# Patient Record
Sex: FEMALE | Race: WHITE | Employment: UNEMPLOYED | URBAN - METROPOLITAN AREA
[De-identification: names, ages, dates, MRNs, and addresses within clinical notes are randomized per-mention and may not be internally consistent; named-entity substitution may affect disease eponyms.]

---

## 2017-01-26 ENCOUNTER — OFFICE VISIT (OUTPATIENT)
Dept: INTERNAL MEDICINE CLINIC | Age: 52
End: 2017-01-26

## 2017-01-26 VITALS
HEART RATE: 64 BPM | SYSTOLIC BLOOD PRESSURE: 126 MMHG | BODY MASS INDEX: 40.34 KG/M2 | OXYGEN SATURATION: 96 % | HEIGHT: 66 IN | DIASTOLIC BLOOD PRESSURE: 91 MMHG | WEIGHT: 251 LBS | TEMPERATURE: 97.9 F | RESPIRATION RATE: 12 BRPM

## 2017-01-26 DIAGNOSIS — R06.83 SNORING: ICD-10-CM

## 2017-01-26 DIAGNOSIS — E78.00 HIGH CHOLESTEROL: ICD-10-CM

## 2017-01-26 DIAGNOSIS — K63.5 COLON POLYPS: ICD-10-CM

## 2017-01-26 DIAGNOSIS — E03.9 ACQUIRED HYPOTHYROIDISM: ICD-10-CM

## 2017-01-26 DIAGNOSIS — R07.89 OTHER CHEST PAIN: ICD-10-CM

## 2017-01-26 DIAGNOSIS — F32.89 OTHER DEPRESSION: ICD-10-CM

## 2017-01-26 DIAGNOSIS — I10 ESSENTIAL HYPERTENSION: ICD-10-CM

## 2017-01-26 DIAGNOSIS — Z23 ENCOUNTER FOR IMMUNIZATION: Primary | ICD-10-CM

## 2017-01-26 PROBLEM — G47.00 INSOMNIA: Status: ACTIVE | Noted: 2017-01-26

## 2017-01-26 PROBLEM — J45.909 ASTHMA: Status: ACTIVE | Noted: 2017-01-26

## 2017-01-26 RX ORDER — LOVASTATIN 10 MG/1
TABLET ORAL
Refills: 1 | COMMUNITY
Start: 2016-12-20 | End: 2017-04-21 | Stop reason: SDUPTHER

## 2017-01-26 RX ORDER — BUPROPION HYDROCHLORIDE 100 MG/1
TABLET, EXTENDED RELEASE ORAL
Refills: 3 | COMMUNITY
Start: 2016-12-17 | End: 2017-01-26 | Stop reason: ALTCHOICE

## 2017-01-26 NOTE — MR AVS SNAPSHOT
Visit Information Date & Time Provider Department Dept. Phone Encounter #  
 1/26/2017  1:15 PM José Miguel Mcnally [facility] [address] [phone] [id number] Follow-up Instructions Return in about 3 months (around 4/26/2017) for BP and labs . Your Appointments 3/17/2017  1:00 PM  
ACUTE CARE with MD [facility]) Appt Note: well woman cp 0 sb 3/16/16  
 [address] [id number] [address] [place] [address] [address] [address] Upcoming Health Maintenance Date Due Hepatitis C Screening 1965 INFLUENZA AGE 9 TO ADULT 8/1/2016 PAP AKA CERVICAL CYTOLOGY 11/25/2017 COLONOSCOPY 2/1/2018 BREAST CANCER SCRN MAMMOGRAM 4/5/2018 DTaP/Tdap/Td series (2 - Td) 10/5/2022 Allergies as of 1/26/2017  Review Complete On: 1/26/2017 By: Karena Call LPN Severity Noted Reaction Type Reactions Codeine  10/13/2010    Other (comments) Pcn [Penicillins]  10/13/2010    Other (comments) Current Immunizations  Reviewed on 10/29/2013 Name Date Influenza Vaccine 10/17/2013 Influenza Vaccine (Quad) PF  Incomplete Pneumococcal Vaccine (Unspecified Type) 10/17/2013 Not reviewed this visit You Were Diagnosed With   
  
 Codes Comments Encounter for immunization    -  Primary ICD-10-CM: B19 ICD-9-CM: V03.89 Snoring     ICD-10-CM: R06.83 
ICD-9-CM: 786.09 Colon polyps     ICD-10-CM: K63.5 ICD-9-CM: 211.3 Essential hypertension     ICD-10-CM: I10 
ICD-9-CM: 401.9 Other chest pain     ICD-10-CM: R07.89 ICD-9-CM: 786.59 Elevated BP     ICD-10-CM: I10 
ICD-9-CM: 401.9 Other depression     ICD-10-CM: F32.89 Vitals BP Pulse Temp Resp Height(growth percentile) Weight(growth percentile)  (!) 126/91 (BP 1 Location: Left arm, BP Patient Position: Sitting) 64 97.9 °F (36.6 °C) (Oral) 12 5' 6\" (1.676 m) 251 lb (113.9 kg) LMP SpO2 BMI OB Status Smoking Status 06/22/2009 96% 40.51 kg/m2 Hysterectomy Former Smoker BMI and BSA Data Body Mass Index Body Surface Area 40.51 kg/m 2 2.3 m 2 Preferred Pharmacy Pharmacy Name Phone [id number] [address] [place] [address] [phone] Your Updated Medication List  
  
   
This list is accurate as of: 1/26/17  2:16 PM.  Always use your most recent med list.  
  
  
  
  
 albuterol 2.5 mg /3 mL (0.083 %) nebulizer solution Commonly known as:  PROVENTIL VENTOLIN  
  
 albuterol 90 mcg/actuation inhaler Commonly known as:  Avis Portal Take 2 Puffs by inhalation every six (6) hours as needed. escitalopram oxalate 20 mg tablet Commonly known as:  Arther Putty TAKE 1 TABLET BY MOUTH EVERY DAY  
  
 lovastatin 10 mg tablet Commonly known as:  MEVACOR  
TAKE 1 TABLET BY MOUTH EVERY DAY  
  
 QVAR 40 mcg/actuation inhaler Generic drug:  beclomethasone Take 1 Puff by inhalation two (2) times daily as needed. SINGULAIR 10 mg tablet Generic drug:  montelukast  
Take 10 mg by mouth daily. SYNTHROID 100 mcg tablet Generic drug:  levothyroxine  
  
 traZODone 150 mg tablet Commonly known as:  DESYREL  
300 mg. Takes prn patient states she \"hardly takes it\" We Performed the Following AMB SUPPLY ORDER [[id number] Custom] Comments:  
 Check your blood pressure 5 times per week and record readings. INFLUENZA VIRUS VAC QUAD,SPLIT,PRESV FREE SYRINGE 3/> YRS IM [id number] CPT(R)] TN IMMUNIZ ADMIN,1 SINGLE/COMB VAC/TOXOID [id number] CPT(R)] REFERRAL FOR COLONOSCOPY [YDT787 Custom] Comments:  
 Needs coonoscopy followed for polyps every 3 years REFERRAL TO CARDIOLOGY [[id number] Custom] Comments:  
 Chest pain SLEEP MEDICINE REFERRAL [[id number] Custom] Comments:  
 Suspect SUN Follow-up Instructions Return in about 3 months (around 4/26/2017) for BP and labs . Referral Information Referral ID Referred By Referred To  
  
 [id number] APPA Derek Vazquez, [address] [facility] [address] Phone: [phone] Fax: [phone] Visits Status Start Date End Date 1 New Request 1/26/17 1/26/18 If your referral has a status of pending review or denied, additional information will be sent to support the outcome of this decision. Referral ID Referred By Referred To  
 [id number] [address] [facility], [address] [facility] [address] [place] [address],[address], [facility] 23 Visits Status Start Date End Date 1 New Request 1/26/17 1/26/18 If your referral has a status of pending review or denied, additional information will be sent to support the outcome of this decision. Referral ID Referred By Referred To  
 [id number] APPA Ewing Frankel, [address] [facility] [address] Phone: [phone] Fax: [phone] Visits Status Start Date End Date 1 New Request 1/26/17 1/26/18 If your referral has a status of pending review or denied, additional information will be sent to support the outcome of this decision. Patient Instructions Insomnia: Care Instructions Your Care Instructions Insomnia is the inability to sleep well. It is a common problem for most people at some time. Insomnia may make it hard for you to get to sleep, stay asleep, or sleep as long as you need to. This can make you tired and grouchy during the day. It can also make you forgetful, less effective at work, and unhappy. Insomnia can be caused by conditions such as depression or anxiety. Pain can also affect your ability to sleep.  When these problems are solved, the insomnia usually clears up. But sometimes bad sleep habits can cause insomnia. If insomnia is affecting your work or your enjoyment of life, you can take steps to improve your sleep. Follow-up care is a key part of your treatment and safety. Be sure to make and go to all appointments, and call your doctor if you are having problems. It's also a good idea to know your test results and keep a list of the medicines you take. How can you care for yourself at home? What to avoid · Do not have drinks with caffeine, such as coffee or black tea, for 8 hours before bed. · Do not smoke or use other types of tobacco near bedtime. Nicotine is a stimulant and can keep you awake. · Avoid drinking alcohol late in the evening, because it can cause you to wake in the middle of the night. · Do not eat a big meal close to bedtime. If you are hungry, eat a light snack. · Do not drink a lot of water close to bedtime, because the need to urinate may wake you up during the night. · Do not read or watch TV in bed. Use the bed only for sleeping and sexual activity. What to try · Go to bed at the same time every night, and wake up at the same time every morning. Do not take naps during the day. · Keep your bedroom quiet, dark, and cool. · Sleep on a comfortable pillow and mattress. · If watching the clock makes you anxious, turn it facing away from you so you cannot see the time. · If you worry when you lie down, start a worry book. Well before bedtime, write down your worries, and then set the book and your concerns aside. · Try meditation or other relaxation techniques before you go to bed. · If you cannot fall asleep, get up and go to another room until you feel sleepy. Do something relaxing. Repeat your bedtime routine before you go to bed again. · Make your house quiet and calm about an hour before bedtime.  Turn down the lights, turn off the TV, log off the computer, and turn down the volume on music. This can help you relax after a busy day. When should you call for help? Watch closely for changes in your health, and be sure to contact your doctor if: 
· Your efforts to improve your sleep do not work. · Your insomnia gets worse. · You have been feeling down, depressed, or hopeless or have lost interest in things that you usually enjoy. Where can you learn more? Go to http://olivier-akil.info/. Enter P513 in the search box to learn more about \"Insomnia: Care Instructions. \" Current as of: July 26, 2016 Content Version: 11.1 © 9418-2675 Exclusively.in. Care instructions adapted under license by [employer] (which disclaims liability or warranty for this information). If you have questions about a medical condition or this instruction, always ask your healthcare professional. [address] any warranty or liability for your use of this information. 
 
--------------------------------------------- Trial of melatonin OTC - take the disintegrating type at bedtime.  
------------------------------------- Normal blood pressure is 125/80 or less. Record BP readings. Introducing [facility]! Dear Richi Esteban: Thank you for requesting a ONTRAPORT account. Our records indicate that you already have an active ONTRAPORT account. You can access your account anytime at [email or web address] Did you know that you can access your hospital and ER discharge instructions at any time in ONTRAPORT? You can also review all of your test results from your hospital stay or ER visit. Additional Information If you have questions, please visit the Frequently Asked Questions section of the ONTRAPORT website at [email or web address] Remember, ONTRAPORT is NOT to be used for urgent needs. For medical emergencies, dial 911. Now available from your iPhone and Android! Please provide this summary of care documentation to your next provider. Your primary care clinician is listed as GUNNER Chang. If you have any questions after today's visit, please call [phone].

## 2017-01-26 NOTE — PROGRESS NOTES
Ms. Caitlyn Reyes is a new patient who is here to establish care. Had to change PCP due to insurance. Establish Care (new patient )     depression: patient was started [facility] after hysterectomy. Mood is stable. But has significant  Insomnia. Has energy during the day and enjoys activities and family. Patient stopped taking Wellbutrin on her own and requesting to have it taken off from her med list.     Insomnia: having trouble sleeping for years. Takes trazodone 300mg to sleeps but that makes her too sleepy during the day also. She has tried taking lower dose and it doesn't work. Has tried benadryl. Asthma: takes QVAR daily. Albuterol as needed. Also on singulair. Never intubated for asthma. Ashtma is well controlled     Up to date on Omnidrone - has appointment with  [address] [facility] [address] [facility] pap smears and orders mammogram      Chest pain occasionally: substernal. Lasts seconds. Not exertional can happen at rest. \" Shapr pain\". No dyspnea, no orthopnea and no PND. Patient is concerned due to strong family. Hx. Takes lovastatin daily and hap lipid panel checked in November. Requesting to see a cardiologist.   Strong family hx of CAD - sister had CAD and had MI last. Father had heart attack in his early 46s, brother had heart attack at 50. Chronic knee pain and back pain : 2 surgeries in Knee and back and followed by ortho. Not on medication for pain.          Review of systems:  Constitutional: negative for fever, chills, weight loss, night sweats   Eyes : negative for vision changes, eye pain and discharge  Nose and Throat: negative for tinnitus, sore throat   Cardiovascular: positive for occasional chest pain, no palpitations and shortness of breath  Respiratory: negative for shortness of breath, cough and wheezing   Gastroinstestinal: negative for abdominal pain, nausea, vomiting, diarrhea, constipation, and blood in the stool  Musculoskeletal: negative for back ache and joint ache Genitourinary: negative for dysuria, nocturia, polyuria and hematuria   Neurologic: Negative for focal weakness, numbness or incoordination  Skin: negative for rash, pruritus  Hematologic: negative for easy bruising      Past Medical History   Diagnosis Date    Asthma     Chronic pain      LOWER BACK    Hypercholesteremia     Psychiatric disorder         Past Surgical History   Procedure Laterality Date    Hx breast biopsy       Left    Renal stent      Hx orthopaedic       thumb surgery    Hx orthopaedic       left knee surgery    Hx back surgery       L5 & S 1    Pr removal of kidney stone      Hx hysterectomy  6/22/09     LSH LSO    Hx tubal ligation         Allergies   Allergen Reactions    Codeine Other (comments)    Pcn [Penicillins] Other (comments)       Current Outpatient Prescriptions on File Prior to Visit   Medication Sig Dispense Refill    SYNTHROID 100 mcg tablet   7    traZODone (DESYREL) 150 mg tablet 300 mg. Takes prn patient states she \"hardly takes it\"  5    albuterol (PROVENTIL VENTOLIN) 2.5 mg /3 mL (0.083 %) nebulizer solution   1    escitalopram oxalate (LEXAPRO) 20 mg tablet TAKE 1 TABLET BY MOUTH EVERY DAY 30 Tab 4    beclomethasone (QVAR) 40 mcg/actuation inhaler Take 1 Puff by inhalation two (2) times daily as needed.  albuterol (PROVENTIL, VENTOLIN) 90 mcg/Actuation inhaler Take 2 Puffs by inhalation every six (6) hours as needed.  montelukast (SINGULAIR) 10 mg tablet Take 10 mg by mouth daily. No current facility-administered medications on file prior to visit. family history includes Cancer in her sister; Cancer (age of onset: 54) in her mother; Elevated Lipids in her brother, father, sister, sister, and sister; Heart Disease in her brother, father, sister, sister, and sister; Hypertension in her brother and father. There is no history of Anesth Problems.     Social History     Social History    Marital status: SINGLE     Spouse name: N/A   Alin Villaseñor Number of children: N/A    Years of education: N/A     Occupational History    Not on file. Social History Main Topics    Smoking status: Former Smoker     Packs/day: 1.00     Years: 25.00    Smokeless tobacco: Never Used    Alcohol use Yes      Comment: occasionally    Drug use: No    Sexual activity: Yes     Partners: Male     Birth control/ protection: Surgical     Other Topics Concern    Not on file     Social History Narrative       Visit Vitals    BP (!) 126/91 (BP 1 Location: Left arm, BP Patient Position: Sitting)    Pulse 64    Temp 97.9 °F (36.6 °C) (Oral)    Resp 12    Ht 5' 6\" (1.676 m)    Wt 251 lb (113.9 kg)    LMP 06/22/2009    SpO2 96%    BMI 40.51 kg/m2     General:  Well appearing female no acute distress  HEENT:   PERRL,normal conjunctiva. External ear and canals normal, TMs normal.  Hearing normal to voice. Nose without edema or discharge, normal septum. Lips, teeth, gums normal.  Oropharynx: no erythema, no exudates, no lesions, normal tongue. Neck:  Supple. Large neck circum fence. Thyroid normal size, nontender, without nodules. No carotid bruit. No masses or lymphadenopathy  Respiratory: no respiratory distress,  no wheezing, no rhonchi, no rales. No chest wall tenderness. Cardiovascular:  RRR, normal S1S2, no murmur. Gastrointestinal: normal bowel sounds, soft, nontender, without masses. No hepatosplenomegaly. Extremities +2 pulses, no edema, normal sensation   Musculoskeletal:  Normal gait. Normal digits and nails. Normal strength and tone, no atrophy, and no abnormal movement. Skin:  No rash, no lesions, no ulcers. Skin warm, normal turgor, without induration or nodules. Neuro:  A and OX4, fluent speech, cranial nerves normal 2-12. Sensation normal to light touch.   DTR symmetrical  Psych:  Normal affect      Lab Results   Component Value Date/Time    WBC 4.5 11/02/2014 01:00 PM    Hemoglobin (POC) 12.9 10/28/2013 03:40 PM    HGB 12.8 11/02/2014 01:00 PM Hematocrit (POC) 38 10/28/2013 03:40 PM    HCT 40.5 11/02/2014 01:00 PM    PLATELET 413 49/45/3580 01:00 PM    MCV 97.8 11/02/2014 01:00 PM     Lab Results   Component Value Date/Time    Sodium 140 11/02/2014 01:00 PM    Potassium 4.1 11/02/2014 01:00 PM    Chloride 104 11/02/2014 01:00 PM    CO2 29 11/02/2014 01:00 PM    Anion gap 7 11/02/2014 01:00 PM    Glucose 88 11/02/2014 01:00 PM    BUN 9 11/02/2014 01:00 PM    Creatinine 1.03 11/02/2014 01:00 PM    BUN/Creatinine ratio 9 11/02/2014 01:00 PM    GFR est AA >60 11/02/2014 01:00 PM    GFR est non-AA 57 11/02/2014 01:00 PM    Calcium 9.0 11/02/2014 01:00 PM     Patient to bring in labs done in November 2016. EKG showed NSR with normal ST segments. Assessment and Plan:    44 yo woman with a hx of obesity, depression, hypothyroidism and asthma presenting to establish care    Other chest pain: description is atypical, non exertional lasts seconds. However patient is obese and has strong family hx of CAD.   - REFERRAL TO CARDIOLOGY - patient will likely need stress test   - AMB POC EKG ROUTINE W/ 12 LEADS, INTER & REP       Snoring/ insomnia: large neck circumference suspect SUN  - SLEEP MEDICINE REFERRAL  - trial of melatonin at night time  - continue trazodone as needed     Colon polyps hx. Patient is due for colonoscopy this year. No Bleeding or change in bowel habits   On 03/14 - 2017 - update  I received records from previous colonoscopy done in 2014 which showed tubular adenoma with hyperplastic polyp - no mention of time to follow up -  Already given referral for colonoscopy   - REFERRAL FOR COLONOSCOPY       Elevated BP: BP is high today.  On previous visits in system BP is normal - possibly due to office visit   - AMB SUPPLY ORDER for BP cuff at home - advised to take BP and send us measurements via my chart or next apt     depression  - stable continue lexapro  - pt discontinued wellbutrin      Obesity: counseled on weight loss      High cholesterol : on lovastatin had recent cholesterol check pending results sent over     MIld intermittent Asthma: stable continue QVAR and singulair and PRN albuterol     In Addition to above patient was seen for immunizations    . Encounter for immunization  - Influenza virus vaccine (QUADRIVALENT PRES FREE SYRINGE) IM 3 years and older  - ND IMMUNIZ ADMIN,1 SINGLE/COMB VAC/TOXOID    Patient to send copy of recent  labs     Follow-up Disposition:  Return in about 3 months (around 4/26/2017) for BP and labs .    Received records   Labs done om Nove 2016   normal BMP and LFTs, normal CBC   Cholesterol last chedked in 08/2016 which showed  and HDL is 86   Triglycerides 100     TSH normal in 08/2016    Sher Zapata MD

## 2017-01-26 NOTE — PROGRESS NOTES
Reviewed record in preparation for visit and have obtained necessary documentation. Identified pt with two pt identifiers(name and ). Chief Complaint   Patient presents with   [employer] Establish Care     new patient        Health Maintenance Due   Topic Date Due    Hepatitis C Screening  1965    DTaP/Tdap/Td series (1 - Tdap) 1985    INFLUENZA AGE 9 TO ADULT  2016       Ms. Pam Houston has a reminder for a \"due or due soon\" health maintenance. I have asked that she discuss health maintenance topic(s) due with Her  primary care provider. Coordination of Care Questionnaire:  :     1) Have you been to an emergency room, urgent care clinic since your last visit? no   Hospitalized since your last visit? no             2) Have you seen or consulted any other health care providers outside of [address] [place] [address] since your last visit? no  (Include any pap smears or colon screenings in this section.)      Patient is accompanied by self I have received verbal consent from Misti Reeves to discuss any/all medical information while they are present in the room.

## 2017-01-26 NOTE — PATIENT INSTRUCTIONS

## 2017-02-27 ENCOUNTER — TELEPHONE (OUTPATIENT)
Dept: INTERNAL MEDICINE CLINIC | Age: 52
End: 2017-02-27

## 2017-02-27 NOTE — TELEPHONE ENCOUNTER
Pt requesting that you make her a colonoscopy appt at the imaging center on [address] [facility]. Pt states she doesn't need to go to a consult as she has had one before. Please call pt.

## 2017-02-27 NOTE — TELEPHONE ENCOUNTER
Spoke with patient after verifying name and  regarding colonoscopy appointment. Patient stated that would like Dr. Antonina Hamlin to schedule her an appointment to complete her colonoscopy. Writer explained to patient that Dr. Antonina Hamlin doesn't call and schedule the appointment but the patient does. Patient given an opportunity to ask questions, repeated information, and verbalized understanding.

## 2017-03-03 ENCOUNTER — TELEPHONE (OUTPATIENT)
Dept: INTERNAL MEDICINE CLINIC | Age: 52
End: 2017-03-03

## 2017-03-03 NOTE — TELEPHONE ENCOUNTER
Spoke with patient after verifying name and  regarding diverticulitis. Patient stated she had diagnosis of diverticulitis on 2014 by Dr. Duane Deluca. Per chart review, no hx of diverticulitis. Patient stated that she will drop off  the paperwork from Dr. Duane Deluca office today. Patient stated she is having a flare-up and would like a antibiotic called into her local pharmacy. Will Discuss with On-Call provider. Patient given an opportunity to ask questions, repeated information, and verbalized understanding.

## 2017-03-03 NOTE — TELEPHONE ENCOUNTER
Spoke with patient after verifying name and  regarding Dr. Lizbeth Deleon recommendations. Writer informed patient of Dr. Lizbeth Deleon recommendations. Patient given an opportunity to ask questions, repeated information, and verbalized understanding.

## 2017-03-03 NOTE — TELEPHONE ENCOUNTER
Pt called and states that they are needing a call back today in regards to getting a medication called in for her diverticulitis being inflamed. Please call pt to advise.

## 2017-03-03 NOTE — TELEPHONE ENCOUNTER
Discussed with Dr. Elise Pritchard. Dr. Elise Pritchard reviewed the paperwork and stated the patient needs to see UC to be evaluated with an abdominal exam.

## 2017-03-03 NOTE — TELEPHONE ENCOUNTER
Patient states she's calling to advise she just dropped off her medical records to Dr. Benoit Rodriguez at the  that was needed. Please call if any questions.  Thank you

## 2017-03-17 ENCOUNTER — HOSPITAL ENCOUNTER (OUTPATIENT)
Dept: LAB | Age: 52
Discharge: HOME | End: 2017-03-17
Payer: MEDICARE

## 2017-03-17 ENCOUNTER — OFFICE VISIT (OUTPATIENT)
Dept: INTERNAL MEDICINE CLINIC | Age: 52
End: 2017-03-17

## 2017-03-17 VITALS
TEMPERATURE: 98 F | DIASTOLIC BLOOD PRESSURE: 89 MMHG | WEIGHT: 254 LBS | BODY MASS INDEX: 40.82 KG/M2 | HEIGHT: 66 IN | HEART RATE: 69 BPM | SYSTOLIC BLOOD PRESSURE: 149 MMHG | RESPIRATION RATE: 18 BRPM | OXYGEN SATURATION: 99 %

## 2017-03-17 DIAGNOSIS — K57.32 DIVERTICULITIS OF LARGE INTESTINE WITHOUT PERFORATION OR ABSCESS WITHOUT BLEEDING: ICD-10-CM

## 2017-03-17 DIAGNOSIS — J01.00 ACUTE NON-RECURRENT MAXILLARY SINUSITIS: Primary | ICD-10-CM

## 2017-03-17 DIAGNOSIS — I10 ESSENTIAL HYPERTENSION: ICD-10-CM

## 2017-03-17 DIAGNOSIS — Z11.59 NEED FOR HEPATITIS C SCREENING TEST: ICD-10-CM

## 2017-03-17 PROCEDURE — 80053 COMPREHEN METABOLIC PANEL: CPT

## 2017-03-17 PROCEDURE — 86803 HEPATITIS C AB TEST: CPT

## 2017-03-17 PROCEDURE — 85025 COMPLETE CBC W/AUTO DIFF WBC: CPT

## 2017-03-17 PROCEDURE — 36415 COLL VENOUS BLD VENIPUNCTURE: CPT

## 2017-03-17 RX ORDER — LEVOFLOXACIN 500 MG/1
500 TABLET, FILM COATED ORAL DAILY
Qty: 14 TAB | Refills: 0 | Status: SHIPPED | OUTPATIENT
Start: 2017-03-17 | End: 2017-03-24

## 2017-03-17 RX ORDER — BUPROPION HYDROCHLORIDE 100 MG/1
TABLET, EXTENDED RELEASE ORAL
Refills: 3 | COMMUNITY
Start: 2017-03-02 | End: 2017-03-24

## 2017-03-17 RX ORDER — CYCLOBENZAPRINE HCL 5 MG
TABLET ORAL
Refills: 1 | COMMUNITY
Start: 2016-12-20 | End: 2017-03-24

## 2017-03-17 RX ORDER — CELECOXIB 200 MG/1
CAPSULE ORAL
Refills: 5 | COMMUNITY
Start: 2016-12-31 | End: 2017-03-24

## 2017-03-17 RX ORDER — METRONIDAZOLE 500 MG/1
500 TABLET ORAL 3 TIMES DAILY
Qty: 42 TAB | Refills: 0 | Status: SHIPPED | OUTPATIENT
Start: 2017-03-17 | End: 2017-03-24

## 2017-03-17 NOTE — MR AVS SNAPSHOT
Visit Information Date & Time Provider Department Dept. Phone Encounter #  
 3/17/2017  3:30 PM [place], [place] [address] [place] [address],[address] [phone] [id number] Follow-up Instructions Return in about 4 weeks (around 4/14/2017) for BP pressure and fasting cholesterol . Your Appointments 3/17/2017  3:30 PM  
ACUTE CARE with MD FARZAD Mcnally [facility]) Appt Note: Sinus Infection symptoms//  
 [facility] [address] [address]  
[phone]  
  
   
 [facility] [address] [facility]  [address] [facility]  
  
    
 3/20/2017  2:20 PM  
New Patient with Deshawn Guerrero MD  
[address] [facility] [place] ([facility]) Appt Note: NP_ ref by Dr Estevan Jackson witnessed apneas_ Medicare + Medicaid; r/s due to physician emergency [address] [place] [address], [address] [phone] [address] [place] [address], [address] [address]  
  
    
 3/24/2017  1:15 PM  
ROUTINE CARE with Babar Herrera MD  
[facility]) Appt Note: well woman cp 0 sb 3/16/16; well woman cp 0 sb 3/16/16  
 [address] [facility]. [address] [phone] [address] [place] [address] 4/27/2017  3:30 PM  
ROUTINE CARE with MD FARZAD Mcnally [facility]) Appt Note: 3 mon f/u for bp and labs [facility] [address] [phone]  
[address] [facility]  [address] [facility] Upcoming Health Maintenance Date Due Hepatitis C Screening 1965 PAP AKA CERVICAL CYTOLOGY 11/25/2017 COLONOSCOPY 2/1/2018 BREAST CANCER SCRN MAMMOGRAM 4/5/2018 DTaP/Tdap/Td series (2 - Td) 10/5/2022 Allergies as of 3/17/2017  Review Complete On: 3/17/2017 By: Karie Schwab Severity Noted Reaction Type Reactions Codeine  10/13/2010    Other (comments) Pcn [Penicillins]  10/13/2010    Other (comments) Current Immunizations  Reviewed on 1/26/2017 Name Date Influenza Vaccine 10/17/2013 Influenza Vaccine (Quad) PF 1/26/2017 Pneumococcal Vaccine (Unspecified Type) 10/17/2013 Not reviewed this visit You Were Diagnosed With   
  
 Codes Comments Acute non-recurrent maxillary sinusitis    -  Primary ICD-10-CM: J01.00 ICD-9-CM: 461.0 Diverticulitis of large intestine without perforation or abscess without bleeding     ICD-10-CM: K57.32 
ICD-9-CM: 562.11 Vitals BP Pulse Temp Resp Height(growth percentile) Weight(growth percentile) 149/89 (BP 1 Location: Left arm, BP Patient Position: Sitting) 69 98 °F (36.7 °C) (Oral) 18 5' 6\" (1.676 m) 254 lb (115.2 kg) LMP SpO2 BMI OB Status Smoking Status 06/22/2009 99% 41 kg/m2 Hysterectomy Former Smoker BMI and BSA Data Body Mass Index Body Surface Area 41 kg/m 2 2.32 m 2 Preferred Pharmacy Pharmacy Name Phone [id number] [address] [place] [address] [phone] Your Updated Medication List  
  
   
This list is accurate as of: 3/17/17  3:27 PM.  Always use your most recent med list.  
  
  
  
  
 albuterol 2.5 mg /3 mL (0.083 %) nebulizer solution Commonly known as:  PROVENTIL VENTOLIN  
  
 albuterol 90 mcg/actuation inhaler Commonly known as:  Raúl Has Take 2 Puffs by inhalation every six (6) hours as needed. buPROPion  mg SR tablet Commonly known as:  WELLBUTRIN SR  
TAKE 1 TABLET BY MOUTH 2 TIMES A DAY  
  
 celecoxib 200 mg capsule Commonly known as:  CELEBREX  
TAKE 1 CAPSULE BY MOUTH DAILY  
  
 cyclobenzaprine 5 mg tablet Commonly known as:  FLEXERIL  
TAKE 1 TABLET BY MOUTH AT BEDTIME AS NEEDED  
  
 escitalopram oxalate 20 mg tablet Commonly known as:  Lima Carroll TAKE 1 TABLET BY MOUTH EVERY DAY  
  
 levoFLOXacin 500 mg tablet Commonly known as:  Rue Rafael Take 1 Tab by mouth daily. lovastatin 10 mg tablet Commonly known as:  MEVACOR  
TAKE 1 TABLET BY MOUTH EVERY DAY  
  
 metroNIDAZOLE 500 mg tablet Commonly known as:  FLAGYL Take 1 Tab by mouth three (3) times daily. QVAR 40 mcg/actuation Honey Generic drug:  beclomethasone Take 1 Puff by inhalation two (2) times daily as needed. SINGULAIR 10 mg tablet Generic drug:  montelukast  
Take 10 mg by mouth daily. SYNTHROID 100 mcg tablet Generic drug:  levothyroxine Prescriptions Sent to Pharmacy Refills  
 levoFLOXacin (LEVAQUIN) 500 mg tablet 0 Sig: Take 1 Tab by mouth daily. Class: Normal  
 Pharmacy: [address] [place] [address] [address] [place] [address] Ph #: [phone] Route: Oral  
 metroNIDAZOLE (FLAGYL) 500 mg tablet 0 Sig: Take 1 Tab by mouth three (3) times daily. Class: Normal  
 Pharmacy: [address] [place] [address] [address] [place] [address] Ph #: [phone] Route: Oral  
  
We Performed the Following CBC WITH AUTOMATED DIFF [76570 CPT(R)] METABOLIC PANEL, COMPREHENSIVE [37265 CPT(R)] Follow-up Instructions Return in about 4 weeks (around 4/14/2017) for BP pressure and fasting cholesterol . Patient Instructions Learning About Diverticulosis and Diverticulitis What are diverticulosis and diverticulitis? In diverticulosis and diverticulitis, pouches called diverticula form in the wall of the large intestine, or colon. · In diverticulosis, the pouches do not cause any pain or other symptoms. · In diverticulitis, the pouches get inflamed or infected and cause symptoms. Doctors aren't sure what causes these pouches in the colon. But they think that a low-fiber diet may play a role.  Without fiber to add bulk to the stool, the colon has to work harder than normal to push the stool forward. The pressure from this may cause pouches to form in weak spots along the colon. Some people with diverticulosis get diverticulitis. But experts don't know why this happens. What are the symptoms? · In diverticulosis, most people don't have symptoms. But pouches sometimes bleed. · In diverticulitis, symptoms may last from a few hours to a week or more. They include: ¨ Belly pain. This is usually in the lower left side. It is sometimes worse when you move. This is the most common symptom. ¨ Fever and chills. ¨ Bloating and gas. ¨ Diarrhea or constipation. ¨ Nausea and sometimes vomiting. ¨ Not feeling like eating. How can you prevent these problems? You may be able to lower your chance of getting diverticulitis. You can do this by taking steps to prevent constipation. · Eat fruits, vegetables, beans, and whole grains every day. These foods are high in fiber. · Drink plenty of fluids (enough so that your urine is light yellow or clear like water). If you have kidney, heart, or liver disease and have to limit fluids, talk with your doctor before you increase the amount of fluids you drink. · Get at least 30 minutes of exercise on most days of the week. Walking is a good choice. You also may want to do other activities, such as running, swimming, cycling, or playing tennis or team sports. · Take a fiber supplement, such as Citrucel or Metamucil, every day if needed. Read and follow all instructions on the label. · Schedule time each day for a bowel movement. Having a daily routine may help. Take your time and do not strain when having a bowel movement. Some people avoid nuts, seeds, berries, and popcorn. They believe that these foods might get trapped in the diverticula and cause pain. But there is no proof that these foods cause diverticulitis or make it worse. How are these problems treated? · The best way to treat diverticulosis is to avoid constipation. (See the tips above.) · Treatment for diverticulitis includes antibiotics and often a change in your diet. You may need only liquids at first. Your doctor may suggest pain medicines for pain or belly cramps. In some cases, surgery may be needed. Follow-up care is a key part of your treatment and safety. Be sure to make and go to all appointments, and call your doctor if you are having problems. It's also a good idea to know your test results and keep a list of the medicines you take. Where can you learn more? Go to http://olivier-akil.info/. Enter F752 in the search box to learn more about \"Learning About Diverticulosis and Diverticulitis. \" Current as of: August 9, 2016 Content Version: 11.1 © 4243-2685 [employer]. Care instructions adapted under license by [employer] (which disclaims liability or warranty for this information). If you have questions about a medical condition or this instruction, always ask your healthcare professional. [address] any warranty or liability for your use of this information. Introducing [facility]! Dear Giacomo Gaviria: Thank you for requesting a [employer] account. Our records indicate that you already have an active [employer] account. You can access your account anytime at https://[employer]. [employer] Did you know that you can access your hospital and ER discharge instructions at any time in [employer]? You can also review all of your test results from your hospital stay or ER visit. Additional Information If you have questions, please visit the Frequently Asked Questions section of the [employer] website at https://[employer]. [employer]/. Remember, [employer] is NOT to be used for urgent needs. For medical emergencies, dial 911. Now available from your iPhone and Android! Please provide this summary of care documentation to your next provider. Your primary care clinician is listed as GUNNER Chang. If you have any questions after today's visit, please call [phone].

## 2017-03-17 NOTE — PROGRESS NOTES
Chief Complaint   Patient presents with    Sinus Infection     x5 days      1. Have you been to the ER, urgent care clinic since your last visit? Hospitalized since your last visit? No    2. Have you seen or consulted any other health care providers outside of the [address] [place] [address] since your last visit? Include any pap smears or colon screening.  No

## 2017-03-17 NOTE — PROGRESS NOTES
CC: Sinus Infection (x5 days )  and abdominal pain     HPI:    She is a 45 y.o. female who presents for evaluation of sinus pain/ thinks has infection tara bdominla pain     sinusinits symptoms: complains of pain 5 days in maxillary sinus, sinus pressure and fullness, subjective fever, chills. Throat also with pain. Yellow/green discharge. Took teraflu with some improvement     Left lower abdominal pain: has hx of diverticulitisDiverticulitis X 3 had colonoscopy in the past.  Notes pain in left lower abdomen sharp 5/10 comes and goes for past 4-5 days also tried to make apt this Wednesday and unable to. Denies nausea and vomiting and tolerating PO.         ROS:  12 systems reviewed and negative other than HPI     Past Medical History:   Diagnosis Date    Asthma     Chronic pain     LOWER BACK    Colon polyps     Hypercholesteremia     Psychiatric disorder        Current Outpatient Prescriptions on File Prior to Visit   Medication Sig Dispense Refill    lovastatin (MEVACOR) 10 mg tablet TAKE 1 TABLET BY MOUTH EVERY DAY  1    SYNTHROID 100 mcg tablet   7    albuterol (PROVENTIL VENTOLIN) 2.5 mg /3 mL (0.083 %) nebulizer solution   1    escitalopram oxalate (LEXAPRO) 20 mg tablet TAKE 1 TABLET BY MOUTH EVERY DAY 30 Tab 4    beclomethasone (QVAR) 40 mcg/actuation inhaler Take 1 Puff by inhalation two (2) times daily as needed.  albuterol (PROVENTIL, VENTOLIN) 90 mcg/Actuation inhaler Take 2 Puffs by inhalation every six (6) hours as needed.  montelukast (SINGULAIR) 10 mg tablet Take 10 mg by mouth daily. No current facility-administered medications on file prior to visit.         Past Surgical History:   Procedure Laterality Date    HX BACK SURGERY      L5 & S 1    HX BREAST BIOPSY      Left    HX [facility]CT[facility]  6/22/09    [facility]    HX ORTHOPAEDIC      thumb surgery    HX ORTHOPAEDIC      left knee surgery    HX TUBAL LIGATION      REMOVAL OF KIDNEY STONE      RENAL STENT Family History   Problem Relation Age of Onset    Cancer Mother 54     Lung cancer    Heart Disease Father     Hypertension Father     Elevated Lipids Father     Elevated Lipids Sister     Heart Disease Brother     Hypertension Brother     Elevated Lipids Brother     Elevated Lipids Sister     Elevated Lipids Sister     Heart Disease Sister     Heart Disease Sister     Heart Disease Sister     Cancer Sister      colon cancer with liver mets    Anesth Problems Neg Hx      Reviewed and no changes     Social History     Social History    Marital status: SINGLE     Spouse name: N/A    Number of children: N/A    Years of education: N/A     Occupational History    Not on file.      Social History Main Topics    Smoking status: Former Smoker     Packs/day: 1.00     Years: 25.00    Smokeless tobacco: Never Used    Alcohol use Yes      Comment: occasionally    Drug use: No    Sexual activity: Yes     Partners: Male     Birth control/ protection: Surgical     Other Topics Concern    Not on file     Social History Narrative            Visit Vitals    /89 (BP 1 Location: Left arm, BP Patient Position: Sitting)    Pulse 69    Temp 98 °F (36.7 °C) (Oral)    Resp 18    Ht 5' 6\" (1.676 m)    Wt 254 lb (115.2 kg)    LMP 06/22/2009    SpO2 99%    BMI 41 kg/m2       Physical Examination:   General - Well appearing female  HEENT - PERRL, TM no erythema/opacification, boggy nasal turbinates, oropharynx no erythema or exudate, MMM   Pain to palpation on maxillary sinus   Neck - supple, no bruits, no TMG, no LAD  Pulm - clear to auscultation bilaterally  Cardio - RRR, normal S1 S2, no murmur gallops or rubs  Abd - soft, tenderness on LLQ, no masses, no HSM no rebound  Extrem - no edema, +2 distal pulses  Psych - normal affect, appropriate mood    Lab Results   Component Value Date/Time    WBC 4.5 11/02/2014 01:00 PM    Hemoglobin (POC) 12.9 10/28/2013 03:40 PM    HGB 12.8 11/02/2014 01:00 PM Hematocrit (POC) 38 10/28/2013 03:40 PM    HCT 40.5 11/02/2014 01:00 PM    PLATELET 189 18/60/3866 01:00 PM    MCV 97.8 11/02/2014 01:00 PM     Lab Results   Component Value Date/Time    Sodium 140 11/02/2014 01:00 PM    Potassium 4.1 11/02/2014 01:00 PM    Chloride 104 11/02/2014 01:00 PM    CO2 29 11/02/2014 01:00 PM    Anion gap 7 11/02/2014 01:00 PM    Glucose 88 11/02/2014 01:00 PM    BUN 9 11/02/2014 01:00 PM    Creatinine 1.03 11/02/2014 01:00 PM    BUN/Creatinine ratio 9 11/02/2014 01:00 PM    GFR est AA >60 11/02/2014 01:00 PM    GFR est non-AA 57 11/02/2014 01:00 PM    Calcium 9.0 11/02/2014 01:00 PM     No results found for: CHOL, CHOLPOCT, CHOLX, CHLST, CHOLV, HDL, HDLPOC, LDL, LDLCPOC, NLDLCT, DLDL, LDLC, DLDLP, VLDLC, VLDL, TGL, TGLX, TRIGL, [id number], TRIGP, TGLPOCT, CHHD, CHHDX  No results found for: TSH, TSH2, TSH3, TSHP, TSHEXT  No results found for: PSA, PSA2, PSAR1, PSA1, PSAR2, PSA3, PSAR3, [id number], [id number], PSALT  No results found for: HBA1C, HGBE8, EZF4NGGN, XNJ6ZHBX  No results found for: LILLI Cutler    Lab Results   Component Value Date/Time    ALT (SGPT) 25 11/02/2014 01:00 PM    AST (SGOT) 24 11/02/2014 01:00 PM    Alk. phosphatase 91 11/02/2014 01:00 PM    Bilirubin, total 0.3 11/02/2014 01:00 PM      Received records   Labs done om Nove 2016  normal BMP and LFTs, normal CBC   Cholesterol last chedked in 08/2016 which showed  and HDL is 86   Triglycerides 100      TSH normal in 08/2016     Assessment/Plan:    1. Acute non-recurrent maxillary sinusitis  - symptosm for 5 days with pain on maxillary sinus to touch. Pen allergy. - levoFLOXacin (LEVAQUIN) 500 mg tablet; Take 1 Tab by mouth daily. Dispense: 14 Tab; Refill: 0    2. Diverticulitis of large intestine without perforation or abscess without bleeding  - symptoms of diverticulitis with pain on LLQ on exam. Will treat empirically. - levoFLOXacin (LEVAQUIN) 500 mg tablet;  Take 1 Tab by mouth daily.  Dispense: 14 Tab; Refill: 0  - metroNIDAZOLE (FLAGYL) 500 mg tablet; Take 1 Tab by mouth three (3) times daily. Dispense: 42 Tab; Refill: 0  - CBC WITH AUTOMATED DIFF  - METABOLIC PANEL, COMPREHENSIVE  - patient is due for colonoscopy in September 2017 for previous tubular adenoma. Advised to see her GI       3. Snoring: has sleep study scheduled    4. Elevated BP - elevated in todays visit again  - patient is checking BP at home and forgor to bring in values  - follow up in onem month and if elevated will start patient on BP medication   - BMP today     5.  HM  Has womens exam already scheduled for this month and will have mammogram   Colonoscopy due in Sep 2017  Hep C screening today   Follow-up Disposition: Not on Eulalio MD Emerita

## 2017-03-17 NOTE — PATIENT INSTRUCTIONS
Learning About Diverticulosis and Diverticulitis  What are diverticulosis and diverticulitis? In diverticulosis and diverticulitis, pouches called diverticula form in the wall of the large intestine, or colon. · In diverticulosis, the pouches do not cause any pain or other symptoms. · In diverticulitis, the pouches get inflamed or infected and cause symptoms. Doctors aren't sure what causes these pouches in the colon. But they think that a low-fiber diet may play a role. Without fiber to add bulk to the stool, the colon has to work harder than normal to push the stool forward. The pressure from this may cause pouches to form in weak spots along the colon. Some people with diverticulosis get diverticulitis. But experts don't know why this happens. What are the symptoms? · In diverticulosis, most people don't have symptoms. But pouches sometimes bleed. · In diverticulitis, symptoms may last from a few hours to a week or more. They include:  ¨ Belly pain. This is usually in the lower left side. It is sometimes worse when you move. This is the most common symptom. ¨ Fever and chills. ¨ Bloating and gas. ¨ Diarrhea or constipation. ¨ Nausea and sometimes vomiting. ¨ Not feeling like eating. How can you prevent these problems? You may be able to lower your chance of getting diverticulitis. You can do this by taking steps to prevent constipation. · Eat fruits, vegetables, beans, and whole grains every day. These foods are high in fiber. · Drink plenty of fluids (enough so that your urine is light yellow or clear like water). If you have kidney, heart, or liver disease and have to limit fluids, talk with your doctor before you increase the amount of fluids you drink. · Get at least 30 minutes of exercise on most days of the week. Walking is a good choice. You also may want to do other activities, such as running, swimming, cycling, or playing tennis or team sports.   · Take a fiber supplement, such as Citrucel or Metamucil, every day if needed. Read and follow all instructions on the label. · Schedule time each day for a bowel movement. Having a daily routine may help. Take your time and do not strain when having a bowel movement. Some people avoid nuts, seeds, berries, and popcorn. They believe that these foods might get trapped in the diverticula and cause pain. But there is no proof that these foods cause diverticulitis or make it worse. How are these problems treated? · The best way to treat diverticulosis is to avoid constipation. (See the tips above.)  · Treatment for diverticulitis includes antibiotics and often a change in your diet. You may need only liquids at first. Your doctor may suggest pain medicines for pain or belly cramps. In some cases, surgery may be needed. Follow-up care is a key part of your treatment and safety. Be sure to make and go to all appointments, and call your doctor if you are having problems. It's also a good idea to know your test results and keep a list of the medicines you take. Where can you learn more? Go to http://olivier-akil.info/. Enter R273 in the search box to learn more about \"Learning About Diverticulosis and Diverticulitis. \"  Current as of: August 9, 2016  Content Version: 11.1  © 0546-4905 [employer]. Care instructions adapted under license by [employer] (which disclaims liability or warranty for this information). If you have questions about a medical condition or this instruction, always ask your healthcare professional. [place] [address] any warranty or liability for your use of this information.

## 2017-03-18 LAB
ALBUMIN SERPL-MCNC: 4.2 G/DL (ref 3.5–5.5)
ALBUMIN/GLOB SERPL: 1.6 {RATIO} (ref 1.2–2.2)
ALP SERPL-CCNC: 72 IU/L (ref 39–117)
ALT SERPL-CCNC: 19 IU/L (ref 0–32)
AST SERPL-CCNC: 20 IU/L (ref 0–40)
BASOPHILS # BLD AUTO: 0 X10E3/UL (ref 0–0.2)
BASOPHILS NFR BLD AUTO: 1 %
BILIRUB SERPL-MCNC: 0.3 MG/DL (ref 0–1.2)
BUN SERPL-MCNC: 7 MG/DL (ref 6–24)
BUN/CREAT SERPL: 7 (ref 9–23)
CALCIUM SERPL-MCNC: 9.3 MG/DL (ref 8.7–10.2)
CHLORIDE SERPL-SCNC: 97 MMOL/L (ref 96–106)
CO2 SERPL-SCNC: 25 MMOL/L (ref 18–29)
CREAT SERPL-MCNC: 0.97 MG/DL (ref 0.57–1)
EOSINOPHIL # BLD AUTO: 0.1 X10E3/UL (ref 0–0.4)
EOSINOPHIL NFR BLD AUTO: 4 %
ERYTHROCYTE [DISTWIDTH] IN BLOOD BY AUTOMATED COUNT: 13.3 % (ref 12.3–15.4)
GLOBULIN SER CALC-MCNC: 2.6 G/DL (ref 1.5–4.5)
GLUCOSE SERPL-MCNC: 93 MG/DL (ref 65–99)
HCT VFR BLD AUTO: 42.5 % (ref 34–46.6)
HCV AB S/CO SERPL IA: 0.1 S/CO RATIO (ref 0–0.9)
HGB BLD-MCNC: 13.6 G/DL (ref 11.1–15.9)
IMM GRANULOCYTES # BLD: 0 X10E3/UL (ref 0–0.1)
IMM GRANULOCYTES NFR BLD: 0 %
LYMPHOCYTES # BLD AUTO: 1.2 X10E3/UL (ref 0.7–3.1)
LYMPHOCYTES NFR BLD AUTO: 33 %
MCH RBC QN AUTO: 31.7 PG (ref 26.6–33)
MCHC RBC AUTO-ENTMCNC: 32 G/DL (ref 31.5–35.7)
MCV RBC AUTO: 99 FL (ref 79–97)
MONOCYTES # BLD AUTO: 0.2 X10E3/UL (ref 0.1–0.9)
MONOCYTES NFR BLD AUTO: 7 %
NEUTROPHILS # BLD AUTO: 2 X10E3/UL (ref 1.4–7)
NEUTROPHILS NFR BLD AUTO: 55 %
PLATELET # BLD AUTO: 238 X10E3/UL (ref 150–379)
POTASSIUM SERPL-SCNC: 4.9 MMOL/L (ref 3.5–5.2)
PROT SERPL-MCNC: 6.8 G/DL (ref 6–8.5)
RBC # BLD AUTO: 4.29 X10E6/UL (ref 3.77–5.28)
SODIUM SERPL-SCNC: 140 MMOL/L (ref 134–144)
WBC # BLD AUTO: 3.7 X10E3/UL (ref 3.4–10.8)

## 2017-03-20 ENCOUNTER — OFFICE VISIT (OUTPATIENT)
Dept: SLEEP MEDICINE | Age: 52
End: 2017-03-20

## 2017-03-20 ENCOUNTER — HOSPITAL ENCOUNTER (OUTPATIENT)
Dept: SLEEP MEDICINE | Age: 52
Discharge: HOME | End: 2017-03-20
Payer: MEDICARE

## 2017-03-20 VITALS
HEIGHT: 66 IN | SYSTOLIC BLOOD PRESSURE: 139 MMHG | OXYGEN SATURATION: 92 % | HEART RATE: 70 BPM | BODY MASS INDEX: 40.34 KG/M2 | DIASTOLIC BLOOD PRESSURE: 86 MMHG | TEMPERATURE: 97.9 F | WEIGHT: 251 LBS

## 2017-03-20 DIAGNOSIS — F51.05 HYPOSOMNIA, INSOMNIA, OR SLEEPLESSNESS ASSOCIATED WITH ANXIETY: ICD-10-CM

## 2017-03-20 DIAGNOSIS — F41.9 HYPOSOMNIA, INSOMNIA, OR SLEEPLESSNESS ASSOCIATED WITH ANXIETY: ICD-10-CM

## 2017-03-20 DIAGNOSIS — E66.01 OBESITY, MORBID, BMI 40.0-49.9 (HCC): ICD-10-CM

## 2017-03-20 DIAGNOSIS — G47.33 OSA (OBSTRUCTIVE SLEEP APNEA): Primary | ICD-10-CM

## 2017-03-20 PROCEDURE — 95806 SLEEP STUDY UNATT&RESP EFFT: CPT

## 2017-03-20 RX ORDER — FAMOTIDINE 20 MG/1
20 TABLET, FILM COATED ORAL 2 TIMES DAILY
COMMUNITY
End: 2017-03-24

## 2017-03-20 NOTE — PROGRESS NOTES
Notify patient that her blood counts are normal, kidney function is normal, Hep C screening is negative.

## 2017-03-20 NOTE — PROGRESS NOTES
Spoke with the pt about lab results . Lab results are as follows:  blood counts are normal, kidney function is normal, Hep C screening is negative.

## 2017-03-20 NOTE — PROGRESS NOTES
[address] [facility] [place]. [place], [address]  Tel.  [phone]  Fax. 100 [facility] 60  [place], [address] [facility]  Tel.  [phone]  Fax. [phone] [address]   Tel.  [phone]  Fax. [phone]         Subjective:      Rudean Simmonds is an 45 y.o. female referred for evaluation for a sleep disorder. She complains of awakening in the middle of the night because of SOB associated with difficulty maintaining sleep. Symptoms began a few years ago, gradually worsening since that time. She usually can fall asleep in 30 minutes. Family or house members note snoring, choking, periods of not breathing. She denies completely or partially paralyzed while falling asleep or waking up. Rudean Simmonds does wake up frequently at night. She is bothered by waking up too early and left unable to get back to sleep. She actually sleeps about 3 hours at night and wakes up about 7 times during the night. She   work shifts: Aury De Los Santos indicates she does get too little sleep at night. Her bedtime is  . She awakens at  . She does take naps. She takes 5 naps a week lasting 45 min to an hour. She has the following observed behaviors: Pauses in breathing;  . Other remarks: waking with a gasp or snort    [place] Sleepiness Score: 3   which reflect mild daytime drowsiness. Allergies   Allergen Reactions    Codeine Other (comments)    Pcn [Penicillins] Other (comments)         Current Outpatient Prescriptions:     famotidine (PEPCID) 20 mg tablet, Take 20 mg by mouth two (2) times a day., Disp: , Rfl:     levoFLOXacin (LEVAQUIN) 500 mg tablet, Take 1 Tab by mouth daily. , Disp: 14 Tab, Rfl: 0    metroNIDAZOLE (FLAGYL) 500 mg tablet, Take 1 Tab by mouth three (3) times daily. , Disp: 42 Tab, Rfl: 0    lovastatin (MEVACOR) 10 mg tablet, TAKE 1 TABLET BY MOUTH EVERY DAY, Disp: , Rfl: 1    SYNTHROID 100 mcg tablet, , Disp: , Rfl: 7    albuterol (PROVENTIL VENTOLIN) 2.5 mg /3 mL (0.083 %) nebulizer solution, , Disp: , Rfl: 1    escitalopram oxalate (LEXAPRO) 20 mg tablet, TAKE 1 TABLET BY MOUTH EVERY DAY, Disp: 30 Tab, Rfl: 4    beclomethasone (QVAR) 40 mcg/actuation inhaler, Take 1 Puff by inhalation two (2) times daily as needed. , Disp: , Rfl:     albuterol (PROVENTIL, VENTOLIN) 90 mcg/Actuation inhaler, Take 2 Puffs by inhalation every six (6) hours as needed. , Disp: , Rfl:     montelukast (SINGULAIR) 10 mg tablet, Take 10 mg by mouth daily. , Disp: , Rfl:     buPROPion SR (WELLBUTRIN SR) 100 mg SR tablet, TAKE 1 TABLET BY MOUTH 2 TIMES A DAY, Disp: , Rfl: 3    cyclobenzaprine (FLEXERIL) 5 mg tablet, TAKE 1 TABLET BY MOUTH AT BEDTIME AS NEEDED, Disp: , Rfl: 1    celecoxib (CELEBREX) 200 mg capsule, TAKE 1 CAPSULE BY MOUTH DAILY, Disp: , Rfl: 5     She  has a past medical history of Asthma; Chronic pain; Colon polyps; Hypercholesteremia; and Psychiatric disorder. She  has a past surgical history that includes breast biopsy; renal stent; orthopaedic; orthopaedic; back surgery; removal of kidney stone; hysterectomy (6/22/09); and tubal ligation. She family history includes Cancer in her sister; Cancer (age of onset: 54) in her mother; Elevated Lipids in her brother, father, sister, sister, and sister; Heart Disease in her brother, father, sister, sister, and sister; Hypertension in her brother and father. There is no history of Anesth Problems. She  reports that she has quit smoking. She has a 25.00 pack-year smoking history. She has never used smokeless tobacco. She reports that she drinks alcohol. She reports that she does not use illicit drugs. Review of Systems:  Constitutional:  No significant weight loss or weight gain. Eyes:  No blurred vision.   CVS:  No significant chest pain  Pulm:  No significant shortness of breath  GI:  No significant nausea or vomiting  :  No significant nocturia  Musculoskeletal:  No significant joint pain at night  Skin:  No significant rashes  Neuro:  No significant dizziness   Psych:  anxiety    Sleep Review of Systems: notable for + difficulty falling asleep; frequent awakenings at night;  irregular dreaming noted; no nightmares ; no early morning headaches; no memory problems; no concentration issues; no history of any automobile or occupational accidents due to daytime drowsiness. Objective:     Visit Vitals    /86    Pulse 70    Temp 97.9 °F (36.6 °C)    Ht 5' 6\" (1.676 m)    Wt 251 lb (113.9 kg)    LMP 06/22/2009    SpO2 92%    BMI 40.51 kg/m2         General:   Not in acute distress   Eyes:  Anicteric sclerae, no obvious strabismus   Nose:  No obvious nasal septum deviation    Oropharynx:   Class 3 oropharyngeal outlet, thick tongue base,low-lying soft palate, narrow tonsilo-pharyngeal pilars   Tonsils:   tonsils are unappreciated   Neck:   Neck circ. in \"inches\": 15.5; midline trachea   Chest/Lungs:  Equal lung expansion, clear on auscultation    CVS:  Normal rate, regular rhythm; no JVD   Skin:  Warm to touch; no obvious rashes   Neuro:  No focal deficits ; no obvious tremor    Psych:  Normal affect,  normal countenance;          Assessment:       ICD-10-CM ICD-9-CM    1. SUN (obstructive sleep apnea) G47.33 327.23    2. Hyposomnia, insomnia, or sleeplessness associated with anxiety F51.05 293.84     F41.9 327.02    3. Obesity, morbid, BMI 40.0-49.9 ([facility]) E66.01 278.01          Plan:     * The patient currently has a Moderate Risk for having sleep apnea. STOP-BANG score 5.  * PSG was ordered for initial evaluation. * She was provided information on sleep apnea including coresponding risk factors and the importance of proper treatment. * Counseling was provided regarding proper sleep hygiene and safe driving. * We'll call her with test results. * She is not opposed to CPAP therapy if positive for sleep apnea. * The problem of recurrent insomnia is discussed.  Avoidance of caffeine sources is strongly encouraged. Sleep hygiene issues are reviewed. The use of sedative hypnotics for temporary relief may be appropriate for a short 2-3 week course; we discussed the addictive nature of these drugs and counseled her on stress mangement. I have reviewed/discussed the above normal BMI with the patient. I have recommended the following interventions: dietary management education, guidance, and counseling . The plan is as follows: I have counseled this patient on diet and exercise regimens. .    Thank you for allowing us to participate in your patient's medical care. We'll keep you updated on these investigations.     Vianney Beavers M.D.  (electronically signed)  Diplomate in Sleep Medicine, [facility]

## 2017-03-20 NOTE — PATIENT INSTRUCTIONS
217 [facility] [place], [address]  Tel.  [phone]  Fax. 100 [facility] 60  [place], [address] [facility]  Tel.  [phone]  Fax. [phone] [address]  Tel.  [phone]  Fax. [phone]     Sleep Apnea: After Your Visit  Your Care Instructions  Sleep apnea occurs when you frequently stop breathing for 10 seconds or longer during sleep. It can be mild to severe, based on the number of times per hour that you stop breathing or have slowed breathing. Blocked or narrowed airways in your nose, mouth, or throat can cause sleep apnea. Your airway can become blocked when your throat muscles and tongue relax during sleep. Sleep apnea is common, occurring in 1 out of 20 individuals. Individuals having any of the following characteristics should be evaluated and treated right away due to high risk and detrimental consequences from untreated sleep apnea:  1. Obesity  2. Congestive Heart failure  3. Atrial Fibrillation  4. Uncontrolled Hypertension  5. Type II Diabetes  6. Night-time Arrhythmias  7. Stroke  8. Pulmonary Hypertension  9. High-risk Driving Populations (pilots, truck drivers, etc.)  10. Patients Considering Weight-loss Surgery    How do you know you have sleep apnea? You probably have sleep apnea if you answer 'yes' to 3 or more of the following questions:  S - Have you been told that you Snore? T - Are you often Tired during the day? O - Has anyone Observed you stop breathing while sleeping? P- Do you have (or are being treated for) high blood Pressure? B - Are you obese (Body Mass Index > 35)? A - Is your Age 48years old or older? N - Is your Neck size greater than 16 inches? G - Are you male Gender? A sleep physician can prescribe a breathing device that prevents tissues in the throat from blocking your airway.  Or your doctor may recommend using a dental device (oral breathing device) to help keep your airway open. In some cases, surgery may be needed to remove enlarged tissues in the throat. Follow-up care is a key part of your treatment and safety. Be sure to make and go to all appointments, and call your doctor if you are having problems. It's also a good idea to know your test results and keep a list of the medicines you take. How can you care for yourself at home? · Lose weight, if needed. It may reduce the number of times you stop breathing or have slowed breathing. · Go to bed at the same time every night. · Sleep on your side. It may stop mild apnea. If you tend to roll onto your back, sew a pocket in the back of your pajama top. Put a tennis ball into the pocket, and stitch the pocket shut. This will help keep you from sleeping on your back. · Avoid alcohol and medicines such as sleeping pills and sedatives before bed. · Do not smoke. Smoking can make sleep apnea worse. If you need help quitting, talk to your doctor about stop-smoking programs and medicines. These can increase your chances of quitting for good. · Prop up the head of your bed 4 to 6 inches by putting bricks under the legs of the bed. · Treat breathing problems, such as a stuffy nose, caused by a cold or allergies. · Use a continuous positive airway pressure (CPAP) breathing machine if lifestyle changes do not help your apnea and your doctor recommends it. The machine keeps your airway from closing when you sleep. · If CPAP does not help you, ask your doctor whether you should try other breathing machines. A bilevel positive airway pressure machine has two types of air pressureâone for breathing in and one for breathing out. Another device raises or lowers air pressure as needed while you breathe. · If your nose feels dry or bleeds when using one of these machines, talk with your doctor about increasing moisture in the air. A humidifier may help.   · If your nose is runny or stuffy from using a breathing machine, talk with your doctor about using decongestants or a corticosteroid nasal spray. When should you call for help? Watch closely for changes in your health, and be sure to contact your doctor if:  · You still have sleep apnea even though you have made lifestyle changes. · You are thinking of trying a device such as CPAP. · You are having problems using a CPAP or similar machine. Where can you learn more? Go to Spyder Lynk. Enter K643 in the search box to learn more about \"Sleep Apnea: After Your Visit. \"   © 2719-2135 Healthwise, [employer]. Care instructions adapted under license by Kristen Urrutia (which disclaims liability or warranty for this information). This care instruction is for use with your licensed healthcare professional. If you have questions about a medical condition or this instruction, always ask your healthcare professional. Cha Benavides any warranty or liability for your use of this information. PROPER SLEEP HYGIENE    What to avoid  · Do not have drinks with caffeine, such as coffee or black tea, for 8 hours before bed. · Do not smoke or use other types of tobacco near bedtime. Nicotine is a stimulant and can keep you awake. · Avoid drinking alcohol late in the evening, because it can cause you to wake in the middle of the night. · Do not eat a big meal close to bedtime. If you are hungry, eat a light snack. · Do not drink a lot of water close to bedtime, because the need to urinate may wake you up during the night. · Do not read or watch TV in bed. Use the bed only for sleeping and sexual activity. What to try  · Go to bed at the same time every night, and wake up at the same time every morning. Do not take naps during the day. · Keep your bedroom quiet, dark, and cool. · Get regular exercise, but not within 3 to 4 hours of your bedtime. .  · Sleep on a comfortable pillow and mattress.   · If watching the clock makes you anxious, turn it facing away from you so you cannot see the time. · If you worry when you lie down, start a worry book. Well before bedtime, write down your worries, and then set the book and your concerns aside. · Try meditation or other relaxation techniques before you go to bed. · If you cannot fall asleep, get up and go to another room until you feel sleepy. Do something relaxing. Repeat your bedtime routine before you go to bed again. · Make your house quiet and calm about an hour before bedtime. Turn down the lights, turn off the TV, log off the computer, and turn down the volume on music. This can help you relax after a busy day. Drowsy Driving  The [address] [place] [address] Road Traffic Safety Administration cites drowsiness as a causing factor in more than 141,667 police reported crashes annually, resulting in 76,000 injuries and 1,500 deaths. Other surveys suggest 55% of people polled have driven while drowsy in the past year, 23% had fallen asleep but not crashed, 3% crashed, and 2% had and accident due to drowsy driving. Who is at risk? Young Drivers: One study of drowsy driving accidents states that 55% of the drivers were under 25 years. Of those, 75% were male. Shift Workers and Travelers: People who work overnight or travel across time zones frequently are at higher risk of experiencing Circadian Rhythm Disorders. They are trying to work and function when their body is programed to sleep. Sleep Deprived: Lack of sleep has a serious impact on your ability to pay attention or focus on a task. Consistently getting less than the average of 8 hours your body needs creates partial or cumulative sleep deprivation. Untreated Sleep Disorders: Sleep Apnea, Narcolepsy, R.L.S., and other sleep disorders (untreated) prevent a person from getting enough restful sleep. This leads to excessive daytime sleepiness and increases the risk for drowsy driving accidents by up to 7 times.   Medications / Alcohol: Even over the counter medications can cause drowsiness. Medications that impair a drivers attention should have a warning label. Alcohol naturally makes you sleepy and on its own can cause accidents. Combined with excessive drowsiness its effects are amplified. Signs of Drowsy Driving:   * You don't remember driving the last few miles   * You may drift out of your bowen   * You are unable to focus and your thoughts wander   * You may yawn more often than normal   * You have difficulty keeping your eyes open / nodding off   * Missing traffic signs, speeding, or tailgating  Prevention-   Good sleep hygiene, lifestyle and behavioral choices have the most impact on drowsy driving. There is no substitute for sleep and the average person requires 8 hours nightly. If you find yourself driving drowsy, stop and sleep. Consider the sleep hygiene tips provided during your visit as well. Medication Refill Policy: Refills for all medications require 1 week advance notice. Please have your pharmacy fax a refill request. We are unable to fax, or call in \"controled substance\" medications and you will need to pick these prescriptions up from our office. My Own Med Activation    Thank you for requesting access to My Own Med. Please follow the instructions below to securely access and download your online medical record. My Own Med allows you to send messages to your doctor, view your test results, renew your prescriptions, schedule appointments, and more. How Do I Sign Up? 1. In your internet browser, go to [email or web address]. 2. Click on the First Time User? Click Here link in the Sign In box. You will see the New Member Sign Up page. 3. Enter your My Own Med Access Code exactly as it appears below. You will not need to use this code after youve completed the sign-up process. If you do not sign up before the expiration date, you must request a new code. My Own Med Access Code:  Activation code not generated  Current My Own Med Status: Active (This is the date your [employer] Private access code will )    4. Enter the last four digits of your Social Security Number (xxxx) and Date of Birth (mm/dd/yyyy) as indicated and click Submit. You will be taken to the next sign-up page. 5. Create a [employer] Private ID. This will be your [employer] Private login ID and cannot be changed, so think of one that is secure and easy to remember. 6. Create a [employer] Private password. You can change your password at any time. 7. Enter your Password Reset Question and Answer. This can be used at a later time if you forget your password. 8. Enter your e-mail address. You will receive e-mail notification when new information is available in [address] 9. Click Sign Up. You can now view and download portions of your medical record. 10. Click the Download Summary menu link to download a portable copy of your medical information. Additional Information    If you have questions, please call [phone]. Remember, [employer] Private is NOT to be used for urgent needs. For medical emergencies, dial 911. Starting a Weight Loss Plan: After Your Visit  Your Care Instructions  If you are thinking about losing weight, it can be hard to know where to start. Your doctor can help you set up a weight loss plan that best meets your needs. You may want to take a class on nutrition or exercise, or join a weight loss support group. If you have questions about how to make changes to your eating or exercise habits, ask your doctor about seeing a registered dietitian or an exercise specialist.  It can be a big challenge to lose weight. But you do not have to make huge changes at once. Make small changes, and stick with them. When those changes become habit, add a few more changes. If you do not think you are ready to make changes right now, try to pick a date in the future. Make an appointment to see your doctor to discuss whether the time is right for you to start a plan.   Follow-up care is a key part of your treatment and safety. Be sure to make and go to all appointments, and call your doctor if you are having problems. It's also a good idea to know your test results and keep a list of the medicines you take. How can you care for yourself at home? · Set realistic goals. Many people expect to lose much more weight than is likely. A weight loss of 5% to 10% of your body weight may be enough to improve your health. · Get family and friends involved to provide support. Talk to them about why you are trying to lose weight, and ask them to help. They can help by participating in exercise and having meals with you, even if they may be eating something different. · Find what works best for you. If you do not have time or do not like to cook, a program that offers meal replacement bars or shakes may be better for you. Or if you like to prepare meals, finding a plan that includes daily menus and recipes may be best.  · Ask your doctor about other health professionals who can help you achieve your weight loss goals. ¨ A dietitian can help you make healthy changes in your diet. ¨ An exercise specialist or  can help you develop a safe and effective exercise program.  ¨ A counselor or psychiatrist can help you cope with issues such as depression, anxiety, or family problems that can make it hard to focus on weight loss. · Consider joining a support group for people who are trying to lose weight. Your doctor can suggest groups in your area. Where can you learn more? Go to DIY.be  Enter U357 in the search box to learn more about \"Starting a Weight Loss Plan: After Your Visit. \"   © 9372-4016 Healthwise, Incorporated. Care instructions adapted under license by Clau Min (which disclaims liability or warranty for this information).  This care instruction is for use with your licensed healthcare professional. If you have questions about a medical condition or this instruction, always ask your healthcare professional. [place] [address] any warranty or liability for your use of this information. Content Version: 6.7.22872; Last Revised: September 1, 2009                   [address], [address]  Tel.  [phone]  Fax. 100 [facility] 60  [place], [address]  Tel.  [phone]  Fax. [phone] [address] [facility] [address]  Tel.  [phone]  Fax. [phone]       Insomnia: After Your Visit  Your Care Instructions  Insomnia is the inability to sleep well. It is a common problem for most people at some time. Insomnia may make it hard for you to get to sleep, stay asleep, or sleep as long as you need to. This can make you tired and grouchy during the day. It can also make you forgetful, less effective at work, and unhappy. Insomnia can be caused by conditions such as depression or anxiety. Pain can also affect your ability to sleep. When these problems are solved, the insomnia usually clears up. But sometimes bad sleep habits can cause insomnia. If insomnia is affecting your work or your enjoyment of life, you can take steps to improve your sleep. Follow-up care is a key part of your treatment and safety. Be sure to make and go to all appointments, and call your doctor if you are having problems. It's also a good idea to know your test results and keep a list of the medicines you take. How can you care for yourself at home? What to avoid  · Do not have drinks with caffeine, such as coffee or black tea, for 8 hours before bed. · Do not smoke or use other types of tobacco near bedtime. Nicotine is a stimulant and can keep you awake. · Avoid drinking alcohol late in the evening, because it can cause you to wake in the middle of the night. · Do not eat a big meal close to bedtime. If you are hungry, eat a light snack.   · Do not drink a lot of water close to bedtime, because the need to urinate may wake you up during the night. · Do not read or watch TV in bed. Use the bed only for sleeping and sexual activity. What to try  · Go to bed at the same time every night, and wake up at the same time every morning. Do not take naps during the day. · Keep your bedroom quiet, dark, and cool. · Get regular exercise, but not within 3 to 4 hours of your bedtime. .  · Sleep on a comfortable pillow and mattress. · If watching the clock makes you anxious, turn it facing away from you so you cannot see the time. · If you worry when you lie down, start a worry book. Well before bedtime, write down your worries, and then set the book and your concerns aside. · Try meditation or other relaxation techniques before you go to bed. · If you cannot fall asleep, get up and go to another room until you feel sleepy. Do something relaxing. Repeat your bedtime routine before you go to bed again. · Make your house quiet and calm about an hour before bedtime. Turn down the lights, turn off the TV, log off the computer, and turn down the volume on music. This can help you relax after a busy day. When should you call for help? Watch closely for changes in your health, and be sure to contact your doctor if:  · Your efforts to improve your sleep do not work. · Your insomnia gets worse. · You fall asleep during the day. Where can you learn more? Go to [employer].be  Enter P513 in the search box to learn more about \"Insomnia: After Your Visit. \"   © 3895-2261 Healthwise, Incorporated. Care instructions adapted under license by [facility] (which disclaims liability or warranty for this information). This care instruction is for use with your licensed healthcare professional. If you have questions about a medical condition or this instruction, always ask your healthcare professional. [place] [address] any warranty or liability for your use of this information. Content Version: 5.7.55357;  Last Revised: June 26, 2010    Drowsy Driving: The [employer] cites drowsiness as a causing factor in more than 247,235 police reported crashes annually, resulting in 76,000 injuries and 1,500 deaths. Other surveys suggest 55% of people polled have driven while drowsy in the past year, 23% had fallen asleep but not crashed, 3% crashed, and 2% had and accident due to drowsy driving. Who is at risk? Young Drivers: One study of drowsy driving accidents states that 55% of the drivers were under 25 years. Of those, 75% were male. Shift Workers and Travelers: People who work overnight or travel across time zones frequently are at higher risk of experiencing Circadian Rhythm Disorders. They are trying to work and function when their body is programed to sleep. Sleep Deprived: Lack of sleep has a serious impact on your ability to pay attention or focus on a task. Consistently getting less than the average of 8 hours your body needs creates partial or cumulative sleep deprivation. Untreated Sleep Disorders: Sleep Apnea, Narcolepsy, R.L.S., and other sleep disorders (untreated) prevent a person from getting enough restful sleep. This leads to excessive daytime sleepiness and increases the risk for drowsy driving accidents by up to 7 times. Medications / Alcohol: Even over the counter medications can cause drowsiness. Medications that impair a drivers attention should have a warning label. Alcohol naturally makes you sleepy and on its own can cause accidents. Combined with excessive drowsiness its effects are amplified.    Signs of Drowsy Driving:   * You don't remember driving the last few miles   * You may drift out of your bowen   * You are unable to focus and your thoughts wander   * You may yawn more often than normal   * You have difficulty keeping your eyes open / nodding off   * Missing traffic signs, speeding, or tailgating  Prevention-   Good sleep hygiene, lifestyle and behavioral choices have the most impact on drowsy driving. There is no substitute for sleep and the average person requires 8 hours nightly. If you find yourself driving drowsy, stop and sleep. Consider the sleep hygiene tips provided during your visit as well. Medication Refill Policy: Refills for all medications require 1 week advance notice. Please have your pharmacy fax a refill request. We are unable to fax, or call in \"controled substance\" medications and you will need to pick these prescriptions up from our office. Primesporthart Activation    Thank you for requesting access to Portr. Please follow the instructions below to securely access and download your online medical record. Portr allows you to send messages to your doctor, view your test results, renew your prescriptions, schedule appointments, and more. How Do I Sign Up?    11. In your internet browser, go to [email or web address]. 12. Click on the First Time User? Click Here link in the Sign In box. You will see the New Member Sign Up page. 15. Enter your Portr Access Code exactly as it appears below. You will not need to use this code after youve completed the sign-up process. If you do not sign up before the expiration date, you must request a new code. Portr Access Code: Activation code not generated  Current Portr Status: Active (This is the date your Portr access code will )    14. Enter the last four digits of your Social Security Number (xxxx) and Date of Birth (mm/dd/yyyy) as indicated and click Submit. You will be taken to the next sign-up page. 15. Create a DocbookMDt ID. This will be your Portr login ID and cannot be changed, so think of one that is secure and easy to remember. 12. Create a Portr password. You can change your password at any time. 16. Enter your Password Reset Question and Answer. This can be used at a later time if you forget your password. 25. Enter your e-mail address. You will receive e-mail notification when new information is available in [address] 19. Click Sign Up. You can now view and download portions of your medical record. 20. Click the Download Summary menu link to download a portable copy of your medical information. Additional Information    If you have questions, please call [phone]. Remember, [employer] is NOT to be used for urgent needs. For medical emergencies, dial 911.

## 2017-03-21 ENCOUNTER — TELEPHONE (OUTPATIENT)
Dept: SLEEP MEDICINE | Age: 52
End: 2017-03-21

## 2017-03-21 DIAGNOSIS — G47.33 OSA (OBSTRUCTIVE SLEEP APNEA): Primary | ICD-10-CM

## 2017-03-21 NOTE — TELEPHONE ENCOUNTER
HSAT Returned    Date of Study: 3/20/17    The following information was gathered from the patients study log:    · Lights off: 7:00 PM  · Estimated sleep onset: 7:30 PM    · Awakened a total of 2 times. · The patient felt they slept 6 hours. · Patient took nothing before starting the test.  · Sleep quality was same compared to a usual nights sleep.     Further information provided: N/A

## 2017-03-21 NOTE — TELEPHONE ENCOUNTER
217 [facility] [place]. [place], [address]  Tel.  [phone]  Fax. 100 [facility] 60  [place], [address] [facility]  Tel.  [phone]  Fax. [phone] [address]  Tel.  [phone]  Fax. [phone]   Polysomnogram was performed and the results of the study were explained to the patient. Please refer to interpretation report for further details. Apnea/Hypopnea index of 21 which indicates significant apnea. She continues to have snoring. * Treatment options were offered. She has elected to proceed with a positive airway pressure trial (CPAP). * We have written her PAP order  * PAP card download in 4 weeks. PAP clinic if adherence remains poor  * Counseling was provided regarding the importance of regular PAP use and on proper sleep hygiene and safe driving. Thank you for allowing to participate in your patient's medical care. We'll keep you updated on these investigations.     Petra Montoya M.D.  (electronically signed)  Diplomate in Sleep Medicine, [facility]

## 2017-03-22 ENCOUNTER — DOCUMENTATION ONLY (OUTPATIENT)
Dept: SLEEP MEDICINE | Age: 52
End: 2017-03-22

## 2017-03-22 ENCOUNTER — TELEPHONE (OUTPATIENT)
Dept: SLEEP MEDICINE | Age: 52
End: 2017-03-22

## 2017-03-22 NOTE — PROGRESS NOTES
Spoke to pt about Blood pressure. Let pt know that normal blood pressure is 120-130/80 and she is in pre-hypertension stage and does not warrant medication at this time. Zakiya Blake can continue to check her Blood pressure and if BP is 140/90 or higher we will start medication.    I recommend a low salt diet ( no more than 2grams daily) and regular exercise 30 minutes daily. Pt had no questions at this time.

## 2017-03-22 NOTE — TELEPHONE ENCOUNTER
Patient was made aware of DME company and was advised to contact our office if she had any questions or concerns and we would schedule a follow up visit.

## 2017-03-24 ENCOUNTER — TELEPHONE (OUTPATIENT)
Dept: INTERNAL MEDICINE CLINIC | Age: 52
End: 2017-03-24

## 2017-03-24 ENCOUNTER — OFFICE VISIT (OUTPATIENT)
Dept: SURGERY | Age: 52
End: 2017-03-24

## 2017-03-24 VITALS
DIASTOLIC BLOOD PRESSURE: 74 MMHG | HEIGHT: 66 IN | SYSTOLIC BLOOD PRESSURE: 123 MMHG | WEIGHT: 252 LBS | RESPIRATION RATE: 18 BRPM | HEART RATE: 57 BPM | OXYGEN SATURATION: 98 % | TEMPERATURE: 98.2 F | BODY MASS INDEX: 40.5 KG/M2

## 2017-03-24 DIAGNOSIS — Z01.419 ENCOUNTER FOR GYNECOLOGICAL EXAMINATION WITHOUT ABNORMAL FINDING: Primary | ICD-10-CM

## 2017-03-24 DIAGNOSIS — Z90.721 S/P LEFT OOPHORECTOMY: ICD-10-CM

## 2017-03-24 DIAGNOSIS — F51.01 PRIMARY INSOMNIA: ICD-10-CM

## 2017-03-24 DIAGNOSIS — Z90.711 HISTORY OF HYSTERECTOMY, SUPRACERVICAL: ICD-10-CM

## 2017-03-24 DIAGNOSIS — K57.30 DIVERTICULOSIS OF LARGE INTESTINE WITHOUT HEMORRHAGE: ICD-10-CM

## 2017-03-24 NOTE — MR AVS SNAPSHOT
Visit Information Date & Time Provider Department Dept. Phone Encounter #  
 3/24/2017  1:15 PM Usman Bedolla, [address] [facility] Surgical [address] [id number] Follow-up Instructions Return in about 1 year (around 3/24/2018), or if symptoms worsen or fail to improve. Your Appointments 4/14/2017  8:15 AM  
ACUTE CARE with Nichole Logan MD  
[facility] Salohans Gambino) Appt Note: 1 mon f/u  
 [place] [address]  
[address] [facility] [address] [facility] Upcoming Health Maintenance Date Due  
 PAP AKA CERVICAL CYTOLOGY 11/25/2017 COLONOSCOPY 2/1/2018 BREAST CANCER SCRN MAMMOGRAM 4/5/2018 DTaP/Tdap/Td series (2 - Td) 10/5/2022 Allergies as of 3/24/2017  Review Complete On: 3/24/2017 By: Usman Bedolla MD  
  
 Severity Noted Reaction Type Reactions Codeine  10/13/2010    Other (comments) Pcn [Penicillins]  10/13/2010    Other (comments) Current Immunizations  Reviewed on 1/26/2017 Name Date Influenza Vaccine 10/17/2013 Influenza Vaccine (Quad) PF 1/26/2017 Pneumococcal Vaccine (Unspecified Type) 10/17/2013 Not reviewed this visit You Were Diagnosed With   
  
 Codes Comments Encounter for gynecological examination without abnormal finding    -  Primary ICD-10-CM: O80.201 ICD-9-CM: V72.31 Diverticulosis of large intestine without hemorrhage     ICD-10-CM: K57.30 ICD-9-CM: 562.10 History of hysterectomy, supracervical     ICD-10-CM: Z90.711 ICD-9-CM: V88.02 S/P left oophorectomy     ICD-10-CM: M14.211 ICD-9-CM: V45.77 Primary insomnia     ICD-10-CM: F51.01 
ICD-9-CM: 307.42 Vitals BP Pulse Temp Resp Height(growth percentile) Weight(growth percentile) 123/74 (!) 57 98.2 °F (36.8 °C) (Oral) 18 5' 6\" (1.676 m) 252 lb (114.3 kg) LMP SpO2 BMI OB Status Smoking Status 06/22/2009 98% 40.67 kg/m2 Hysterectomy Former Smoker Vitals History BMI and BSA Data Body Mass Index Body Surface Area  
 40.67 kg/m 2 2.31 m 2 Preferred Pharmacy Pharmacy Name Phone [id number] [id number] [place] [address] [phone] Your Updated Medication List  
  
   
This list is accurate as of: 3/24/17  2:28 PM.  Always use your most recent med list.  
  
  
  
  
 albuterol 2.5 mg /3 mL (0.083 %) nebulizer solution Commonly known as:  PROVENTIL VENTOLIN  
  
 albuterol 90 mcg/actuation inhaler Commonly known as:  Jovanni Ma Take 2 Puffs by inhalation every six (6) hours as needed. escitalopram oxalate 20 mg tablet Commonly known as:  Karen Skates TAKE 1 TABLET BY MOUTH EVERY DAY  
  
 HYDROCODONE-ACETAMINOPHEN PO Take  by mouth.  
  
 lovastatin 10 mg tablet Commonly known as:  MEVACOR  
TAKE 1 TABLET BY MOUTH EVERY DAY  
  
 MELOXICAM PO Take  by mouth. QVAR 40 mcg/actuation [employer] Generic drug:  beclomethasone Take 1 Puff by inhalation two (2) times daily as needed. SINGULAIR 10 mg tablet Generic drug:  montelukast  
Take 10 mg by mouth daily. SYNTHROID 100 mcg tablet Generic drug:  levothyroxine Follow-up Instructions Return in about 1 year (around 3/24/2018), or if symptoms worsen or fail to improve. [facility] [place] [facility]! Dear Chuck Rodas: Thank you for requesting a Amitive account. Our records indicate that you already have an active Amitive account. You can access your account anytime at [email or web address] Did you know that you can access your hospital and ER discharge instructions at any time in Amitive? You can also review all of your test results from your hospital stay or ER visit. Additional Information If you have questions, please visit the Frequently Asked Questions section of the [employer] website at [email or web address] Remember, [employer] is NOT to be used for urgent needs. For medical emergencies, dial 911. Now available from your iPhone and Android! Please provide this summary of care documentation to your next provider. Your primary care clinician is listed as GUNNER Chang. If you have any questions after today's visit, please call [phone].

## 2017-03-24 NOTE — TELEPHONE ENCOUNTER
Patient states she needs a call back in reference to antibiotics prescribed causing diarrhea. Patient needs a call back to discuss as patient states she stopped taking both medications.  Please mesfin. Thank you

## 2017-03-24 NOTE — TELEPHONE ENCOUNTER
Spoke with patient. Advised it is important to finish antibiotics to treat diverticulitis. Diarrhea is a side effect not an allergy. She my take an anti-diarrheal medication and eat yogurt/ take probiotics, and complete full prescription. Patient also states she does have sleep apnea and will be using a cpap machine at night.

## 2017-03-24 NOTE — PROGRESS NOTES
SUBJECTIVE: Lilyan Severs is a 45 y.o. female who presents with desire for annual well woman exam. Patient's last menstrual period was 2009. Allergies   Allergen Reactions    Codeine Other (comments)    Pcn [Penicillins] Other (comments)      Past Medical History:   Diagnosis Date    Asthma     Chronic pain     LOWER BACK    Colon polyps     Hypercholesteremia     Psychiatric disorder      Past Surgical History:   Procedure Laterality Date    HX BACK SURGERY      L5 & S 1    HX BREAST BIOPSY      Left    HX HYSTERECTOMY  09    LSH LSO    HX ORTHOPAEDIC      thumb surgery    HX ORTHOPAEDIC      left knee surgery    HX TUBAL LIGATION      REMOVAL OF KIDNEY STONE      RENAL STENT       OB History    Grav Para Term  Abortions TAB SAB Ect Mult Living    4 3 3 0 1 0 0 1 0 3        Family History   Problem Relation Age of Onset    Cancer Mother 54     Lung cancer    Heart Disease Father     Hypertension Father    [facility] Elevated Lipids Father     Elevated Lipids Sister     Heart Disease Brother     Hypertension Brother     Elevated Lipids Brother     Elevated Lipids Sister     Elevated Lipids Sister     Heart Disease Sister     Heart Disease Sister     Heart Disease Sister     Cancer Sister      colon cancer with liver mets    Anesth Problems Neg Hx      Social History     Social History    Marital status:      Spouse name: N/A    Number of children: N/A    Years of education: N/A     Occupational History    Not on file.      Social History Main Topics    Smoking status: Former Smoker     Packs/day: 1.00     Years: 25.00    Smokeless tobacco: Never Used    Alcohol use Yes      Comment: occasionally    Drug use: No    Sexual activity: Yes     Partners: Male     Birth control/ protection: Surgical     Other Topics Concern    Not on file     Social History Narrative     Current Outpatient Prescriptions   Medication Sig Dispense Refill    HYDROCODONE-ACETAMINOPHEN PO Take  by mouth.  MELOXICAM PO Take  by mouth.  lovastatin (MEVACOR) 10 mg tablet TAKE 1 TABLET BY MOUTH EVERY DAY  1    SYNTHROID 100 mcg tablet   7    albuterol (PROVENTIL VENTOLIN) 2.5 mg /3 mL (0.083 %) nebulizer solution   1    escitalopram oxalate (LEXAPRO) 20 mg tablet TAKE 1 TABLET BY MOUTH EVERY DAY 30 Tab 4    beclomethasone (QVAR) 40 mcg/actuation inhaler Take 1 Puff by inhalation two (2) times daily as needed.  albuterol (PROVENTIL, VENTOLIN) 90 mcg/Actuation inhaler Take 2 Puffs by inhalation every six (6) hours as needed.  montelukast (SINGULAIR) 10 mg tablet Take 10 mg by mouth daily. Review of Systems:   Constitutional: No weight change, chills or fever, anorexia, weakness or sleep disturbance . Cardiovascular: No chest pain, shortness of breath, or palpitations . Respiratory: No cough, shortness of breath, hemoptysis, or orthopnea . Neurologic: No syncope, headaches or seizures . Hematologic: No easy bruising or unusual bleeding . Psychiatric: No insomnia, confusion, depression, or anxiety . GI:No nausea and vomiting, diarrhea or constipation  . : See HPI . Musculoskeletal: No joint pain or muscle pain . Endocrine: No polydipsia, polyuria, cold intolerance, excessive fatigue, or sleep disturbance . Integumentary: No breast pain, lumps, nipple discharge, or axillary lumps .     Objective:     Visit Vitals    /74    Pulse (!) 57    Temp 98.2 °F (36.8 °C) (Oral)    Resp 18    Ht 5' 6\" (1.676 m)    Wt 252 lb (114.3 kg)    LMP 06/22/2009    SpO2 98%    BMI 40.67 kg/m2       General:  alert, cooperative, no distress, appears stated age   Skin:  no rash or abnormalities   Eyes: negative   Mouth: MMM no lesions   Lymph Nodes:  Cervical, supraclavicular, and axillary nodes normal.   Breast Exam: normal appearance, no masses or tenderness    Lungs:  clear to auscultation bilaterally   Heart:  regular rate and rhythm   Abdomen: soft, non-tender. Bowel sounds normal. No masses,  no organomegaly   Back:  Costovertebral angle tenderness absent   Genitourinary: Pelvic exam: VULVA: normal appearing vulva with no masses, tenderness or lesions, VAGINA: normal appearing vagina with normal color and discharge, no lesions, CERVIX: normal appearing cervix without discharge or lesions, UTERUS: surgically absent, vaginal cuff well healed, ADNEXA: surgically absent left    Extremities:  extremities normal, atraumatic, no cyanosis or edema   Neurologic:  negative   Psychiatric:  non focal     ASSESSMENT:      ICD-10-CM ICD-9-CM    1. Encounter for gynecological examination without abnormal finding Z01.419 V72.31    2. Diverticulosis of large intestine without hemorrhage K57.30 562.10    3. History of hysterectomy, supracervical Z90.711 V88.02    4. S/P left oophorectomy Z90.721 V45.77    5. Primary insomnia F51.01 307.42         Follow-up Disposition:  Return in about 1 year (around 3/24/2018), or if symptoms worsen or fail to improve.

## 2017-04-05 ENCOUNTER — HOSPITAL ENCOUNTER (OUTPATIENT)
Dept: MRI IMAGING | Age: 52
Discharge: HOME | End: 2017-04-05
Attending: ORTHOPAEDIC SURGERY
Payer: MEDICARE

## 2017-04-05 DIAGNOSIS — M43.10 ACQUIRED SPONDYLOLISTHESIS: ICD-10-CM

## 2017-04-05 DIAGNOSIS — Z98.1 S/P LUMBAR FUSION: ICD-10-CM

## 2017-04-05 DIAGNOSIS — M51.36 DEGENERATION OF LUMBAR INTERVERTEBRAL DISC: ICD-10-CM

## 2017-04-05 PROCEDURE — 72148 MRI LUMBAR SPINE W/O DYE: CPT

## 2017-04-07 ENCOUNTER — HOSPITAL ENCOUNTER (OUTPATIENT)
Dept: MAMMOGRAPHY | Age: 52
Discharge: HOME | End: 2017-04-07
Attending: OBSTETRICS & GYNECOLOGY
Payer: MEDICARE

## 2017-04-07 DIAGNOSIS — Z01.419 ENCOUNTER FOR GYNECOLOGICAL EXAMINATION WITHOUT ABNORMAL FINDING: ICD-10-CM

## 2017-04-07 PROCEDURE — 77067 SCR MAMMO BI INCL CAD: CPT

## 2017-04-17 ENCOUNTER — APPOINTMENT (OUTPATIENT)
Dept: GENERAL RADIOLOGY | Age: 52
End: 2017-04-17
Attending: PHYSICIAN ASSISTANT
Payer: MEDICARE

## 2017-04-17 ENCOUNTER — HOSPITAL ENCOUNTER (EMERGENCY)
Age: 52
Discharge: HOME | End: 2017-04-17
Attending: EMERGENCY MEDICINE
Payer: MEDICARE

## 2017-04-17 VITALS
OXYGEN SATURATION: 99 % | HEIGHT: 67 IN | BODY MASS INDEX: 39.52 KG/M2 | WEIGHT: 251.77 LBS | RESPIRATION RATE: 22 BRPM | DIASTOLIC BLOOD PRESSURE: 101 MMHG | TEMPERATURE: 97.2 F | SYSTOLIC BLOOD PRESSURE: 160 MMHG

## 2017-04-17 DIAGNOSIS — M54.50 ACUTE EXACERBATION OF CHRONIC LOW BACK PAIN: Primary | ICD-10-CM

## 2017-04-17 DIAGNOSIS — G89.29 ACUTE EXACERBATION OF CHRONIC LOW BACK PAIN: Primary | ICD-10-CM

## 2017-04-17 PROCEDURE — 99283 EMERGENCY DEPT VISIT LOW MDM: CPT

## 2017-04-17 PROCEDURE — 74011636637 HC RX REV CODE- 636/637: Performed by: PHYSICIAN ASSISTANT

## 2017-04-17 PROCEDURE — 74011250637 HC RX REV CODE- 250/637: Performed by: PHYSICIAN ASSISTANT

## 2017-04-17 PROCEDURE — 72100 X-RAY EXAM L-S SPINE 2/3 VWS: CPT

## 2017-04-17 PROCEDURE — A9270 NON-COVERED ITEM OR SERVICE: HCPCS | Performed by: PHYSICIAN ASSISTANT

## 2017-04-17 RX ORDER — HYDROCODONE BITARTRATE AND ACETAMINOPHEN 5; 325 MG/1; MG/1
1 TABLET ORAL
Status: COMPLETED | OUTPATIENT
Start: 2017-04-17 | End: 2017-04-17

## 2017-04-17 RX ORDER — HYDROCODONE BITARTRATE AND ACETAMINOPHEN 5; 325 MG/1; MG/1
1 TABLET ORAL
Qty: 20 TAB | Refills: 0 | Status: SHIPPED | OUTPATIENT
Start: 2017-04-17 | End: 2017-05-09 | Stop reason: ALTCHOICE

## 2017-04-17 RX ORDER — PREDNISONE 20 MG/1
60 TABLET ORAL
Status: COMPLETED | OUTPATIENT
Start: 2017-04-17 | End: 2017-04-17

## 2017-04-17 RX ORDER — CYCLOBENZAPRINE HCL 10 MG
10 TABLET ORAL
Status: COMPLETED | OUTPATIENT
Start: 2017-04-17 | End: 2017-04-17

## 2017-04-17 RX ORDER — CYCLOBENZAPRINE HCL 10 MG
10 TABLET ORAL
Qty: 20 TAB | Refills: 0 | Status: SHIPPED | OUTPATIENT
Start: 2017-04-17 | End: 2017-07-19 | Stop reason: CLARIF

## 2017-04-17 RX ADMIN — CYCLOBENZAPRINE HYDROCHLORIDE 10 MG: 10 TABLET, FILM COATED ORAL at 14:39

## 2017-04-17 RX ADMIN — HYDROCODONE BITARTRATE AND ACETAMINOPHEN 1 TABLET: 5; 325 TABLET ORAL at 14:39

## 2017-04-17 RX ADMIN — PREDNISONE 60 MG: 20 TABLET ORAL at 14:39

## 2017-04-17 NOTE — DISCHARGE INSTRUCTIONS
Getting Back to Normal After Low Back Pain: Care Instructions  Your Care Instructions  Almost everyone has low back pain at some time. The good news is that most low back pain will go away in a few days or weeks with some basic self-care. Some people are afraid that doing too much may make their pain worse. In the past, people stayed in bed, thinking this would help their backs. Now doctors think that, in most cases, getting back to your normal activities is good for your back, as long as you avoid doing things that make your pain worse. Follow-up care is a key part of your treatment and safety. Be sure to make and go to all appointments, and call your doctor if you are having problems. It's also a good idea to know your test results and keep a list of the medicines you take. How can you care for yourself at home? Ease back into daily activities  · For the first day or two of pain, take it easy. But as soon as possible, get back to your normal daily life and activities. · Get gentle exercise, such as walking. Movement keeps your spine flexible and helps your muscles stay strong. · If you are an athlete, return to your activity carefully. Choose a low-impact option until your pain is under control. Avoid or change activities that cause pain  · Try to avoid too much bending, heavy lifting, or reaching. These movements put extra stress on your back. · In bed, try lying on your side with a pillow between your knees. Or lie on your back on the floor with a pillow under your knees. · When you sit, place a small pillow, a rolled-up towel, or a lumbar roll in the curve of your back for extra support. · Try putting one foot up on a stool or changing positions every few minutes if you have to stand still for a period of time. Pay attention to body mechanics and posture  Body mechanics are the way you use your body. Posture is the way you sit or stand. · Take extra care when you lift.  When you must lift, bend your knees and keep your back straight. Avoid twisting, and keep the load close to your body. · Stand or sit tall, with your shoulders back and your stomach pulled in to support your back. Get support when you need it  · Let people know when you need a helping hand. Get family members or friends to help out with tasks you cannot do right now. · Be honest with your doctor about how the pain affects you. · If you have had to take time off work, talk to your doctor and boss about a gradual tsapue-wh-kifo plan. Find out if there are other ways you could do your job to avoid hurting your back again. Reduce stress  Worrying about the pain can cause you to tense the muscles in your lower back. This in turn causes more pain. Here are a few things you can do to relax your mind and your muscles:  · Take 10 to 15 minutes to sit quietly and breathe deeply. Try to focus only on your breathing. If you cannot keep thoughts away, think about things that make you feel good. · Get involved in your favorite hobby, or try something new. · Talk to a friend, read a book, or listen to your favorite music. · Find a counselor you like and trust. Talk openly and honestly about your problems. Be willing to make some changes. When should you call for help? Call 911 anytime you think you may need emergency care. For example, call if:  · You are unable to move a leg at all. Call your doctor now or seek immediate medical care if:  · You have new or worse symptoms in your legs, belly, or buttocks. Symptoms may include:  ¨ Numbness or tingling. ¨ Weakness. ¨ Pain. · You lose bladder or bowel control. Watch closely for changes in your health, and be sure to contact your doctor if:  · You are not getting better as expected. Where can you learn more? Go to http://olivier-akil.info/. Enter F494 in the search box to learn more about \"Getting Back to Normal After Low Back Pain: Care Instructions. \"  Current as of:  May 23, 2016  Content Version: 11.2  © 4470-5067 [employer]. Care instructions adapted under license by [employer] (which disclaims liability or warranty for this information). If you have questions about a medical condition or this instruction, always ask your healthcare professional. [address] any warranty or liability for your use of this information.

## 2017-04-17 NOTE — ED NOTES
Pt reports lower back pain radiating down bilateral legs, has chronic pain however pain is worse since lifting her scooter on Wednesday

## 2017-04-17 NOTE — ED PROVIDER NOTES
HPI Comments: Stephan Chow is a 45 y.o. female with PMhx significant for HLD, asthma, and chronic back pain who presents ambulatory with cane to the ED with cc of worsening low back pain x 1 week after lifting a motorized scooter and placing it in her car. She reports hx of back, ankle, and knee surgery for which she uses a motorized scooter. Of note, pt reports decreased sensation in her R leg at baseline after her surgeries. Per chart review, pt had a MRI of the lumbar spine on 4/5/2017 at [facility]. Pt reports occasional EtOH use, but denies any tobacco and illicit drug use. She specifically denies any urinary incontinence, fecal incontinence, hematuria, blood in stool, and fevers. PCP: Faye Pineda MD    There are no other complaints, changes or physical findings at this time. The history is provided by the patient.         Past Medical History:   Diagnosis Date    Asthma     Breast cyst 1996    left     Chronic pain     LOWER BACK    Colon polyps     Hypercholesteremia     Psychiatric disorder        Past Surgical History:   Procedure Laterality Date    HX BACK SURGERY      L5 & S 1    HX BREAST BIOPSY      Left    HX HYSTERECTOMY  6/22/09    LSH LSO    HX ORTHOPAEDIC      thumb surgery    HX ORTHOPAEDIC      left knee surgery    HX TUBAL LIGATION      REMOVAL OF KIDNEY STONE      RENAL STENT           Family History:   Problem Relation Age of Onset    Cancer Mother 54     Lung cancer    Heart Disease Father     Hypertension Father     Elevated Lipids Father     Elevated Lipids Sister     Heart Disease Brother     Hypertension Brother     Elevated Lipids Brother     Elevated Lipids Sister     Elevated Lipids Sister     Heart Disease Sister     Heart Disease Sister     Heart Disease Sister     Cancer Sister      colon cancer with liver mets    Anesth Problems Neg Hx        Social History     Social History    Marital status: SINGLE     Spouse name: N/A    Number of children: N/A    Years of education: N/A     Occupational History    Not on file. Social History Main Topics    Smoking status: Former Smoker     Packs/day: 1.00     Years: 25.00    Smokeless tobacco: Never Used    Alcohol use Yes      Comment: occasionally    Drug use: No    Sexual activity: Yes     Partners: Male     Birth control/ protection: Surgical     Other Topics Concern    Not on file     Social History Narrative         ALLERGIES: Codeine and Pcn [penicillins]    Review of Systems   Constitutional: Negative. Negative for fever. HENT: Negative. Eyes: Negative. Respiratory: Negative. Cardiovascular: Negative. Gastrointestinal: Negative. Negative for blood in stool. Genitourinary: Negative. Negative for hematuria. - urinary incontinence  - fecal incontinence   Musculoskeletal: Positive for back pain (low). Skin: Negative. Neurological: Negative.         + decreased sensation L leg   All other systems reviewed and are negative. Vitals:    04/17/17 1359   BP: (!) 160/101   Resp: 22   Temp: 97.2 °F (36.2 °C)   SpO2: 99%   Weight: 114.2 kg (251 lb 12.3 oz)   Height: 5' 7\" (1.702 m)            Physical Exam   Constitutional: She is oriented to person, place, and time. She appears well-developed and well-nourished. No distress. 46 yo  female   HENT:   Head: Normocephalic and atraumatic. Eyes: Conjunctivae are normal. Right eye exhibits no discharge. Left eye exhibits no discharge. Neck: Normal range of motion. Neck supple. Cardiovascular: Normal rate, regular rhythm and normal heart sounds. No murmur heard. Pulmonary/Chest: Effort normal and breath sounds normal. No respiratory distress. She has no wheezes. Musculoskeletal:   BACK: Normal spinal curvatures. No step off or deformity. Mild but diffuse TTP to the lumbar region of the back. Pain with seated SLR bilaterally. Strength of the LE 5/5 and equal bilaterally. Ambulatory with pain. Neurological: She is alert and oriented to person, place, and time. Skin: Skin is warm and dry. She is not diaphoretic. Psychiatric: She has a normal mood and affect. Her behavior is normal.   Nursing note and vitals reviewed. MDM  Number of Diagnoses or Management Options  Diagnosis management comments: DDx: exacerbation of chronic pain, DDD, DJD, HMP       Amount and/or Complexity of Data Reviewed  Tests in the radiology section of CPT®: ordered and reviewed  Review and summarize past medical records: yes  Independent visualization of images, tracings, or specimens: yes    Patient Progress  Patient progress: stable    ED Course       Procedures    PROGRESS NOTE  2:31 PM   reviewed. Pt has not filled any controlled substances in the past 12 months. PROGRESS NOTE  2:35 PM  MRI results reviewed and patient given a copy of the results. Notes that she a follow up appointment scheduled with Dr. Ml Medina for this Thursday (3 days). IMAGING RESULTS:  XR SPINE LUMB 2 OR 3 V   Final Result   Clinical indication: Chronic back pain.     Frontal and lateral views of the lumbar sacral spine are obtained. The pedicles  are seen. Prior surgery at the L5-S1 level with alignment. There is no fracture  or focal lytic lesion.     IMPRESSION  impression: Prior surgery at L5-S1. No other significant findings. MEDICATIONS GIVEN:  Medications   HYDROcodone-acetaminophen (NORCO) 5-325 mg per tablet 1 Tab (1 Tab Oral Given 4/17/17 1439)   cyclobenzaprine (FLEXERIL) tablet 10 mg (10 mg Oral Given 4/17/17 1439)   predniSONE (DELTASONE) tablet 60 mg (60 mg Oral Given 4/17/17 1439)       IMPRESSION:  1. Acute exacerbation of chronic low back pain        PLAN:  1. Patient has an appointment with Dr. Ml Medina. Discharge home.   Current Discharge Medication List      START taking these medications    Details   cyclobenzaprine (FLEXERIL) 10 mg tablet Take 1 Tab by mouth three (3) times daily as needed for Muscle Spasm(s). Qty: 20 Tab, Refills: 0         CONTINUE these medications which have CHANGED    Details   HYDROcodone-acetaminophen (NORCO) 5-325 mg per tablet Take 1 Tab by mouth every four (4) hours as needed for Pain. Max Daily Amount: 6 Tabs. Qty: 20 Tab, Refills: 0         CONTINUE these medications which have NOT CHANGED    Details   MELOXICAM PO Take  by mouth.      lovastatin (MEVACOR) 10 mg tablet TAKE 1 TABLET BY MOUTH EVERY DAY  Refills: 1      SYNTHROID 100 mcg tablet Refills: 7      albuterol (PROVENTIL VENTOLIN) 2.5 mg /3 mL (0.083 %) nebulizer solution Refills: 1      escitalopram oxalate (LEXAPRO) 20 mg tablet TAKE 1 TABLET BY MOUTH EVERY DAY  Qty: 30 Tab, Refills: 4      beclomethasone (QVAR) 40 mcg/actuation inhaler Take 1 Puff by inhalation two (2) times daily as needed. albuterol (PROVENTIL, VENTOLIN) 90 mcg/Actuation inhaler Take 2 Puffs by inhalation every six (6) hours as needed. montelukast (SINGULAIR) 10 mg tablet Take 10 mg by mouth daily. 2.   Follow-up Information     Follow up With Details Comments [id number] [address] Lynn, MD   [address] [facility]  [address] [facility]      Matti Hawthorne MD In 3 days as scheduled on Thursday [address] [phone]          Return to ED if worse     DISCHARGE NOTE:  3:56 PM  The patient is ready for discharge. The patients signs, symptoms, diagnosis, and instructions for discharge have been discussed and the pt has conveyed their understanding. The patient is to follow up as recommended with PCP or return to the ER should their symptoms worsen. Plan has been discussed and patient has conveyed their agreement. This note is prepared by Randy Brito, acting as Scribe for WILMA [facility]: The scribe's documentation has been prepared under my direction and personally reviewed by me in its entirety.  I confirm that the note above accurately reflects all work, treatment, procedures, and medical decision making performed by me.

## 2017-04-21 DIAGNOSIS — E78.5 HYPERLIPIDEMIA, UNSPECIFIED HYPERLIPIDEMIA TYPE: Primary | ICD-10-CM

## 2017-04-21 RX ORDER — ESCITALOPRAM OXALATE 20 MG/1
TABLET ORAL
Qty: 30 TAB | Refills: 4 | Status: SHIPPED | OUTPATIENT
Start: 2017-04-21 | End: 2017-08-09 | Stop reason: SDUPTHER

## 2017-04-21 RX ORDER — MONTELUKAST SODIUM 10 MG/1
10 TABLET ORAL DAILY
Qty: 30 TAB | Refills: 4 | Status: SHIPPED | OUTPATIENT
Start: 2017-04-21 | End: 2019-06-05 | Stop reason: SDUPTHER

## 2017-04-21 RX ORDER — LEVOTHYROXINE SODIUM 100 UG/1
100 TABLET ORAL
Qty: 30 TAB | Refills: 7 | Status: SHIPPED | OUTPATIENT
Start: 2017-04-21 | End: 2017-05-12 | Stop reason: DRUGHIGH

## 2017-04-21 RX ORDER — LOVASTATIN 10 MG/1
TABLET ORAL
Qty: 30 TAB | Refills: 4 | Status: SHIPPED | OUTPATIENT
Start: 2017-04-21 | End: 2017-05-12 | Stop reason: DRUGHIGH

## 2017-04-26 ENCOUNTER — HOSPITAL ENCOUNTER (OUTPATIENT)
Dept: INTERVENTIONAL RADIOLOGY/VASCULAR | Age: 52
Discharge: HOME | End: 2017-04-26
Attending: ORTHOPAEDIC SURGERY
Payer: MEDICARE

## 2017-04-26 VITALS
TEMPERATURE: 98.1 F | HEIGHT: 67 IN | HEART RATE: 64 BPM | DIASTOLIC BLOOD PRESSURE: 74 MMHG | RESPIRATION RATE: 20 BRPM | BODY MASS INDEX: 39.24 KG/M2 | OXYGEN SATURATION: 99 % | WEIGHT: 250 LBS | SYSTOLIC BLOOD PRESSURE: 134 MMHG

## 2017-04-26 DIAGNOSIS — M51.36 DEGENERATION OF LUMBAR INTERVERTEBRAL DISC: ICD-10-CM

## 2017-04-26 PROCEDURE — 64483 NJX AA&/STRD TFRM EPI L/S 1: CPT

## 2017-04-26 PROCEDURE — 74011636320 HC RX REV CODE- 636/320: Performed by: RADIOLOGY

## 2017-04-26 PROCEDURE — 74011250636 HC RX REV CODE- 250/636: Performed by: RADIOLOGY

## 2017-04-26 PROCEDURE — 74011000250 HC RX REV CODE- 250: Performed by: RADIOLOGY

## 2017-04-26 PROCEDURE — 77030003666 HC NDL SPINAL BD -A

## 2017-04-26 RX ORDER — LIDOCAINE HYDROCHLORIDE 10 MG/ML
5 INJECTION, SOLUTION EPIDURAL; INFILTRATION; INTRACAUDAL; PERINEURAL
Status: COMPLETED | OUTPATIENT
Start: 2017-04-26 | End: 2017-04-26

## 2017-04-26 RX ORDER — DEXAMETHASONE SODIUM PHOSPHATE 10 MG/ML
10 INJECTION INTRAMUSCULAR; INTRAVENOUS ONCE
Status: COMPLETED | OUTPATIENT
Start: 2017-04-26 | End: 2017-04-26

## 2017-04-26 RX ORDER — LIDOCAINE HYDROCHLORIDE 20 MG/ML
20 INJECTION, SOLUTION INFILTRATION; PERINEURAL
Status: COMPLETED | OUTPATIENT
Start: 2017-04-26 | End: 2017-04-26

## 2017-04-26 RX ADMIN — IOPAMIDOL 2 ML: 408 INJECTION, SOLUTION INTRATHECAL at 08:30

## 2017-04-26 RX ADMIN — DEXAMETHASONE SODIUM PHOSPHATE 10 MG: 10 INJECTION, SOLUTION INTRAMUSCULAR; INTRAVENOUS at 08:30

## 2017-04-26 RX ADMIN — LIDOCAINE HYDROCHLORIDE 400 MG: 20 INJECTION, SOLUTION INFILTRATION; PERINEURAL at 08:30

## 2017-04-26 RX ADMIN — LIDOCAINE HYDROCHLORIDE 5 ML: 10 INJECTION, SOLUTION EPIDURAL; INFILTRATION; INTRACAUDAL; PERINEURAL at 08:30

## 2017-04-26 NOTE — PROGRESS NOTES
Name of procedure: NITZA     Complications, if any, r/t procedure: none    VS : Stable    Pt tolerated procedure well. VSS. No C/O pain. Dressing to site D&I. No bleeding or hematoma noted to site. Discharge instructions reinforced. Pt verbalized understanding. Pt taken to car by wheelchair and taken home by family. NAD at time of discharge.

## 2017-04-26 NOTE — DISCHARGE INSTRUCTIONS
Steroid Injection Discharge Instructions    General Information:   A steroid injection was performed today, placing a combination of a steroid and an anesthetic (numbing medicine) into the space around the nerves of your spine. This is done to treat back pain. It may take 7-10 days for the injection to reach its full potential.  This procedure can be done at any level of the spinal column, depending on where your pain is. Your doctor will have ordered the appropriate level to be treated prior to your coming in for the procedure. Home Care Instructions: You can resume your regular diet. Do not drink alcohol. You may notice that you have to use your pain medications less after your injection. Some people do not notice much of a change in their pain after the                     first injection. If that is the case, it is worth your time to have a second one done. This is why these injections are sometimes ordered in a series of                    three. Keep the puncture site clean and dry for 24 hours, and then you may remove the dressing. Showering is acceptable after the bandage is removed. Avoid tub baths, swimming, and hot tubs until puncture site is healed. Call If:   You should call your Physician and/or the Radiology Nurse if you have any bleeding other than a small spot on your bandage. Call if you have any signs of infection, fever, increased pain at the puncture site, confusion, or a headache that worsens when you stand and eases when lying flat. Follow-Up Instructions:  Please see your ordering doctor as he/she has requested. Let your doctor know if you have relief from your pain so they may schedule another injection for you if it is indicated. To Reach Us:    Should you experience any significant changes, please call [phone] between the hours of 730 am and 10 pm or [phone] after hours.   After hours, ask the  to page the [address] Technologist, and describe the problem to the technologist.

## 2017-04-26 NOTE — IP AVS SNAPSHOT
[address] [facility] 
[phone] Patient: Cindi Barrios MRN: [id number] :1965 You are allergic to the following Allergen Reactions Codeine Other (comments) Pcn (Penicillins) Other (comments) Recent Documentation Height Weight Breastfeeding? BMI OB Status Smoking Status 1.702 m 113.4 kg No 39.16 kg/m2 Hysterectomy Former Smoker Emergency Contacts Name Discharge Info Relation Home Work Mobile Garrett Rios  Spouse [3] [phone] About your hospitalization You were admitted on:  2017 You last received care in the:  [facility] RAD ANGIO IR You were discharged on:  2017 Unit phone number:  [phone] Why you were hospitalized Your primary diagnosis was:  Not on File Providers Seen During Your Hospitalizations Provider Role Specialty Primary office phone Brad Haq MD Attending Provider Orthopedic Surgery [phone] Your Primary Care Physician (PCP) Primary Care Physician Office Phone Office Fax Jocelyn Christine [phone] Follow-up Information None Your Appointments Monday May 01, 2017  2:20 PM EDT Any with Krystle Ventura MD  
[address] [facility] ([place] [facility]) [facility] [address] [address] [phone] Tuesday May 09, 2017  8:15 AM EDT ROUTINE CARE with Chrissy Hawthorne, [address] [place] [address],[address] [place] [employer]) [address] [facility] [address] [facility]  
[phone] Current Discharge Medication List  
  
ASK your doctor about these medications Dose & Instructions Dispensing Information Comments Morning Noon Evening Bedtime  
 albuterol 2.5 mg /3 mL (0.083 %) nebulizer solution Commonly known as:  PROVENTIL VENTOLIN Your last dose was: Your next dose is:    
   
   
  Refills:  1  
     
   
   
   
  
 albuterol 90 mcg/actuation inhaler Commonly known as:  Dru Faulkner Your last dose was: Your next dose is:    
   
   
 Dose:  2 Puff Take 2 Puffs by inhalation every six (6) hours as needed. Refills:  0  
     
   
   
   
  
 cyclobenzaprine 10 mg tablet Commonly known as:  FLEXERIL Your last dose was: Your next dose is:    
   
   
 Dose:  10 mg Take 1 Tab by mouth three (3) times daily as needed for Muscle Spasm(s). Quantity:  20 Tab Refills:  0  
     
   
   
   
  
 escitalopram oxalate 20 mg tablet Commonly known as:  Efrain Mealing Your last dose was: Your next dose is: TAKE 1 TABLET BY MOUTH EVERY DAY Quantity:  30 Tab Refills:  4 HYDROcodone-acetaminophen 5-325 mg per tablet Commonly known as:  Josue Poon Your last dose was: Your next dose is:    
   
   
 Dose:  1 Tab Take 1 Tab by mouth every four (4) hours as needed for Pain. Max Daily Amount: 6 Tabs. Quantity:  20 Tab Refills:  0  
     
   
   
   
  
 lovastatin 10 mg tablet Commonly known as:  MEVACOR Your last dose was: Your next dose is: TAKE 1 TABLET BY MOUTH EVERY DAY Quantity:  30 Tab Refills:  4 MELOXICAM PO Your last dose was: Your next dose is: Take  by mouth. Refills:  0  
     
   
   
   
  
 montelukast 10 mg tablet Commonly known as:  SINGULAIR Your last dose was: Your next dose is:    
   
   
 Dose:  10 mg Take 1 Tab by mouth daily. Quantity:  30 Tab Refills:  4 QVAR 40 mcg/actuation [employer] Generic drug:  beclomethasone Your last dose was: Your next dose is:    
   
   
 Dose:  1 Puff Take 1 Puff by inhalation two (2) times daily as needed. Refills:  0  
     
   
   
   
  
 SYNTHROID 100 mcg tablet Generic drug:  levothyroxine Your last dose was: Your next dose is:    
   
   
 Dose:  100 mcg Take 1 Tab by mouth Daily (before breakfast). Quantity:  30 Tab Refills:  7 Discharge Instructions Steroid Injection Discharge Instructions General Information: A steroid injection was performed today, placing a combination of a steroid and an anesthetic (numbing medicine) into the space around the nerves of your spine. This is done to treat back pain. It may take 7-10 days for the injection to reach its full potential.  This procedure can be done at any level of the spinal column, depending on where your pain is. Your doctor will have ordered the appropriate level to be treated prior to your coming in for the procedure. Home Care Instructions: You can resume your regular diet. Do not drink alcohol. You may notice that you have to use your pain medications less after your injection. Some people do not notice much of a change in their pain after the                     first injection. If that is the case, it is worth your time to have a second one done. This is why these injections are sometimes ordered in a series of                    three. Keep the puncture site clean and dry for 24 hours, and then you may remove the dressing. Showering is acceptable after the bandage is removed. Avoid tub baths, swimming, and hot tubs until puncture site is healed. Call If: 
 You should call your Physician and/or the Radiology Nurse if you have any bleeding other than a small spot on your bandage. Call if you have any signs of infection, fever, increased pain at the puncture site, confusion, or a headache that worsens when you stand and eases when lying flat. Follow-Up Instructions:  Please see your ordering doctor as he/she has requested. Let your doctor know if you have relief from your pain so they may schedule another injection for you if it is indicated. To Reach Us:    Should you experience any significant changes, please call [phone] between the hours of 730 am and 10 pm or [phone] after hours. After hours, ask the  to page the [address] [place] [address] Way Technologist, and describe the problem to the technologist. 
 
 
 
 
 
Discharge Orders None Introducing [facility] SERVICES! Dear Govind Tim: Thank you for requesting a [facility] account. Our records indicate that you already have an active [facility] account. You can access your account anytime at [email or web address] Did you know that you can access your hospital and ER discharge instructions at any time in [employer]? You can also review all of your test results from your hospital stay or ER visit. Additional Information If you have questions, please visit the Frequently Asked Questions section of the ACE website at [email or web address] Remember, [facility] is NOT to be used for urgent needs. For medical emergencies, dial 911. Now available from your iPhone and Android! General Information Please provide this summary of care documentation to your next provider. Patient Signature:  ____________________________________________________________ Date:  ____________________________________________________________  
  
Isa Mendoza Provider Signature:  ____________________________________________________________ Date:  ____________________________________________________________

## 2017-04-26 NOTE — IP AVS SNAPSHOT
Current Discharge Medication List  
  
ASK your doctor about these medications Dose & Instructions Dispensing Information Comments Morning Noon Evening Bedtime  
 albuterol 2.5 mg /3 mL (0.083 %) nebulizer solution Commonly known as:  PROVENTIL VENTOLIN Your last dose was: Your next dose is:    
   
   
  Refills:  1  
     
   
   
   
  
 albuterol 90 mcg/actuation inhaler Commonly known as:  Ki Henson Your last dose was: Your next dose is:    
   
   
 Dose:  2 Puff Take 2 Puffs by inhalation every six (6) hours as needed. Refills:  0  
     
   
   
   
  
 cyclobenzaprine 10 mg tablet Commonly known as:  FLEXERIL Your last dose was: Your next dose is:    
   
   
 Dose:  10 mg Take 1 Tab by mouth three (3) times daily as needed for Muscle Spasm(s). Quantity:  20 Tab Refills:  0  
     
   
   
   
  
 escitalopram oxalate 20 mg tablet Commonly known as:  Joann Peak Your last dose was: Your next dose is: TAKE 1 TABLET BY MOUTH EVERY DAY Quantity:  30 Tab Refills:  4 HYDROcodone-acetaminophen 5-325 mg per tablet Commonly known as:  Tawanna De León Your last dose was: Your next dose is:    
   
   
 Dose:  1 Tab Take 1 Tab by mouth every four (4) hours as needed for Pain. Max Daily Amount: 6 Tabs. Quantity:  20 Tab Refills:  0  
     
   
   
   
  
 lovastatin 10 mg tablet Commonly known as:  MEVACOR Your last dose was: Your next dose is: TAKE 1 TABLET BY MOUTH EVERY DAY Quantity:  30 Tab Refills:  4 MELOXICAM PO Your last dose was: Your next dose is: Take  by mouth. Refills:  0  
     
   
   
   
  
 montelukast 10 mg tablet Commonly known as:  SINGULAIR Your last dose was: Your next dose is:    
   
   
 Dose:  10 mg Take 1 Tab by mouth daily. Quantity:  30 Tab Refills:  4 QVAR 40 mcg/actuation [employer] Generic drug:  beclomethasone Your last dose was: Your next dose is:    
   
   
 Dose:  1 Puff Take 1 Puff by inhalation two (2) times daily as needed. Refills:  0  
     
   
   
   
  
 SYNTHROID 100 mcg tablet Generic drug:  levothyroxine Your last dose was: Your next dose is:    
   
   
 Dose:  100 mcg Take 1 Tab by mouth Daily (before breakfast). Quantity:  30 Tab Refills:  7

## 2017-05-01 ENCOUNTER — OFFICE VISIT (OUTPATIENT)
Dept: SLEEP MEDICINE | Age: 52
End: 2017-05-01

## 2017-05-01 VITALS
OXYGEN SATURATION: 93 % | HEIGHT: 67 IN | DIASTOLIC BLOOD PRESSURE: 79 MMHG | WEIGHT: 253 LBS | BODY MASS INDEX: 39.71 KG/M2 | HEART RATE: 88 BPM | TEMPERATURE: 99.2 F | SYSTOLIC BLOOD PRESSURE: 111 MMHG

## 2017-05-01 DIAGNOSIS — G47.33 OSA (OBSTRUCTIVE SLEEP APNEA): Primary | ICD-10-CM

## 2017-05-01 DIAGNOSIS — E66.9 OBESITY (BMI 30-39.9): ICD-10-CM

## 2017-05-01 NOTE — MR AVS SNAPSHOT
Visit Information Date & Time Provider Department Dept. Phone Encounter #  
 5/1/2017  2:40 PM Krystle VenturaEvin [id number] Follow-up Instructions Return if symptoms worsen or fail to improve. Follow-up and Disposition History Your Appointments 5/1/2017  2:40 PM  
Any with Krystle Ventura MD  
[address] [facility] ([facility]) Appt Note: 1st adherence visit; pt r/s  
 [address] [facility], [address] [address] [address] [facility] [address]  
  
    
 5/9/2017  8:15 AM  
ROUTINE CARE with Nichole Harrington MD  
[facility] Appt Note: 1 mon f/u bp and labs; 1 mon f/u bp and labs//r/s from 4/14/17  
 [facility] [address] [id number]  
[address] Upcoming Health Maintenance Date Due INFLUENZA AGE 9 TO ADULT 8/1/2017 PAP AKA CERVICAL CYTOLOGY 11/25/2017 COLONOSCOPY 2/1/2018 BREAST CANCER SCRN MAMMOGRAM 4/7/2019 DTaP/Tdap/Td series (2 - Td) 10/5/2022 Allergies as of 5/1/2017  Review Complete On: 5/1/2017 By: Krystle Ventura MD  
  
 Severity Noted Reaction Type Reactions Codeine  10/13/2010    Other (comments) Pcn [Penicillins]  10/13/2010    Other (comments) Current Immunizations  Reviewed on 1/26/2017 Name Date Influenza Vaccine 10/17/2013 Influenza Vaccine (Quad) PF 1/26/2017 Pneumococcal Vaccine (Unspecified Type) 10/17/2013 Not reviewed this visit You Were Diagnosed With   
  
 Codes Comments SUN (obstructive sleep apnea)    -  Primary ICD-10-CM: G47.33 
ICD-9-CM: 327.23 Obesity (BMI 30-39. 9)     ICD-10-CM: E66.9 ICD-9-CM: 278.00 Vitals  BP Pulse Temp Height(growth percentile) Weight(growth percentile) LMP  
 111/79 88 99.2 °F (37.3 °C) 5' 7\" (1.702 m) 253 lb (114.8 kg) 06/22/2009 SpO2 BMI OB Status Smoking Status 93% 39.63 kg/m2 Hysterectomy Former Smoker Vitals History BMI and BSA Data Body Mass Index Body Surface Area  
 39.63 kg/m 2 2.33 m 2 Preferred Pharmacy Pharmacy Name Phone [id number], [address] [place] [address] [phone] Your Updated Medication List  
  
   
This list is accurate as of: 5/1/17  2:24 PM.  Always use your most recent med list.  
  
  
  
  
 albuterol 2.5 mg /3 mL (0.083 %) nebulizer solution Commonly known as:  PROVENTIL VENTOLIN  
  
 albuterol 90 mcg/actuation inhaler Commonly known as:  Lilli Fennel Take 2 Puffs by inhalation every six (6) hours as needed. cyclobenzaprine 10 mg tablet Commonly known as:  FLEXERIL Take 1 Tab by mouth three (3) times daily as needed for Muscle Spasm(s). escitalopram oxalate 20 mg tablet Commonly known as:  Salli Dong TAKE 1 TABLET BY MOUTH EVERY DAY  
  
 HYDROcodone-acetaminophen 5-325 mg per tablet Commonly known as:  Soco Cruise Take 1 Tab by mouth every four (4) hours as needed for Pain. Max Daily Amount: 6 Tabs.  
  
 lovastatin 10 mg tablet Commonly known as:  MEVACOR  
TAKE 1 TABLET BY MOUTH EVERY DAY  
  
 MELOXICAM PO Take  by mouth.  
  
 montelukast 10 mg tablet Commonly known as:  SINGULAIR Take 1 Tab by mouth daily. QVAR 40 mcg/actuation Equiom Generic drug:  beclomethasone Take 1 Puff by inhalation two (2) times daily as needed. SYNTHROID 100 mcg tablet Generic drug:  levothyroxine Take 1 Tab by mouth Daily (before breakfast). Follow-up Instructions Return if symptoms worsen or fail to improve. Patient Instructions [address] [facility], [address] Tel.  [phone] Fax. 100 [address], [address] Tel.  [phone] Fax. [phone] [address] Tel.  [phone] Fax. [phone] Learning About CPAP for Sleep Apnea What is CPAP? CPAP is a small machine that you use at home every night while you sleep. It increases air pressure in your throat to keep your airway open. When you have sleep apnea, this can help you sleep better so you feel much better. CPAP stands for \"continuous positive airway pressure. \" The CPAP machine will have one of the following: · A mask that covers your nose and mouth · Prongs that fit into your nose · A mask that covers your nose only, the most common type. This type is called NCPAP. The N stands for \"nasal.\" Why is it done? CPAP is usually the best treatment for obstructive sleep apnea. It is the first treatment choice and the most widely used. Your doctor may suggest CPAP if you have: · Moderate to severe sleep apnea. · Sleep apnea and coronary artery disease (CAD) or heart failure. How does it help? · CPAP can help you have more normal sleep, so you feel less sleepy and more alert during the daytime. · CPAP may help keep heart failure or other heart problems from getting worse. · NCPAP may help lower your blood pressure. · If you use CPAP, your bed partner may also sleep better because you are not snoring or restless. What are the side effects? Some people who use CPAP have: · A dry or stuffy nose and a sore throat. · Irritated skin on the face. · Sore eyes. · Bloating. If you have any of these problems, work with your doctor to fix them. Here are some things you can try: · Be sure the mask or nasal prongs fit well. · See if your doctor can adjust the pressure of your CPAP. · If your nose is dry, try a humidifier. · If your nose is runny or stuffy, try decongestant medicine or a steroid nasal spray. If these things do not help, you might try a different type of machine. Some machines have air pressure that adjusts on its own. Others have air pressures that are different when you breathe in than when you breathe out. This may reduce discomfort caused by too much pressure in your nose. Where can you learn more? Go to StreetSpark.be Enter G918 in the search box to learn more about \"Learning About CPAP for Sleep Apnea. \"  
© 1152-9954 Healthwise, [employer]. Care instructions adapted under license by [address] [place] [address] [employer] (which disclaims liability or warranty for this information). This care instruction is for use with your licensed healthcare professional. If you have questions about a medical condition or this instruction, always ask your healthcare professional. [place] [address] any warranty or liability for your use of this information. Content Version: 0.8.86371; Last Revised: January 11, 2010 PROPER SLEEP HYGIENE What to avoid · Do not have drinks with caffeine, such as coffee or black tea, for 8 hours before bed. · Do not smoke or use other types of tobacco near bedtime. Nicotine is a stimulant and can keep you awake. · Avoid drinking alcohol late in the evening, because it can cause you to wake in the middle of the night. · Do not eat a big meal close to bedtime. If you are hungry, eat a light snack. · Do not drink a lot of water close to bedtime, because the need to urinate may wake you up during the night. · Do not read or watch TV in bed. Use the bed only for sleeping and sexual activity. What to try · Go to bed at the same time every night, and wake up at the same time every morning. Do not take naps during the day. · Keep your bedroom quiet, dark, and cool. · Get regular exercise, but not within 3 to 4 hours of your bedtime. Yamini Avila · Sleep on a comfortable pillow and mattress. · If watching the clock makes you anxious, turn it facing away from you so you cannot see the time. · If you worry when you lie down, start a worry book. Well before bedtime, write down your worries, and then set the book and your concerns aside. · Try meditation or other relaxation techniques before you go to bed. · If you cannot fall asleep, get up and go to another room until you feel sleepy. Do something relaxing. Repeat your bedtime routine before you go to bed again. · Make your house quiet and calm about an hour before bedtime. Turn down the lights, turn off the TV, log off the computer, and turn down the volume on music. This can help you relax after a busy day. Drowsy Driving: The [place] [address] cites drowsiness as a causing factor in more than 004,398 police reported crashes annually, resulting in 76,000 injuries and 1,500 deaths. Other surveys suggest 55% of people polled have driven while drowsy in the past year, 23% had fallen asleep but not crashed, 3% crashed, and 2% had and accident due to drowsy driving. Who is at risk? Young Drivers: One study of drowsy driving accidents states that 55% of the drivers were under 25 years. Of those, 75% were male. Shift Workers and Travelers: People who work overnight or travel across time zones frequently are at higher risk of experiencing Circadian Rhythm Disorders. They are trying to work and function when their body is programed to sleep. Sleep Deprived: Lack of sleep has a serious impact on your ability to pay attention or focus on a task. Consistently getting less than the average of 8 hours your body needs creates partial or cumulative sleep deprivation. Untreated Sleep Disorders: Sleep Apnea, Narcolepsy, R.L.S., and other sleep disorders (untreated) prevent a person from getting enough restful sleep. This leads to excessive daytime sleepiness and increases the risk for drowsy driving accidents by up to 7 times.  
Medications / Alcohol: Even over the counter medications can cause drowsiness. Medications that impair a drivers attention should have a warning label. Alcohol naturally makes you sleepy and on its own can cause accidents. Combined with excessive drowsiness its effects are amplified. Signs of Drowsy Driving: * You don't remember driving the last few miles * You may drift out of your bowen * You are unable to focus and your thoughts wander * You may yawn more often than normal 
 * You have difficulty keeping your eyes open / nodding off * Missing traffic signs, speeding, or tailgating Prevention-  
Good sleep hygiene, lifestyle and behavioral choices have the most impact on drowsy driving. There is no substitute for sleep and the average person requires 8 hours nightly. If you find yourself driving drowsy, stop and sleep. Consider the sleep hygiene tips provided during your visit as well. Medication Refill Policy: Refills for all medications require 1 week advance notice. Please have your pharmacy fax a refill request. We are unable to fax, or call in \"controled substance\" medications and you will need to pick these prescriptions up from our office. [employer] Activation Thank you for requesting access to [employer]. Please follow the instructions below to securely access and download your online medical record. [employer] allows you to send messages to your doctor, view your test results, renew your prescriptions, schedule appointments, and more. How Do I Sign Up? 1. In your internet browser, go to [email or web address]. 2. Click on the First Time User? Click Here link in the Sign In box. You will see the New Member Sign Up page. 3. Enter your [employer] Access Code exactly as it appears below. You will not need to use this code after youve completed the sign-up process. If you do not sign up before the expiration date, you must request a new code. [employer] Access Code: Activation code not generated Current [employer] Status: Active (This is the date your [employer] access code will ) 4. Enter the last four digits of your Social Security Number (xxxx) and Date of Birth (mm/dd/yyyy) as indicated and click Submit. You will be taken to the next sign-up page. 5. Create a [employer] ID. This will be your [employer] login ID and cannot be changed, so think of one that is secure and easy to remember. 6. Create a [employer] password. You can change your password at any time. 7. Enter your Password Reset Question and Answer. This can be used at a later time if you forget your password. 8. Enter your e-mail address. You will receive e-mail notification when new information is available in [address] 9. Click Sign Up. You can now view and download portions of your medical record. 10. Click the Download Summary menu link to download a portable copy of your medical information. Additional Information If you have questions, please call [phone]. Remember, [employer] is NOT to be used for urgent needs. For medical emergencies, dial 911. Starting a Weight Loss Plan: After Your Visit Your Care Instructions If you are thinking about losing weight, it can be hard to know where to start. Your doctor can help you set up a weight loss plan that best meets your needs. You may want to take a class on nutrition or exercise, or join a weight loss support group. If you have questions about how to make changes to your eating or exercise habits, ask your doctor about seeing a registered dietitian or an exercise specialist. 
It can be a big challenge to lose weight. But you do not have to make huge changes at once. Make small changes, and stick with them. When those changes become habit, add a few more changes. If you do not think you are ready to make changes right now, try to pick a date in the future. Make an appointment to see your doctor to discuss whether the time is right for you to start a plan. Follow-up care is a key part of your treatment and safety. Be sure to make and go to all appointments, and call your doctor if you are having problems. It's also a good idea to know your test results and keep a list of the medicines you take. How can you care for yourself at home? · Set realistic goals. Many people expect to lose much more weight than is likely. A weight loss of 5% to 10% of your body weight may be enough to improve your health. · Get family and friends involved to provide support. Talk to them about why you are trying to lose weight, and ask them to help. They can help by participating in exercise and having meals with you, even if they may be eating something different. · Find what works best for you. If you do not have time or do not like to cook, a program that offers meal replacement bars or shakes may be better for you. Or if you like to prepare meals, finding a plan that includes daily menus and recipes may be best. 
· Ask your doctor about other health professionals who can help you achieve your weight loss goals. ¨ A dietitian can help you make healthy changes in your diet. ¨ An exercise specialist or  can help you develop a safe and effective exercise program. 
¨ A counselor or psychiatrist can help you cope with issues such as depression, anxiety, or family problems that can make it hard to focus on weight loss. · Consider joining a support group for people who are trying to lose weight. Your doctor can suggest groups in your area. Where can you learn more? Go to Paracosm.be Enter G848 in the search box to learn more about \"Starting a Weight Loss Plan: After Your Visit. \"  
© 3270-4150 Healthwise, Incorporated. Care instructions adapted under license by Jimmy Ramírez (which disclaims liability or warranty for this information).  This care instruction is for use with your licensed healthcare professional. If you have questions about a medical condition or this instruction, always ask your healthcare professional. [address] any warranty or liability for your use of this information. Content Version: 3.5.84819; Last Revised: September 1, 2009 Introducing [facility] & HEALTH SERVICES! Dear Rachel Armstrong: Thank you for requesting a Hip[employer] account. Our records indicate that you already have an active Hipster account. You can access your account anytime at [email or web address] Did you know that you can access your hospital and ER discharge instructions at any time in Hipster? You can also review all of your test results from your hospital stay or ER visit. Additional Information If you have questions, please visit the Frequently Asked Questions section of the Hipster website at [email or web address] Remember, [employer] is NOT to be used for urgent needs. For medical emergencies, dial 911. Now available from your iPhone and Android! Please provide this summary of care documentation to your next provider. Your primary care clinician is listed as GUNNER Chang. If you have any questions after today's visit, please call [phone].

## 2017-05-01 NOTE — PROGRESS NOTES
[address] [facility]. [place], [address]  Tel.  [phone]  Fax. 100 [facility] [address]  [place], [address] [facility]  Tel.  [phone]  Fax. [phone] [address]   Tel.  [phone]  Fax. [phone]       S>Smitha Escudero is a 45 y.o. female seen for a positive airway pressure follow-up. She reports minimal problems using the device. The following problems are identified:    Drowsiness yes Problems exhaling no   Snoring yes Forget to put on no   Mask Comfortable yes Can't fall asleep no   Dry Mouth no Mask falls off no   Air Leaking no Frequent awakenings yes       She admits that her sleep has not changed. Therapy Apnea Index averaged over PAP use: 5 /hr which reflects improved sleep breathing condition. Allergies   Allergen Reactions    Codeine Other (comments)    Pcn [Penicillins] Other (comments)       She has a current medication list which includes the following prescription(s): lovastatin, montelukast, escitalopram oxalate, synthroid, hydrocodone-acetaminophen, cyclobenzaprine, meloxicam, albuterol, beclomethasone, and albuterol. .      She  has a past medical history of Asthma; Breast cyst (1996); Chronic pain; Colon polyps; Hypercholesteremia; and Psychiatric disorder. Vina Sleepiness Score: 11   and Modified F.O.S.Q. Score Total / 2: 14.5   which reflect not changed sleep quality over therapy time. O>    Visit Vitals    /79    Pulse 88    Temp 99.2 °F (37.3 °C)    Ht 5' 7\" (1.702 m)    Wt 253 lb (114.8 kg)    LMP 06/22/2009    SpO2 93%    BMI 39.63 kg/m2           General:   Alert, oriented, not in distress   Neck:   No JVD    Chest/Lungs:  symetrical lung expansion , no accessory muscle use    Extremities:  no obvious rashes , negative edema    Neuro:  No focal deficits ; No obvious tremor    Psych:  Normal affect ,  Normal countenance ;         A>    ICD-10-CM ICD-9-CM    1.  SUN (obstructive sleep apnea) G47.33 327.23    2. Obesity (BMI 30-39. 9) E66.9 278.00      AHI = 21 (2017). On CPAP :  4-15 cmH2O. Compliant:      yes    Therapeutic Response:  Positive    P>    * Device pressure change to CPAP  6-15 cmH2O. Optistart on.    * PAP card download in 3 weeks. PAP clinic if adherence remains poor    O2 sat monitoring in 3 weeks     * Follow-up Disposition:  Return if symptoms worsen or fail to improve. *She was asked to contact our office for any problems regarding  PAP therapy. * Counseling was provided regarding the importance of regular PAP use and on proper sleep hygiene and safe driving. * Re-enforced proper and regular cleaning for the device. I have reviewed/discussed the above normal BMI with the patient. I have recommended the following interventions: dietary management education, guidance, and counseling . Hubert López Thank you for allowing us to participate in your patient's medical care.     Antoni Coughlin M.D.  (electronically signed)  Diplomate in Sleep Medicine, JANETH

## 2017-05-01 NOTE — PATIENT INSTRUCTIONS
217 [facility] [place]. [place], [address]  Tel.  [phone]  Fax. 100 [facility] 60  1001 [facility], [address]  Tel.  [phone]  Fax. [phone] [address]  Tel.  [phone]  Fax. [phone]     Learning About CPAP for Sleep Apnea  What is CPAP? CPAP is a small machine that you use at home every night while you sleep. It increases air pressure in your throat to keep your airway open. When you have sleep apnea, this can help you sleep better so you feel much better. CPAP stands for \"continuous positive airway pressure. \"  The CPAP machine will have one of the following:  · A mask that covers your nose and mouth  · Prongs that fit into your nose  · A mask that covers your nose only, the most common type. This type is called NCPAP. The N stands for \"nasal.\"  Why is it done? CPAP is usually the best treatment for obstructive sleep apnea. It is the first treatment choice and the most widely used. Your doctor may suggest CPAP if you have:  · Moderate to severe sleep apnea. · Sleep apnea and coronary artery disease (CAD) or heart failure. How does it help? · CPAP can help you have more normal sleep, so you feel less sleepy and more alert during the daytime. · CPAP may help keep heart failure or other heart problems from getting worse. · NCPAP may help lower your blood pressure. · If you use CPAP, your bed partner may also sleep better because you are not snoring or restless. What are the side effects? Some people who use CPAP have:  · A dry or stuffy nose and a sore throat. · Irritated skin on the face. · Sore eyes. · Bloating. If you have any of these problems, work with your doctor to fix them. Here are some things you can try:  · Be sure the mask or nasal prongs fit well. · See if your doctor can adjust the pressure of your CPAP. · If your nose is dry, try a humidifier.   · If your nose is runny or stuffy, try decongestant medicine or a steroid nasal spray. If these things do not help, you might try a different type of machine. Some machines have air pressure that adjusts on its own. Others have air pressures that are different when you breathe in than when you breathe out. This may reduce discomfort caused by too much pressure in your nose. Where can you learn more? Go to Ignite[employer].be  Enter Sarahrosalina Triado in the search box to learn more about \"Learning About CPAP for Sleep Apnea. \"   © 3987-8531 Healthwise, Incorporated. Care instructions adapted under license by [employer] (which disclaims liability or warranty for this information). This care instruction is for use with your licensed healthcare professional. If you have questions about a medical condition or this instruction, always ask your healthcare professional. [address] any warranty or liability for your use of this information. Content Version: 4.7.60345; Last Revised: January 11, 2010  PROPER SLEEP HYGIENE    What to avoid  · Do not have drinks with caffeine, such as coffee or black tea, for 8 hours before bed. · Do not smoke or use other types of tobacco near bedtime. Nicotine is a stimulant and can keep you awake. · Avoid drinking alcohol late in the evening, because it can cause you to wake in the middle of the night. · Do not eat a big meal close to bedtime. If you are hungry, eat a light snack. · Do not drink a lot of water close to bedtime, because the need to urinate may wake you up during the night. · Do not read or watch TV in bed. Use the bed only for sleeping and sexual activity. What to try  · Go to bed at the same time every night, and wake up at the same time every morning. Do not take naps during the day. · Keep your bedroom quiet, dark, and cool. · Get regular exercise, but not within 3 to 4 hours of your bedtime. .  · Sleep on a comfortable pillow and mattress.   · If watching the clock makes you anxious, turn it facing away from you so you cannot see the time. · If you worry when you lie down, start a worry book. Well before bedtime, write down your worries, and then set the book and your concerns aside. · Try meditation or other relaxation techniques before you go to bed. · If you cannot fall asleep, get up and go to another room until you feel sleepy. Do something relaxing. Repeat your bedtime routine before you go to bed again. · Make your house quiet and calm about an hour before bedtime. Turn down the lights, turn off the TV, log off the computer, and turn down the volume on music. This can help you relax after a busy day. Drowsy Driving: The [employer] cites drowsiness as a causing factor in more than 028,285 police reported crashes annually, resulting in 76,000 injuries and 1,500 deaths. Other surveys suggest 55% of people polled have driven while drowsy in the past year, 23% had fallen asleep but not crashed, 3% crashed, and 2% had and accident due to drowsy driving. Who is at risk? Young Drivers: One study of drowsy driving accidents states that 55% of the drivers were under 25 years. Of those, 75% were male. Shift Workers and Travelers: People who work overnight or travel across time zones frequently are at higher risk of experiencing Circadian Rhythm Disorders. They are trying to work and function when their body is programed to sleep. Sleep Deprived: Lack of sleep has a serious impact on your ability to pay attention or focus on a task. Consistently getting less than the average of 8 hours your body needs creates partial or cumulative sleep deprivation. Untreated Sleep Disorders: Sleep Apnea, Narcolepsy, R.L.S., and other sleep disorders (untreated) prevent a person from getting enough restful sleep. This leads to excessive daytime sleepiness and increases the risk for drowsy driving accidents by up to 7 times.   Medications / Alcohol: Even over the counter medications can cause drowsiness. Medications that impair a drivers attention should have a warning label. Alcohol naturally makes you sleepy and on its own can cause accidents. Combined with excessive drowsiness its effects are amplified. Signs of Drowsy Driving:   * You don't remember driving the last few miles   * You may drift out of your bowen   * You are unable to focus and your thoughts wander   * You may yawn more often than normal   * You have difficulty keeping your eyes open / nodding off   * Missing traffic signs, speeding, or tailgating  Prevention-   Good sleep hygiene, lifestyle and behavioral choices have the most impact on drowsy driving. There is no substitute for sleep and the average person requires 8 hours nightly. If you find yourself driving drowsy, stop and sleep. Consider the sleep hygiene tips provided during your visit as well. Medication Refill Policy: Refills for all medications require 1 week advance notice. Please have your pharmacy fax a refill request. We are unable to fax, or call in \"controled substance\" medications and you will need to pick these prescriptions up from our office. [employer] Activation    Thank you for requesting access to [employer]. Please follow the instructions below to securely access and download your online medical record. [employer] allows you to send messages to your doctor, view your test results, renew your prescriptions, schedule appointments, and more. How Do I Sign Up? 1. In your internet browser, go to [email or web address]. 2. Click on the First Time User? Click Here link in the Sign In box. You will see the New Member Sign Up page. 3. Enter your [employer] Access Code exactly as it appears below. You will not need to use this code after youve completed the sign-up process. If you do not sign up before the expiration date, you must request a new code. [employer] Access Code:  Activation code not generated  Current [employer] Status: Active (This is the date your [employer] access code will )    4. Enter the last four digits of your Social Security Number (xxxx) and Date of Birth (mm/dd/yyyy) as indicated and click Submit. You will be taken to the next sign-up page. 5. Create a [employer] ID. This will be your [employer] login ID and cannot be changed, so think of one that is secure and easy to remember. 6. Create a [employer] password. You can change your password at any time. 7. Enter your Password Reset Question and Answer. This can be used at a later time if you forget your password. 8. Enter your e-mail address. You will receive e-mail notification when new information is available in [address] 9. Click Sign Up. You can now view and download portions of your medical record. 10. Click the Download Summary menu link to download a portable copy of your medical information. Additional Information    If you have questions, please call [phone]. Remember, [employer] is NOT to be used for urgent needs. For medical emergencies, dial 911. Starting a Weight Loss Plan: After Your Visit  Your Care Instructions  If you are thinking about losing weight, it can be hard to know where to start. Your doctor can help you set up a weight loss plan that best meets your needs. You may want to take a class on nutrition or exercise, or join a weight loss support group. If you have questions about how to make changes to your eating or exercise habits, ask your doctor about seeing a registered dietitian or an exercise specialist.  It can be a big challenge to lose weight. But you do not have to make huge changes at once. Make small changes, and stick with them. When those changes become habit, add a few more changes. If you do not think you are ready to make changes right now, try to pick a date in the future.  Make an appointment to see your doctor to discuss whether the time is right for you to start a plan.  Follow-up care is a key part of your treatment and safety. Be sure to make and go to all appointments, and call your doctor if you are having problems. It's also a good idea to know your test results and keep a list of the medicines you take. How can you care for yourself at home? · Set realistic goals. Many people expect to lose much more weight than is likely. A weight loss of 5% to 10% of your body weight may be enough to improve your health. · Get family and friends involved to provide support. Talk to them about why you are trying to lose weight, and ask them to help. They can help by participating in exercise and having meals with you, even if they may be eating something different. · Find what works best for you. If you do not have time or do not like to cook, a program that offers meal replacement bars or shakes may be better for you. Or if you like to prepare meals, finding a plan that includes daily menus and recipes may be best.  · Ask your doctor about other health professionals who can help you achieve your weight loss goals. ¨ A dietitian can help you make healthy changes in your diet. ¨ An exercise specialist or  can help you develop a safe and effective exercise program.  ¨ A counselor or psychiatrist can help you cope with issues such as depression, anxiety, or family problems that can make it hard to focus on weight loss. · Consider joining a support group for people who are trying to lose weight. Your doctor can suggest groups in your area. Where can you learn more? Go to [employer].be  Enter U357 in the search box to learn more about \"Starting a Weight Loss Plan: After Your Visit. \"   © 8289-3484 Healthwise, Incorporated. Care instructions adapted under license by [employer] (which disclaims liability or warranty for this information).  This care instruction is for use with your licensed healthcare professional. If you have questions about a medical condition or this instruction, always ask your healthcare professional. [place] [address] any warranty or liability for your use of this information.   Content Version: 2.7.83216; Last Revised: September 1, 2009

## 2017-05-09 ENCOUNTER — OFFICE VISIT (OUTPATIENT)
Dept: INTERNAL MEDICINE CLINIC | Age: 52
End: 2017-05-09

## 2017-05-09 ENCOUNTER — HOSPITAL ENCOUNTER (OUTPATIENT)
Dept: LAB | Age: 52
Discharge: HOME | End: 2017-05-09
Payer: MEDICARE

## 2017-05-09 VITALS
RESPIRATION RATE: 18 BRPM | WEIGHT: 256.4 LBS | HEIGHT: 67 IN | HEART RATE: 65 BPM | BODY MASS INDEX: 40.24 KG/M2 | SYSTOLIC BLOOD PRESSURE: 135 MMHG | DIASTOLIC BLOOD PRESSURE: 84 MMHG | TEMPERATURE: 98.1 F | OXYGEN SATURATION: 98 %

## 2017-05-09 DIAGNOSIS — Z00.00 MEDICARE ANNUAL WELLNESS VISIT, SUBSEQUENT: Primary | ICD-10-CM

## 2017-05-09 DIAGNOSIS — E03.9 ACQUIRED HYPOTHYROIDISM: ICD-10-CM

## 2017-05-09 DIAGNOSIS — Z13.21 ENCOUNTER FOR VITAMIN DEFICIENCY SCREENING: ICD-10-CM

## 2017-05-09 DIAGNOSIS — E78.00 HIGH CHOLESTEROL: ICD-10-CM

## 2017-05-09 DIAGNOSIS — Z13.1 SCREENING FOR DIABETES MELLITUS: ICD-10-CM

## 2017-05-09 DIAGNOSIS — Z23 ENCOUNTER FOR IMMUNIZATION: ICD-10-CM

## 2017-05-09 DIAGNOSIS — M85.9 DISORDER OF BONE DENSITY AND STRUCTURE, UNSPECIFIED: ICD-10-CM

## 2017-05-09 DIAGNOSIS — R73.09 ELEVATED GLUCOSE: ICD-10-CM

## 2017-05-09 DIAGNOSIS — E66.09 NON MORBID OBESITY DUE TO EXCESS CALORIES: ICD-10-CM

## 2017-05-09 PROCEDURE — 84443 ASSAY THYROID STIM HORMONE: CPT

## 2017-05-09 PROCEDURE — 36415 COLL VENOUS BLD VENIPUNCTURE: CPT

## 2017-05-09 PROCEDURE — 80061 LIPID PANEL: CPT

## 2017-05-09 PROCEDURE — 82306 VITAMIN D 25 HYDROXY: CPT

## 2017-05-09 PROCEDURE — 83036 HEMOGLOBIN GLYCOSYLATED A1C: CPT

## 2017-05-09 RX ORDER — PHENTERMINE HYDROCHLORIDE 37.5 MG/1
37.5 TABLET ORAL
Qty: 30 TAB | Refills: 0 | Status: SHIPPED | OUTPATIENT
Start: 2017-05-09 | End: 2017-05-12

## 2017-05-09 NOTE — MR AVS SNAPSHOT
Visit Information Date & Time Provider Department Dept. Phone Encounter #  
 5/9/2017  8:15 AM [place], [address] [place] [address],[address] [phone] [id number] Follow-up Instructions Return in about 4 weeks (around 6/6/2017) for obesity . Upcoming Health Maintenance Date Due INFLUENZA AGE 9 TO ADULT 8/1/2017 PAP AKA CERVICAL CYTOLOGY 11/25/2017 COLONOSCOPY 2/1/2018 BREAST CANCER SCRN MAMMOGRAM 4/7/2019 DTaP/Tdap/Td series (2 - Td) 10/5/2022 Allergies as of 5/9/2017  Review Complete On: 5/9/2017 By: Rosita West Severity Noted Reaction Type Reactions Codeine  10/13/2010    Other (comments) Pcn [Penicillins]  10/13/2010    Other (comments) Current Immunizations  Reviewed on 1/26/2017 Name Date Influenza Vaccine 10/17/2013 Influenza Vaccine (Quad) PF 1/26/2017 Pneumococcal Vaccine (Unspecified Type) 10/17/2015, 10/17/2013 Not reviewed this visit You Were Diagnosed With   
  
 Codes Comments Medicare annual wellness visit, subsequent    -  Primary ICD-10-CM: Z00.00 ICD-9-CM: V70.0 Encounter for immunization     ICD-10-CM: T29 ICD-9-CM: V03.89 High cholesterol     ICD-10-CM: E78.00 ICD-9-CM: 272.0 Screening for diabetes mellitus     ICD-10-CM: Z13.1 ICD-9-CM: V77.1 Encounter for vitamin deficiency screening     ICD-10-CM: Z13.21 ICD-9-CM: V77.99 Elevated glucose     ICD-10-CM: R73.09 
ICD-9-CM: 790.29 Disorder of bone density and structure, unspecified     ICD-10-CM: M85.9 ICD-9-CM: 733.90 Non morbid obesity due to excess calories     ICD-10-CM: E66.09 
ICD-9-CM: 278.00 Vitals BP Pulse Temp Resp Height(growth percentile) Weight(growth percentile) 135/84 (BP 1 Location: Right arm, BP Patient Position: Sitting) 65 98.1 °F (36.7 °C) (Oral) 18 5' 7\" (1.702 m) 256 lb 6.4 oz (116.3 kg) LMP SpO2 BMI OB Status Smoking Status 2009 98% 40.16 kg/m2 Hysterectomy Former Smoker BMI and BSA Data Body Mass Index Body Surface Area  
 40.16 kg/m 2 2.34 m 2 Preferred Pharmacy Pharmacy Name Phone [id number] [place] [address] [phone] Your Updated Medication List  
  
   
This list is accurate as of: 17  8:45 AM.  Always use your most recent med list.  
  
  
  
  
 albuterol 2.5 mg /3 mL (0.083 %) nebulizer solution Commonly known as:  PROVENTIL VENTOLIN  
  
 albuterol 90 mcg/actuation inhaler Commonly known as:  Jeri Spotted Take 2 Puffs by inhalation every six (6) hours as needed. cyclobenzaprine 10 mg tablet Commonly known as:  FLEXERIL Take 1 Tab by mouth three (3) times daily as needed for Muscle Spasm(s). escitalopram oxalate 20 mg tablet Commonly known as:  Tod Beckwith TAKE 1 TABLET BY MOUTH EVERY DAY  
  
 lovastatin 10 mg tablet Commonly known as:  MEVACOR  
TAKE 1 TABLET BY MOUTH EVERY DAY  
  
 MELOXICAM PO Take  by mouth.  
  
 montelukast 10 mg tablet Commonly known as:  SINGULAIR Take 1 Tab by mouth daily. phentermine 37.5 mg tablet Commonly known as:  ADIPEX-P Take 1 Tab by mouth every morning. Max Daily Amount: 37.5 mg. QVAR 40 mcg/actuation [employer] Generic drug:  beclomethasone Take 1 Puff by inhalation two (2) times daily as needed. SYNTHROID 100 mcg tablet Generic drug:  levothyroxine Take 1 Tab by mouth Daily (before breakfast). varicella zoster vacine live 19,400 unit/0.65 mL Susr injection Commonly known as:  ZOSTAVAX  
1 Vial by SubCUTAneous route once for 1 dose. Prescriptions Printed Refills  
 varicella zoster vacine live (ZOSTAVAX) 19,400 unit/0.65 mL susr injection 0 Si Vial by SubCUTAneous route once for 1 dose. Class: Print  Route: SubCUTAneous  
 phentermine (ADIPEX-P) 37.5 mg tablet 0  
 Sig: Take 1 Tab by mouth every morning. Max Daily Amount: 37.5 mg.  
 Class: Print Route: Oral  
  
We Performed the Following HEMOGLOBIN A1C WITH EAG [54467 CPT(R)] LIPID PANEL [25353 CPT(R)] VITAMIN D, 25 HYDROXY [id number] CPT(R)] Follow-up Instructions Return in about 4 weeks (around 6/6/2017) for obesity . Patient Instructions Medicare Part B Preventive Services Limitations Recommendation/Date completed if known Scheduled/ Next Due Bone Mass Measurement 
(age 72 & older, biennial) Requires diagnosis related to osteoporosis or estrogen deficiency. Biennial benefit unless patient has history of long-term glucocorticoid tx or baseline is needed because initial test was by other method Completed: 
 
 
Recommended every 2 years DUE: not due for this yet Cardiovascular Screening Blood Tests (every 5 years) Total cholesterol, HDL, Triglycerides Order as a panel if possible Completed: 
 
 
Recommended annually DUE: ordered today Colorectal Cancer Screening 
-Fecal occult blood test (annual) -Flexible sigmoidoscopy (5y) 
-Screening colonoscopy (10y) -Barium Enema  Completed: 2014 Recommended every 10 years DUE: September 2018 Counseling to Prevent Tobacco Use (up to 8 sessions per year) - Counseling greater than 3 and up to 10 minutes - Counseling greater than 10 minutes Patients must be asymptomatic of tobacco-related conditions to receive as preventive service  Quit 3 year ago Diabetes Screening Tests (at least every 3 years, Medicare covers annually or at 6-month intervals for prediabetic patients) Fasting blood sugar (FBS) or glucose tolerance test (GTT) Patient must be diagnosed with one of the following: 
-Hypertension, Dyslipidemia, obesity, previous impaired FBS or GTT 
Or any two of the following: overweight, FH of diabetes, age ? 72, history of gestational diabetes, birth of baby weighing more than 9 pounds Completed: Recommended annually DUE: today Diabetes Self-Management Training (DSMT) (no USPSTF recommendation) Requires referral by treating physician for patient with diabetes or renal disease. 10 hours of initial DSMT session of no less than 30 minutes each in a continuous 12-month period. 2 hours of follow-up DSMT in subsequent years. Glaucoma Screening (no USPSTF recommendation) Diabetes mellitus, family history, , age 48 or over,  American, age 72 or over Completed: 
 
 
 
Recommended annually DUE:follows frequently with Dr Ernestine Sotomayor Human Immunodeficiency Virus (HIV) Screening (annually for increased risk patients) HIV-1 and HIV-2 by EIA, ALPHONSE, rapid antibody test, or oral mucosa transudate Patient must be at increased risk for HIV infection per USPSTF guidelines or pregnant. Tests covered annually for patients at increased risk. Pregnant patients may receive up to 3 test during pregnancy. Medical Nutrition Therapy (MNT) (for diabetes or renal disease not recommended schedule) Requires referral by treating physician for patient with diabetes or renal disease. Can be provided in same year as diabetes self-management training (DSMT), and CMS recommends medical nutrition therapy take place after DSMT. Up to 3 hours for initial year and 2 hours in subsequent years. Prostate Cancer Screening (annually up to age 76) - Digital rectal exam (ANDREW) - Prostate specific antigen (PSA) Annually (age 48 or over), ANDREW not paid separately when covered E/M service is provided on same date Completed: 
 
 
 
Recommended annually DUE: NA women Seasonal Influenza Vaccination (annually)  Completed: 
 
 
 Recommended annually DUE every Fall Pneumococcal Vaccination (once after 65)  Completed:2013 and 2015 prevnar and pneumovax A Shingles Vaccine is also recommended once in a lifetime after age 61. Recommended today Hepatitis B Vaccinations (if medium/high risk) Medium/high risk factors:  End-stage renal disease, Hemophiliacs who received Factor VIII or IX concentrates, Clients of institutions for the mentally retarded, Persons who live in the same house as a HepB virus carrier, Homosexual men, Illicit injectable drug abusers. Screening Mammography (biennial age 54-69)? Annually (age 36 or over) Completed:  
 
Recommended annually DUE: 2018 Screening Pap Tests and Pelvic Examination (up to age 79 and after 79 if unknown history or abnormal study last 10 years) Every 24 months except high risk Completed:11/2014 Recommended every 2 years DUE: nov 2018 Ultrasound Screening for Abdominal Aortic Aneurysm (AAA) (once) Patient must be referred through IPPE and not have had a screening for abdominal aortic aneurysm before under Medicare. Limited to patients who meet one of the following criteria: 
- Men who are 73-68 years old and have smoked more than 100 cigarettes in their lifetime. 
-Anyone with a FH of AAA 
-Anyone recommended for screening by USPSTF Not covered by Medicare as preventive. Thanks for coming in today. It was nice to spend some time with you. If you have any questions about your visit today, please call [phone] and ask for [facility]. Introducing [facility] HEALTH SERVICES! Dear Liberty Triplett: Thank you for requesting a [employer] account. Our records indicate that you already have an active [employer] account. You can access your account anytime at [email or web address] Did you know that you can access your hospital and ER discharge instructions at any time in [employer]? You can also review all of your test results from your hospital stay or ER visit. Additional Information If you have questions, please visit the Frequently Asked Questions section of the [employer] website at [email or web address] Remember, [employer] is NOT to be used for urgent needs.  For medical emergencies, dial 911. Now available from your iPhone and Android! Please provide this summary of care documentation to your next provider. Your primary care clinician is listed as GUNNER Chang. If you have any questions after today's visit, please call [phone].

## 2017-05-09 NOTE — PATIENT INSTRUCTIONS
Medicare Part B Preventive Services Limitations Recommendation/Date completed if known Scheduled/ Next Due   Bone Mass Measurement  (age 72 & older, biennial) Requires diagnosis related to osteoporosis or estrogen deficiency. Biennial benefit unless patient has history of long-term glucocorticoid tx or baseline is needed because initial test was by other method Completed:      Recommended every 2 years DUE: not due for this yet   Cardiovascular Screening Blood Tests (every 5 years)  Total cholesterol, HDL, Triglycerides Order as a panel if possible Completed:      Recommended annually DUE: ordered today    Colorectal Cancer Screening  -Fecal occult blood test (annual)  -Flexible sigmoidoscopy (5y)  -Screening colonoscopy (10y)  -Barium Enema  Completed: 2014        Recommended every 10 years DUE: September 2018   Counseling to Prevent Tobacco Use (up to 8 sessions per year)  - Counseling greater than 3 and up to 10 minutes  - Counseling greater than 10 minutes Patients must be asymptomatic of tobacco-related conditions to receive as preventive service  Quit 3 year ago    Diabetes Screening Tests (at least every 3 years, Medicare covers annually or at 6-month intervals for prediabetic patients)    Fasting blood sugar (FBS) or glucose tolerance test (GTT)       Patient must be diagnosed with one of the following:  -Hypertension, Dyslipidemia, obesity, previous impaired FBS or GTT  Or any two of the following: overweight, FH of diabetes, age ? 72, history of gestational diabetes, birth of baby weighing more than 9 pounds Completed:        Recommended annually DUE: today    Diabetes Self-Management Training (DSMT) (no USPSTF recommendation) Requires referral by treating physician for patient with diabetes or renal disease. 10 hours of initial DSMT session of no less than 30 minutes each in a continuous 12-month period. 2 hours of follow-up DSMT in subsequent years.      Glaucoma Screening (no USPSTF recommendation) Diabetes mellitus, family history, , age 48 or over,  American, age 72 or over Completed:        Recommended annually DUE:follows frequently with Dr Dhara Miller (HIV) Screening (annually for increased risk patients)  HIV-1 and HIV-2 by EIA, ALPHONSE, rapid antibody test, or oral mucosa transudate Patient must be at increased risk for HIV infection per USPSTF guidelines or pregnant. Tests covered annually for patients at increased risk. Pregnant patients may receive up to 3 test during pregnancy. Medical Nutrition Therapy (MNT) (for diabetes or renal disease not recommended schedule) Requires referral by treating physician for patient with diabetes or renal disease. Can be provided in same year as diabetes self-management training (DSMT), and CMS recommends medical nutrition therapy take place after DSMT. Up to 3 hours for initial year and 2 hours in subsequent years. Prostate Cancer Screening (annually up to age 76)  - Digital rectal exam (ANDREW)  - Prostate specific antigen (PSA) Annually (age 48 or over), ANDREW not paid separately when covered E/M service is provided on same date Completed:        Recommended annually DUE: NA women   Seasonal Influenza Vaccination (annually)  Completed:       Recommended annually    DUE every Fall   Pneumococcal Vaccination (once after 72)  Completed:2013 and 2015 prevnar and pneumovax    A Shingles Vaccine is also recommended once in a lifetime after age 61. Recommended today   Hepatitis B Vaccinations (if medium/high risk) Medium/high risk factors:  End-stage renal disease,  Hemophiliacs who received Factor VIII or IX concentrates, Clients of institutions for the mentally retarded, Persons who live in the same house as a HepB virus carrier, Homosexual men, Illicit injectable drug abusers. Screening Mammography (biennial age 54-69)?  Annually (age 36 or over) Completed:     Recommended annually DUE: 2018   Screening Pap Tests and Pelvic Examination (up to age 79 and after 79 if unknown history or abnormal study last 10 years) Every 24 months except high risk Completed:11/2014        Recommended every 2 years DUE: nov 2018   Ultrasound Screening for Abdominal Aortic Aneurysm (AAA) (once) Patient must be referred through IPPE and not have had a screening for abdominal aortic aneurysm before under Medicare. Limited to patients who meet one of the following criteria:  - Men who are 73-68 years old and have smoked more than 100 cigarettes in their lifetime.  -Anyone with a FH of AAA  -Anyone recommended for screening by USPSTF Not covered by Medicare as preventive. Thanks for coming in today. It was nice to spend some time with you. If you have any questions about your visit today, please call [phone] and ask for [facility].

## 2017-05-09 NOTE — PROGRESS NOTES
Chief Complaint   Patient presents with    Annual Wellness Visit     wellness visit     Follow-up     hypertension     1. Have you been to the ER, urgent care clinic since your last visit? Hospitalized since your last visit? Yes When: 4/2017 Where: [facility] Reason for visit: Hurt Back    2. Have you seen or consulted any other health care providers outside of the [address] [place] [address] since your last visit? Include any pap smears or colon screening.  No

## 2017-05-09 NOTE — PROGRESS NOTES
CC: Annual Wellness Visit (wellness visit ) and Follow-up (hypertension)      HPI:    She is a 45 y.o. female with a hx of recurrent diverticulitis, hypothyroidism , elevated cholesterol and previously elevated BP prsenting for medicare wellness visit and follow up HTN  who presents for evaluation. Seen in March 2017 and treated empirically for diverticulitis given symptoms. However WBC was not elevated - patient was advised to follow up up with GI has not yet scheduled appointment. Today denies abdominal pain    Has seen Dr Tiana Brasher for SUN and using CPAP       colonoscopy in 2014 with tubular adenoma repeat due in September    OA: Chronic knee pain Dr Yared Vang gives cortisone shots in knees every 3 months   Also had injections in back \" nerve blocks\" and prescribed flexeril     Obesity: patient wants to loose weight currently exercise is limited due to knee pain. Discussed eating abits we identified that eating late contributing to weight gain. Discussed good carbs and sample diet. Patient wants to start phentermine we discussed risks including arrhyhtmia and heart failure patient wants to start medication. Discussed importance of diet discussed that bariatric sx is more effective than pills for weight loss . Glaucoma suspect followed by Dr Erasto Loredo     ROS:  12 systems reviewed and negative other than  HPI       Past Medical History:   Diagnosis Date    Asthma     Breast cyst 1996    left     Chronic pain     LOWER BACK    Colon polyps     Hypercholesteremia     Psychiatric disorder        Current Outpatient Prescriptions on File Prior to Visit   Medication Sig Dispense Refill    lovastatin (MEVACOR) 10 mg tablet TAKE 1 TABLET BY MOUTH EVERY DAY 30 Tab 4    montelukast (SINGULAIR) 10 mg tablet Take 1 Tab by mouth daily. 30 Tab 4    SYNTHROID 100 mcg tablet Take 1 Tab by mouth Daily (before breakfast). 30 Tab 7    MELOXICAM PO Take  by mouth.       albuterol (PROVENTIL VENTOLIN) 2.5 mg /3 mL (0.083 %) nebulizer solution   1    beclomethasone (QVAR) 40 mcg/actuation inhaler Take 1 Puff by inhalation two (2) times daily as needed.  albuterol (PROVENTIL, VENTOLIN) 90 mcg/Actuation inhaler Take 2 Puffs by inhalation every six (6) hours as needed.  escitalopram oxalate (LEXAPRO) 20 mg tablet TAKE 1 TABLET BY MOUTH EVERY DAY 30 Tab 4    cyclobenzaprine (FLEXERIL) 10 mg tablet Take 1 Tab by mouth three (3) times daily as needed for Muscle Spasm(s). 20 Tab 0     No current facility-administered medications on file prior to visit. Past Surgical History:   Procedure Laterality Date    HX BACK SURGERY      L5 & S 1    HX BREAST BIOPSY      Left    HX HYSTERECTOMY  6/22/09    LSH LSO    HX ORTHOPAEDIC      thumb surgery    HX ORTHOPAEDIC      left knee surgery    HX TUBAL LIGATION      REMOVAL OF KIDNEY STONE      RENAL STENT         Family History   Problem Relation Age of Onset    Cancer Mother 54     Lung cancer    Heart Disease Father     Hypertension Father    [employer] Elevated Lipids Father     Elevated Lipids Sister     Heart Disease Brother     Hypertension Brother     Elevated Lipids Brother     Elevated Lipids Sister     Elevated Lipids Sister     Heart Disease Sister     Heart Disease Sister     Heart Disease Sister     Cancer Sister      colon cancer with liver mets    Anesth Problems Neg Hx      Reviewed and no changes     Social History     Social History    Marital status: SINGLE     Spouse name: N/A    Number of children: N/A    Years of education: N/A     Occupational History    Not on file.      Social History Main Topics    Smoking status: Former Smoker     Packs/day: 1.00     Years: 25.00    Smokeless tobacco: Never Used    Alcohol use Yes      Comment: occasionally    Drug use: No    Sexual activity: Yes     Partners: Male     Birth control/ protection: Surgical     Other Topics Concern    Not on file     Social History Narrative            Visit Vitals    BP 135/84 (BP 1 Location: Right arm, BP Patient Position: Sitting)    Pulse 65    Temp 98.1 °F (36.7 °C) (Oral)    Resp 18    Ht 5' 7\" (1.702 m)    Wt 256 lb 6.4 oz (116.3 kg)    LMP 06/22/2009    SpO2 98%    BMI 40.16 kg/m2       Physical Examination:   General - Well appearing female, obese  HEENT - PERRL, TM no erythema/opacification, normal nasal turbinates, oropharynx no erythema or exudate, MMM  Neck - supple, no bruits, no TMG, no LAD  Pulm - clear to auscultation bilaterally  Cardio - RRR, normal S1 S2, no murmur gallops or rubs  Abd - soft, nontender, no masses, no HSM  Extrem - no edema, +2 distal pulses  Psych - normal affect, appropriate mood    Lab Results   Component Value Date/Time    WBC 3.7 03/17/2017 03:38 PM    Hemoglobin (POC) 12.9 10/28/2013 03:40 PM    HGB 13.6 03/17/2017 03:38 PM    Hematocrit (POC) 38 10/28/2013 03:40 PM    HCT 42.5 03/17/2017 03:38 PM    PLATELET 285 63/35/8376 03:38 PM    MCV 99 03/17/2017 03:38 PM     Lab Results   Component Value Date/Time    Sodium 140 03/17/2017 03:38 PM    Potassium 4.9 03/17/2017 03:38 PM    Chloride 97 03/17/2017 03:38 PM    CO2 25 03/17/2017 03:38 PM    Anion gap 7 11/02/2014 01:00 PM    Glucose 93 03/17/2017 03:38 PM    BUN 7 03/17/2017 03:38 PM    Creatinine 0.97 03/17/2017 03:38 PM    BUN/Creatinine ratio 7 03/17/2017 03:38 PM    GFR est AA 78 03/17/2017 03:38 PM    GFR est non-AA 67 03/17/2017 03:38 PM    Calcium 9.3 03/17/2017 03:38 PM     Lab Results   Component Value Date/Time    ALT (SGPT) 19 03/17/2017 03:38 PM    AST (SGOT) 20 03/17/2017 03:38 PM    Alk. phosphatase 72 03/17/2017 03:38 PM    Bilirubin, total 0.3 03/17/2017 03:38 PM      last TSH  Last cholesterol    Cholesterol last checked in 08/2016 which showed  and HDL is 86   Triglycerides 100      TSH normal in 08/2016            Assessment/Plan:    1. Medicare annual wellness visit, subsequent  -see separate note   - HEMOGLOBIN A1C WITH EAG    2.  Encounter for immunization  - discussed importance of vaccines   - varicella zoster vacine live (ZOSTAVAX) 19,400 unit/0.65 mL susr injection; 1 Vial by SubCUTAneous route once for 1 dose. Dispense: 0.65 mL; Refill: 0    3. High cholesterol  - on lovastatin d  - LIPID PANEL      4. Encounter for vitamin deficiency screening  - VITAMIN D, 25 HYDROXY      5. morbid obesity due to excess calories  - discussed risk and benefits of medication  - discussed weight management with exercise and diet   - phentermine (ADIPEX-P) 37.5 mg tablet; Take 1 Tab by mouth every morning. Max Daily Amount: 37.5 mg.  Dispense: 30 Tab; Refill: 0    6. Acquired hypothyroidism  - on synthroid 100mcg  - TSH AND FREE T4    7. Recurrent Diverticulitis: resolved with antibiotics  - advised to follow up with GI    8. Glaucoma suspect: followed by Dr Mehdi Paulino     9. SUN: Uses CPAP       Follow-up Disposition:  Return in about 4 weeks (around 6/6/2017) for obesity .     Jacqui Peralta MD

## 2017-05-10 LAB
25(OH)D3+25(OH)D2 SERPL-MCNC: 28.5 NG/ML (ref 30–100)
CHOLEST SERPL-MCNC: 243 MG/DL (ref 100–199)
EST. AVERAGE GLUCOSE BLD GHB EST-MCNC: 114 MG/DL
HBA1C MFR BLD: 5.6 % (ref 4.8–5.6)
HDLC SERPL-MCNC: 90 MG/DL
LDLC SERPL CALC-MCNC: 136 MG/DL (ref 0–99)
T4 FREE SERPL-MCNC: 1.44 NG/DL (ref 0.82–1.77)
TRIGL SERPL-MCNC: 86 MG/DL (ref 0–149)
TSH SERPL DL<=0.005 MIU/L-ACNC: 0.34 UIU/ML (ref 0.45–4.5)
VLDLC SERPL CALC-MCNC: 17 MG/DL (ref 5–40)

## 2017-05-11 ENCOUNTER — TELEPHONE (OUTPATIENT)
Dept: INTERNAL MEDICINE CLINIC | Age: 52
End: 2017-05-11

## 2017-05-11 NOTE — PROGRESS NOTES
History: Ms. Duane Burroughs is here for Medicare  exam. See separate note for issues addressed today  Past Medical History:  has a past medical history of Asthma; Breast cyst (1996); Chronic pain; Colon polyps; Diverticulitis; Hypercholesteremia; Obesity; SUN (obstructive sleep apnea); and Psychiatric disorder. Past Surgical History:  has a past surgical history that includes breast biopsy; renal stent; orthopaedic; orthopaedic; back surgery; removal of kidney stone; hysterectomy (6/22/09); and tubal ligation. Allergies: is allergic to codeine and pcn [penicillins]. Medications:   Current Outpatient Prescriptions on File Prior to Visit   Medication Sig Dispense Refill    lovastatin (MEVACOR) 10 mg tablet TAKE 1 TABLET BY MOUTH EVERY DAY 30 Tab 4    montelukast (SINGULAIR) 10 mg tablet Take 1 Tab by mouth daily. 30 Tab 4    SYNTHROID 100 mcg tablet Take 1 Tab by mouth Daily (before breakfast). 30 Tab 7    MELOXICAM PO Take  by mouth.  albuterol (PROVENTIL VENTOLIN) 2.5 mg /3 mL (0.083 %) nebulizer solution   1    beclomethasone (QVAR) 40 mcg/actuation inhaler Take 1 Puff by inhalation two (2) times daily as needed.  albuterol (PROVENTIL, VENTOLIN) 90 mcg/Actuation inhaler Take 2 Puffs by inhalation every six (6) hours as needed.  escitalopram oxalate (LEXAPRO) 20 mg tablet TAKE 1 TABLET BY MOUTH EVERY DAY 30 Tab 4    cyclobenzaprine (FLEXERIL) 10 mg tablet Take 1 Tab by mouth three (3) times daily as needed for Muscle Spasm(s). 20 Tab 0     No current facility-administered medications on file prior to visit. Family History: family history includes Cancer in her sister; Cancer (age of onset: 54) in her mother; Elevated Lipids in her brother, father, sister, sister, and sister; Heart Disease in her brother, father, sister, sister, and sister; Hypertension in her brother and father. There is no history of Anesth Problems. .   Social History:  reports that she has quit smoking.  She has a 25.00 pack-year smoking history. She has never used smokeless tobacco. She reports that she drinks alcohol. She reports that she does not use illicit drugs. Review of Systems: Complete review of systems otherwise unremarkable except as noted elsewhere. Health maintenance/screenings:   Depression: As part of the Welcome to Medicare physical, we completed a fifteen question Geriatric Depression Scale. The patient's score was 0 indicating no depression. ADL: Questionnaires were also done assessing activities of daily living and there were no abnormal answers on the \"ABCDETT\" questionnaire. An assessment of instrumental activities of daily living revealed no abnormal answers on the \"SHAFTIT\" questionnaire. Colon cancer screening: With respect to health maintenance issues, she has had a colonoscopy with polyps and is due in September of this year   Vaccinations: reviewed shingles shot prescribed  Physical Examination:   Visit Vitals    /84 (BP 1 Location: Right arm, BP Patient Position: Sitting)    Pulse 65    Temp 98.1 °F (36.7 °C) (Oral)    Resp 18    Ht 5' 7\" (1.702 m)    Wt 256 lb 6.4 oz (116.3 kg)    LMP 06/22/2009    SpO2 98%    BMI 40.16 kg/m2      See exam on separte noted. Assessment/Plan:   Completed Medicare Wellness physical: The patient was given a screening and preventive services letter as required by Medicare for the screening Medicare physical. We recommend getting yearly influenza vaccines. The patient is to keep up to date with colon cancer screening and breast cancer screening. Vaccinations given as appropriate. Follow-up Disposition:  Return in about 4 weeks (around 6/6/2017) for obesity .      MD Dali

## 2017-05-11 NOTE — TELEPHONE ENCOUNTER
Pt states she needs to speak with the nurse about a current medication and would like test results, labs?

## 2017-05-12 ENCOUNTER — APPOINTMENT (OUTPATIENT)
Dept: GENERAL RADIOLOGY | Age: 52
End: 2017-05-12
Attending: EMERGENCY MEDICINE
Payer: MEDICARE

## 2017-05-12 ENCOUNTER — HOSPITAL ENCOUNTER (EMERGENCY)
Age: 52
Discharge: HOME | End: 2017-05-12
Attending: EMERGENCY MEDICINE
Payer: MEDICARE

## 2017-05-12 VITALS
HEART RATE: 73 BPM | WEIGHT: 252.87 LBS | SYSTOLIC BLOOD PRESSURE: 117 MMHG | BODY MASS INDEX: 39.69 KG/M2 | HEIGHT: 67 IN | DIASTOLIC BLOOD PRESSURE: 85 MMHG | RESPIRATION RATE: 17 BRPM | TEMPERATURE: 97.8 F | OXYGEN SATURATION: 95 %

## 2017-05-12 DIAGNOSIS — E66.01 MORBID OBESITY DUE TO EXCESS CALORIES (HCC): ICD-10-CM

## 2017-05-12 DIAGNOSIS — R07.9 CHEST PAIN, UNSPECIFIED TYPE: Primary | ICD-10-CM

## 2017-05-12 DIAGNOSIS — R03.0 ELEVATED BLOOD PRESSURE READING: ICD-10-CM

## 2017-05-12 DIAGNOSIS — E78.5 HYPERLIPIDEMIA, UNSPECIFIED HYPERLIPIDEMIA TYPE: ICD-10-CM

## 2017-05-12 DIAGNOSIS — E03.9 ACQUIRED HYPOTHYROIDISM: Primary | ICD-10-CM

## 2017-05-12 LAB
ALBUMIN SERPL BCP-MCNC: 3.9 G/DL (ref 3.5–5)
ALBUMIN/GLOB SERPL: 1 {RATIO} (ref 1.1–2.2)
ALP SERPL-CCNC: 75 U/L (ref 45–117)
ALT SERPL-CCNC: 39 U/L (ref 12–78)
ANION GAP BLD CALC-SCNC: 10 MMOL/L (ref 5–15)
AST SERPL W P-5'-P-CCNC: 24 U/L (ref 15–37)
ATRIAL RATE: 62 BPM
ATRIAL RATE: 74 BPM
BASOPHILS # BLD AUTO: 0 K/UL (ref 0–0.1)
BASOPHILS # BLD: 1 % (ref 0–1)
BILIRUB SERPL-MCNC: 0.7 MG/DL (ref 0.2–1)
BUN SERPL-MCNC: 9 MG/DL (ref 6–20)
BUN/CREAT SERPL: 9 (ref 12–20)
CALCIUM SERPL-MCNC: 9.5 MG/DL (ref 8.5–10.1)
CALCULATED P AXIS, ECG09: 50 DEGREES
CALCULATED R AXIS, ECG10: 16 DEGREES
CALCULATED R AXIS, ECG10: 16 DEGREES
CALCULATED T AXIS, ECG11: 27 DEGREES
CALCULATED T AXIS, ECG11: 35 DEGREES
CHLORIDE SERPL-SCNC: 103 MMOL/L (ref 97–108)
CK SERPL-CCNC: 42 U/L (ref 26–192)
CK SERPL-CCNC: 45 U/L (ref 26–192)
CO2 SERPL-SCNC: 27 MMOL/L (ref 21–32)
CREAT SERPL-MCNC: 0.99 MG/DL (ref 0.55–1.02)
D DIMER PPP FEU-MCNC: 0.43 MG/L FEU (ref 0–0.65)
DIAGNOSIS, 93000: NORMAL
DIAGNOSIS, 93000: NORMAL
EOSINOPHIL # BLD: 0.1 K/UL (ref 0–0.4)
EOSINOPHIL NFR BLD: 3 % (ref 0–7)
ERYTHROCYTE [DISTWIDTH] IN BLOOD BY AUTOMATED COUNT: 13.2 % (ref 11.5–14.5)
GLOBULIN SER CALC-MCNC: 4 G/DL (ref 2–4)
GLUCOSE SERPL-MCNC: 97 MG/DL (ref 65–100)
HCT VFR BLD AUTO: 42.1 % (ref 35–47)
HGB BLD-MCNC: 14 G/DL (ref 11.5–16)
LYMPHOCYTES # BLD AUTO: 32 % (ref 12–49)
LYMPHOCYTES # BLD: 1.2 K/UL (ref 0.8–3.5)
MCH RBC QN AUTO: 32.8 PG (ref 26–34)
MCHC RBC AUTO-ENTMCNC: 33.3 G/DL (ref 30–36.5)
MCV RBC AUTO: 98.6 FL (ref 80–99)
MONOCYTES # BLD: 0.2 K/UL (ref 0–1)
MONOCYTES NFR BLD AUTO: 7 % (ref 5–13)
NEUTS SEG # BLD: 2.1 K/UL (ref 1.8–8)
NEUTS SEG NFR BLD AUTO: 57 % (ref 32–75)
P-R INTERVAL, ECG05: 168 MS
P-R INTERVAL, ECG05: 194 MS
PLATELET # BLD AUTO: 180 K/UL (ref 150–400)
POTASSIUM SERPL-SCNC: 3.5 MMOL/L (ref 3.5–5.1)
PROT SERPL-MCNC: 7.9 G/DL (ref 6.4–8.2)
Q-T INTERVAL, ECG07: 430 MS
Q-T INTERVAL, ECG07: 448 MS
QRS DURATION, ECG06: 76 MS
QRS DURATION, ECG06: 80 MS
QTC CALCULATION (BEZET), ECG08: 454 MS
QTC CALCULATION (BEZET), ECG08: 477 MS
RBC # BLD AUTO: 4.27 M/UL (ref 3.8–5.2)
SODIUM SERPL-SCNC: 140 MMOL/L (ref 136–145)
TROPONIN I BLD-MCNC: <0.04 NG/ML (ref 0–0.08)
TROPONIN I SERPL-MCNC: <0.04 NG/ML
VENTRICULAR RATE, ECG03: 62 BPM
VENTRICULAR RATE, ECG03: 74 BPM
WBC # BLD AUTO: 3.6 K/UL (ref 3.6–11)

## 2017-05-12 PROCEDURE — 93005 ELECTROCARDIOGRAM TRACING: CPT

## 2017-05-12 PROCEDURE — 84484 ASSAY OF TROPONIN QUANT: CPT

## 2017-05-12 PROCEDURE — 80053 COMPREHEN METABOLIC PANEL: CPT | Performed by: EMERGENCY MEDICINE

## 2017-05-12 PROCEDURE — 85025 COMPLETE CBC W/AUTO DIFF WBC: CPT | Performed by: EMERGENCY MEDICINE

## 2017-05-12 PROCEDURE — 71020 XR CHEST PA LAT: CPT

## 2017-05-12 PROCEDURE — 85379 FIBRIN DEGRADATION QUANT: CPT | Performed by: EMERGENCY MEDICINE

## 2017-05-12 PROCEDURE — 99285 EMERGENCY DEPT VISIT HI MDM: CPT

## 2017-05-12 PROCEDURE — 82550 ASSAY OF CK (CPK): CPT | Performed by: EMERGENCY MEDICINE

## 2017-05-12 PROCEDURE — 36415 COLL VENOUS BLD VENIPUNCTURE: CPT | Performed by: EMERGENCY MEDICINE

## 2017-05-12 PROCEDURE — 74011250637 HC RX REV CODE- 250/637: Performed by: EMERGENCY MEDICINE

## 2017-05-12 RX ORDER — ACETAMINOPHEN 325 MG/1
975 TABLET ORAL
Status: COMPLETED | OUTPATIENT
Start: 2017-05-12 | End: 2017-05-12

## 2017-05-12 RX ORDER — LEVOTHYROXINE SODIUM 88 UG/1
88 TABLET ORAL
Qty: 30 TAB | Refills: 5 | Status: SHIPPED | OUTPATIENT
Start: 2017-05-12 | End: 2017-05-15 | Stop reason: SDUPTHER

## 2017-05-12 RX ORDER — NITROGLYCERIN 0.4 MG/1
0.4 TABLET SUBLINGUAL
Qty: 1 BOTTLE | Refills: 0 | Status: SHIPPED | OUTPATIENT
Start: 2017-05-12 | End: 2017-07-19 | Stop reason: CLARIF

## 2017-05-12 RX ORDER — GUAIFENESIN 100 MG/5ML
162 LIQUID (ML) ORAL
Status: COMPLETED | OUTPATIENT
Start: 2017-05-12 | End: 2017-05-12

## 2017-05-12 RX ORDER — GUAIFENESIN 100 MG/5ML
81 LIQUID (ML) ORAL DAILY
Qty: 14 TAB | Refills: 0 | Status: SHIPPED | OUTPATIENT
Start: 2017-05-12 | End: 2017-05-26

## 2017-05-12 RX ORDER — LOVASTATIN 20 MG/1
20 TABLET ORAL
Qty: 30 TAB | Refills: 5 | Status: SHIPPED | OUTPATIENT
Start: 2017-05-12 | End: 2017-05-15 | Stop reason: SDUPTHER

## 2017-05-12 RX ORDER — NITROGLYCERIN 0.4 MG/1
0.4 TABLET SUBLINGUAL
Status: DISCONTINUED | OUTPATIENT
Start: 2017-05-12 | End: 2017-05-12 | Stop reason: HOSPADM

## 2017-05-12 RX ADMIN — ASPIRIN 81 MG 162 MG: 81 TABLET ORAL at 03:02

## 2017-05-12 RX ADMIN — ACETAMINOPHEN 975 MG: 325 TABLET ORAL at 03:03

## 2017-05-12 RX ADMIN — NITROGLYCERIN 0.4 MG: 0.4 TABLET SUBLINGUAL at 03:04

## 2017-05-12 NOTE — DISCHARGE INSTRUCTIONS
Chest Pain: Care Instructions  Your Care Instructions  There are many things that can cause chest pain. Some are not serious and will get better on their own in a few days. But some kinds of chest pain need more testing and treatment. Your doctor may have recommended a follow-up visit in the next 8 to 12 hours. If you are not getting better, you may need more tests or treatment. Even though your doctor has released you, you still need to watch for any problems. The doctor carefully checked you, but sometimes problems can develop later. If you have new symptoms or if your symptoms do not get better, get medical care right away. If you have worse or different chest pain or pressure that lasts more than 5 minutes or you passed out (lost consciousness), call 911 or seek other emergency help right away. A medical visit is only one step in your treatment. Even if you feel better, you still need to do what your doctor recommends, such as going to all suggested follow-up appointments and taking medicines exactly as directed. This will help you recover and help prevent future problems. How can you care for yourself at home? · Rest until you feel better. · Take your medicine exactly as prescribed. Call your doctor if you think you are having a problem with your medicine. · Do not drive after taking a prescription pain medicine. When should you call for help? Call 911 if:  · You passed out (lost consciousness). · You have severe difficulty breathing. · You have symptoms of a heart attack. These may include:  ¨ Chest pain or pressure, or a strange feeling in your chest.  ¨ Sweating. ¨ Shortness of breath. ¨ Nausea or vomiting. ¨ Pain, pressure, or a strange feeling in your back, neck, jaw, or upper belly or in one or both shoulders or arms. ¨ Lightheadedness or sudden weakness. ¨ A fast or irregular heartbeat.   After you call 911, the  may tell you to chew 1 adult-strength or 2 to 4 low-dose aspirin. Wait for an ambulance. Do not try to drive yourself. Call your doctor today if:  · You have any trouble breathing. · Your chest pain gets worse. · You are dizzy or lightheaded, or you feel like you may faint. · You are not getting better as expected. · You are having new or different chest pain. Where can you learn more? Go to http://olivier-akil.info/. Enter A120 in the search box to learn more about \"Chest Pain: Care Instructions. \"  Current as of: May 27, 2016  Content Version: 11.2  © 8382-1054 [employer]. Care instructions adapted under license by [employer] (which disclaims liability or warranty for this information). If you have questions about a medical condition or this instruction, always ask your healthcare professional. [address] any warranty or liability for your use of this information.

## 2017-05-12 NOTE — ED PROVIDER NOTES
HPI Comments: Cristiane Ramos is a 45 y.o. female with hx of who presents pre-HTN, HLD, CPAP dependent SUN and asthma to the ED c/o sharp CP since 12:45AM today with associated symptoms of SOB, nausea, and dizziness, that started after the CP. Pt notes she is currently experiencing 3/10 CP with lightheadedness and fatigue. She also states that CP radiates to left shoulder. Pt reports that her first phase of sharp CP was a few seconds and her second phase lasted ~30 seconds. She notes difficulty sleeping due to severity of pain. Pt reports taking ASA with no significant relief. She also admits FHx of MI during early 51's and specifically notes her sister has had similar pain that was dx as MI. She specifically denies hx of similar pain, changes with movement, CP exacerbation with breathing or exertion, leg edema, abdominal pain, and recent travel. Social hx: -(former) Tobacco use, +(occ) EtOH use, - Illicit drug use    PCP: MD Dali    There are no other complaints, changes or physical findings at this time. The history is provided by the patient. No  was used.         Past Medical History:   Diagnosis Date    Asthma     Breast cyst 1996    left     Chronic pain     LOWER BACK    Colon polyps     Diverticulitis     Hypercholesteremia     Obesity     SUN (obstructive sleep apnea)     uses CPAP    Psychiatric disorder        Past Surgical History:   Procedure Laterality Date    HX BACK SURGERY      L5 & S 1    HX BREAST BIOPSY      Left    HX HYSTERECTOMY  6/22/09    LSH LSO    HX ORTHOPAEDIC      thumb surgery    HX ORTHOPAEDIC      left knee surgery    HX TUBAL LIGATION      REMOVAL OF KIDNEY STONE      RENAL STENT           Family History:   Problem Relation Age of Onset    Cancer Mother 54     Lung cancer    Heart Disease Father     Hypertension Father     Elevated Lipids Father     Elevated Lipids Sister     Heart Disease Brother     Hypertension Brother     Elevated Lipids Brother     Elevated Lipids Sister     Elevated Lipids Sister     Heart Disease Sister     Heart Disease Sister     Heart Disease Sister     Cancer Sister      colon cancer with liver mets    Anesth Problems Neg Hx        Social History     Social History    Marital status: SINGLE     Spouse name: N/A    Number of children: N/A    Years of education: N/A     Occupational History    Not on file. Social History Main Topics    Smoking status: Former Smoker     Packs/day: 1.00     Years: 25.00    Smokeless tobacco: Never Used    Alcohol use Yes      Comment: occasionally    Drug use: No    Sexual activity: Yes     Partners: Male     Birth control/ protection: Surgical     Other Topics Concern    Not on file     Social History Narrative         ALLERGIES: Codeine and Pcn [penicillins]    Review of Systems   Constitutional: Positive for fatigue. Negative for chills and fever. HENT: Negative for congestion. Eyes: Negative for visual disturbance. Respiratory: Positive for shortness of breath. Negative for chest tightness. Cardiovascular: Positive for chest pain. Negative for leg swelling. Gastrointestinal: Positive for nausea. Negative for abdominal pain and vomiting. Endocrine: Negative for polyuria. Genitourinary: Negative for dysuria and frequency. Musculoskeletal: Positive for arthralgias (left shoulder). Negative for myalgias. Skin: Negative for color change. Allergic/Immunologic: Negative for immunocompromised state. Neurological: Positive for dizziness and light-headedness. Negative for numbness. Vitals:    05/12/17 0531 05/12/17 0533 05/12/17 0610 05/12/17 0628   BP: 117/85      Pulse:  69 62 73   Resp:  14 14 17   Temp:       SpO2:  95% 95% 95%   Weight:       Height:                Physical Exam   Nursing note and vitals reviewed.   General appearance: non-toxic, NAD  Eyes: PERRL, EOMI, conjunctiva normal, anicteric sclera  HEENT: mucous membranes moist, oropharynx is clear, dentures in place  Pulmonary: clear to auscultation bilaterally, anterior left chest wall TTP, mildly reproducible with torsion rotation, left upper extremity flexion and horizontal adduction  Cardiac: normal rate and regular rhythm, no murmurs, gallops, or rubs, 2+DP pulses, 2+ radial pulses  Abdomen: soft, nontender, nondistended, bowel sounds present,  MSK: no pre-tibial edema, no CVA tenderness,  Neuro: Alert, answers questions appropriately  Skin: capillary refill brisk      MDM  Number of Diagnoses or Management Options  Chest pain, unspecified type:   Elevated blood pressure reading:   Diagnosis management comments: DDx: msk CP, gastritis, esophageal spasm, Printzmetal's angina, ACS. Pt asymptomatic in ED; recently took Phenteramine for weight loss over past 2 days. Consider transient ischemic pain as adverse effect from medication. Advised pt to stop Phenteramine until f/u with and cleared by cardiology. Amount and/or Complexity of Data Reviewed  Clinical lab tests: reviewed and ordered  Tests in the medicine section of CPT®: ordered and reviewed  Review and summarize past medical records: yes  Discuss the patient with other providers: yes (cardiology)  Independent visualization of images, tracings, or specimens: yes    Patient Progress  Patient progress: stable    ED Course       Procedures     EKG interpretation: 2:21  Rhythm: normal sinus rhythm; and regular . Rate (approx.): 74 bpm; Axis: normal; DC interval: normal; QRS interval: normal ; ST/T wave: non-specific ST and T wave abnormality; similar to 4/2/14    EKG interpretation: 5:27  Rhythm: normal sinus rhythm; and regular . Rate (approx.): 62bpm; Axis: normal; DC interval: normal; QRS interval: normal ; ST/T wave: non-specific changes; Other findings: baseline. CONSULT NOTE:   6:04 AM  Gema Gillis.  Jimenez Gonzalez MD spoke with Dr. Edith Billingsley,  Specialty: cardiology  Discussed pt's hx, disposition, and available diagnostic and imaging results. Reviewed care plans. Consultant agrees with plans as outlined. Consultant stated pt can be discharged with follow up to cardiology. Written by Ahmet Delgadillo ED Scribe, as dictated by [employer]. Paul Mitchell MD.    LABORATORY TESTS:  Recent Results (from the past 12 hour(s))   EKG, 12 LEAD, INITIAL    Collection Time: 05/12/17  2:21 AM   Result Value Ref Range    Ventricular Rate 74 BPM    Atrial Rate 74 BPM    P-R Interval 168 ms    QRS Duration 80 ms    Q-T Interval 430 ms    QTC Calculation (Bezet) 477 ms    Calculated P Axis 50 degrees    Calculated R Axis 16 degrees    Calculated T Axis 35 degrees    Diagnosis       Normal sinus rhythm  Possible Left atrial enlargement  Nonspecific ST and T wave abnormality  Abnormal ECG  When compared with ECG of 02-NOV-2014 12:58,  SD interval has decreased  T wave inversion no longer evident in Anterior leads     CBC WITH AUTOMATED DIFF    Collection Time: 05/12/17  2:30 AM   Result Value Ref Range    WBC 3.6 3.6 - 11.0 K/uL    RBC 4.27 3.80 - 5.20 M/uL    HGB 14.0 11.5 - 16.0 g/dL    HCT 42.1 35.0 - 47.0 %    MCV 98.6 80.0 - 99.0 FL    MCH 32.8 26.0 - 34.0 PG    MCHC 33.3 30.0 - 36.5 g/dL    RDW 13.2 11.5 - 14.5 %    PLATELET 466 722 - 933 K/uL    NEUTROPHILS 57 32 - 75 %    LYMPHOCYTES 32 12 - 49 %    MONOCYTES 7 5 - 13 %    EOSINOPHILS 3 0 - 7 %    BASOPHILS 1 0 - 1 %    ABS. NEUTROPHILS 2.1 1.8 - 8.0 K/UL    ABS. LYMPHOCYTES 1.2 0.8 - 3.5 K/UL    ABS. MONOCYTES 0.2 0.0 - 1.0 K/UL    ABS. EOSINOPHILS 0.1 0.0 - 0.4 K/UL    ABS.  BASOPHILS 0.0 0.0 - 0.1 K/UL   METABOLIC PANEL, COMPREHENSIVE    Collection Time: 05/12/17  2:30 AM   Result Value Ref Range    Sodium 140 136 - 145 mmol/L    Potassium 3.5 3.5 - 5.1 mmol/L    Chloride 103 97 - 108 mmol/L    CO2 27 21 - 32 mmol/L    Anion gap 10 5 - 15 mmol/L    Glucose 97 65 - 100 mg/dL    BUN 9 6 - 20 MG/DL    Creatinine 0.99 0.55 - 1.02 MG/DL    BUN/Creatinine ratio 9 (L) 12 - 20 GFR est AA >60 >60 ml/min/1.73m2    GFR est non-AA 59 (L) >60 ml/min/1.73m2    Calcium 9.5 8.5 - 10.1 MG/DL    Bilirubin, total 0.7 0.2 - 1.0 MG/DL    ALT (SGPT) 39 12 - 78 U/L    AST (SGOT) 24 15 - 37 U/L    Alk. phosphatase 75 45 - 117 U/L    Protein, total 7.9 6.4 - 8.2 g/dL    Albumin 3.9 3.5 - 5.0 g/dL    Globulin 4.0 2.0 - 4.0 g/dL    A-G Ratio 1.0 (L) 1.1 - 2.2     CK W/ REFLX CKMB    Collection Time: 05/12/17  2:30 AM   Result Value Ref Range    CK 45 26 - 192 U/L   POC TROPONIN-I    Collection Time: 05/12/17  2:51 AM   Result Value Ref Range    Troponin-I (POC) <0.04 0.00 - 0.08 ng/mL   D DIMER    Collection Time: 05/12/17  2:54 AM   Result Value Ref Range    D-dimer 0.43 0.00 - 0.65 mg/L FEU   EKG, 12 LEAD, SUBSEQUENT    Collection Time: 05/12/17  5:27 AM   Result Value Ref Range    Ventricular Rate 62 BPM    Atrial Rate 62 BPM    P-R Interval 194 ms    QRS Duration 76 ms    Q-T Interval 448 ms    QTC Calculation (Bezet) 454 ms    Calculated R Axis 16 degrees    Calculated T Axis 27 degrees    Diagnosis       Normal sinus rhythm  Anterior infarct , age undetermined  Abnormal ECG  When compared with ECG of 12-MAY-2017 02:21,  MANUAL COMPARISON REQUIRED, DATA IS UNCONFIRMED     CK W/ REFLX CKMB    Collection Time: 05/12/17  5:35 AM   Result Value Ref Range    CK 42 26 - 192 U/L   TROPONIN I    Collection Time: 05/12/17  5:35 AM   Result Value Ref Range    Troponin-I, Qt. <0.04 <0.05 ng/mL       IMAGING RESULTS:    CXR Results  (Last 48 hours)               05/12/17 0549  XR CHEST PA LAT Final result    Impression:  IMPRESSION: Normal chest.           Narrative:  INDICATION: Chest pain and shortness of breath for 1 hour       COMPARISON: November 2, 2014       FINDINGS: PA and lateral views of the chest demonstrate a stable   cardiomediastinal silhouette and clear lungs bilaterally. The visualized osseous   structures are unremarkable.                MEDICATIONS GIVEN:  Medications   nitroglycerin (NITROSTAT) tablet 0.4 mg (0.4 mg SubLINGual Given 5/12/17 0304)   aspirin chewable tablet 162 mg (162 mg Oral Given 5/12/17 0302)   acetaminophen (TYLENOL) tablet 975 mg (975 mg Oral Given 5/12/17 0303)       IMPRESSION:  1. Chest pain, unspecified type    2. Elevated blood pressure reading        PLAN:  1. Discharge home  Current Discharge Medication List      START taking these medications    Details   aspirin 81 mg chewable tablet Take 1 Tab by mouth daily for 14 days. Indications: myocardial infarction prevention  Qty: 14 Tab, Refills: 0      nitroglycerin (NITROSTAT) 0.4 mg SL tablet Take 1 Tab by mouth every five (5) minutes as needed for Chest Pain. Sit/Lay down then put one tab under the tongue every 5 minutes as needed for chest pain for 3 doses  Indications: ANGINA  Qty: 1 Bottle, Refills: 0         CONTINUE these medications which have NOT CHANGED    Details   lovastatin (MEVACOR) 10 mg tablet TAKE 1 TABLET BY MOUTH EVERY DAY  Qty: 30 Tab, Refills: 4    Associated Diagnoses: Hyperlipidemia, unspecified hyperlipidemia type      montelukast (SINGULAIR) 10 mg tablet Take 1 Tab by mouth daily. Qty: 30 Tab, Refills: 4      escitalopram oxalate (LEXAPRO) 20 mg tablet TAKE 1 TABLET BY MOUTH EVERY DAY  Qty: 30 Tab, Refills: 4      SYNTHROID 100 mcg tablet Take 1 Tab by mouth Daily (before breakfast). Qty: 30 Tab, Refills: 7      cyclobenzaprine (FLEXERIL) 10 mg tablet Take 1 Tab by mouth three (3) times daily as needed for Muscle Spasm(s). Qty: 20 Tab, Refills: 0      MELOXICAM PO Take  by mouth. albuterol (PROVENTIL VENTOLIN) 2.5 mg /3 mL (0.083 %) nebulizer solution Refills: 1      beclomethasone (QVAR) 40 mcg/actuation inhaler Take 1 Puff by inhalation two (2) times daily as needed. albuterol (PROVENTIL, VENTOLIN) 90 mcg/Actuation inhaler Take 2 Puffs by inhalation every six (6) hours as needed.          STOP taking these medications       phentermine (ADIPEX-P) 37.5 mg tablet Comments:   Reason for Stoppin.   Follow-up Information     Follow up With Details Comments Contact Iveth Marcial MD Schedule an appointment as soon as possible for a visit in 4 days  [address]  [phone]      [facility] EMERGENCY DEPT Go in 1 day If symptoms worsen [address] [facility] [place] III, DO Call today CALL TODAY FOR AN UPCOMING APPOINTMENT EARLY NEXT WEEK [address]  [address]  [address] [address] [phone]          3. Return to ED if worse     DISCHARGE NOTE  6:49 AM  The patient has been re-evaluated and is ready for discharge. Reviewed available results with patient. Counseled pt on diagnosis and care plan. Pt has expressed understanding, and all questions have been answered. Pt agrees with plan and agrees to F/U as recommended, or return to the ED if their sxs worsen. Discharge instructions have been provided and explained to the pt, along with reasons to return to the ED. This note is prepared by Chester Mejía, acting as Scribe for Viky Cintron. Linsey Prabhakar MD.    Viky Cintron. Linsey Prabhakar MD: The scribe's documentation has been prepared under my direction and personally reviewed by me in its entirety. I confirm that the note above accurately reflects all work, treatment, procedures, and medical decision making performed by me.

## 2017-05-12 NOTE — TELEPHONE ENCOUNTER
Patient was seen at ED for chest pain on 5/11/17. Attending physician advised patient to stop phentermine as this could have caused her symptoms. She was only on this medication for 2 days. She has a follow up appointment scheduled. She is requesting an alternative weight loss medication. Please advise. Also she is requesting the results of her labs.

## 2017-05-12 NOTE — ED NOTES
Discharge instructions reviewed with patient by Dr. Long Marin. Pt denies questions or concerns. Pt appears in no acute distress at this time. Pt ambulatory out of department with steady gait.

## 2017-05-12 NOTE — PROGRESS NOTES
LDL is mildly elevated- increase lovastatin to 20mg daily - prescription sent to pharmacy  Hemoglobin A1C is 5.6 - no diabetes  Vitamin D is borderline low - take 1000 units of vitamin D3 daily OTC  Thyroid function - showing that the dose of the thyroid medication is too high decrease levothyroxine to 88 mcg daily on an empty stomach in the morning

## 2017-05-12 NOTE — TELEPHONE ENCOUNTER
MD Alayna Carpio, LPN        Caller: Unspecified (Yesterday, 12:42 PM)                     Agree that patient should stop phentermine. Please see separate note for lab results and medication adjustments   I have prescribed Belviq for weight loss suppresses appetite. Medication can be expensive - go online for coupon www. [email or web address] com

## 2017-05-15 ENCOUNTER — OFFICE VISIT (OUTPATIENT)
Dept: INTERNAL MEDICINE CLINIC | Age: 52
End: 2017-05-15

## 2017-05-15 VITALS
HEIGHT: 67 IN | DIASTOLIC BLOOD PRESSURE: 78 MMHG | HEART RATE: 85 BPM | SYSTOLIC BLOOD PRESSURE: 118 MMHG | RESPIRATION RATE: 18 BRPM | TEMPERATURE: 98.3 F | BODY MASS INDEX: 39.72 KG/M2 | WEIGHT: 253.1 LBS | OXYGEN SATURATION: 99 %

## 2017-05-15 DIAGNOSIS — E78.5 HYPERLIPIDEMIA, UNSPECIFIED HYPERLIPIDEMIA TYPE: ICD-10-CM

## 2017-05-15 DIAGNOSIS — E03.9 ACQUIRED HYPOTHYROIDISM: ICD-10-CM

## 2017-05-15 DIAGNOSIS — R07.9 CHEST PAIN, UNSPECIFIED TYPE: Primary | ICD-10-CM

## 2017-05-15 DIAGNOSIS — E66.01 MORBID OBESITY DUE TO EXCESS CALORIES (HCC): ICD-10-CM

## 2017-05-15 RX ORDER — PHENTERMINE HYDROCHLORIDE 37.5 MG/1
TABLET ORAL
Refills: 0 | COMMUNITY
Start: 2017-05-09 | End: 2017-05-15 | Stop reason: SINTOL

## 2017-05-15 RX ORDER — LOVASTATIN 20 MG/1
20 TABLET ORAL
Qty: 30 TAB | Refills: 5 | Status: SHIPPED | OUTPATIENT
Start: 2017-05-15 | End: 2017-11-06 | Stop reason: SDUPTHER

## 2017-05-15 RX ORDER — LEVOTHYROXINE SODIUM 88 UG/1
88 TABLET ORAL
Qty: 30 TAB | Refills: 5 | Status: SHIPPED | OUTPATIENT
Start: 2017-05-15 | End: 2017-11-06 | Stop reason: SDUPTHER

## 2017-05-15 NOTE — MR AVS SNAPSHOT
Visit Information Date & Time Provider Department Dept. Phone Encounter #  
 5/15/2017  4:00 PM Dali Indio [address] [place] [address],[address] [phone] [id number] Follow-up Instructions Return if symptoms worsen or fail to improve. Your Appointments 6/6/2017 11:30 AM  
ROUTINE CARE with MD FARZAD [facility] [address]) Appt Note: 4 week f/u  
 [address] [address]  
[address] Upcoming Health Maintenance Date Due INFLUENZA AGE 9 TO ADULT 8/1/2017 PAP AKA CERVICAL CYTOLOGY 11/25/2017 COLONOSCOPY 2/1/2018 BREAST CANCER SCRN MAMMOGRAM 4/7/2019 DTaP/Tdap/Td series (2 - Td) 10/5/2022 Allergies as of 5/15/2017  Review Complete On: 5/15/2017 By: Ilda Pavon Severity Noted Reaction Type Reactions Codeine  10/13/2010    Other (comments) Pcn [Penicillins]  10/13/2010    Other (comments) Current Immunizations  Reviewed on 1/26/2017 Name Date Influenza Vaccine 10/17/2013 Influenza Vaccine (Quad) PF 1/26/2017 Pneumococcal Vaccine (Unspecified Type) 10/17/2015, 10/17/2013 Not reviewed this visit You Were Diagnosed With   
  
 Codes Comments Morbid obesity due to excess calories ([facility] [address]    -  Primary ICD-10-CM: E66.01 
ICD-9-CM: 278.01 Hyperlipidemia, unspecified hyperlipidemia type     ICD-10-CM: E78.5 ICD-9-CM: 272.4 Acquired hypothyroidism     ICD-10-CM: E03.9 ICD-9-CM: 605. 9 Vitals BP Pulse Temp Resp Height(growth percentile) Weight(growth percentile) 118/78 (BP 1 Location: Right arm, BP Patient Position: Sitting) 85 98.3 °F (36.8 °C) (Oral) 18 5' 7\" (1.702 m) 253 lb 1.6 oz (114.8 kg) LMP SpO2 BMI OB Status Smoking Status 06/22/2009 99% 39.64 kg/m2 Hysterectomy Former Smoker BMI and BSA Data Body Mass Index Body Surface Area 39.64 kg/m 2 2.33 m 2 Preferred Pharmacy Pharmacy Name Phone [id number] [address], [id number] [place] [address] [phone] Your Updated Medication List  
  
   
This list is accurate as of: 5/15/17  4:29 PM.  Always use your most recent med list.  
  
  
  
  
 albuterol 2.5 mg /3 mL (0.083 %) nebulizer solution Commonly known as:  PROVENTIL VENTOLIN  
  
 albuterol 90 mcg/actuation inhaler Commonly known as:  Nancy Muñozowsky Take 2 Puffs by inhalation every six (6) hours as needed. aspirin 81 mg chewable tablet Take 1 Tab by mouth daily for 14 days. Indications: myocardial infarction prevention  
  
 cyclobenzaprine 10 mg tablet Commonly known as:  FLEXERIL Take 1 Tab by mouth three (3) times daily as needed for Muscle Spasm(s). escitalopram oxalate 20 mg tablet Commonly known as:  Meera Larger TAKE 1 TABLET BY MOUTH EVERY DAY  
  
 levothyroxine 88 mcg tablet Commonly known as:  SYNTHROID Take 1 Tab by mouth Daily (before breakfast). lorcaserin 20 mg Tb24 Commonly known as:  BELVIQ XR Take 20 mg by mouth daily. Max Daily Amount: 20 mg.  
  
 lovastatin 20 mg tablet Commonly known as:  MEVACOR Take 1 Tab by mouth nightly. MELOXICAM PO Take  by mouth.  
  
 montelukast 10 mg tablet Commonly known as:  SINGULAIR Take 1 Tab by mouth daily. nitroglycerin 0.4 mg SL tablet Commonly known as:  NITROSTAT Take 1 Tab by mouth every five (5) minutes as needed for Chest Pain. Sit/Lay down then put one tab under the tongue every 5 minutes as needed for chest pain for 3 doses  Indications: ANGINA QVAR 40 mcg/actuation [place] Generic drug:  beclomethasone Take 1 Puff by inhalation two (2) times daily as needed. Prescriptions Printed Refills  
 lorcaserin (BELVIQ XR) 20 mg Tb24 4 Sig: Take 20 mg by mouth daily. Max Daily Amount: 20 mg.  
 Class: Print  Route: Oral  
  
 Prescriptions Sent to Pharmacy Refills  
 lovastatin (MEVACOR) 20 mg tablet 5 Sig: Take 1 Tab by mouth nightly. Class: Normal  
 Pharmacy: [address] [place] [address] , [address] [place] [address] Ph #: [phone] Route: Oral  
 levothyroxine (SYNTHROID) 88 mcg tablet 5 Sig: Take 1 Tab by mouth Daily (before breakfast). Class: Normal  
 Pharmacy: [address] [place] [address] , [facility] Ph #: [phone] Route: Oral  
  
Follow-up Instructions Return if symptoms worsen or fail to improve. Patient Instructions Chest Pain: Care Instructions Your Care Instructions There are many things that can cause chest pain. Some are not serious and will get better on their own in a few days. But some kinds of chest pain need more testing and treatment. Your doctor may have recommended a follow-up visit in the next 8 to 12 hours. If you are not getting better, you may need more tests or treatment. Even though your doctor has released you, you still need to watch for any problems. The doctor carefully checked you, but sometimes problems can develop later. If you have new symptoms or if your symptoms do not get better, get medical care right away. If you have worse or different chest pain or pressure that lasts more than 5 minutes or you passed out (lost consciousness), call 911 or seek other emergency help right away. A medical visit is only one step in your treatment. Even if you feel better, you still need to do what your doctor recommends, such as going to all suggested follow-up appointments and taking medicines exactly as directed. This will help you recover and help prevent future problems. How can you care for yourself at home? · Rest until you feel better. · Take your medicine exactly as prescribed. Call your doctor if you think you are having a problem with your medicine. · Do not drive after taking a prescription pain medicine. When should you call for help? Call 911 if: 
· You passed out (lost consciousness). · You have severe difficulty breathing. · You have symptoms of a heart attack. These may include: ¨ Chest pain or pressure, or a strange feeling in your chest. 
¨ Sweating. ¨ Shortness of breath. ¨ Nausea or vomiting. ¨ Pain, pressure, or a strange feeling in your back, neck, jaw, or upper belly or in one or both shoulders or arms. ¨ Lightheadedness or sudden weakness. ¨ A fast or irregular heartbeat. After you call 911, the  may tell you to chew 1 adult-strength or 2 to 4 low-dose aspirin. Wait for an ambulance. Do not try to drive yourself. Call your doctor today if: 
· You have any trouble breathing. · Your chest pain gets worse. · You are dizzy or lightheaded, or you feel like you may faint. · You are not getting better as expected. · You are having new or different chest pain. Where can you learn more? Go to http://olivier-akil.info/. Enter A120 in the search box to learn more about \"Chest Pain: Care Instructions. \" Current as of: May 27, 2016 Content Version: 11.2 © 8007-5127 [employer]. Care instructions adapted under license by [employer] (which disclaims liability or warranty for this information). If you have questions about a medical condition or this instruction, always ask your healthcare professional. [place] [address] any warranty or liability for your use of this information. 
 
-------------------------- Stop phentermine as it may have caused your symptoms. You already have an appointment with cardiologist 
 
 
  
[facility] & HEALTH SERVICES! Dear Shawn Morales: Thank you for requesting a [employer] account. Our records indicate that you already have an active [employer] account. You can access your account anytime at [email or web address] Did you know that you can access your hospital and ER discharge instructions at any time in Omnigy? You can also review all of your test results from your hospital stay or ER visit. Additional Information If you have questions, please visit the Frequently Asked Questions section of the [employer] website at [email or web address] Remember, O[employer]igy is NOT to be used for urgent needs. For medical emergencies, dial 911. Now available from your iPhone and Android! Please provide this summary of care documentation to your next provider. Your primary care clinician is listed as GUNNER Chang. If you have any questions after today's visit, please call [phone].

## 2017-05-15 NOTE — PROGRESS NOTES
CC: Hospital Follow Up (chest pain 5/12/17)      HPI:    She is a 45 y.o. female who presents for evaluation of chest pain rquiring ER visit. Last seen by me on May 9th and started on phentermine for weight loss. Two days after woke up in the middle of the night with chest pain- woke patient from sleep. Had severe pain and took Wilbert aspirin Lasted 10-15 minutes and then resolved. Was wearing CPAP. Stopped phntermine which was new medication  Patient has not had any more recurrent chest pain. Patient has appointment with cardiologist this Thursday. Recently checked LDL and elevated and increase lovastatin to 20mg daily - prescription sent to pharmacy  Hemoglobin A1C is 5.6 - no diabetes                    ROS:  12 systems reviewed and negative other than  HPI       Past Medical History:   Diagnosis Date    Asthma     Breast cyst 1996    left     Chronic pain     LOWER BACK    Colon polyps     Diverticulitis     Hypercholesteremia     Obesity     SUN (obstructive sleep apnea)     uses CPAP    Psychiatric disorder        Current Outpatient Prescriptions on File Prior to Visit   Medication Sig Dispense Refill    aspirin 81 mg chewable tablet Take 1 Tab by mouth daily for 14 days. Indications: myocardial infarction prevention 14 Tab 0    nitroglycerin (NITROSTAT) 0.4 mg SL tablet Take 1 Tab by mouth every five (5) minutes as needed for Chest Pain. Sit/Lay down then put one tab under the tongue every 5 minutes as needed for chest pain for 3 doses  Indications: ANGINA 1 Bottle 0    montelukast (SINGULAIR) 10 mg tablet Take 1 Tab by mouth daily. 30 Tab 4    escitalopram oxalate (LEXAPRO) 20 mg tablet TAKE 1 TABLET BY MOUTH EVERY DAY 30 Tab 4    cyclobenzaprine (FLEXERIL) 10 mg tablet Take 1 Tab by mouth three (3) times daily as needed for Muscle Spasm(s). 20 Tab 0    MELOXICAM PO Take  by mouth.       albuterol (PROVENTIL VENTOLIN) 2.5 mg /3 mL (0.083 %) nebulizer solution   1    beclomethasone (QVAR) 40 mcg/actuation inhaler Take 1 Puff by inhalation two (2) times daily as needed.  albuterol (PROVENTIL, VENTOLIN) 90 mcg/Actuation inhaler Take 2 Puffs by inhalation every six (6) hours as needed. No current facility-administered medications on file prior to visit. Past Surgical History:   Procedure Laterality Date    HX BACK SURGERY      L5 & S 1    HX BREAST BIOPSY      Left    HX HYSTERECTOMY  6/22/09    LSH LSO    HX ORTHOPAEDIC      thumb surgery    HX ORTHOPAEDIC      left knee surgery    HX TUBAL LIGATION      REMOVAL OF KIDNEY STONE      RENAL STENT         Family History   Problem Relation Age of Onset    Cancer Mother 54     Lung cancer    Heart Disease Father     Hypertension Father    [facility] Elevated Lipids Father     Elevated Lipids Sister     Heart Disease Brother     Hypertension Brother     Elevated Lipids Brother     Elevated Lipids Sister     Elevated Lipids Sister     Heart Disease Sister     Heart Disease Sister     Heart Disease Sister     Cancer Sister      colon cancer with liver mets    Anesth Problems Neg Hx      Reviewed and no changes     Social History     Social History    Marital status: SINGLE     Spouse name: N/A    Number of children: N/A    Years of education: N/A     Occupational History    Not on file.      Social History Main Topics    Smoking status: Former Smoker     Packs/day: 1.00     Years: 25.00    Smokeless tobacco: Never Used    Alcohol use Yes      Comment: occasionally    Drug use: No    Sexual activity: Yes     Partners: Male     Birth control/ protection: Surgical     Other Topics Concern    Not on file     Social History Narrative            Visit Vitals    /78 (BP 1 Location: Right arm, BP Patient Position: Sitting)    Pulse 85    Temp 98.3 °F (36.8 °C) (Oral)    Resp 18    Ht 5' 7\" (1.702 m)    Wt 253 lb 1.6 oz (114.8 kg)    LMP 06/22/2009    SpO2 99%    BMI 39.64 kg/m2       Physical Examination: General - Well appearing female  HEENT - PERRL, TM no erythema/opacification, normal nasal turbinates, oropharynx no erythema or exudate, MMM  Neck - supple, no bruits, no TMG, no LAD  Pulm - clear to auscultation bilaterally  Cardio - RRR, normal S1 S2, no murmur gallops or rubs  Abd - soft, nontender, no masses, no HSM  Extrem - no edema, +2 distal pulses  Psych - normal affect, appropriate mood    Lab Results   Component Value Date/Time    WBC 3.6 05/12/2017 02:30 AM    Hemoglobin (POC) 12.9 10/28/2013 03:40 PM    HGB 14.0 05/12/2017 02:30 AM    Hematocrit (POC) 38 10/28/2013 03:40 PM    HCT 42.1 05/12/2017 02:30 AM    PLATELET 263 35/68/1954 02:30 AM    MCV 98.6 05/12/2017 02:30 AM     Lab Results   Component Value Date/Time    Sodium 140 05/12/2017 02:30 AM    Potassium 3.5 05/12/2017 02:30 AM    Chloride 103 05/12/2017 02:30 AM    CO2 27 05/12/2017 02:30 AM    Anion gap 10 05/12/2017 02:30 AM    Glucose 97 05/12/2017 02:30 AM    BUN 9 05/12/2017 02:30 AM    Creatinine 0.99 05/12/2017 02:30 AM    BUN/Creatinine ratio 9 05/12/2017 02:30 AM    GFR est AA >60 05/12/2017 02:30 AM    GFR est non-AA 59 05/12/2017 02:30 AM    Calcium 9.5 05/12/2017 02:30 AM     Lab Results   Component Value Date/Time    Cholesterol, total 243 05/09/2017 08:54 AM    HDL Cholesterol 90 05/09/2017 08:54 AM    LDL, calculated 136 05/09/2017 08:54 AM    VLDL, calculated 17 05/09/2017 08:54 AM    Triglyceride 86 05/09/2017 08:54 AM     Lab Results   Component Value Date/Time    TSH 0.345 05/09/2017 08:54 AM     No results found for: PSA, PSA2, PSAR1, Paulette Christine, [id number], [id number], PSALT  Lab Results   Component Value Date/Time    Hemoglobin A1c 5.6 05/09/2017 08:54 AM     Lab Results   Component Value Date/Time    VITAMIN D, 25-HYDROXY 28.5 05/09/2017 08:54 AM       Lab Results   Component Value Date/Time    ALT (SGPT) 39 05/12/2017 02:30 AM    AST (SGOT) 24 05/12/2017 02:30 AM    Alk.  phosphatase 75 05/12/2017 02:30 AM Bilirubin, total 0.7 05/12/2017 02:30 AM           Assessment/Plan:  1. Chest pain: leading to ER visit. Had EKG similar to previous and troponins negative. In the setting of starting phentermine. Medication stopped. Removed from drug list. Patient will see cardiologist this Thursday and will likely have a stress test. Currently chest pain free. 1. Hyperlipidemia, unspecified hyperlipidemia type  elevated  -increased lovastatin (MEVACOR) 20 mg tablet; Take 1 Tab by mouth nightly. Dispense: 30 Tab; Refill: 5    2. Acquired hypothyroidism  - TSH was low therefore decreased the dose of synthroid  - levothyroxine (SYNTHROID) 88 mcg tablet; Take 1 Tab by mouth Daily (before breakfast). Dispense: 30 Tab; Refill: 5    3. Morbid obesity due to excess calories ([address]  - discussed diet and exercise. Trial of Belviq - discussed possible side effects - \" feeling sleepy more tired\". - given coupon for lower pricing  - lorcaserin (BELVIQ XR) 20 mg Tb24; Take 20 mg by mouth daily. Max Daily Amount: 20 mg. Dispense: 30 mg; Refill: 4      Follow-up Disposition:  Return if symptoms worsen or fail to improve.     Jose F Ellis MD

## 2017-05-15 NOTE — PROGRESS NOTES
Chief Complaint   Patient presents with   [facility] Follow Up     chest pain 5/12/17     1. Have you been to the ER, urgent care clinic since your last visit? Hospitalized since your last visit? Yes When: 5/12/17 Where: ED [facility] Reason for visit: Chest pain    2. Have you seen or consulted any other health care providers outside of the [address] [place] [address] since your last visit? Include any pap smears or colon screening.  No

## 2017-05-15 NOTE — PATIENT INSTRUCTIONS
Chest Pain: Care Instructions  Your Care Instructions  There are many things that can cause chest pain. Some are not serious and will get better on their own in a few days. But some kinds of chest pain need more testing and treatment. Your doctor may have recommended a follow-up visit in the next 8 to 12 hours. If you are not getting better, you may need more tests or treatment. Even though your doctor has released you, you still need to watch for any problems. The doctor carefully checked you, but sometimes problems can develop later. If you have new symptoms or if your symptoms do not get better, get medical care right away. If you have worse or different chest pain or pressure that lasts more than 5 minutes or you passed out (lost consciousness), call 911 or seek other emergency help right away. A medical visit is only one step in your treatment. Even if you feel better, you still need to do what your doctor recommends, such as going to all suggested follow-up appointments and taking medicines exactly as directed. This will help you recover and help prevent future problems. How can you care for yourself at home? · Rest until you feel better. · Take your medicine exactly as prescribed. Call your doctor if you think you are having a problem with your medicine. · Do not drive after taking a prescription pain medicine. When should you call for help? Call 911 if:  · You passed out (lost consciousness). · You have severe difficulty breathing. · You have symptoms of a heart attack. These may include:  ¨ Chest pain or pressure, or a strange feeling in your chest.  ¨ Sweating. ¨ Shortness of breath. ¨ Nausea or vomiting. ¨ Pain, pressure, or a strange feeling in your back, neck, jaw, or upper belly or in one or both shoulders or arms. ¨ Lightheadedness or sudden weakness. ¨ A fast or irregular heartbeat.   After you call 911, the  may tell you to chew 1 adult-strength or 2 to 4 low-dose aspirin. Wait for an ambulance. Do not try to drive yourself. Call your doctor today if:  · You have any trouble breathing. · Your chest pain gets worse. · You are dizzy or lightheaded, or you feel like you may faint. · You are not getting better as expected. · You are having new or different chest pain. Where can you learn more? Go to http://olivier-akil.info/. Enter A120 in the search box to learn more about \"Chest Pain: Care Instructions. \"  Current as of: May 27, 2016  Content Version: 11.2  © 2289-8013 [employer]. Care instructions adapted under license by [employer] (which disclaims liability or warranty for this information). If you have questions about a medical condition or this instruction, always ask your healthcare professional. [address] any warranty or liability for your use of this information.    --------------------------  Stop phentermine as it may have caused your symptoms.  You already have an appointment with cardiologist

## 2017-05-16 ENCOUNTER — TELEPHONE (OUTPATIENT)
Dept: INTERNAL MEDICINE CLINIC | Age: 52
End: 2017-05-16

## 2017-05-16 DIAGNOSIS — E66.09 NON MORBID OBESITY DUE TO EXCESS CALORIES: Primary | ICD-10-CM

## 2017-05-17 NOTE — TELEPHONE ENCOUNTER
Returned call to patient. She states Cookie is still going to cost her over $100. She is requesting an alternative.

## 2017-05-18 RX ORDER — BUPROPION HYDROCHLORIDE 75 MG/1
75 TABLET ORAL 2 TIMES DAILY
Qty: 60 TAB | Refills: 2 | Status: SHIPPED | OUTPATIENT
Start: 2017-05-18 | End: 2017-07-19 | Stop reason: CLARIF

## 2017-05-19 NOTE — TELEPHONE ENCOUNTER
MD Kalpesh Irvin LPN        Caller: Unspecified (3 days ago,  3:20 PM)                     Prescribed Wellbutrin which is not as effective as Belviq for weight loss but can try medication along with diet and exercise.

## 2017-05-19 NOTE — TELEPHONE ENCOUNTER
Spoke with patient and advised of wellbutrin rx. Discussed directions per Dr. Moore Bachelor and to use in conjunction with diet and exercise. Patient verbalized understanding.

## 2017-05-22 ENCOUNTER — TELEPHONE (OUTPATIENT)
Dept: SLEEP MEDICINE | Age: 52
End: 2017-05-22

## 2017-05-22 DIAGNOSIS — R09.02 HYPOXEMIA: ICD-10-CM

## 2017-05-22 DIAGNOSIS — G47.33 OSA (OBSTRUCTIVE SLEEP APNEA): Primary | ICD-10-CM

## 2017-05-22 NOTE — TELEPHONE ENCOUNTER
Orders Placed This Encounter    AMB SUPPLY ORDER     * Nocturnal Oxygen Study on current PAP settings.     Neftaly Mulligan M.D.  (electronically signed)  Diplomate in Sleep Medicine, [facility]

## 2017-05-23 ENCOUNTER — DOCUMENTATION ONLY (OUTPATIENT)
Dept: SLEEP MEDICINE | Age: 52
End: 2017-05-23

## 2017-05-23 ENCOUNTER — TELEPHONE (OUTPATIENT)
Dept: SLEEP MEDICINE | Age: 52
End: 2017-05-23

## 2017-05-23 NOTE — TELEPHONE ENCOUNTER
Patient called and had questions regarding nocturnal oximetry study. I have addressed the patient's concerns regarding financial cost. She also had questions concerning another matter.  She can be reached at [phone]

## 2017-05-24 DIAGNOSIS — K57.32 DIVERTICULITIS OF LARGE INTESTINE WITHOUT PERFORATION OR ABSCESS WITHOUT BLEEDING: ICD-10-CM

## 2017-05-24 RX ORDER — METRONIDAZOLE 500 MG/1
TABLET ORAL
Qty: 42 TAB | Refills: 0 | OUTPATIENT
Start: 2017-05-24

## 2017-05-26 ENCOUNTER — TELEPHONE (OUTPATIENT)
Dept: SLEEP MEDICINE | Age: 52
End: 2017-05-26

## 2017-06-05 ENCOUNTER — OFFICE VISIT (OUTPATIENT)
Dept: SLEEP MEDICINE | Age: 52
End: 2017-06-05

## 2017-06-05 VITALS
TEMPERATURE: 98.7 F | HEART RATE: 79 BPM | BODY MASS INDEX: 39.87 KG/M2 | WEIGHT: 254 LBS | HEIGHT: 67 IN | DIASTOLIC BLOOD PRESSURE: 75 MMHG | SYSTOLIC BLOOD PRESSURE: 109 MMHG | OXYGEN SATURATION: 94 %

## 2017-06-05 DIAGNOSIS — E66.9 OBESITY (BMI 30-39.9): ICD-10-CM

## 2017-06-05 DIAGNOSIS — G47.33 OSA (OBSTRUCTIVE SLEEP APNEA): Primary | ICD-10-CM

## 2017-06-05 NOTE — PROGRESS NOTES
[facility] [place] [place], [address]  Tel.  [phone]  Fax. [address] [facility] [address]  [place], [address] [facility]  Tel.  [phone]  Fax. [phone] [address] [facility] [address]   Tel.  [phone]  Fax. [phone]       S>Smitha Kaba is a 45 y.o. female seen for a positive airway pressure follow-up. She reports minimal problems using the device. The following problems are identified:    Drowsiness yes Problems exhaling no   Snoring yes Forget to put on no   Mask Comfortable yes Can't fall asleep no   Dry Mouth no Mask falls off no   Air Leaking no Frequent awakenings yes       She admits that her sleep has not changed. Therapy Apnea Index averaged over PAP use: 4.3 /hr which reflects improved sleep breathing condition. Oxygen saturation within normal limits. Allergies   Allergen Reactions    Codeine Other (comments)    Pcn [Penicillins] Other (comments)       She has a current medication list which includes the following prescription(s): bupropion, lovastatin, levothyroxine, nitroglycerin, montelukast, escitalopram oxalate, cyclobenzaprine, meloxicam, albuterol, beclomethasone, and albuterol. .      She  has a past medical history of Asthma; Breast cyst (1996); Chronic pain; Colon polyps; Diverticulitis; Hypercholesteremia; Obesity; SUN (obstructive sleep apnea); and Psychiatric disorder. She scored  5 points on the [place] Sleepiness Scale  which reflect mild daytime drowsiness. O>    Visit Vitals    /75    Pulse 79    Temp 98.7 °F (37.1 °C)    Ht 5' 7\" (1.702 m)    Wt 254 lb (115.2 kg)    LMP 06/22/2009    SpO2 94%    BMI 39.78 kg/m2           General:   Alert, oriented, not in distress   Neck:   No JVD    Chest/Lungs:  symetrical lung expansion , no accessory muscle use    Extremities:  no obvious rashes , negative edema    Neuro:  No focal deficits ;  No obvious tremor    Psych:  Normal affect ,  Normal countenance ; A>    ICD-10-CM ICD-9-CM    1. SUN (obstructive sleep apnea) G47.33 327.23    2. Obesity (BMI 30-39. 9) E66.9 278.00      AHI = 21 (2017). On CPAP :  6-15 cmH2O. Compliant:      yes    Therapeutic Response:  Positive    P>    * PAP therapy is effective and remains necessary treatment for sleep apnea     * Follow-up Disposition:  Return in about 1 year (around 6/5/2018), or if symptoms worsen or fail to improve. *She was asked to contact our office for any problems regarding  PAP therapy. * Counseling was provided regarding the importance of regular PAP use and on proper sleep hygiene and safe driving. * Re-enforced proper and regular cleaning for the device. I have reviewed/discussed the above normal BMI with the patient. I have recommended the following interventions: dietary management education, guidance, and counseling . Shameka Minus Thank you for allowing us to participate in your patient's medical care.     Vidal Wall M.D.  (electronically signed)  Diplomate in Sleep Medicine, [facility]

## 2017-06-05 NOTE — MR AVS SNAPSHOT
Visit Information Date & Time Provider Department Dept. Phone Encounter #  
 6/5/2017  1:45 PM Dev Pond, Evin Bales [id number] Follow-up Instructions Return in about 1 year (around 6/5/2018), or if symptoms worsen or fail to improve. Follow-up and Disposition History Your Appointments 8/18/2017  2:00 PM  
ROUTINE CARE with MD FARZAD Mcnally [facility] Appt Note: 4 week f/u; 3 month f/up kmt [id number]  
 [address] [id number]  
[address] [facility] [address] Upcoming Health Maintenance Date Due INFLUENZA AGE 9 TO ADULT 8/1/2017 PAP AKA CERVICAL CYTOLOGY 11/25/2017 COLONOSCOPY 2/1/2018 BREAST CANCER SCRN MAMMOGRAM 4/7/2019 DTaP/Tdap/Td series (2 - Td) 10/5/2022 Allergies as of 6/5/2017  Review Complete On: 6/5/2017 By: Dev Pond MD  
  
 Severity Noted Reaction Type Reactions Codeine  10/13/2010    Other (comments) Pcn [Penicillins]  10/13/2010    Other (comments) Current Immunizations  Reviewed on 1/26/2017 Name Date Influenza Vaccine 10/17/2013 Influenza Vaccine (Quad) PF 1/26/2017 Pneumococcal Vaccine (Unspecified Type) 10/17/2015, 10/17/2013 Not reviewed this visit You Were Diagnosed With   
  
 Codes Comments SUN (obstructive sleep apnea)    -  Primary ICD-10-CM: G47.33 
ICD-9-CM: 327.23 Obesity (BMI 30-39. 9)     ICD-10-CM: E66.9 ICD-9-CM: 278.00 Vitals BP Pulse Temp Height(growth percentile) Weight(growth percentile) LMP  
 109/75 79 98.7 °F (37.1 °C) 5' 7\" (1.702 m) 254 lb (115.2 kg) 06/22/2009 SpO2 BMI OB Status Smoking Status 94% 39.78 kg/m2 Hysterectomy Former Smoker BMI and BSA Data Body Mass Index Body Surface Area 39.78 kg/m 2 2.33 m 2 Preferred Pharmacy Pharmacy Name Phone [id number], [place] [address] [place] [address] [address] [phone] Your Updated Medication List  
  
   
This list is accurate as of: 6/5/17  2:06 PM.  Always use your most recent med list.  
  
  
  
  
 albuterol 2.5 mg /3 mL (0.083 %) nebulizer solution Commonly known as:  PROVENTIL VENTOLIN  
  
 albuterol 90 mcg/actuation inhaler Commonly known as:  Natalie [place] Take 2 Puffs by inhalation every six (6) hours as needed. buPROPion 75 mg tablet Commonly known as:  [facility] Take 1 Tab by mouth two (2) times a day. cyclobenzaprine 10 mg tablet Commonly known as:  FLEXERIL Take 1 Tab by mouth three (3) times daily as needed for Muscle Spasm(s). escitalopram oxalate 20 mg tablet Commonly known as:  Quynh Moynahan TAKE 1 TABLET BY MOUTH EVERY DAY  
  
 levothyroxine 88 mcg tablet Commonly known as:  SYNTHROID Take 1 Tab by mouth Daily (before breakfast). lovastatin 20 mg tablet Commonly known as:  MEVACOR Take 1 Tab by mouth nightly. MELOXICAM PO Take  by mouth.  
  
 montelukast 10 mg tablet Commonly known as:  SINGULAIR Take 1 Tab by mouth daily. nitroglycerin 0.4 mg SL tablet Commonly known as:  NITROSTAT Take 1 Tab by mouth every five (5) minutes as needed for Chest Pain. Sit/Lay down then put one tab under the tongue every 5 minutes as needed for chest pain for 3 doses  Indications: ANGINA QVAR 40 mcg/actuation mii Generic drug:  beclomethasone Take 1 Puff by inhalation two (2) times daily as needed. Follow-up Instructions Return in about 1 year (around 6/5/2018), or if symptoms worsen or fail to improve. Patient Instructions [address] [facility] [place] [address] [place] [address], [address] Tel.  [phone] Fax. [facility] [address], [address] Tel.  [phone] Fax. [phone] [address] [place] [address] JuanJulien Tel.  [phone] Fax. [phone] Learning About CPAP for Sleep Apnea What is CPAP? CPAP is a small machine that you use at home every night while you sleep. It increases air pressure in your throat to keep your airway open. When you have sleep apnea, this can help you sleep better so you feel much better. CPAP stands for \"continuous positive airway pressure. \" The CPAP machine will have one of the following: · A mask that covers your nose and mouth · Prongs that fit into your nose · A mask that covers your nose only, the most common type. This type is called NCPAP. The N stands for \"nasal.\" Why is it done? CPAP is usually the best treatment for obstructive sleep apnea. It is the first treatment choice and the most widely used. Your doctor may suggest CPAP if you have: · Moderate to severe sleep apnea. · Sleep apnea and coronary artery disease (CAD) or heart failure. How does it help? · CPAP can help you have more normal sleep, so you feel less sleepy and more alert during the daytime. · CPAP may help keep heart failure or other heart problems from getting worse. · NCPAP may help lower your blood pressure. · If you use CPAP, your bed partner may also sleep better because you are not snoring or restless. What are the side effects? Some people who use CPAP have: · A dry or stuffy nose and a sore throat. · Irritated skin on the face. · Sore eyes. · Bloating. If you have any of these problems, work with your doctor to fix them. Here are some things you can try: · Be sure the mask or nasal prongs fit well. · See if your doctor can adjust the pressure of your CPAP. · If your nose is dry, try a humidifier. · If your nose is runny or stuffy, try decongestant medicine or a steroid nasal spray. If these things do not help, you might try a different type of machine. Some machines have air pressure that adjusts on its own.  Others have air pressures that are different when you breathe in than when you breathe out. This may reduce discomfort caused by too much pressure in your nose. Where can you learn more? Go to Tradyo.be Enter E335 in the search box to learn more about \"Learning About CPAP for Sleep Apnea. \"  
© 3169-7359 Healthwise, Incorporated. Care instructions adapted under license by [facility] (which disclaims liability or warranty for this information). This care instruction is for use with your licensed healthcare professional. If you have questions about a medical condition or this instruction, always ask your healthcare professional. [place] [address] any warranty or liability for your use of this information. Content Version: 8.6.85071; Last Revised: January 11, 2010 PROPER SLEEP HYGIENE What to avoid · Do not have drinks with caffeine, such as coffee or black tea, for 8 hours before bed. · Do not smoke or use other types of tobacco near bedtime. Nicotine is a stimulant and can keep you awake. · Avoid drinking alcohol late in the evening, because it can cause you to wake in the middle of the night. · Do not eat a big meal close to bedtime. If you are hungry, eat a light snack. · Do not drink a lot of water close to bedtime, because the need to urinate may wake you up during the night. · Do not read or watch TV in bed. Use the bed only for sleeping and sexual activity. What to try · Go to bed at the same time every night, and wake up at the same time every morning. Do not take naps during the day. · Keep your bedroom quiet, dark, and cool. · Get regular exercise, but not within 3 to 4 hours of your bedtime. Danielle Jj · Sleep on a comfortable pillow and mattress. · If watching the clock makes you anxious, turn it facing away from you so you cannot see the time. · If you worry when you lie down, start a worry book.  Well before bedtime, write down your worries, and then set the book and your concerns aside. · Try meditation or other relaxation techniques before you go to bed. · If you cannot fall asleep, get up and go to another room until you feel sleepy. Do something relaxing. Repeat your bedtime routine before you go to bed again. · Make your house quiet and calm about an hour before bedtime. Turn down the lights, turn off the TV, log off the computer, and turn down the volume on music. This can help you relax after a busy day. Drowsy Driving: The [employer] cites drowsiness as a causing factor in more than 791,090 police reported crashes annually, resulting in 76,000 injuries and 1,500 deaths. Other surveys suggest 55% of people polled have driven while drowsy in the past year, 23% had fallen asleep but not crashed, 3% crashed, and 2% had and accident due to drowsy driving. Who is at risk? Young Drivers: One study of drowsy driving accidents states that 55% of the drivers were under 25 years. Of those, 75% were male. Shift Workers and Travelers: People who work overnight or travel across time zones frequently are at higher risk of experiencing Circadian Rhythm Disorders. They are trying to work and function when their body is programed to sleep. Sleep Deprived: Lack of sleep has a serious impact on your ability to pay attention or focus on a task. Consistently getting less than the average of 8 hours your body needs creates partial or cumulative sleep deprivation. Untreated Sleep Disorders: Sleep Apnea, Narcolepsy, R.L.S., and other sleep disorders (untreated) prevent a person from getting enough restful sleep. This leads to excessive daytime sleepiness and increases the risk for drowsy driving accidents by up to 7 times. Medications / Alcohol: Even over the counter medications can cause drowsiness.  Medications that impair a drivers attention should have a warning label. Alcohol naturally makes you sleepy and on its own can cause accidents. Combined with excessive drowsiness its effects are amplified. Signs of Drowsy Driving: * You don't remember driving the last few miles * You may drift out of your bowen * You are unable to focus and your thoughts wander * You may yawn more often than normal 
 * You have difficulty keeping your eyes open / nodding off * Missing traffic signs, speeding, or tailgating Prevention-  
Good sleep hygiene, lifestyle and behavioral choices have the most impact on drowsy driving. There is no substitute for sleep and the average person requires 8 hours nightly. If you find yourself driving drowsy, stop and sleep. Consider the sleep hygiene tips provided during your visit as well. Medication Refill Policy: Refills for all medications require 1 week advance notice. Please have your pharmacy fax a refill request. We are unable to fax, or call in \"controled substance\" medications and you will need to pick these prescriptions up from our office. Xormis Activation Thank you for requesting access to Xormis. Please follow the instructions below to securely access and download your online medical record. Xormis allows you to send messages to your doctor, view your test results, renew your prescriptions, schedule appointments, and more. How Do I Sign Up? 1. In your internet browser, go to [email or web address]. 2. Click on the First Time User? Click Here link in the Sign In box. You will see the New Member Sign Up page. 3. Enter your Xormis Access Code exactly as it appears below. You will not need to use this code after youve completed the sign-up process. If you do not sign up before the expiration date, you must request a new code. Xormis Access Code: Activation code not generated Current Xormis Status: Active (This is the date your Xormis access code will ) 4. Enter the last four digits of your Social Security Number (xxxx) and Date of Birth (mm/dd/yyyy) as indicated and click Submit. You will be taken to the next sign-up page. 5. Create a Tipser ID. This will be your Tipser login ID and cannot be changed, so think of one that is secure and easy to remember. 6. Create a Tipser password. You can change your password at any time. 7. Enter your Password Reset Question and Answer. This can be used at a later time if you forget your password. 8. Enter your e-mail address. You will receive e-mail notification when new information is available in [address] 9. Click Sign Up. You can now view and download portions of your medical record. 10. Click the Download Summary menu link to download a portable copy of your medical information. Additional Information If you have questions, please call [phone]. Remember, Tipser is NOT to be used for urgent needs. For medical emergencies, dial 911. Starting a Weight Loss Plan: After Your Visit Your Care Instructions If you are thinking about losing weight, it can be hard to know where to start. Your doctor can help you set up a weight loss plan that best meets your needs. You may want to take a class on nutrition or exercise, or join a weight loss support group. If you have questions about how to make changes to your eating or exercise habits, ask your doctor about seeing a registered dietitian or an exercise specialist. 
It can be a big challenge to lose weight. But you do not have to make huge changes at once. Make small changes, and stick with them. When those changes become habit, add a few more changes. If you do not think you are ready to make changes right now, try to pick a date in the future. Make an appointment to see your doctor to discuss whether the time is right for you to start a plan. Follow-up care is a key part of your treatment and safety.  Be sure to make and go to all appointments, and call your doctor if you are having problems. It's also a good idea to know your test results and keep a list of the medicines you take. How can you care for yourself at home? · Set realistic goals. Many people expect to lose much more weight than is likely. A weight loss of 5% to 10% of your body weight may be enough to improve your health. · Get family and friends involved to provide support. Talk to them about why you are trying to lose weight, and ask them to help. They can help by participating in exercise and having meals with you, even if they may be eating something different. · Find what works best for you. If you do not have time or do not like to cook, a program that offers meal replacement bars or shakes may be better for you. Or if you like to prepare meals, finding a plan that includes daily menus and recipes may be best. 
· Ask your doctor about other health professionals who can help you achieve your weight loss goals. ¨ A dietitian can help you make healthy changes in your diet. ¨ An exercise specialist or  can help you develop a safe and effective exercise program. 
¨ A counselor or psychiatrist can help you cope with issues such as depression, anxiety, or family problems that can make it hard to focus on weight loss. · Consider joining a support group for people who are trying to lose weight. Your doctor can suggest groups in your area. Where can you learn more? Go to Tuolar.com.be Enter Y669 in the search box to learn more about \"Starting a Weight Loss Plan: After Your Visit. \"  
© 3411-3508 Healthwise, Incorporated. Care instructions adapted under license by [facility] (which disclaims liability or warranty for this information).  This care instruction is for use with your licensed healthcare professional. If you have questions about a medical condition or this instruction, always ask your healthcare professional. [address] any warranty or liability for your use of this information. Content Version: 9.7.79571; Last Revised: September 1, 2009 Introducing [facility] SERVICES! Dear Stefany Dean: Thank you for requesting a MustHaveMenus account. Our records indicate that you already have an active MustHaveMenus account. You can access your account anytime at [email or web address] Did you know that you can access your hospital and ER discharge instructions at any time in MustHaveMenus? You can also review all of your test results from your hospital stay or ER visit. Additional Information If you have questions, please visit the Frequently Asked Questions section of the MustHaveMenus website at [email or web address] Remember, MustHaveMenus is NOT to be used for urgent needs. For medical emergencies, dial 911. Now available from your iPhone and Android! Please provide this summary of care documentation to your next provider. Your primary care clinician is listed as GUNNER Chang. If you have any questions after today's visit, please call [phone].

## 2017-06-05 NOTE — PATIENT INSTRUCTIONS
[address] [facility] [place]. [place], [address]  Tel.  [phone]  Fax. 100 [facility] 60  [place], [address] [facility]  Tel.  [phone]  Fax. [phone] [address] [address] Julien Martinez  Tel.  [phone]  Fax. [phone]     Learning About CPAP for Sleep Apnea  What is CPAP? CPAP is a small machine that you use at home every night while you sleep. It increases air pressure in your throat to keep your airway open. When you have sleep apnea, this can help you sleep better so you feel much better. CPAP stands for \"continuous positive airway pressure. \"  The CPAP machine will have one of the following:  · A mask that covers your nose and mouth  · Prongs that fit into your nose  · A mask that covers your nose only, the most common type. This type is called NCPAP. The N stands for \"nasal.\"  Why is it done? CPAP is usually the best treatment for obstructive sleep apnea. It is the first treatment choice and the most widely used. Your doctor may suggest CPAP if you have:  · Moderate to severe sleep apnea. · Sleep apnea and coronary artery disease (CAD) or heart failure. How does it help? · CPAP can help you have more normal sleep, so you feel less sleepy and more alert during the daytime. · CPAP may help keep heart failure or other heart problems from getting worse. · NCPAP may help lower your blood pressure. · If you use CPAP, your bed partner may also sleep better because you are not snoring or restless. What are the side effects? Some people who use CPAP have:  · A dry or stuffy nose and a sore throat. · Irritated skin on the face. · Sore eyes. · Bloating. If you have any of these problems, work with your doctor to fix them. Here are some things you can try:  · Be sure the mask or nasal prongs fit well. · See if your doctor can adjust the pressure of your CPAP. · If your nose is dry, try a humidifier.   · If your nose is runny or stuffy, try decongestant medicine or a steroid nasal spray. If these things do not help, you might try a different type of machine. Some machines have air pressure that adjusts on its own. Others have air pressures that are different when you breathe in than when you breathe out. This may reduce discomfort caused by too much pressure in your nose. Where can you learn more? Go to QBE.be  Enter Mili Tavares in the search box to learn more about \"Learning About CPAP for Sleep Apnea. \"   © 6138-7752 Healthwise, Incorporated. Care instructions adapted under license by Samuel Lopes (which disclaims liability or warranty for this information). This care instruction is for use with your licensed healthcare professional. If you have questions about a medical condition or this instruction, always ask your healthcare professional. [address] any warranty or liability for your use of this information. Content Version: 5.6.54509; Last Revised: January 11, 2010  PROPER SLEEP HYGIENE    What to avoid  · Do not have drinks with caffeine, such as coffee or black tea, for 8 hours before bed. · Do not smoke or use other types of tobacco near bedtime. Nicotine is a stimulant and can keep you awake. · Avoid drinking alcohol late in the evening, because it can cause you to wake in the middle of the night. · Do not eat a big meal close to bedtime. If you are hungry, eat a light snack. · Do not drink a lot of water close to bedtime, because the need to urinate may wake you up during the night. · Do not read or watch TV in bed. Use the bed only for sleeping and sexual activity. What to try  · Go to bed at the same time every night, and wake up at the same time every morning. Do not take naps during the day. · Keep your bedroom quiet, dark, and cool. · Get regular exercise, but not within 3 to 4 hours of your bedtime. .  · Sleep on a comfortable pillow and mattress.   · If watching the clock makes you anxious, turn it facing away from you so you cannot see the time. · If you worry when you lie down, start a worry book. Well before bedtime, write down your worries, and then set the book and your concerns aside. · Try meditation or other relaxation techniques before you go to bed. · If you cannot fall asleep, get up and go to another room until you feel sleepy. Do something relaxing. Repeat your bedtime routine before you go to bed again. · Make your house quiet and calm about an hour before bedtime. Turn down the lights, turn off the TV, log off the computer, and turn down the volume on music. This can help you relax after a busy day. Drowsy Driving: The [employer] cites drowsiness as a causing factor in more than 858,221 police reported crashes annually, resulting in 76,000 injuries and 1,500 deaths. Other surveys suggest 55% of people polled have driven while drowsy in the past year, 23% had fallen asleep but not crashed, 3% crashed, and 2% had and accident due to drowsy driving. Who is at risk? Young Drivers: One study of drowsy driving accidents states that 55% of the drivers were under 25 years. Of those, 75% were male. Shift Workers and Travelers: People who work overnight or travel across time zones frequently are at higher risk of experiencing Circadian Rhythm Disorders. They are trying to work and function when their body is programed to sleep. Sleep Deprived: Lack of sleep has a serious impact on your ability to pay attention or focus on a task. Consistently getting less than the average of 8 hours your body needs creates partial or cumulative sleep deprivation. Untreated Sleep Disorders: Sleep Apnea, Narcolepsy, R.L.S., and other sleep disorders (untreated) prevent a person from getting enough restful sleep. This leads to excessive daytime sleepiness and increases the risk for drowsy driving accidents by up to 7 times.   Medications / Alcohol: Even over the counter medications can cause drowsiness. Medications that impair a drivers attention should have a warning label. Alcohol naturally makes you sleepy and on its own can cause accidents. Combined with excessive drowsiness its effects are amplified. Signs of Drowsy Driving:   * You don't remember driving the last few miles   * You may drift out of your bowen   * You are unable to focus and your thoughts wander   * You may yawn more often than normal   * You have difficulty keeping your eyes open / nodding off   * Missing traffic signs, speeding, or tailgating  Prevention-   Good sleep hygiene, lifestyle and behavioral choices have the most impact on drowsy driving. There is no substitute for sleep and the average person requires 8 hours nightly. If you find yourself driving drowsy, stop and sleep. Consider the sleep hygiene tips provided during your visit as well. Medication Refill Policy: Refills for all medications require 1 week advance notice. Please have your pharmacy fax a refill request. We are unable to fax, or call in \"controled substance\" medications and you will need to pick these prescriptions up from our office. Understory Activation    Thank you for requesting access to Understory. Please follow the instructions below to securely access and download your online medical record. Understory allows you to send messages to your doctor, view your test results, renew your prescriptions, schedule appointments, and more. How Do I Sign Up? 1. In your internet browser, go to [email or web address]. 2. Click on the First Time User? Click Here link in the Sign In box. You will see the New Member Sign Up page. 3. Enter your Understory Access Code exactly as it appears below. You will not need to use this code after youve completed the sign-up process. If you do not sign up before the expiration date, you must request a new code. Understory Access Code:  Activation code not generated  Current Core Competence Status: Active (This is the date your Core Competence access code will )    4. Enter the last four digits of your Social Security Number (xxxx) and Date of Birth (mm/dd/yyyy) as indicated and click Submit. You will be taken to the next sign-up page. 5. Create a Core Competence ID. This will be your Core Competence login ID and cannot be changed, so think of one that is secure and easy to remember. 6. Create a Core Competence password. You can change your password at any time. 7. Enter your Password Reset Question and Answer. This can be used at a later time if you forget your password. 8. Enter your e-mail address. You will receive e-mail notification when new information is available in [address] 9. Click Sign Up. You can now view and download portions of your medical record. 10. Click the Download Summary menu link to download a portable copy of your medical information. Additional Information    If you have questions, please call [phone]. Remember, Core Competence is NOT to be used for urgent needs. For medical emergencies, dial 911. Starting a Weight Loss Plan: After Your Visit  Your Care Instructions  If you are thinking about losing weight, it can be hard to know where to start. Your doctor can help you set up a weight loss plan that best meets your needs. You may want to take a class on nutrition or exercise, or join a weight loss support group. If you have questions about how to make changes to your eating or exercise habits, ask your doctor about seeing a registered dietitian or an exercise specialist.  It can be a big challenge to lose weight. But you do not have to make huge changes at once. Make small changes, and stick with them. When those changes become habit, add a few more changes. If you do not think you are ready to make changes right now, try to pick a date in the future.  Make an appointment to see your doctor to discuss whether the time is right for you to start a plan.  Follow-up care is a key part of your treatment and safety. Be sure to make and go to all appointments, and call your doctor if you are having problems. It's also a good idea to know your test results and keep a list of the medicines you take. How can you care for yourself at home? · Set realistic goals. Many people expect to lose much more weight than is likely. A weight loss of 5% to 10% of your body weight may be enough to improve your health. · Get family and friends involved to provide support. Talk to them about why you are trying to lose weight, and ask them to help. They can help by participating in exercise and having meals with you, even if they may be eating something different. · Find what works best for you. If you do not have time or do not like to cook, a program that offers meal replacement bars or shakes may be better for you. Or if you like to prepare meals, finding a plan that includes daily menus and recipes may be best.  · Ask your doctor about other health professionals who can help you achieve your weight loss goals. ¨ A dietitian can help you make healthy changes in your diet. ¨ An exercise specialist or  can help you develop a safe and effective exercise program.  ¨ A counselor or psychiatrist can help you cope with issues such as depression, anxiety, or family problems that can make it hard to focus on weight loss. · Consider joining a support group for people who are trying to lose weight. Your doctor can suggest groups in your area. Where can you learn more? Go to Luminoso.be  Enter U357 in the search box to learn more about \"Starting a Weight Loss Plan: After Your Visit. \"   © 1701-0112 Healthwise, Incorporated. Care instructions adapted under license by Renay Mina (which disclaims liability or warranty for this information).  This care instruction is for use with your licensed healthcare professional. If you have questions about a medical condition or this instruction, always ask your healthcare professional. [place] [address] any warranty or liability for your use of this information.   Content Version: 6.2.17185; Last Revised: September 1, 2009

## 2017-07-19 ENCOUNTER — HOSPITAL ENCOUNTER (EMERGENCY)
Age: 52
Discharge: HOME | End: 2017-07-19
Attending: EMERGENCY MEDICINE
Payer: MEDICARE

## 2017-07-19 ENCOUNTER — APPOINTMENT (OUTPATIENT)
Dept: GENERAL RADIOLOGY | Age: 52
End: 2017-07-19
Attending: PHYSICIAN ASSISTANT
Payer: MEDICARE

## 2017-07-19 VITALS
TEMPERATURE: 98.1 F | HEART RATE: 78 BPM | OXYGEN SATURATION: 95 % | SYSTOLIC BLOOD PRESSURE: 160 MMHG | HEIGHT: 67 IN | BODY MASS INDEX: 39.27 KG/M2 | DIASTOLIC BLOOD PRESSURE: 92 MMHG | WEIGHT: 250.22 LBS | RESPIRATION RATE: 18 BRPM

## 2017-07-19 DIAGNOSIS — M54.2 CERVICAL PAIN: Primary | ICD-10-CM

## 2017-07-19 DIAGNOSIS — M47.812 SPONDYLOSIS OF CERVICAL REGION WITHOUT MYELOPATHY OR RADICULOPATHY: ICD-10-CM

## 2017-07-19 PROCEDURE — 96372 THER/PROPH/DIAG INJ SC/IM: CPT

## 2017-07-19 PROCEDURE — 74011250636 HC RX REV CODE- 250/636: Performed by: PHYSICIAN ASSISTANT

## 2017-07-19 PROCEDURE — 72050 X-RAY EXAM NECK SPINE 4/5VWS: CPT

## 2017-07-19 PROCEDURE — 99283 EMERGENCY DEPT VISIT LOW MDM: CPT

## 2017-07-19 RX ORDER — HYDROCODONE BITARTRATE AND ACETAMINOPHEN 5; 325 MG/1; MG/1
1 TABLET ORAL
Qty: 10 TAB | Refills: 0 | Status: SHIPPED | OUTPATIENT
Start: 2017-07-19 | End: 2017-07-22

## 2017-07-19 RX ORDER — CYCLOBENZAPRINE HCL 10 MG
10 TABLET ORAL
Qty: 20 TAB | Refills: 0 | Status: SHIPPED | OUTPATIENT
Start: 2017-07-19 | End: 2018-04-17 | Stop reason: ALTCHOICE

## 2017-07-19 RX ORDER — KETOROLAC TROMETHAMINE 30 MG/ML
60 INJECTION, SOLUTION INTRAMUSCULAR; INTRAVENOUS
Status: COMPLETED | OUTPATIENT
Start: 2017-07-19 | End: 2017-07-19

## 2017-07-19 RX ORDER — METHYLPREDNISOLONE 4 MG/1
TABLET ORAL
Qty: 1 DOSE PACK | Refills: 0 | Status: SHIPPED | OUTPATIENT
Start: 2017-07-19 | End: 2017-09-22

## 2017-07-19 RX ADMIN — KETOROLAC TROMETHAMINE 60 MG: 30 INJECTION, SOLUTION INTRAMUSCULAR at 13:29
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
## 2017-07-19 NOTE — DISCHARGE INSTRUCTIONS
Neck Pain: Care Instructions  Your Care Instructions  You can have neck pain anywhere from the bottom of your head to the top of your shoulders. It can spread to the upper back or arms. Injuries, painting a ceiling, sleeping with your neck twisted, staying in one position for too long, and many other activities can cause neck pain. Most neck pain gets better with home care. Your doctor may recommend medicine to relieve pain or relax your muscles. He or she may suggest exercise and physical therapy to increase flexibility and relieve stress. You may need to wear a special (cervical) collar to support your neck for a day or two. Follow-up care is a key part of your treatment and safety. Be sure to make and go to all appointments, and call your doctor if you are having problems. It's also a good idea to know your test results and keep a list of the medicines you take. How can you care for yourself at home? · Try using a heating pad on a low or medium setting for 15 to 20 minutes every 2 or 3 hours. Try a warm shower in place of one session with the heating pad. · You can also try an ice pack for 10 to 15 minutes every 2 to 3 hours. Put a thin cloth between the ice and your skin. · Take pain medicines exactly as directed. ¨ If the doctor gave you a prescription medicine for pain, take it as prescribed. ¨ If you are not taking a prescription pain medicine, ask your doctor if you can take an over-the-counter medicine. · If your doctor recommends a cervical collar, wear it exactly as directed. When should you call for help? Call your doctor now or seek immediate medical care if:  · You have new or worsening numbness in your arms, buttocks or legs. · You have new or worsening weakness in your arms or legs. (This could make it hard to stand up.)  · You lose control of your bladder or bowels.   Watch closely for changes in your health, and be sure to contact your doctor if:  · Your neck pain is getting worse.  · You are not getting better after 1 week. · You do not get better as expected. Where can you learn more? Go to http://olivier-akil.info/. Enter [id number] in the search box to learn more about \"Neck Pain: Care Instructions. \"  Current as of: March 21, 2017  Content Version: 11.3  © 5591-1668 [employer]. Care instructions adapted under license by [employer] (which disclaims liability or warranty for this information). If you have questions about a medical condition or this instruction, always ask your healthcare professional. [place] [address] any warranty or liability for your use of this information.

## 2017-07-19 NOTE — ED NOTES
SABRA Gomez  reviewed discharge instructions with the patient. The patient verbalized understanding.
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
## 2017-08-09 RX ORDER — ESCITALOPRAM OXALATE 20 MG/1
TABLET ORAL
Qty: 90 TAB | Refills: 1 | Status: SHIPPED | OUTPATIENT
Start: 2017-08-09 | End: 2018-01-31 | Stop reason: SDUPTHER

## 2017-09-22 ENCOUNTER — HOSPITAL ENCOUNTER (OUTPATIENT)
Dept: INTERVENTIONAL RADIOLOGY/VASCULAR | Age: 52
Discharge: HOME | End: 2017-09-22
Attending: ORTHOPAEDIC SURGERY
Payer: MEDICARE

## 2017-09-22 VITALS
BODY MASS INDEX: 39.39 KG/M2 | OXYGEN SATURATION: 96 % | RESPIRATION RATE: 16 BRPM | TEMPERATURE: 98.3 F | HEART RATE: 63 BPM | WEIGHT: 251 LBS | SYSTOLIC BLOOD PRESSURE: 135 MMHG | DIASTOLIC BLOOD PRESSURE: 80 MMHG | HEIGHT: 67 IN

## 2017-09-22 DIAGNOSIS — M51.36 DISC DEGENERATION, LUMBAR: ICD-10-CM

## 2017-09-22 PROCEDURE — 74011636320 HC RX REV CODE- 636/320: Performed by: RADIOLOGY

## 2017-09-22 PROCEDURE — 64483 NJX AA&/STRD TFRM EPI L/S 1: CPT

## 2017-09-22 PROCEDURE — 74011000250 HC RX REV CODE- 250: Performed by: RADIOLOGY

## 2017-09-22 PROCEDURE — 74011250636 HC RX REV CODE- 250/636: Performed by: RADIOLOGY

## 2017-09-22 PROCEDURE — 77030003666 HC NDL SPINAL BD -A

## 2017-09-22 RX ORDER — LIDOCAINE HYDROCHLORIDE 20 MG/ML
20 INJECTION, SOLUTION INFILTRATION; PERINEURAL ONCE
Status: COMPLETED | OUTPATIENT
Start: 2017-09-22 | End: 2017-09-22

## 2017-09-22 RX ORDER — LIDOCAINE HYDROCHLORIDE 10 MG/ML
5 INJECTION, SOLUTION EPIDURAL; INFILTRATION; INTRACAUDAL; PERINEURAL ONCE
Status: COMPLETED | OUTPATIENT
Start: 2017-09-22 | End: 2017-09-22

## 2017-09-22 RX ORDER — DEXAMETHASONE SODIUM PHOSPHATE 10 MG/ML
10 INJECTION INTRAMUSCULAR; INTRAVENOUS ONCE
Status: COMPLETED | OUTPATIENT
Start: 2017-09-22 | End: 2017-09-22

## 2017-09-22 RX ORDER — SODIUM CHLORIDE 9 MG/ML
3 INJECTION INTRAMUSCULAR; INTRAVENOUS; SUBCUTANEOUS ONCE
Status: COMPLETED | OUTPATIENT
Start: 2017-09-22 | End: 2017-09-22

## 2017-09-22 RX ADMIN — SODIUM CHLORIDE 3 ML: 9 INJECTION, SOLUTION INTRAMUSCULAR; INTRAVENOUS; SUBCUTANEOUS at 08:15

## 2017-09-22 RX ADMIN — DEXAMETHASONE SODIUM PHOSPHATE 5 MG: 10 INJECTION, SOLUTION INTRAMUSCULAR; INTRAVENOUS at 08:29

## 2017-09-22 RX ADMIN — DEXAMETHASONE SODIUM PHOSPHATE 10 MG: 10 INJECTION, SOLUTION INTRAMUSCULAR; INTRAVENOUS at 08:15

## 2017-09-22 RX ADMIN — LIDOCAINE HYDROCHLORIDE 400 MG: 20 INJECTION, SOLUTION INFILTRATION; PERINEURAL at 08:15

## 2017-09-22 RX ADMIN — IOPAMIDOL 2 ML: 408 INJECTION, SOLUTION INTRATHECAL at 08:15

## 2017-09-22 RX ADMIN — LIDOCAINE HYDROCHLORIDE 5 ML: 10 INJECTION, SOLUTION EPIDURAL; INFILTRATION; INTRACAUDAL; PERINEURAL at 08:15

## 2017-09-22 NOTE — DISCHARGE INSTRUCTIONS
[facility]  Special Procedures/Radiology Department      Steroidal Injection      Go home and rest.     No vigorous physical activity today. Be aware that numbness and/or tingling can occur up to 24 hours after the injection. No driving today. Resume your previous diet. Resume your previous medications. Depending on your job, you may return to work in 25 to 48 hours. It may take up to one week after the injection to see a change or an improvement in your symptoms. Be sure to follow up with your physician. Tell your physician if the injection helped with your symptoms or if the injection did nothing for your symptoms. For minor discomfort, you can take Tylenol, as directed on the label.       If you have any questions or concerns, please call [phone] and ask for the nurse on-call

## 2017-09-22 NOTE — PROGRESS NOTES
Discharge instructions reviewed with understanding. Copy given to patient. Pt deneis numbness, weakness or tingling in legs. Pt up to sitting postion without difficutly. Pt OOB, gait steady, transferred to w/c for discharge home. Pt out to  by RN.

## 2017-09-22 NOTE — IP AVS SNAPSHOT
Summary of Care Report The Summary of Care report has been created to help improve care coordination. Users with access to [employer] or [employer] [address] (Web-based application) may access additional patient information including the Discharge Summary. If you are not currently a [address] user and need more information, please call the number listed below in the [place] [address] section and ask to be connected with Medical Records. Facility Information Name Address Phone [address] [address] [phone] Patient Information Patient Name Sex NATHANIEL Winn ([id number]) Female 1965 Discharge Information Admitting Provider Service Area Unit  
 (none) [facility] Amara Cristina / [phone] Discharge Provider Discharge Date/Time Discharge Disposition Destination (none) (none) (none) (none) Patient Language Language ENGLISH [13] Hospital Problems as of 2017  Reviewed: 2017  2:04 PM by Tg Cummings None Non-Hospital Problems as of 2017  Reviewed: 2017  2:04 PM by Tg Cummings Class Noted - Resolved Last Modified Active Problems   History of hysterectomy, supracervical  2012 - Present 2012 by Renay Rizvi MD  
  Entered by Renay Rizvi MD  
  S/P left oophorectomy  2012 - Present 2012 by Renay Rizvi MD  
  Entered by Renay Rizvi MD  
  Glaucoma  2014 - Present 2014 by Renay Rizvi MD  
  Entered by Renay Rizvi MD  
  Overview Signed 2014  2:33 PM by Renay Rizvi MD  
   History of 
  
  
  Glaucoma of right eye  3/16/2016 - Present 3/16/2016 by Renay Rizvi MD  
  Entered by Renay Rizvi MD  
  Asthma  2017 - Present 2017 by Celia Newsome MD  
  Entered by Celia Newsome MD  
 Insomnia  1/26/2017 - Present 1/26/2017 by Pablo Wolf MD  
  Entered by Pablo Wolf MD  
  Diverticulitis of large intestine without perforation or abscess without bleeding  3/17/2017 - Present 3/17/2017 by Pablo Wolf MD  
  Entered by Pablo Wolf MD  
  Diverticulosis of large intestine without hemorrhage  3/24/2017 - Present 3/24/2017 by Mitchel Humphrey MD  
  Entered by Mitchel Humphrey MD  
  Diverticulitis  Unknown - Present 5/10/2017 by Pablo Wolf MD  
  Entered by Pablo Wolf MD  
  
You are allergic to the following Allergen Reactions Codeine Other (comments) Pcn (Penicillins) Other (comments) Current Discharge Medication List  
  
ASK your doctor about these medications Dose & Instructions Dispensing Information Comments  
 albuterol 2.5 mg /3 mL (0.083 %) nebulizer solution Commonly known as:  PROVENTIL VENTOLIN Refills:  1  
   
 albuterol 90 mcg/actuation inhaler Commonly known as:  Willa Comment Dose:  2 Puff Take 2 Puffs by inhalation every six (6) hours as needed. Refills:  0  
   
 cyclobenzaprine 10 mg tablet Commonly known as:  FLEXERIL Dose:  10 mg Take 1 Tab by mouth three (3) times daily as needed for Muscle Spasm(s). Quantity:  20 Tab Refills:  0  
   
 escitalopram oxalate 20 mg tablet Commonly known as:  Colejacklyn HoffmannBrunner TAKE 1 TABLET BY MOUTH EVERY DAY Quantity:  90 Tab Refills:  1  
   
 levothyroxine 88 mcg tablet Commonly known as:  SYNTHROID Dose:  88 mcg Take 1 Tab by mouth Daily (before breakfast). Quantity:  30 Tab Refills:  5  
   
 lovastatin 20 mg tablet Commonly known as:  MEVACOR Dose:  20 mg Take 1 Tab by mouth nightly. Quantity:  30 Tab Refills:  5 MELOXICAM PO Take  by mouth. Refills:  0  
   
 montelukast 10 mg tablet Commonly known as:  SINGULAIR Dose:  10 mg Take 1 Tab by mouth daily. Quantity:  30 Tab Refills:  4 QVAR 40 mcg/actuation Epizyme Generic drug:  beclomethasone Dose:  1 Puff Take 1 Puff by inhalation two (2) times daily as needed. Refills:  0 Current Immunizations Name Date Influenza Vaccine 10/17/2013 Influenza Vaccine (Quad) PF 1/26/2017 Pneumococcal Vaccine (Unspecified Type) 10/17/2015, 10/17/2013 Follow-up Information None Discharge Instructions Allison Simpsonkner [facility] Special Procedures/Radiology Department Steroidal Injection Go home and rest. 
 
 No vigorous physical activity today. Be aware that numbness and/or tingling can occur up to 24 hours after the injection. No driving today. Resume your previous diet. Resume your previous medications. Depending on your job, you may return to work in 25 to 48 hours. It may take up to one week after the injection to see a change or an improvement in your symptoms. Be sure to follow up with your physician. Tell your physician if the injection helped with your symptoms or if the injection did nothing for your symptoms. For minor discomfort, you can take Tylenol, as directed on the label. If you have any questions or concerns, please call [phone] and ask for the nurse on-call Chart Review Routing History No Routing History on File

## 2017-10-17 ENCOUNTER — OFFICE VISIT (OUTPATIENT)
Dept: INTERNAL MEDICINE CLINIC | Age: 52
End: 2017-10-17

## 2017-10-17 VITALS
WEIGHT: 261.6 LBS | BODY MASS INDEX: 41.06 KG/M2 | HEIGHT: 67 IN | DIASTOLIC BLOOD PRESSURE: 80 MMHG | RESPIRATION RATE: 16 BRPM | TEMPERATURE: 98 F | HEART RATE: 71 BPM | OXYGEN SATURATION: 95 % | SYSTOLIC BLOOD PRESSURE: 117 MMHG

## 2017-10-17 DIAGNOSIS — K59.00 CONSTIPATION, UNSPECIFIED CONSTIPATION TYPE: Primary | ICD-10-CM

## 2017-10-17 DIAGNOSIS — K21.9 GASTROESOPHAGEAL REFLUX DISEASE, ESOPHAGITIS PRESENCE NOT SPECIFIED: ICD-10-CM

## 2017-10-17 DIAGNOSIS — R10.10 UPPER ABDOMINAL PAIN: ICD-10-CM

## 2017-10-17 DIAGNOSIS — Z23 ENCOUNTER FOR IMMUNIZATION: ICD-10-CM

## 2017-10-17 DIAGNOSIS — R10.10 PAIN OF UPPER ABDOMEN: ICD-10-CM

## 2017-10-17 RX ORDER — MAGNESIUM CITRATE
296 SOLUTION, ORAL ORAL
Qty: 1 BOTTLE | Refills: 0 | Status: SHIPPED | OUTPATIENT
Start: 2017-10-17 | End: 2017-10-17

## 2017-10-17 RX ORDER — AMOXICILLIN 250 MG
1 CAPSULE ORAL DAILY
Qty: 30 TAB | Refills: 3 | Status: SHIPPED | OUTPATIENT
Start: 2017-10-17 | End: 2017-11-13

## 2017-10-17 NOTE — MR AVS SNAPSHOT
Visit Information Date & Time Provider Department Dept. Phone Encounter #  
 10/17/2017 10:00 AM Umm Brown, [address] [place] [address],[address] [phone] [id number] Follow-up Instructions Return if symptoms worsen or fail to improve. Upcoming Health Maintenance Date Due INFLUENZA AGE 9 TO ADULT 8/1/2017 PAP AKA CERVICAL CYTOLOGY 11/25/2017 COLONOSCOPY 2/1/2018 BREAST CANCER SCRN MAMMOGRAM 4/7/2019 DTaP/Tdap/Td series (2 - Td) 10/5/2022 Allergies as of 10/17/2017  Review Complete On: 10/17/2017 By: Umm Brown MD  
  
 Severity Noted Reaction Type Reactions Codeine  10/13/2010    Other (comments) Pcn [Penicillins]  10/13/2010    Other (comments) Current Immunizations  Reviewed on 1/26/2017 Name Date Influenza Vaccine 10/17/2013 Influenza Vaccine (Quad) PF  Incomplete, 1/26/2017 Pneumococcal Vaccine (Unspecified Type) 10/17/2015, 10/17/2013 Not reviewed this visit You Were Diagnosed With   
  
 Codes Comments Constipation, unspecified constipation type    -  Primary ICD-10-CM: K59.00 ICD-9-CM: 564.00 Gastroesophageal reflux disease, esophagitis presence not specified     ICD-10-CM: K21.9 ICD-9-CM: 530.81 Pain of upper abdomen     ICD-10-CM: R10.10 ICD-9-CM: 789.09 Encounter for immunization     ICD-10-CM: P50 ICD-9-CM: V03.89 Vitals BP Pulse Temp Resp Height(growth percentile) Weight(growth percentile) 117/80 (BP 1 Location: Left arm, BP Patient Position: Sitting) 71 98 °F (36.7 °C) (Oral) 16 5' 7\" (1.702 m) 261 lb 9.6 oz (118.7 kg) LMP SpO2 BMI OB Status Smoking Status 06/22/2009 95% 40.97 kg/m2 Hysterectomy Former Smoker Vitals History BMI and BSA Data Body Mass Index Body Surface Area 40.97 kg/m 2 2.37 m 2 Preferred Pharmacy Pharmacy Name Phone [id number], [place] [address] [place] [address] [address] [phone] Your Updated Medication List  
  
   
This list is accurate as of: 10/17/17 11:19 AM.  Always use your most recent med list.  
  
  
  
  
 albuterol 2.5 mg /3 mL (0.083 %) nebulizer solution Commonly known as:  PROVENTIL VENTOLIN  
  
 albuterol 90 mcg/actuation inhaler Commonly known as:  Lexx Laroche Take 2 Puffs by inhalation every six (6) hours as needed. cyclobenzaprine 10 mg tablet Commonly known as:  FLEXERIL Take 1 Tab by mouth three (3) times daily as needed for Muscle Spasm(s). escitalopram oxalate 20 mg tablet Commonly known as:  Heena Levo TAKE 1 TABLET BY MOUTH EVERY DAY  
  
 levothyroxine 88 mcg tablet Commonly known as:  SYNTHROID Take 1 Tab by mouth Daily (before breakfast). lovastatin 20 mg tablet Commonly known as:  MEVACOR Take 1 Tab by mouth nightly.  
  
 magnesium citrate solution Take 296 mL by mouth now for 1 dose. MELOXICAM PO Take  by mouth.  
  
 montelukast 10 mg tablet Commonly known as:  SINGULAIR Take 1 Tab by mouth daily. QVAR 40 mcg/actuation VipVenta Generic drug:  beclomethasone Take 1 Puff by inhalation two (2) times daily as needed. senna-docusate 8.6-50 mg per tablet Commonly known as:  Giovana Pilon Take 1 Tab by mouth daily. Prescriptions Sent to Pharmacy Refills  
 magnesium citrate solution 0 Sig: Take 296 mL by mouth now for 1 dose. Class: Normal  
 Pharmacy: [address] [place] [address] [address] [place] [address] Ph #: [phone] Route: Oral  
 senna-docusate (PERICOLACE) 8.6-50 mg per tablet 3 Sig: Take 1 Tab by mouth daily. Class: Normal  
 Pharmacy: [address] [place] [address] [address] [place] [address] Ph #: [phone] Route: Oral  
  
We Performed the Following ADMIN INFLUENZA VIRUS VAC [ [facility]] INFLUENZA VIRUS VAC QUAD,SPLIT,PRESV FREE SYRINGE IM [id number] CPT(R)] Follow-up Instructions Return if symptoms worsen or fail to improve. To-Do List   
 10/17/2017 Imaging:  US ABD COMP   
  
 10/17/2017 Imaging:  XR ABD FLAT/ ERECT Introducing [address]! Dear Willy Wallace: Thank you for requesting a [employer] account. Our records indicate that you already have an active [employer] account. You can access your account anytime at [email or web address] Did you know that you can access your hospital and ER discharge instructions at any time in [employer]? You can also review all of your test results from your hospital stay or ER visit. Additional Information If you have questions, please visit the Frequently Asked Questions section of the [employer] website at [email or web address] Remember, [employer] is NOT to be used for urgent needs. For medical emergencies, dial 911. Now available from your iPhone and Android! Please provide this summary of care documentation to your next provider. Your primary care clinician is listed as GUNNER Chang. If you have any questions after today's visit, please call [phone].

## 2017-10-17 NOTE — PROGRESS NOTES
SUBJECTIVE  Ms. Eduar Jaeger presents today acutely for     Chief Complaint   Patient presents with    Abdominal Pain     pt here today c.o abdominal pain near lower R rib/ inflamation/ bone aches that comes a goes    Irritable Bowel Syndrome     pt concerned of IBS    Generalized Body Aches     pt c.o pain in legs/arms    Immunization/Injection     pt wants a flu shot       Rib pain has been present for a couple of months. She has no recollection of trauma or injury. \"It's inflammation that makes me sick to my stomach. \"   Has BM only once every 2 to 3 weeks. \"I've tried everything\". OBJECTIVE  Visit Vitals    /80 (BP 1 Location: Left arm, BP Patient Position: Sitting)    Pulse 71    Temp 98 °F (36.7 °C) (Oral)    Resp 16    Ht 5' 7\" (1.702 m)    Wt 261 lb 9.6 oz (118.7 kg)    LMP 06/22/2009    SpO2 95%    BMI 40.97 kg/m2     Gen: Pleasant 47 y.o.  female in NAD.   HEENT: PERRLA. EOMI. OP moist and pink.  Neck: Supple.  No LAD.  HEART: RRR, No M/G/R.   LUNGS: CTAB No W/R.   ABDOMEN: S, NT, ND, BS+.   EXTREMITIES: Warm. No C/C/E. MUSCULOSKELETAL: Normal ROM, muscle strength 5/5 all groups. NEURO: Alert and oriented x 3.  Cranial nerves grossly intact.  No focal sensory or motor deficits noted. SKIN: Warm. Dry. No rashes or other lesions noted. ASSESSMENT / PLAN    ICD-10-CM ICD-9-CM    1. Constipation, unspecified constipation type K59.00 564.00 magnesium citrate solution      senna-docusate (PERICOLACE) 8.6-50 mg per tablet      XR ABD FLAT/ ERECT   2. Gastroesophageal reflux disease, esophagitis presence not specified K21.9 530.81    3. Pain of upper abdomen R10.10 789.09 XR ABD FLAT/ ERECT      US ABD COMP     Keep GI appointment in November; let him know about the severe constipation. I have reviewed with the patient details of the assessment and plan and all questions were answered. Relevant patient education was performed.     Follow-up Disposition:  Return if symptoms worsen or fail to improve.

## 2017-10-18 ENCOUNTER — TELEPHONE (OUTPATIENT)
Dept: INTERNAL MEDICINE CLINIC | Age: 52
End: 2017-10-18

## 2017-10-19 NOTE — TELEPHONE ENCOUNTER
Called patient. Two patient identifiers verified. Advised patient of normal abd xray results per Dr. Flor Alcaraz. Patient verbalized understanding and all questions answered at this time.

## 2017-10-21 ENCOUNTER — HOSPITAL ENCOUNTER (OUTPATIENT)
Dept: ULTRASOUND IMAGING | Age: 52
Discharge: HOME | End: 2017-10-21
Attending: INTERNAL MEDICINE
Payer: MEDICARE

## 2017-10-21 DIAGNOSIS — R10.10 PAIN OF UPPER ABDOMEN: ICD-10-CM

## 2017-10-21 PROCEDURE — 76700 US EXAM ABDOM COMPLETE: CPT

## 2017-10-23 NOTE — PROGRESS NOTES
May inform patient: Gallbladder looks normal.  She does have some fatty liver which is not related to her pain.  BJF

## 2017-10-24 NOTE — PROGRESS NOTES
Called patient. Two patient identifiers verified. Advised of results per Dr. Romana Doyne. Patient verbalized understanding and all questions answered at this time.

## 2017-11-03 NOTE — TELEPHONE ENCOUNTER
Pt trying to get Ventolin inhaler and they no longer make this. Please prescribed something similar.   #[phone]

## 2017-11-06 DIAGNOSIS — E03.9 ACQUIRED HYPOTHYROIDISM: ICD-10-CM

## 2017-11-06 DIAGNOSIS — E78.5 HYPERLIPIDEMIA, UNSPECIFIED HYPERLIPIDEMIA TYPE: ICD-10-CM

## 2017-11-06 RX ORDER — LOVASTATIN 20 MG/1
TABLET ORAL
Qty: 30 TAB | Refills: 5 | Status: SHIPPED | OUTPATIENT
Start: 2017-11-06 | End: 2018-05-24 | Stop reason: SDUPTHER

## 2017-11-06 RX ORDER — LEVOTHYROXINE SODIUM 88 UG/1
TABLET ORAL
Qty: 30 TAB | Refills: 5 | Status: SHIPPED | OUTPATIENT
Start: 2017-11-06 | End: 2017-12-12 | Stop reason: SDUPTHER

## 2017-11-08 ENCOUNTER — TELEPHONE (OUTPATIENT)
Dept: INTERNAL MEDICINE CLINIC | Age: 52
End: 2017-11-08

## 2017-11-08 DIAGNOSIS — J45.40 MODERATE PERSISTENT ASTHMA WITHOUT COMPLICATION: Primary | ICD-10-CM

## 2017-11-08 NOTE — TELEPHONE ENCOUNTER
#[phone] [facility] states insurance will not cover the Qvar and asking that you prescribe something similar

## 2017-11-13 RX ORDER — MELATONIN
1000
COMMUNITY
End: 2019-03-05 | Stop reason: ALTCHOICE

## 2017-11-14 ENCOUNTER — ANESTHESIA (OUTPATIENT)
Dept: ENDOSCOPY | Age: 52
End: 2017-11-14
Payer: MEDICARE

## 2017-11-14 ENCOUNTER — ANESTHESIA EVENT (OUTPATIENT)
Dept: ENDOSCOPY | Age: 52
End: 2017-11-14
Payer: MEDICARE

## 2017-11-14 ENCOUNTER — HOSPITAL ENCOUNTER (OUTPATIENT)
Age: 52
Setting detail: OUTPATIENT SURGERY
Discharge: HOME | End: 2017-11-14
Attending: SPECIALIST | Admitting: SPECIALIST
Payer: MEDICARE

## 2017-11-14 VITALS
BODY MASS INDEX: 40.38 KG/M2 | OXYGEN SATURATION: 99 % | SYSTOLIC BLOOD PRESSURE: 124 MMHG | WEIGHT: 257.25 LBS | HEIGHT: 67 IN | HEART RATE: 67 BPM | RESPIRATION RATE: 14 BRPM | TEMPERATURE: 98.4 F | DIASTOLIC BLOOD PRESSURE: 77 MMHG

## 2017-11-14 PROCEDURE — 74011250636 HC RX REV CODE- 250/636: Performed by: SPECIALIST

## 2017-11-14 PROCEDURE — 77030013992 HC SNR POLYP ENDOSC BSC -B: Performed by: SPECIALIST

## 2017-11-14 PROCEDURE — 77030010936 HC CLP LIG BSC -C: Performed by: SPECIALIST

## 2017-11-14 PROCEDURE — 74011000250 HC RX REV CODE- 250

## 2017-11-14 PROCEDURE — 76040000019: Performed by: SPECIALIST

## 2017-11-14 PROCEDURE — 88305 TISSUE EXAM BY PATHOLOGIST: CPT | Performed by: SPECIALIST

## 2017-11-14 PROCEDURE — 74011250636 HC RX REV CODE- 250/636

## 2017-11-14 PROCEDURE — 76060000031 HC ANESTHESIA FIRST 0.5 HR: Performed by: SPECIALIST

## 2017-11-14 RX ORDER — PROPOFOL 10 MG/ML
INJECTION, EMULSION INTRAVENOUS AS NEEDED
Status: DISCONTINUED | OUTPATIENT
Start: 2017-11-14 | End: 2017-11-14 | Stop reason: HOSPADM

## 2017-11-14 RX ORDER — SODIUM CHLORIDE 0.9 % (FLUSH) 0.9 %
5-10 SYRINGE (ML) INJECTION EVERY 8 HOURS
Status: DISCONTINUED | OUTPATIENT
Start: 2017-11-14 | End: 2017-11-14 | Stop reason: HOSPADM

## 2017-11-14 RX ORDER — SODIUM CHLORIDE 0.9 % (FLUSH) 0.9 %
5-10 SYRINGE (ML) INJECTION AS NEEDED
Status: CANCELLED | OUTPATIENT
Start: 2017-11-14 | End: 2017-11-14

## 2017-11-14 RX ORDER — ATROPINE SULFATE 0.1 MG/ML
0.5 INJECTION INTRAVENOUS
Status: CANCELLED | OUTPATIENT
Start: 2017-11-14 | End: 2017-11-14

## 2017-11-14 RX ORDER — NALOXONE HYDROCHLORIDE 0.4 MG/ML
0.4 INJECTION, SOLUTION INTRAMUSCULAR; INTRAVENOUS; SUBCUTANEOUS
Status: CANCELLED | OUTPATIENT
Start: 2017-11-14 | End: 2017-11-14

## 2017-11-14 RX ORDER — SODIUM CHLORIDE 0.9 % (FLUSH) 0.9 %
5-10 SYRINGE (ML) INJECTION AS NEEDED
Status: DISCONTINUED | OUTPATIENT
Start: 2017-11-14 | End: 2017-11-14 | Stop reason: HOSPADM

## 2017-11-14 RX ORDER — FLUMAZENIL 0.1 MG/ML
0.2 INJECTION INTRAVENOUS
Status: CANCELLED | OUTPATIENT
Start: 2017-11-14 | End: 2017-11-14

## 2017-11-14 RX ORDER — EPINEPHRINE 0.1 MG/ML
1 INJECTION INTRACARDIAC; INTRAVENOUS
Status: CANCELLED | OUTPATIENT
Start: 2017-11-14 | End: 2017-11-14

## 2017-11-14 RX ORDER — LIDOCAINE HYDROCHLORIDE 20 MG/ML
INJECTION, SOLUTION EPIDURAL; INFILTRATION; INTRACAUDAL; PERINEURAL AS NEEDED
Status: DISCONTINUED | OUTPATIENT
Start: 2017-11-14 | End: 2017-11-14 | Stop reason: HOSPADM

## 2017-11-14 RX ORDER — SODIUM CHLORIDE 0.9 % (FLUSH) 0.9 %
5-10 SYRINGE (ML) INJECTION EVERY 8 HOURS
Status: CANCELLED | OUTPATIENT
Start: 2017-11-14 | End: 2017-11-14

## 2017-11-14 RX ORDER — SODIUM CHLORIDE 9 MG/ML
50 INJECTION, SOLUTION INTRAVENOUS CONTINUOUS
Status: DISCONTINUED | OUTPATIENT
Start: 2017-11-14 | End: 2017-11-14 | Stop reason: HOSPADM

## 2017-11-14 RX ORDER — DEXTROMETHORPHAN/PSEUDOEPHED 2.5-7.5/.8
1.2 DROPS ORAL
Status: DISCONTINUED | OUTPATIENT
Start: 2017-11-14 | End: 2017-11-14 | Stop reason: HOSPADM

## 2017-11-14 RX ADMIN — PROPOFOL 30 MG: 10 INJECTION, EMULSION INTRAVENOUS at 11:25

## 2017-11-14 RX ADMIN — PROPOFOL 50 MG: 10 INJECTION, EMULSION INTRAVENOUS at 11:13

## 2017-11-14 RX ADMIN — PROPOFOL 50 MG: 10 INJECTION, EMULSION INTRAVENOUS at 11:11

## 2017-11-14 RX ADMIN — SODIUM CHLORIDE 50 ML/HR: 900 INJECTION, SOLUTION INTRAVENOUS at 10:06

## 2017-11-14 RX ADMIN — PROPOFOL 50 MG: 10 INJECTION, EMULSION INTRAVENOUS at 11:22

## 2017-11-14 RX ADMIN — PROPOFOL 100 MG: 10 INJECTION, EMULSION INTRAVENOUS at 11:09

## 2017-11-14 RX ADMIN — PROPOFOL 50 MG: 10 INJECTION, EMULSION INTRAVENOUS at 11:15

## 2017-11-14 RX ADMIN — LIDOCAINE HYDROCHLORIDE 50 MG: 20 INJECTION, SOLUTION EPIDURAL; INFILTRATION; INTRACAUDAL; PERINEURAL at 11:09

## 2017-11-14 RX ADMIN — PROPOFOL 50 MG: 10 INJECTION, EMULSION INTRAVENOUS at 11:18

## 2017-11-14 NOTE — IP AVS SNAPSHOT
[address] [facility] 
[phone] Patient: Glennda Sandhoff MRN: [id number] :1965 About your hospitalization You were admitted on:  2017 You last received care in the:  [facility] ENDOSCOPY You were discharged on:  2017 Why you were hospitalized Your primary diagnosis was:  Not on File Things You Need To Do (next 8 weeks)  ROUTINE CARE with Timmy Feng MD at  9:00 AM  
Where:  [facility] Discharge Orders None A check yuly indicates which time of day the medication should be taken. My Medications TAKE these medications as instructed Instructions Each Dose to Equal  
 Morning Noon Evening Bedtime  
 albuterol 2.5 mg /3 mL (0.083 %) nebulizer solution Commonly known as:  PROVENTIL VENTOLIN Your last dose was: Your next dose is:    
   
   
      
   
   
   
  
 albuterol 90 mcg/actuation inhaler Commonly known as:  Carmella Mode Your last dose was: Your next dose is: Take 2 Puffs by inhalation every six (6) hours as needed. 2 Puff ANTACID ANTI-GAS PO Your last dose was: Your next dose is: Take 1 Tab by mouth as needed. 1 Tab  
    
   
   
   
  
 budesonide 90 mcg/actuation Aepb inhaler Commonly known as:  Roe Иван Your last dose was: Your next dose is: Take 1 Puff by inhalation two (2) times a day. 1 Puff  
    
   
   
   
  
 cyclobenzaprine 10 mg tablet Commonly known as:  FLEXERIL Your last dose was: Your next dose is: Take 1 Tab by mouth three (3) times daily as needed for Muscle Spasm(s). 10 mg  
    
   
   
   
  
 escitalopram oxalate 20 mg tablet Commonly known as:  Angel Luis Nix Your last dose was: Your next dose is: TAKE 1 TABLET BY MOUTH EVERY DAY  
     
   
   
   
  
 levothyroxine 88 mcg tablet Commonly known as:  SYNTHROID Your last dose was: Your next dose is: TAKE 1 TAB BY MOUTH DAILY (BEFORE BREAKFAST). lovastatin 20 mg tablet Commonly known as:  MEVACOR Your last dose was: Your next dose is: TAKE 1 TAB BY MOUTH NIGHTLY. MELOXICAM PO Your last dose was: Your next dose is: Take 7.5 mg by mouth as needed. 7.5 mg  
    
   
   
   
  
 montelukast 10 mg tablet Commonly known as:  SINGULAIR Your last dose was: Your next dose is: Take 1 Tab by mouth daily. 10 mg  
    
   
   
   
  
 VITAMIN D3 1,000 unit tablet Generic drug:  cholecalciferol Your last dose was: Your next dose is: Take 1,000 Units by mouth daily. 1000 Units Discharge Instructions Sherwin Paul [id number] 1965 COLON DISCHARGE INSTRUCTIONS Discomfort: 
Redness at IV site- apply warm compress to area; if redness or soreness persist- contact your physician There may be a slight amount of blood passed from the rectum Gaseous discomfort- walking, belching will help relieve any discomfort You may not operate a vehicle for 12 hours You may not engage in an occupation involving machinery or appliances for rest of today You may not drink alcoholic beverages for at least 12 hours Avoid making any critical decisions for at least 24 hour DIET: 
 Regular diet.  however -  remember your colon is empty and a heavy meal will produce gas. Avoid these foods:  vegetables, fried / greasy foods, carbonated drinks for today. MEDICATIONS: 
  
 
 Regarding Aspirin or Nonsteroidal medications, please see below.  
 
ACTIVITY: 
 You may resume your normal daily activities it is recommended that you spend the remainder of the day resting -  avoid any strenuous activity. CALL M.D. ANY SIGN OF: Increasing pain, nausea, vomiting Abdominal distension (swelling) New increased bleeding (oral or rectal) Fever (chills) Tylenol as needed for pain. Follow-up Instructions: 
 Call Dr. Roxanne Lugo for results of procedure / biopsy in 4-5 days at telephone #  [phone] Repeat Colonoscopy in 3 years. [employer] Activation Thank you for requesting access to [employer]. Please follow the instructions below to securely access and download your online medical record. Ce[employer] allows you to send messages to your doctor, view your test results, renew your prescriptions, schedule appointments, and more. How Do I Sign Up? 1. In your internet browser, go to [email or web address] 
2. Click on the First Time User? Click Here link in the Sign In box. You will be redirect to the New Member Sign Up page. 3. Enter your Ce[employer]or Access Code exactly as it appears below. You will not need to use this code after youve completed the sign-up process. If you do not sign up before the expiration date, you must request a new code. Cerecor Access Code: Activation code not generated Current Cerecor Status: Active (This is the date your Cerecor access code will ) 4. Enter the last four digits of your Social Security Number (xxxx) and Date of Birth (mm/dd/yyyy) as indicated and click Submit. You will be taken to the next sign-up page. 5. Create a Cerecor ID. This will be your Cerecor login ID and cannot be changed, so think of one that is secure and easy to remember. 6. Create a Cerecor password. You can change your password at any time. 7. Enter your Password Reset Question and Answer. This can be used at a later time if you forget your password. 8. Enter your e-mail address.  You will receive e-mail notification when new information is available in [employer]. 9. Click Sign Up. You can now view and download portions of your medical record. 10. Click the Download Summary menu link to download a portable copy of your medical information. Additional Information If you have questions, please visit the Frequently Asked Questions section of the [employer] website at [email or web address] Remember, MyChart is NOT to be used for urgent needs. For medical emergencies, dial 911. Introducing [facility] SERVICES! Dear Heather Quan: Thank you for requesting a [employer] account. Our records indicate that you already have an active [employer] account. You can access your account anytime at [email or web address] Did you know that you can access your hospital and ER discharge instructions at any time in [employer]? You can also review all of your test results from your hospital stay or ER visit. Additional Information If you have questions, please visit the Frequently Asked Questions section of the [employer] website at [email or web address] Remember, MyChart is NOT to be used for urgent needs. For medical emergencies, dial 911. Now available from your iPhone and Android! Providers Seen During Your Hospitalization Provider Specialty Primary office phone Rosa Camacho MD Gastroenterology [phone] Your Primary Care Physician (PCP) Primary Care Physician Office Phone Office Fax Mirlande Tafoya [phone] You are allergic to the following Allergen Reactions Codeine Other (comments) Pcn (Penicillins) Other (comments) Recent Documentation Height Weight Breastfeeding? BMI OB Status Smoking Status 1.702 m 116.7 kg No 40.29 kg/m2 Hysterectomy Former Smoker Emergency Contacts Name Discharge Info Relation Home Work Mobile [facility] CAREGIVER [3] Spouse [3] [phone] Patient Belongings The following personal items are in your possession at time of discharge: 
  Dental Appliances: Lowers, Uppers, With patient  Visual Aid: Glasses, With patient Please provide this summary of care documentation to your next provider. Signatures-by signing, you are acknowledging that this After Visit Summary has been reviewed with you and you have received a copy. Patient Signature:  ____________________________________________________________ Date:  ____________________________________________________________  
  
[facility] Provider Signature:  ____________________________________________________________ Date:  ____________________________________________________________

## 2017-11-14 NOTE — PROGRESS NOTES
Jd Lawrence  1965  [id number]    Situation:  Verbal report received from: ROHIT Queen RN  Procedure: Procedure(s):  COLONOSCOPY  ENDOSCOPIC POLYPECTOMY  RESOLUTION CLIP x 1    Background:    Preoperative diagnosis: FAMILY HISTORY OF COLON CANCER, PERSONAL HISTORY OF COLONIC POLYYS  Postoperative diagnosis: Colon polyp and diverticulosis    :  Dr. Roselyn Mckeon  Assistant(s): Endoscopy Technician-1: Melly Jenkins  Endoscopy RN-1: Malissa Friday RN    Specimens:   ID Type Source Tests Collected by Time Destination   1 : Polyp Preservative   Ade Martini MD 11/14/2017 1119 Pathology     H. Pylori  no    Assessment:  Intra-procedure medications :  Propofol 380 mg      Anesthesia gave intra-procedure sedation and medications, see anesthesia flow sheet yes    Intravenous fluids: NS@ KVO     Vital signs stable     Abdominal assessment: round and soft     Recommendation:  Discharge patient per MD order. Family here.   Permission to share finding with family or friend yes

## 2017-11-14 NOTE — PERIOP NOTES
Endoscope was pre-cleaned at the bedside immediately following procedure by [facility].

## 2017-11-14 NOTE — PROCEDURES
Colonoscopy Procedure Note    Indications:   Family history of coloretal cancer (screening only), Personal history of colon polyps (screening only)    Referring Physician: MD Dali  Anesthesia/Sedation: MAC anesthesia Propofol  Endoscopist:  Dr. Lindalou Goldberg    Procedure in Detail:  Informed consent was obtained for the procedure, including sedation. Risks of perforation, hemorrhage, adverse drug reaction, and aspiration were discussed. The patient was placed in the left lateral decubitus position. Based on the pre-procedure assessment, including review of the patient's medical history, medications, allergies, and review of systems, she had been deemed to be an appropriate candidate for moderate sedation; she was therefore sedated with the medications listed above. The patient was monitored continuously with ECG tracing, pulse oximetry, blood pressure monitoring, and direct observations. A rectal examination was performed. The [id number] was inserted into the rectum and advanced under direct vision to the cecum, which was identified by the ileocecal valve and appendiceal orifice. The quality of the colonic preparation was adequate. A careful inspection was made as the colonoscope was withdrawn, including a retroflexed view of the rectum; findings and interventions are described below. Appropriate photodocumentation was obtained. Findings:     1. Scope advanced to the cecum. 2.  Overall preparation was adequate. 3.  Sessile 1.2 cm polyp in the proximal ascending colon s/p hot snare removal on 15 blended current. Single clip applied to area. Completely excised in one piece. 4.  Diffuse diverticulosis of moderate severity in the sigmoid colon. Therapies:  See above    Specimen: Specimens were collected as described above and sent to pathology. Complications: None were encountered during the procedure. EBL: < 10 ml.     Recommendations:     - Repeat colonoscopy in 3 years.    Signed By: Alis Elliott MD                        November 14, 2017

## 2017-11-14 NOTE — ANESTHESIA POSTPROCEDURE EVALUATION
Post-Anesthesia Evaluation and Assessment    Patient: Agnes Dyer MRN: [id number]  SSN: [id number]    YOB: 1965  Age: 47 y.o. Sex: female       Cardiovascular Function/Vital Signs  Visit Vitals    /57    Pulse 76    Temp 37.2 °C (98.9 °F)    Resp 20    Ht 5' 7\" (1.702 m)    Wt 116.7 kg (257 lb 4 oz)    SpO2 100%    Breastfeeding No    BMI 40.29 kg/m2       Patient is status post total IV anesthesia anesthesia for Procedure(s):  COLONOSCOPY  ENDOSCOPIC POLYPECTOMY  RESOLUTION CLIP x 1. Nausea/Vomiting: None    Postoperative hydration reviewed and adequate. Pain:  Pain Scale 1: Visual (11/14/17 1135)  Pain Intensity 1: 0 (11/14/17 1135)   Managed    Neurological Status: At baseline    Mental Status and Level of Consciousness: Arousable    Pulmonary Status:   O2 Device: Room air (11/14/17 1135)   Adequate oxygenation and airway patent    Complications related to anesthesia: None    Post-anesthesia assessment completed.  No concerns    Signed By: Lucille Cross MD     November 14, 2017

## 2017-11-14 NOTE — H&P
Gastroenterology Outpatient History and Physical    Patient: Avery Chung    Physician: Pam Maurice MD    Vital Signs: Blood pressure 119/76, pulse 79, temperature 98.9 °F (37.2 °C), resp. rate 18, height 5' 7\" (1.702 m), weight 116.7 kg (257 lb 4 oz), last menstrual period 06/22/2009, SpO2 96 %, not currently breastfeeding. Allergies: Allergies   Allergen Reactions    Codeine Other (comments)    Pcn [Penicillins] Other (comments)       Chief Complaint: H/O Polyps, FH colon ca    History of Present Illness: 46 yo WF for colonoscopy for + family history and personal h/o polyps.     Justification for Procedure: above    History:  Past Medical History:   Diagnosis Date    Asthma     Breast cyst 1996    left     Chronic pain     LOWER BACK    Colon polyps     Diverticulitis     GERD (gastroesophageal reflux disease)     Hypercholesteremia     Liver disease     fatty liver    Nicotine vapor product user     Obesity     SUN (obstructive sleep apnea)     uses CPAP    Psychiatric disorder     depression    Thyroid disease     hypothyroid      Past Surgical History:   Procedure Laterality Date    HX BACK SURGERY  2005, 2006    L5 & S 1    HX BREAST BIOPSY      Left    HX HYSTERECTOMY  6/22/09    LSH LSO    HX ORTHOPAEDIC Left 1972    thumb surgery    HX ORTHOPAEDIC Left 2000    left knee surgery    HX ORTHOPAEDIC Left 2013    HX TUBAL LIGATION      REMOVAL OF KIDNEY STONE      RENAL STENT        Social History     Social History    Marital status:      Spouse name: N/A    Number of children: N/A    Years of education: N/A     Social History Main Topics    Smoking status: Former Smoker     Packs/day: 1.50     Years: 25.00     Quit date: 02/2015    Smokeless tobacco: Never Used    Alcohol use No    Drug use: No    Sexual activity: Yes     Partners: Male     Birth control/ protection: Surgical     Other Topics Concern    None     Social History Narrative      Family History   Problem Relation Age of Onset    Cancer Mother 54     Lung cancer    Heart Disease Father     Hypertension Father     Elevated Lipids Father     Heart Attack Father     Stroke Father      x 3    Elevated Lipids Sister     Heart Disease Brother     Hypertension Brother     Elevated Lipids Brother     Elevated Lipids Sister     Elevated Lipids Sister     Heart Disease Sister     Cancer Sister      uterine    Heart Disease Sister     Cancer Sister      uterine    Heart Disease Sister    [de-identified] Cancer Sister      colon cancer with liver mets    Bleeding Prob Brother     Heart Attack Brother     Anesth Problems Neg Hx        Medications:   Prior to Admission medications    Medication Sig Start Date End Date Taking? Authorizing Provider   MAG HYDROX/ALUMINUM HYD/SIMETH (ANTACID ANTI-GAS PO) Take 1 Tab by mouth as needed. Yes Historical Provider   cholecalciferol (VITAMIN D3) 1,000 unit tablet Take 1,000 Units by mouth daily. Yes Historical Provider   levothyroxine (SYNTHROID) 88 mcg tablet TAKE 1 TAB BY MOUTH DAILY (BEFORE BREAKFAST). 11/6/17  Yes Vega Concepcion MD   lovastatin (MEVACOR) 20 mg tablet TAKE 1 TAB BY MOUTH NIGHTLY. 11/6/17  Yes Vega Concepcion MD   escitalopram oxalate (LEXAPRO) 20 mg tablet TAKE 1 TABLET BY MOUTH EVERY DAY 8/9/17  Yes Tita Weber MD   cyclobenzaprine (FLEXERIL) 10 mg tablet Take 1 Tab by mouth three (3) times daily as needed for Muscle Spasm(s). 7/19/17  Yes SABRA Fernandez   montelukast (SINGULAIR) 10 mg tablet Take 1 Tab by mouth daily. 4/21/17  Yes Tita Weber MD   MELOXICAM PO Take 7.5 mg by mouth as needed. Yes Historical Provider   albuterol (PROVENTIL VENTOLIN) 2.5 mg /3 mL (0.083 %) nebulizer solution  3/8/16  Yes Historical Provider   albuterol (PROVENTIL, VENTOLIN) 90 mcg/Actuation inhaler Take 2 Puffs by inhalation every six (6) hours as needed.    Yes Historical Provider   budesonide (PULMICORT FLEXHALER) 90 mcg/actuation aepb inhaler Take 1 Puff by inhalation two (2) times a day. 11/9/17   Adithya Garza MD       Physical Exam:   General: alert, no distress   HEENT: Head: Normocephalic, no lesions, without obvious abnormality.    Heart: regular rate and rhythm, S1, S2 normal, no murmur, click, rub or gallop   Lungs: chest clear, no wheezing, rales, normal symmetric air entry   Abdominal: soft, NT/ND + BS   Neurological: Grossly normal   Extremities: extremities normal, atraumatic, no cyanosis or edema     Findings/Diagnosis: H/O Polyps, FH Colon CA    Plan of Care/Planned Procedure: Colonoscopy with MAC    Signed By: Florie Lanes, MD     November 14, 2017

## 2017-11-14 NOTE — PERIOP NOTES
Anesthesia reports 380mg Propofol, 50mg Lidocaine and 700mL NS given during procedure. Received report from anesthesia staff on vital signs and status of patient.

## 2017-11-14 NOTE — ANESTHESIA PREPROCEDURE EVALUATION
Anesthetic History   No history of anesthetic complications            Review of Systems / Medical History  Patient summary reviewed, nursing notes reviewed and pertinent labs reviewed    Pulmonary        Sleep apnea: CPAP    Asthma        Neuro/Psych         Psychiatric history    Comments: Depression Cardiovascular              Hyperlipidemia    Exercise tolerance: <4 METS  Comments: EF 67%  Normal perfusion study 05/17   GI/Hepatic/Renal     GERD    Renal disease: stones  Liver disease    Comments: Hx Colon polyps   Hx Diverticulitis   Hx fatty liver Endo/Other      Hypothyroidism  Morbid obesity     Other Findings   Comments: Chronic LOW BACK pain         Physical Exam    Airway  Mallampati: II    Neck ROM: normal range of motion        Cardiovascular  Regular rate and rhythm,  S1 and S2 normal,  no murmur, click, rub, or gallop             Dental    Dentition: Full lower dentures and Full upper dentures     Pulmonary  Breath sounds clear to auscultation               Abdominal  GI exam deferred       Other Findings            Anesthetic Plan    ASA: 3  Anesthesia type: total IV anesthesia          Induction: Intravenous  Anesthetic plan and risks discussed with: Patient

## 2017-11-14 NOTE — DISCHARGE INSTRUCTIONS
Dustin Martin  [id number]  1965    COLON DISCHARGE INSTRUCTIONS  Discomfort:  Redness at IV site- apply warm compress to area; if redness or soreness persist- contact your physician  There may be a slight amount of blood passed from the rectum  Gaseous discomfort- walking, belching will help relieve any discomfort  You may not operate a vehicle for 12 hours  You may not engage in an occupation involving machinery or appliances for rest of today  You may not drink alcoholic beverages for at least 12 hours  Avoid making any critical decisions for at least 24 hour  DIET:   Regular diet. - however -  remember your colon is empty and a heavy meal will produce gas. Avoid these foods:  vegetables, fried / greasy foods, carbonated drinks for today. MEDICATIONS:        Regarding Aspirin or Nonsteroidal medications, please see below. ACTIVITY:  You may resume your normal daily activities it is recommended that you spend the remainder of the day resting -  avoid any strenuous activity. CALL M.D. ANY SIGN OF:  Increasing pain, nausea, vomiting  Abdominal distension (swelling)  New increased bleeding (oral or rectal)  Fever (chills)      Tylenol as needed for pain. Follow-up Instructions:   Call Dr. China Rapp for results of procedure / biopsy in 4-5 days at telephone #  [phone]              Repeat Colonoscopy in 3 years. [employer] Activation    Thank you for requesting access to [employer]. Please follow the instructions below to securely access and download your online medical record. [employer] allows you to send messages to your doctor, view your test results, renew your prescriptions, schedule appointments, and more. How Do I Sign Up? 1. In your internet browser, go to [email or web address]  2. Click on the First Time User? Click Here link in the Sign In box. You will be redirect to the New Member Sign Up page. 3. Enter your [employer] Access Code exactly as it appears below.  You will not need to use this code after youve completed the sign-up process. If you do not sign up before the expiration date, you must request a new code. [employer] Access Code: Activation code not generated  Current [employer] Status: Active (This is the date your IP[employer] access code will )    4. Enter the last four digits of your Social Security Number (xxxx) and Date of Birth (mm/dd/yyyy) as indicated and click Submit. You will be taken to the next sign-up page. 5. Create a [employer] ID. This will be your [employer] login ID and cannot be changed, so think of one that is secure and easy to remember. 6. Create a [employer] password. You can change your password at any time. 7. Enter your Password Reset Question and Answer. This can be used at a later time if you forget your password. 8. Enter your e-mail address. You will receive e-mail notification when new information is available in [address]. 9. Click Sign Up. You can now view and download portions of your medical record. 10. Click the Download Summary menu link to download a portable copy of your medical information. Additional Information    If you have questions, please visit the Frequently Asked Questions section of the [employer] website at [email or web address] Remember, [employer] is NOT to be used for urgent needs. For medical emergencies, dial 911.

## 2017-11-16 ENCOUNTER — HOSPITAL ENCOUNTER (OUTPATIENT)
Dept: LAB | Age: 52
Discharge: HOME | End: 2017-11-16
Payer: MEDICARE

## 2017-11-16 ENCOUNTER — OFFICE VISIT (OUTPATIENT)
Dept: INTERNAL MEDICINE CLINIC | Age: 52
End: 2017-11-16

## 2017-11-16 VITALS
BODY MASS INDEX: 40.15 KG/M2 | TEMPERATURE: 98.3 F | HEART RATE: 82 BPM | OXYGEN SATURATION: 99 % | RESPIRATION RATE: 18 BRPM | SYSTOLIC BLOOD PRESSURE: 110 MMHG | DIASTOLIC BLOOD PRESSURE: 76 MMHG | HEIGHT: 67 IN | WEIGHT: 255.8 LBS

## 2017-11-16 DIAGNOSIS — E03.9 ACQUIRED HYPOTHYROIDISM: ICD-10-CM

## 2017-11-16 DIAGNOSIS — E66.01 MORBID OBESITY (HCC): ICD-10-CM

## 2017-11-16 DIAGNOSIS — J01.00 ACUTE NON-RECURRENT MAXILLARY SINUSITIS: Primary | ICD-10-CM

## 2017-11-16 DIAGNOSIS — E78.00 ELEVATED CHOLESTEROL: ICD-10-CM

## 2017-11-16 DIAGNOSIS — D70.9 NEUTROPENIA, UNSPECIFIED TYPE (HCC): ICD-10-CM

## 2017-11-16 DIAGNOSIS — J45.40 MODERATE PERSISTENT ASTHMA WITHOUT COMPLICATION: ICD-10-CM

## 2017-11-16 DIAGNOSIS — K21.9 GASTROESOPHAGEAL REFLUX DISEASE WITHOUT ESOPHAGITIS: ICD-10-CM

## 2017-11-16 PROCEDURE — 84443 ASSAY THYROID STIM HORMONE: CPT

## 2017-11-16 PROCEDURE — 36415 COLL VENOUS BLD VENIPUNCTURE: CPT

## 2017-11-16 PROCEDURE — 85025 COMPLETE CBC W/AUTO DIFF WBC: CPT

## 2017-11-16 PROCEDURE — 80061 LIPID PANEL: CPT

## 2017-11-16 PROCEDURE — 80053 COMPREHEN METABOLIC PANEL: CPT

## 2017-11-16 RX ORDER — DOXYCYCLINE 100 MG/1
100 CAPSULE ORAL 2 TIMES DAILY
Qty: 20 CAP | Refills: 0 | Status: SHIPPED | OUTPATIENT
Start: 2017-11-16 | End: 2018-08-02 | Stop reason: ALTCHOICE

## 2017-11-16 RX ORDER — ALBUTEROL SULFATE 90 UG/1
1 AEROSOL, METERED RESPIRATORY (INHALATION)
Qty: 1 INHALER | Refills: 2 | Status: SHIPPED | OUTPATIENT
Start: 2017-11-16 | End: 2018-08-08 | Stop reason: SDUPTHER

## 2017-11-16 RX ORDER — PANTOPRAZOLE SODIUM 40 MG/1
40 TABLET, DELAYED RELEASE ORAL DAILY
Qty: 30 TAB | Refills: 1 | Status: SHIPPED | OUTPATIENT
Start: 2017-11-16 | End: 2017-12-22 | Stop reason: SDUPTHER

## 2017-11-16 NOTE — MR AVS SNAPSHOT
Visit Information Date & Time Provider Department Dept. Phone Encounter #  
 11/16/2017  9:00 AM Vandana Rogers, [address] [phone] [id number] Upcoming Health Maintenance Date Due  
 PAP AKA CERVICAL CYTOLOGY 11/25/2017 BREAST CANCER SCRN MAMMOGRAM 4/7/2019 COLONOSCOPY 11/14/2020 DTaP/Tdap/Td series (2 - Td) 10/5/2022 Allergies as of 11/16/2017  Review Complete On: 11/16/2017 By: Parth Walters Severity Noted Reaction Type Reactions Codeine  10/13/2010    Other (comments) Pcn [Penicillins]  10/13/2010    Other (comments) Current Immunizations  Reviewed on 1/26/2017 Name Date Influenza Vaccine 10/17/2013 Influenza Vaccine (Quad) PF 10/17/2017 11:30 AM, 1/26/2017 Pneumococcal Vaccine (Unspecified Type) 10/17/2015, 10/17/2013 Not reviewed this visit You Were Diagnosed With   
  
 Codes Comments Acute non-recurrent maxillary sinusitis    -  Primary ICD-10-CM: J01.00 ICD-9-CM: 461.0 Morbid obesity ([address]     ICD-10-CM: E66.01 
ICD-9-CM: 278.01 Gastroesophageal reflux disease without esophagitis     ICD-10-CM: K21.9 ICD-9-CM: 530.81 Acquired hypothyroidism     ICD-10-CM: E03.9 ICD-9-CM: 244.9 Elevated cholesterol     ICD-10-CM: E78.00 ICD-9-CM: 272.0 Vitals BP Pulse Temp Resp Height(growth percentile) Weight(growth percentile) 110/76 (BP 1 Location: Right arm, BP Patient Position: Sitting) 82 98.3 °F (36.8 °C) (Oral) 18 5' 7\" (1.702 m) 255 lb 12.8 oz (116 kg) LMP SpO2 BMI OB Status Smoking Status 06/22/2009 99% 40.06 kg/m2 Hysterectomy Former Smoker BMI and BSA Data Body Mass Index Body Surface Area 40.06 kg/m 2 2.34 m 2 Preferred Pharmacy Pharmacy Name Phone [id number] [address] [place] [address] [address] [phone] Your Updated Medication List  
  
   
 This list is accurate as of: 11/16/17  9:33 AM.  Always use your most recent med list.  
  
  
  
  
 albuterol 2.5 mg /3 mL (0.083 %) nebulizer solution Commonly known as:  PROVENTIL VENTOLIN  
  
 albuterol 90 mcg/actuation inhaler Commonly known as:  Randell Boron Take 2 Puffs by inhalation every six (6) hours as needed. ANTACID ANTI-GAS PO Take 1 Tab by mouth as needed. budesonide 90 mcg/actuation Aepb inhaler Commonly known as:  Pulteney Singer Take 1 Puff by inhalation two (2) times a day. cyclobenzaprine 10 mg tablet Commonly known as:  FLEXERIL Take 1 Tab by mouth three (3) times daily as needed for Muscle Spasm(s). doxycycline 100 mg capsule Commonly known as:  Vero Quintero Take 1 Cap by mouth two (2) times a day. escitalopram oxalate 20 mg tablet Commonly known as:  Bonifay Denier TAKE 1 TABLET BY MOUTH EVERY DAY  
  
 levothyroxine 88 mcg tablet Commonly known as:  SYNTHROID  
TAKE 1 TAB BY MOUTH DAILY (BEFORE BREAKFAST). lovastatin 20 mg tablet Commonly known as:  MEVACOR  
TAKE 1 TAB BY MOUTH NIGHTLY. montelukast 10 mg tablet Commonly known as:  SINGULAIR Take 1 Tab by mouth daily. pantoprazole 40 mg tablet Commonly known as:  PROTONIX Take 1 Tab by mouth daily. VITAMIN D3 1,000 unit tablet Generic drug:  cholecalciferol Take 1,000 Units by mouth daily. Prescriptions Sent to Pharmacy Refills  
 doxycycline (MONODOX) 100 mg capsule 0 Sig: Take 1 Cap by mouth two (2) times a day. Class: Normal  
 Pharmacy: [address] [place] [address] [address] [place] [address] Ph #: [phone] Route: Oral  
 pantoprazole (PROTONIX) 40 mg tablet 1 Sig: Take 1 Tab by mouth daily. Class: Normal  
 Pharmacy: [address] [place] [address] [address] [place] [address] Ph #: [phone] Route: Oral  
  
We Performed the Following CBC WITH AUTOMATED DIFF [60356 CPT(R)] LIPID PANEL [01267 CPT(R)] METABOLIC PANEL, COMPREHENSIVE [80662 CPT(R)] REFERRAL TO BARIATRIC SURGERY [UDS319 Custom] TSH AND FREE T4 [12306 CPT(R)] Referral Information Referral ID Referred By Referred To  
  
 [id number] FRANCESCOVETO Leiva Aaron Hein MD   
   [address] [place] [address] Charlene Phone: [phone] Fax: [phone] Visits Status Start Date End Date 1 New Request 11/16/17 11/16/18 If your referral has a status of pending review or denied, additional information will be sent to support the outcome of this decision. Patient Instructions Take protonix 30 min to one hour  before you eat in the morning for 2 months Gastroesophageal Reflux Disease (GERD): Care Instructions Your Care Instructions Gastroesophageal reflux disease (GERD) is the backward flow of stomach acid into the esophagus. The esophagus is the tube that leads from your throat to your stomach. A one-way valve prevents the stomach acid from moving up into this tube. When you have GERD, this valve does not close tightly enough. If you have mild GERD symptoms including heartburn, you may be able to control the problem with antacids or over-the-counter medicine. Changing your diet, losing weight, and making other lifestyle changes can also help reduce symptoms. Follow-up care is a key part of your treatment and safety. Be sure to make and go to all appointments, and call your doctor if you are having problems. It's also a good idea to know your test results and keep a list of the medicines you take. How can you care for yourself at home? · Take your medicines exactly as prescribed. Call your doctor if you think you are having a problem with your medicine. · Your doctor may recommend over-the-counter medicine.  For mild or occasional indigestion, antacids, such as Tums, Gaviscon, Mylanta, or Maalox, may help. Your doctor also may recommend over-the-counter acid reducers, such as Pepcid AC, Tagamet HB, Zantac 75, or Prilosec. Read and follow all instructions on the label. If you use these medicines often, talk with your doctor. · Change your eating habits. ¨ It's best to eat several small meals instead of two or three large meals. ¨ After you eat, wait 2 to 3 hours before you lie down. ¨ Chocolate, mint, and alcohol can make GERD worse. ¨ Spicy foods, foods that have a lot of acid (like tomatoes and oranges), and coffee can make GERD symptoms worse in some people. If your symptoms are worse after you eat a certain food, you may want to stop eating that food to see if your symptoms get better. · Do not smoke or chew tobacco. Smoking can make GERD worse. If you need help quitting, talk to your doctor about stop-smoking programs and medicines. These can increase your chances of quitting for good. · If you have GERD symptoms at night, raise the head of your bed 6 to 8 inches by putting the frame on blocks or placing a foam wedge under the head of your mattress. (Adding extra pillows does not work.) · Do not wear tight clothing around your middle. · Lose weight if you need to. Losing just 5 to 10 pounds can help. When should you call for help? Call your doctor now or seek immediate medical care if: 
? · You have new or different belly pain. ? · Your stools are black and tarlike or have streaks of blood. ? Watch closely for changes in your health, and be sure to contact your doctor if: 
? · Your symptoms have not improved after 2 days. ? · Food seems to catch in your throat or chest.  
Where can you learn more? Go to http://olivier-akil.info/. Enter J028 in the search box to learn more about \"Gastroesophageal Reflux Disease (GERD): Care Instructions. \" Current as of: May 12, 2017 Content Version: 11.4 © 8828-4982 [employer].  Care instructions adapted under license by Eduardo Chang (which disclaims liability or warranty for this information). If you have questions about a medical condition or this instruction, always ask your healthcare professional. [address] any warranty or liability for your use of this information. Introducing [facility]! Dear Shruthi Vee: Thank you for requesting a [employer] account. Our records indicate that you already have an active [employer] account. You can access your account anytime at [email or web address] Did you know that you can access your hospital and ER discharge instructions at any time in [employer]? You can also review all of your test results from your hospital stay or ER visit. Additional Information If you have questions, please visit the Frequently Asked Questions section of the [employer] website at [email or web address] Remember, [employer] is NOT to be used for urgent needs. For medical emergencies, dial 911. Now available from your iPhone and Android! Please provide this summary of care documentation to your next provider. Your primary care clinician is listed as GUNNER Chang. If you have any questions after today's visit, please call [phone].

## 2017-11-16 NOTE — TELEPHONE ENCOUNTER
Patient forgot to ask about prescription refill for 2 inhalers . Proventil Ventolin  and Alisson  Best contact [phone]

## 2017-11-16 NOTE — PROGRESS NOTES
CC: Labs (follow up) and Sinus Infection (x4 days)  Obesity, GERD    HPI:    She is a 47 y.o. female who presents for evaluation of multiple issues    Sinus: having symptoms for 4 days, had chills, temperature 99.6, symptoms are worsening. Pain head and no nasal discharge. Feels drained. Having cough that is dry      Abdominal complaints, had colonoscopy tubular adenoma, told she has fatty liver. Now watching diet and lost 2 lbs. Has stopped carbs       OBesity wants to be evaluated for bariatric surgery -working on diet and exercise and lost 2 pounds since last visit    SUN: on CPAP reports compliance    GERD symptoms: likely triggered by NSAIDs. \" I feel like I cannot eat\" -due to stomach pain and acid reflux  Stopped meloxicam due to symptoms  Flexeril     Pain  knees and ankles is ongoing due to arthritis. Tylenol does not relieve pain      ROS:  12 systems reviewed and negative other than HPI    Past Medical History:   Diagnosis Date    Asthma     Breast cyst 1996    left     Chronic pain     LOWER BACK    Colon polyps     Diverticulitis     Fatty liver     GERD (gastroesophageal reflux disease)     Hypercholesteremia     Liver disease     fatty liver    Nicotine vapor product user     Obesity     SUN (obstructive sleep apnea)     uses CPAP    Psychiatric disorder     depression    Thyroid disease     hypothyroid       Current Outpatient Prescriptions on File Prior to Visit   Medication Sig Dispense Refill    MAG HYDROX/ALUMINUM HYD/SIMETH (ANTACID ANTI-GAS PO) Take 1 Tab by mouth as needed.  cholecalciferol (VITAMIN D3) 1,000 unit tablet Take 1,000 Units by mouth daily.  budesonide (PULMICORT FLEXHALER) 90 mcg/actuation aepb inhaler Take 1 Puff by inhalation two (2) times a day. 1 Each 3    levothyroxine (SYNTHROID) 88 mcg tablet TAKE 1 TAB BY MOUTH DAILY (BEFORE BREAKFAST). 30 Tab 5    lovastatin (MEVACOR) 20 mg tablet TAKE 1 TAB BY MOUTH NIGHTLY.  30 Tab 5    escitalopram oxalate (LEXAPRO) 20 mg tablet TAKE 1 TABLET BY MOUTH EVERY DAY 90 Tab 1    cyclobenzaprine (FLEXERIL) 10 mg tablet Take 1 Tab by mouth three (3) times daily as needed for Muscle Spasm(s). 20 Tab 0    montelukast (SINGULAIR) 10 mg tablet Take 1 Tab by mouth daily. 30 Tab 4    albuterol (PROVENTIL, VENTOLIN) 90 mcg/Actuation inhaler Take 2 Puffs by inhalation every six (6) hours as needed. No current facility-administered medications on file prior to visit. Past Surgical History:   Procedure Laterality Date    COLONOSCOPY N/A 11/14/2017    COLONOSCOPY performed by Isreal Spurling, MD at hospitals ENDOSCOPY   [facility] SERVICES SURGERY  2005, 2006    L5 & S 1    HX BREAST BIOPSY      Left    HX HYSTERECTOMY  6/22/09    18 [facility]    HX ORTHOPAEDIC Left 1972    thumb surgery    HX ORTHOPAEDIC Left 2000    left knee surgery    HX ORTHOPAEDIC Left 2013    HX TUBAL LIGATION      REMOVAL OF KIDNEY STONE      RENAL STENT         Family History   Problem Relation Age of Onset    Cancer Mother 54     Lung cancer    Heart Disease Father     Hypertension Father     Elevated Lipids Father     Heart Attack Father     Stroke Father      x 3    Elevated Lipids Sister     Heart Disease Brother     Hypertension Brother     Elevated Lipids Brother     Elevated Lipids Sister     Elevated Lipids Sister     Heart Disease Sister     Cancer Sister      uterine    Heart Disease Sister     Cancer Sister      uterine    Heart Disease Sister     Cancer Sister      colon cancer with liver mets    Bleeding Prob Brother     Heart Attack Brother     Anesth Problems Neg Hx      Reviewed and no changes     Social History     Social History    Marital status:      Spouse name: N/A    Number of children: N/A    Years of education: N/A     Occupational History    Not on file.      Social History Main Topics    Smoking status: Former Smoker     Packs/day: 1.50     Years: 25.00     Quit date: 02/2015   Jose Miramontes Smokeless tobacco: Never Used    Alcohol use No    Drug use: No    Sexual activity: Yes     Partners: Male     Birth control/ protection: Surgical     Other Topics Concern    Not on file     Social History Narrative            Visit Vitals    /76 (BP 1 Location: Right arm, BP Patient Position: Sitting)    Pulse 82    Temp 98.3 °F (36.8 °C) (Oral)    Resp 18    Ht 5' 7\" (1.702 m)    Wt 255 lb 12.8 oz (116 kg)    LMP 06/22/2009    SpO2 99%    BMI 40.06 kg/m2       Physical Examination:   General - Well appearing female  HEENT - PERRL, TM no erythema/opacification, normal nasal turbinates, oropharynx no erythema or exudate, MMM  Neck - supple, no bruits, no TMG, no LAD  Pulm - clear to auscultation bilaterally  Cardio - RRR, normal S1 S2, no murmur gallops or rubs  Abd - soft, nontender, no masses, no HSM  Extrem - no edema, +2 distal pulses  Psych - normal affect, appropriate mood    Lab Results   Component Value Date/Time    WBC 3.6 05/12/2017 02:30 AM    Hemoglobin (POC) 12.9 10/28/2013 03:40 PM    HGB 14.0 05/12/2017 02:30 AM    Hematocrit (POC) 38 10/28/2013 03:40 PM    HCT 42.1 05/12/2017 02:30 AM    PLATELET 460 60/63/5818 02:30 AM    MCV 98.6 05/12/2017 02:30 AM     Lab Results   Component Value Date/Time    Sodium 140 05/12/2017 02:30 AM    Potassium 3.5 05/12/2017 02:30 AM    Chloride 103 05/12/2017 02:30 AM    CO2 27 05/12/2017 02:30 AM    Anion gap 10 05/12/2017 02:30 AM    Glucose 97 05/12/2017 02:30 AM    BUN 9 05/12/2017 02:30 AM    Creatinine 0.99 05/12/2017 02:30 AM    BUN/Creatinine ratio 9 05/12/2017 02:30 AM    GFR est AA >60 05/12/2017 02:30 AM    GFR est non-AA 59 05/12/2017 02:30 AM    Calcium 9.5 05/12/2017 02:30 AM     Lab Results   Component Value Date/Time    Cholesterol, total 243 05/09/2017 08:54 AM    HDL Cholesterol 90 05/09/2017 08:54 AM    LDL, calculated 136 05/09/2017 08:54 AM    VLDL, calculated 17 05/09/2017 08:54 AM    Triglyceride 86 05/09/2017 08:54 AM     Lab Results   Component Value Date/Time    TSH 0.345 05/09/2017 08:54 AM     No results found for: PSA, PSA2, PSAR1, Duayne Deacon, PSAR3, [id number], [id number], PSALT  Lab Results   Component Value Date/Time    Hemoglobin A1c 5.6 05/09/2017 08:54 AM     Lab Results   Component Value Date/Time    VITAMIN D, 25-HYDROXY 28.5 05/09/2017 08:54 AM       Lab Results   Component Value Date/Time    ALT (SGPT) 39 05/12/2017 02:30 AM    AST (SGOT) 24 05/12/2017 02:30 AM    Alk. phosphatase 75 05/12/2017 02:30 AM    Bilirubin, total 0.7 05/12/2017 02:30 AM           Assessment/Plan:    1. Acute non-recurrent maxillary sinusitis  -Symptoms for 4-5 days, discussed that if symptoms persist or the weekend and patient may take antibiotic.  - doxycycline (MONODOX) 100 mg capsule; Take 1 Cap by mouth two (2) times a day. Dispense: 20 Cap; Refill: 0    2. Morbid obesity ([address]  -She is working on diet and exercise has lost 2 pounds. I think should be a good candidate for bariatric surgery given comorbidities of arthritis obstructive sleep apnea  - [facility] Bariatric Surgery [facility]    3. Gastroesophageal reflux disease without esophagitis  -Likely related to NSAID use patient stopped NSAIDs  - pantoprazole (PROTONIX) 40 mg tablet; Take 1 Tab by mouth daily. Dispense: 30 Tab; Refill: 1  - METABOLIC PANEL, COMPREHENSIVE  - CBC WITH AUTOMATED DIFF    4. Acquired hypothyroidism  -Currently on Synthroid  - TSH AND FREE T4    5.  Elevated cholesterol  - METABOLIC PANEL, COMPREHENSIVE  - LIPID PANEL      Follow-up Disposition: Not on File    Nichole Crespo MD

## 2017-11-17 LAB
ALBUMIN SERPL-MCNC: 4.5 G/DL (ref 3.5–5.5)
ALBUMIN/GLOB SERPL: 2 {RATIO} (ref 1.2–2.2)
ALP SERPL-CCNC: 67 IU/L (ref 39–117)
ALT SERPL-CCNC: 21 IU/L (ref 0–32)
AST SERPL-CCNC: 24 IU/L (ref 0–40)
BASOPHILS # BLD AUTO: 0 X10E3/UL (ref 0–0.2)
BASOPHILS NFR BLD AUTO: 0 %
BILIRUB SERPL-MCNC: 0.4 MG/DL (ref 0–1.2)
BUN SERPL-MCNC: 11 MG/DL (ref 6–24)
BUN/CREAT SERPL: 12 (ref 9–23)
CALCIUM SERPL-MCNC: 9.8 MG/DL (ref 8.7–10.2)
CHLORIDE SERPL-SCNC: 98 MMOL/L (ref 96–106)
CHOLEST SERPL-MCNC: 207 MG/DL (ref 100–199)
CO2 SERPL-SCNC: 28 MMOL/L (ref 18–29)
CREAT SERPL-MCNC: 0.89 MG/DL (ref 0.57–1)
EOSINOPHIL # BLD AUTO: 0.1 X10E3/UL (ref 0–0.4)
EOSINOPHIL NFR BLD AUTO: 3 %
ERYTHROCYTE [DISTWIDTH] IN BLOOD BY AUTOMATED COUNT: 13.2 % (ref 12.3–15.4)
GFR SERPLBLD CREATININE-BSD FMLA CKD-EPI: 74 ML/MIN/1.73
GFR SERPLBLD CREATININE-BSD FMLA CKD-EPI: 86 ML/MIN/1.73
GLOBULIN SER CALC-MCNC: 2.3 G/DL (ref 1.5–4.5)
GLUCOSE SERPL-MCNC: 103 MG/DL (ref 65–99)
HCT VFR BLD AUTO: 39.4 % (ref 34–46.6)
HDLC SERPL-MCNC: 77 MG/DL
HGB BLD-MCNC: 13.1 G/DL (ref 11.1–15.9)
IMM GRANULOCYTES # BLD: 0 X10E3/UL (ref 0–0.1)
IMM GRANULOCYTES NFR BLD: 0 %
LDLC SERPL CALC-MCNC: 109 MG/DL (ref 0–99)
LYMPHOCYTES # BLD AUTO: 1.3 X10E3/UL (ref 0.7–3.1)
LYMPHOCYTES NFR BLD AUTO: 39 %
MCH RBC QN AUTO: 32.3 PG (ref 26.6–33)
MCHC RBC AUTO-ENTMCNC: 33.2 G/DL (ref 31.5–35.7)
MCV RBC AUTO: 97 FL (ref 79–97)
MONOCYTES # BLD AUTO: 0.4 X10E3/UL (ref 0.1–0.9)
MONOCYTES NFR BLD AUTO: 11 %
NEUTROPHILS # BLD AUTO: 1.5 X10E3/UL (ref 1.4–7)
NEUTROPHILS NFR BLD AUTO: 47 %
PLATELET # BLD AUTO: 217 X10E3/UL (ref 150–379)
POTASSIUM SERPL-SCNC: 4.6 MMOL/L (ref 3.5–5.2)
PROT SERPL-MCNC: 6.8 G/DL (ref 6–8.5)
RBC # BLD AUTO: 4.05 X10E6/UL (ref 3.77–5.28)
SODIUM SERPL-SCNC: 142 MMOL/L (ref 134–144)
T4 FREE SERPL-MCNC: 1.36 NG/DL (ref 0.82–1.77)
TRIGL SERPL-MCNC: 106 MG/DL (ref 0–149)
TSH SERPL DL<=0.005 MIU/L-ACNC: 3.93 UIU/ML (ref 0.45–4.5)
VLDLC SERPL CALC-MCNC: 21 MG/DL (ref 5–40)
WBC # BLD AUTO: 3.2 X10E3/UL (ref 3.4–10.8)

## 2017-11-20 ENCOUNTER — TELEPHONE (OUTPATIENT)
Dept: INTERNAL MEDICINE CLINIC | Age: 52
End: 2017-11-20

## 2017-11-20 DIAGNOSIS — J45.40 MODERATE PERSISTENT ASTHMA WITHOUT COMPLICATION: Primary | ICD-10-CM

## 2017-11-20 NOTE — PROGRESS NOTES
Kidney and liver function are normal  Thyroid function is at goal   Cholesterol is better - 109 LDL previously 136 - continue lovastatin   WBC is low - I ordered repeat to be done in one to two weeks

## 2017-11-20 NOTE — TELEPHONE ENCOUNTER
Called patient. Two patient identifiers verified. Advised of results and recommendations per Dr. Margo Alberto. Patient verbalized understanding and all questions answered at this time. Patient also requesting Diflucan for possible yeast infection. Complaint of burning with urination. Denies vaginal discharge or itching. She states she is unsure if it is yeast or a UTI. Requested patient come in for nurse visit for UA prior to prescribing medication. Explained we want to be sure we are treating the correct problem. Patient verbalized understanding and states she will call to schedule nurse visit when she can come to office.

## 2017-11-20 NOTE — PROGRESS NOTES
Called patient. Two patient identifiers verified. Advised of results and recommendations per Dr. Bev Albright. Patient verbalized understanding and all questions answered at this time.

## 2017-11-20 NOTE — TELEPHONE ENCOUNTER
Pt would like to discuss results from bloodwork that was done 11/16. The best contact number is [phone].        Message received & copied from [facility]

## 2017-11-21 ENCOUNTER — TELEPHONE (OUTPATIENT)
Dept: INTERNAL MEDICINE CLINIC | Age: 52
End: 2017-11-21

## 2017-11-21 NOTE — TELEPHONE ENCOUNTER
Pt is requesting call back regarding blood work performed on 11/16/17. Meriel Riedel number is [phone].        Message received & copied from [facility]

## 2017-11-21 NOTE — TELEPHONE ENCOUNTER
Called patient. Two patient identifiers verified. Reviewed labs and recommendations with patient again. She verbalizes understanding and states no further questions at this time.

## 2017-12-11 ENCOUNTER — HOSPITAL ENCOUNTER (OUTPATIENT)
Dept: INTERVENTIONAL RADIOLOGY/VASCULAR | Age: 52
Discharge: HOME | End: 2017-12-11
Attending: ORTHOPAEDIC SURGERY | Admitting: ORTHOPAEDIC SURGERY
Payer: MEDICARE

## 2017-12-11 VITALS
HEIGHT: 67 IN | WEIGHT: 257 LBS | DIASTOLIC BLOOD PRESSURE: 78 MMHG | TEMPERATURE: 98 F | RESPIRATION RATE: 18 BRPM | SYSTOLIC BLOOD PRESSURE: 138 MMHG | BODY MASS INDEX: 40.34 KG/M2 | HEART RATE: 71 BPM | OXYGEN SATURATION: 99 %

## 2017-12-11 DIAGNOSIS — M51.36 DISC DEGENERATION, LUMBAR: ICD-10-CM

## 2017-12-11 PROCEDURE — 74011000250 HC RX REV CODE- 250: Performed by: RADIOLOGY

## 2017-12-11 PROCEDURE — 74011636320 HC RX REV CODE- 636/320: Performed by: RADIOLOGY

## 2017-12-11 PROCEDURE — 64483 NJX AA&/STRD TFRM EPI L/S 1: CPT

## 2017-12-11 PROCEDURE — 74011250636 HC RX REV CODE- 250/636

## 2017-12-11 RX ORDER — DEXAMETHASONE SODIUM PHOSPHATE 10 MG/ML
INJECTION INTRAMUSCULAR; INTRAVENOUS
Status: COMPLETED
Start: 2017-12-11 | End: 2017-12-11

## 2017-12-11 RX ORDER — SODIUM CHLORIDE 9 MG/ML
3 INJECTION INTRAMUSCULAR; INTRAVENOUS; SUBCUTANEOUS ONCE
Status: DISCONTINUED | OUTPATIENT
Start: 2017-12-11 | End: 2017-12-11 | Stop reason: CLARIF

## 2017-12-11 RX ORDER — METHYLPREDNISOLONE ACETATE 40 MG/ML
80 INJECTION, SUSPENSION INTRA-ARTICULAR; INTRALESIONAL; INTRAMUSCULAR; SOFT TISSUE ONCE
Status: DISCONTINUED | OUTPATIENT
Start: 2017-12-11 | End: 2017-12-11 | Stop reason: CLARIF

## 2017-12-11 RX ORDER — LIDOCAINE HYDROCHLORIDE 10 MG/ML
2 INJECTION, SOLUTION EPIDURAL; INFILTRATION; INTRACAUDAL; PERINEURAL ONCE
Status: COMPLETED | OUTPATIENT
Start: 2017-12-11 | End: 2017-12-11

## 2017-12-11 RX ORDER — LIDOCAINE HYDROCHLORIDE 20 MG/ML
18 INJECTION, SOLUTION INFILTRATION; PERINEURAL ONCE
Status: COMPLETED | OUTPATIENT
Start: 2017-12-11 | End: 2017-12-11

## 2017-12-11 RX ADMIN — LIDOCAINE HYDROCHLORIDE 200 MG: 20 INJECTION, SOLUTION INFILTRATION; PERINEURAL at 08:00

## 2017-12-11 RX ADMIN — DEXAMETHASONE SODIUM PHOSPHATE 10 MG: 10 INJECTION, SOLUTION INTRAMUSCULAR; INTRAVENOUS at 08:00

## 2017-12-11 RX ADMIN — IOPAMIDOL 2 ML: 408 INJECTION, SOLUTION INTRATHECAL at 08:00

## 2017-12-11 RX ADMIN — LIDOCAINE HYDROCHLORIDE 2 ML: 10 INJECTION, SOLUTION EPIDURAL; INFILTRATION; INTRACAUDAL; PERINEURAL at 08:00

## 2017-12-11 NOTE — PROGRESS NOTES
Dr. Nancy Rodrigues at bedside for pre procedure consult and consent, questions reviewed with understanding - consent obtained and witnessed.

## 2017-12-11 NOTE — IP AVS SNAPSHOT
[address] [facility]. 
[phone] Patient: Rose Mary Davis MRN: [id number] :1965 About your hospitalization You were admitted on:  2017 You last received care in the:  [facility] RAD ANGIO IR You were discharged on:  2017 Why you were hospitalized Your primary diagnosis was:  Not on File Discharge Orders None A check yuly indicates which time of day the medication should be taken. My Medications ASK your physician about these medications Instructions Each Dose to Equal  
 Morning Noon Evening Bedtime  
 albuterol 90 mcg/actuation inhaler Commonly known as:  Knappa Hyun Your last dose was: Your next dose is: Take 2 Puffs by inhalation every six (6) hours as needed. 2 Puff  
    
   
   
   
  
 albuterol 90 mcg/actuation inhaler Commonly known as:  PROVENTIL HFA, VENTOLIN HFA, PROAIR HFA Your last dose was: Your next dose is: Take 1 Puff by inhalation every six (6) hours as needed for Wheezing. 1 Puff ANTACID ANTI-GAS PO Your last dose was: Your next dose is: Take 1 Tab by mouth as needed. 1 Tab  
    
   
   
   
  
 beclomethasone 80 mcg/actuation Aero Commonly known as:  QVAR Your last dose was: Your next dose is: Take 1 Puff by inhalation two (2) times a day. 1 Puff  
    
   
   
   
  
 cyclobenzaprine 10 mg tablet Commonly known as:  FLEXERIL Your last dose was: Your next dose is: Take 1 Tab by mouth three (3) times daily as needed for Muscle Spasm(s). 10 mg  
    
   
   
   
  
 doxycycline 100 mg capsule Commonly known as:  Wicho Setter Your last dose was: Your next dose is: Take 1 Cap by mouth two (2) times a day.   
 100 mg  
    
   
   
 escitalopram oxalate 20 mg tablet Commonly known as:  Karen Skates Your last dose was: Your next dose is: TAKE 1 TABLET BY MOUTH EVERY DAY  
     
   
   
   
  
 levothyroxine 88 mcg tablet Commonly known as:  SYNTHROID Your last dose was: Your next dose is: TAKE 1 TAB BY MOUTH DAILY (BEFORE BREAKFAST). lovastatin 20 mg tablet Commonly known as:  MEVACOR Your last dose was: Your next dose is: TAKE 1 TAB BY MOUTH NIGHTLY. montelukast 10 mg tablet Commonly known as:  SINGULAIR Your last dose was: Your next dose is: Take 1 Tab by mouth daily. 10 mg  
    
   
   
   
  
 pantoprazole 40 mg tablet Commonly known as:  PROTONIX Your last dose was: Your next dose is: Take 1 Tab by mouth daily. 40 mg  
    
   
   
   
  
 VITAMIN D3 1,000 unit tablet Generic drug:  cholecalciferol Your last dose was: Your next dose is: Take 1,000 Units by mouth daily. 1000 Units Discharge Instructions [facility] Special Procedures/Radiology Department Steroidal Injection Dr. Kameron Camarillo 12/11/2017 Go home and rest. 
 
 No vigorous physical activity today. Be aware that numbness and/or tingling can occur up to 24 hours after the injection. No driving today. Resume your previous diet. Resume your previous medications. Depending on your job, you may return to work in 25 to 48 hours. It may take up to one week after the injection to see a change or an improvement in your symptoms. Be sure to follow up with your physician. Tell your physician if the injection helped with your symptoms or if the injection did nothing for your symptoms. For minor discomfort, you can take Tylenol, as directed on the label. If you have any questions or concerns, please call [phone] and ask for the nurse on-call ACO Transitions of Care Introducing [employer] Komal Guerra offers a voluntary care coordination program to provide high quality service and care to [facility] fee-for-service beneficiaries. Selena Kenney was designed to help you enhance your health and well-being through the following services: ? Transitions of Care  support for individuals who are transitioning from one care setting to another (example: Hospital to home). ? Chronic and Complex Care Coordination  support for individuals and caregivers of those with serious or chronic illnesses or with more than one chronic (ongoing) condition and those who take a number of different medications. If you meet specific medical criteria, a [facility] [address] may call you directly to coordinate your care with your primary care physician and your other care providers. For questions about the [facility] programs, please, contact your physicians office. For general questions or additional information about Accountable Care Organizations: 
Please visit www.medicare.gov/acos. html or call 1-800-MEDICARE (5-673.353.5700) TTY users should call 1-821.118.7275. Introducing [facility]! Dear Alexx Neil: Thank you for requesting a [employer]TM[employer] account. Our records indicate that you already have an active [employer]TM) [employer] account. You can access your account anytime at [email or web address] [employer] Did you know that you can access your hospital and ER discharge instructions at any time in [employer]? You can also review all of your test results from your hospital stay or ER visit. Additional Information If you have questions, please visit the Frequently Asked Questions section of the [employer] website at [email or web address] [email or web address]/. Remember, MyChart is NOT to be used for urgent needs. For medical emergencies, dial 911. Now available from your iPhone and Android! Providers Seen During Your Hospitalization Provider Specialty Primary office phone David Decker MD Orthopedic Surgery [phone] Your Primary Care Physician (PCP) Primary Care Physician Office Phone Office Fax Susie Merritt [phone] You are allergic to the following Allergen Reactions Codeine Other (comments) Pcn (Penicillins) Other (comments) Recent Documentation Height Weight BMI OB Status Smoking Status 1.702 m 116.6 kg 40.25 kg/m2 Hysterectomy Former Smoker Emergency Contacts Name Discharge Info Relation Home Work Mobile [address] CAREGIVER [3] Spouse [3] [phone] Min Urr[place]  Spouse [3] [phone] Patient Belongings The following personal items are in your possession at time of discharge: 
                             
 
  
  
 Please provide this summary of care documentation to your next provider. Signatures-by signing, you are acknowledging that this After Visit Summary has been reviewed with you and you have received a copy. Patient Signature:  ____________________________________________________________ Date:  ____________________________________________________________  
  
Mirian Diaz Provider Signature:  ____________________________________________________________ Date:  ____________________________________________________________

## 2017-12-11 NOTE — PROGRESS NOTES
Pt denies numbness or weakness in legs. OOB w/ steady gait. Discharge instructions reviewed with understanding, copy given to patient. Pt discharged home with family, pt not driving, assisted out in w/c by RN.

## 2017-12-11 NOTE — DISCHARGE INSTRUCTIONS
[facility].  Special Procedures/Radiology Department      Steroidal Injection  Dr. Lentz Degree  12/11/2017      Go home and rest.     No vigorous physical activity today. Be aware that numbness and/or tingling can occur up to 24 hours after the injection. No driving today. Resume your previous diet. Resume your previous medications. Depending on your job, you may return to work in 25 to 48 hours. It may take up to one week after the injection to see a change or an improvement in your symptoms. Be sure to follow up with your physician. Tell your physician if the injection helped with your symptoms or if the injection did nothing for your symptoms. For minor discomfort, you can take Tylenol, as directed on the label.       If you have any questions or concerns, please call [phone] and ask for the nurse on-call

## 2017-12-12 DIAGNOSIS — E03.9 ACQUIRED HYPOTHYROIDISM: ICD-10-CM

## 2017-12-12 NOTE — TELEPHONE ENCOUNTER
Requested Prescriptions     Pending Prescriptions Disp Refills    levothyroxine (SYNTHROID) 88 mcg tablet 90 Tab 5     Sig: Take 1 Tab by mouth Daily (before breakfast).      Last Refill:11/6/17    Last Office Visit: 11/16/17    Upcoming Appointment: n/s

## 2017-12-13 RX ORDER — LEVOTHYROXINE SODIUM 88 UG/1
88 TABLET ORAL
Qty: 90 TAB | Refills: 5 | Status: SHIPPED | OUTPATIENT
Start: 2017-12-13 | End: 2018-01-10 | Stop reason: SDUPTHER

## 2017-12-20 ENCOUNTER — HOSPITAL ENCOUNTER (OUTPATIENT)
Dept: LAB | Age: 52
Discharge: HOME | End: 2017-12-20
Payer: MEDICARE

## 2017-12-20 ENCOUNTER — APPOINTMENT (OUTPATIENT)
Dept: INTERNAL MEDICINE CLINIC | Age: 52
End: 2017-12-20

## 2017-12-20 PROCEDURE — 36415 COLL VENOUS BLD VENIPUNCTURE: CPT

## 2017-12-20 PROCEDURE — 85025 COMPLETE CBC W/AUTO DIFF WBC: CPT

## 2017-12-21 DIAGNOSIS — K21.9 GASTROESOPHAGEAL REFLUX DISEASE WITHOUT ESOPHAGITIS: ICD-10-CM

## 2017-12-21 LAB
BASOPHILS # BLD AUTO: 0 X10E3/UL (ref 0–0.2)
BASOPHILS NFR BLD AUTO: 1 %
EOSINOPHIL # BLD AUTO: 0.1 X10E3/UL (ref 0–0.4)
EOSINOPHIL NFR BLD AUTO: 3 %
ERYTHROCYTE [DISTWIDTH] IN BLOOD BY AUTOMATED COUNT: 13.1 % (ref 12.3–15.4)
HCT VFR BLD AUTO: 39.7 % (ref 34–46.6)
HGB BLD-MCNC: 13.4 G/DL (ref 11.1–15.9)
IMM GRANULOCYTES # BLD: 0 X10E3/UL (ref 0–0.1)
IMM GRANULOCYTES NFR BLD: 0 %
LYMPHOCYTES # BLD AUTO: 1.6 X10E3/UL (ref 0.7–3.1)
LYMPHOCYTES NFR BLD AUTO: 40 %
MCH RBC QN AUTO: 32.9 PG (ref 26.6–33)
MCHC RBC AUTO-ENTMCNC: 33.8 G/DL (ref 31.5–35.7)
MCV RBC AUTO: 98 FL (ref 79–97)
MONOCYTES # BLD AUTO: 0.3 X10E3/UL (ref 0.1–0.9)
MONOCYTES NFR BLD AUTO: 7 %
MORPHOLOGY BLD-IMP: ABNORMAL
NEUTROPHILS # BLD AUTO: 1.9 X10E3/UL (ref 1.4–7)
NEUTROPHILS NFR BLD AUTO: 49 %
PLATELET # BLD AUTO: 210 X10E3/UL (ref 150–379)
RBC # BLD AUTO: 4.07 X10E6/UL (ref 3.77–5.28)
WBC # BLD AUTO: 4 X10E3/UL (ref 3.4–10.8)

## 2017-12-22 ENCOUNTER — TELEPHONE (OUTPATIENT)
Dept: INTERNAL MEDICINE CLINIC | Age: 52
End: 2017-12-22

## 2017-12-22 DIAGNOSIS — K21.9 GASTROESOPHAGEAL REFLUX DISEASE WITHOUT ESOPHAGITIS: ICD-10-CM

## 2017-12-22 DIAGNOSIS — E03.9 ACQUIRED HYPOTHYROIDISM: ICD-10-CM

## 2017-12-22 DIAGNOSIS — E78.5 HYPERLIPIDEMIA, UNSPECIFIED HYPERLIPIDEMIA TYPE: ICD-10-CM

## 2017-12-22 RX ORDER — PANTOPRAZOLE SODIUM 40 MG/1
TABLET, DELAYED RELEASE ORAL
Qty: 30 TAB | Refills: 1 | Status: SHIPPED | COMMUNITY
Start: 2017-12-22 | End: 2018-02-23 | Stop reason: SDUPTHER

## 2017-12-22 NOTE — TELEPHONE ENCOUNTER
Called patient. Two patient identifiers verified. Advised of normal labs. Patient verbalized understanding. Also needs insurance exception for lovastatin. She is going on a cruise and will need to pick rx up early.

## 2017-12-22 NOTE — TELEPHONE ENCOUNTER
Called patient. Left detailed message for patient advising protonix rx was sent today, synthroid was sent on 12/13/17. All other prescriptions have refills available at the pharmacy. Advised to contact her pharmacy and if needs further assistance to return call.

## 2017-12-22 NOTE — TELEPHONE ENCOUNTER
Pt. requesting refill on all current prescriptions on file. Pt. didn't have prescription handy. Pt. Requested prescription to be sent to [facility] [address] [facility] [phone].        Message received & copied from [facility] after closing on 12/21/17

## 2017-12-22 NOTE — TELEPHONE ENCOUNTER
Pt is calling to speak to  the the nurse regarding her blood work on Wednesday 12/20.  The best contact is [phone].        Message received & copied from [facility]

## 2018-01-10 DIAGNOSIS — E03.9 ACQUIRED HYPOTHYROIDISM: ICD-10-CM

## 2018-01-10 RX ORDER — LEVOTHYROXINE SODIUM 88 UG/1
88 TABLET ORAL
Qty: 90 TAB | Refills: 5 | Status: SHIPPED | OUTPATIENT
Start: 2018-01-10 | End: 2018-11-30 | Stop reason: SDUPTHER

## 2018-01-10 NOTE — TELEPHONE ENCOUNTER
Requested Prescriptions     Pending Prescriptions Disp Refills    levothyroxine (SYNTHROID) 88 mcg tablet 90 Tab 5     Sig: Take 1 Tab by mouth Daily (before breakfast).      Last Refill:12/13/17    Last Office Visit: 11/16/17    Upcoming Appointment: n/s

## 2018-01-26 ENCOUNTER — TELEPHONE (OUTPATIENT)
Dept: INTERNAL MEDICINE CLINIC | Age: 53
End: 2018-01-26

## 2018-01-26 RX ORDER — LINDANE 10 MG/ML
SHAMPOO, SUSPENSION TOPICAL
Qty: 60 ML | Refills: 0 | Status: SHIPPED | OUTPATIENT
Start: 2018-01-26 | End: 2018-08-20

## 2018-01-26 NOTE — TELEPHONE ENCOUNTER
Called patient. Two patient identifiers verified. Advised pt rx for lindane sent to pharmacy. Discussed proper way to use treatment. Also discussed washing all linens and clothes in hot water. Replace pillows or place them in black trash back for 1 week. Be sure to use nit comb daily for at least a week after treatment. Patient verbalized understanding and states no further questions at this time.

## 2018-01-26 NOTE — TELEPHONE ENCOUNTER
Patient need something called in for lice. She has tried everything over the counter and it's not working.  Please advise

## 2018-01-26 NOTE — TELEPHONE ENCOUNTER
Called patient. Two patient identifiers verified. Patient states she was exposed to lice from her grandchildren. States she has tried rid, nix, and several other otc treatments and they are not working. She is requesting prescription.

## 2018-01-31 RX ORDER — ESCITALOPRAM OXALATE 20 MG/1
TABLET ORAL
Qty: 90 TAB | Refills: 1 | Status: SHIPPED | OUTPATIENT
Start: 2018-01-31 | End: 2018-08-04 | Stop reason: SDUPTHER

## 2018-02-23 DIAGNOSIS — K21.9 GASTROESOPHAGEAL REFLUX DISEASE WITHOUT ESOPHAGITIS: ICD-10-CM

## 2018-02-23 RX ORDER — PANTOPRAZOLE SODIUM 40 MG/1
40 TABLET, DELAYED RELEASE ORAL DAILY
Qty: 30 TAB | Refills: 1 | Status: SHIPPED | OUTPATIENT
Start: 2018-02-23 | End: 2018-04-17 | Stop reason: SDUPTHER

## 2018-03-04 DIAGNOSIS — J01.00 ACUTE NON-RECURRENT MAXILLARY SINUSITIS: ICD-10-CM

## 2018-03-04 RX ORDER — DOXYCYCLINE 100 MG/1
CAPSULE ORAL
Qty: 20 CAP | Refills: 0 | OUTPATIENT
Start: 2018-03-04

## 2018-04-17 ENCOUNTER — OFFICE VISIT (OUTPATIENT)
Dept: INTERNAL MEDICINE CLINIC | Age: 53
End: 2018-04-17

## 2018-04-17 VITALS
SYSTOLIC BLOOD PRESSURE: 122 MMHG | HEART RATE: 69 BPM | DIASTOLIC BLOOD PRESSURE: 78 MMHG | BODY MASS INDEX: 40.34 KG/M2 | RESPIRATION RATE: 18 BRPM | TEMPERATURE: 97.8 F | WEIGHT: 257 LBS | OXYGEN SATURATION: 99 % | HEIGHT: 67 IN

## 2018-04-17 DIAGNOSIS — K21.9 GASTROESOPHAGEAL REFLUX DISEASE WITHOUT ESOPHAGITIS: ICD-10-CM

## 2018-04-17 DIAGNOSIS — M54.41 CHRONIC BILATERAL LOW BACK PAIN WITH BILATERAL SCIATICA: Primary | ICD-10-CM

## 2018-04-17 DIAGNOSIS — G89.29 CHRONIC BILATERAL LOW BACK PAIN WITH BILATERAL SCIATICA: Primary | ICD-10-CM

## 2018-04-17 DIAGNOSIS — M54.42 CHRONIC BILATERAL LOW BACK PAIN WITH BILATERAL SCIATICA: Primary | ICD-10-CM

## 2018-04-17 PROBLEM — E66.01 OBESITY, MORBID (HCC): Status: ACTIVE | Noted: 2018-04-17

## 2018-04-17 RX ORDER — PANTOPRAZOLE SODIUM 40 MG/1
40 TABLET, DELAYED RELEASE ORAL DAILY
Qty: 30 TAB | Refills: 1 | Status: SHIPPED | OUTPATIENT
Start: 2018-04-17 | End: 2018-05-29 | Stop reason: SDUPTHER

## 2018-04-17 RX ORDER — TRAMADOL HYDROCHLORIDE 50 MG/1
50 TABLET ORAL
Qty: 90 TAB | Refills: 1 | Status: SHIPPED | OUTPATIENT
Start: 2018-04-17 | End: 2018-06-28 | Stop reason: SDUPTHER

## 2018-04-17 RX ORDER — IBUPROFEN 800 MG/1
800 TABLET ORAL
Qty: 90 TAB | Refills: 0 | Status: SHIPPED | OUTPATIENT
Start: 2018-04-17 | End: 2018-05-15 | Stop reason: SDUPTHER

## 2018-04-17 NOTE — MR AVS SNAPSHOT
[address] 75 [facility], [place], operated by [facility] 
[phone] Patient: Isaura Olivares MRN: [id number] :1965 Visit Information Date & Time Provider Department Dept. Phone Encounter #  
 2018 12:15 PM Neymar Del Angel, [facility] [phone] [id number] Your Appointments 2018  1:45 PM  
ROUTINE CARE with Lina Francois MD  
[facility] [address]) Appt Note: pap-3yrs [facility] [address] [address] [address] [place] [address] [address] 2018  3:20 PM  
Any with Dom Wang MD  
52 [facility] ([address]) Appt Note: Np, last seen by Dr. Michael Pop in 2017, need supplies-bring machine 305 [facility] [address] [address] [place] [address]., [address] [address] Upcoming Health Maintenance Date Due  
 PAP AKA CERVICAL CYTOLOGY 2017 MEDICARE YEARLY EXAM 5/10/2018 BREAST CANCER SCRN MAMMOGRAM 2019 COLONOSCOPY 2020 DTaP/Tdap/Td series (2 - Td) 10/5/2022 Allergies as of 2018  Review Complete On: 2018 By: [place] Severity Noted Reaction Type Reactions Codeine  10/13/2010    Other (comments) Pcn [Penicillins]  10/13/2010    Other (comments) Current Immunizations  Reviewed on 2017 Name Date Influenza Vaccine 10/17/2013 Influenza Vaccine (Quad) PF 10/17/2017 11:30 AM, 2017 Pneumococcal Vaccine (Unspecified Type) 10/17/2015, 10/17/2013 Not reviewed this visit You Were Diagnosed With   
  
 Codes Comments Chronic bilateral low back pain with bilateral sciatica    -  Primary ICD-10-CM: M54.42, M54.41, G89.29 ICD-9-CM: 724.2, 724.3, 338.29   
 Gastroesophageal reflux disease without esophagitis     ICD-10-CM: K21.9 ICD-9-CM: 530.81 Vitals BP Pulse Temp Resp Height(growth percentile) Weight(growth percentile) 122/78 (BP 1 Location: Right arm, BP Patient Position: Sitting) 69 97.8 °F (36.6 °C) (Oral) 18 5' 7\" (1.702 m) 257 lb (116.6 kg) LMP SpO2 BMI OB Status Smoking Status 06/22/2009 99% 40.25 kg/m2 Hysterectomy Former Smoker Vitals History BMI and BSA Data Body Mass Index Body Surface Area  
 40.25 kg/m 2 2.35 m 2 Preferred Pharmacy Pharmacy Name Phone [id number], [address] [place] [address] [phone] Your Updated Medication List  
  
   
This list is accurate as of 4/17/18 12:36 PM.  Always use your most recent med list.  
  
  
  
  
 albuterol 90 mcg/actuation inhaler Commonly known as:  Hildy Canes Take 2 Puffs by inhalation every six (6) hours as needed. albuterol 90 mcg/actuation inhaler Commonly known as:  PROVENTIL HFA, VENTOLIN HFA, PROAIR HFA Take 1 Puff by inhalation every six (6) hours as needed for Wheezing. ANTACID ANTI-GAS PO Take 1 Tab by mouth as needed. beclomethasone 80 mcg/actuation [employer] Commonly known as:  QVAR Take 1 Puff by inhalation two (2) times a day. doxycycline 100 mg capsule Commonly known as:  [place] Chyle Take 1 Cap by mouth two (2) times a day. escitalopram oxalate 20 mg tablet Commonly known as:  Fredick Nelson TAKE 1 TABLET BY MOUTH EVERY DAY  
  
 ibuprofen 800 mg tablet Commonly known as:  MOTRIN Take 1 Tab by mouth every eight (8) hours as needed for Pain.  
  
 levothyroxine 88 mcg tablet Commonly known as:  SYNTHROID Take 1 Tab by mouth Daily (before breakfast). lindane 1 % shampoo Commonly known as:  Genemoiz Christians Apply shampoo to dry hair for 4 minutes, add small amounts of water, lather and rinse  
  
 lovastatin 20 mg tablet Commonly known as:  MEVACOR  
TAKE 1 TAB BY MOUTH NIGHTLY. montelukast 10 mg tablet Commonly known as:  SINGULAIR Take 1 Tab by mouth daily. pantoprazole 40 mg tablet Commonly known as:  PROTONIX Take 1 Tab by mouth daily. Indications: gastroesophageal reflux disease  
  
 traMADol 50 mg tablet Commonly known as:  ULTRAM  
Take 1 Tab by mouth every eight (8) hours as needed for Pain. Max Daily Amount: 150 mg. VITAMIN D3 1,000 unit tablet Generic drug:  cholecalciferol Take 1,000 Units by mouth daily. Prescriptions Printed Refills  
 traMADol (ULTRAM) 50 mg tablet 1 Sig: Take 1 Tab by mouth every eight (8) hours as needed for Pain. Max Daily Amount: 150 mg.  
 Class: Print Route: Oral  
  
Prescriptions Sent to Pharmacy Refills  
 pantoprazole (PROTONIX) 40 mg tablet 1 Sig: Take 1 Tab by mouth daily. Indications: gastroesophageal reflux disease Class: Normal  
 Pharmacy: [address] [place] [address] , [address] [place] [address] Ph #: [phone] Route: Oral  
 ibuprofen (MOTRIN) 800 mg tablet 0 Sig: Take 1 Tab by mouth every eight (8) hours as needed for Pain. Class: Normal  
 Pharmacy: [address] [place] [address] , [address] [place] [address] Ph #: [phone] Route: Oral  
  
We Performed the Following [address] MONITORING [[id number] Custom] Introducing [facility] & HEALTH SERVICES! Dear Tin Courser: Thank you for requesting a iDreamBooks account. Our records indicate that you already have an active iDreamBooks account. You can access your account anytime at [email or web address] Did you know that you can access your hospital and ER discharge instructions at any time in iDreamBooks? You can also review all of your test results from your hospital stay or ER visit. Additional Information If you have questions, please visit the Frequently Asked Questions section of the [employer] [facility] [employer] website at [email or web address] Remember, Scoopinion is NOT to be used for urgent needs. For medical emergencies, dial 911. Now available from your iPhone and Android! Please provide this summary of care documentation to your next provider. Your primary care clinician is listed as GUNNER Tate Wise Charlene. If you have any questions after today's visit, please call [phone].

## 2018-04-17 NOTE — PROGRESS NOTES
CC: Back Pain and Weight Management  Obesity, GERD    HPI:    She is a 47 y.o. female who presents for evaluation of multiple issues    Back pain has been severe- patient is followed by Dr Sarah Valero ( he did both of her back surgeries) she has been doing IR steroid injections which ha not helped. Patient may need another back surgery  Currently pain is severe 8/10 and currently taking 6-7 ibuprofens together with flexeril and still in pain. Patient is requesting stronger pain   Tramadol helped in the past           OBesity wants to be evaluated for bariatric surgery -working on diet and exercise and weight is stable   SUN: on CPAP reports compliance    GERD symptoms:taking protonix which improved symptoms / has been taking NSAIDs for back pain    Pain  knees and ankles is ongoing due to arthritis. Tylenol does not relieve pain     [facility] scheduled next week for pap smear      ROS:  12 systems reviewed and negative other than HPI    Past Medical History:   Diagnosis Date    Asthma     Breast cyst 1996    left     Chronic pain     LOWER BACK    Colon polyps     Diverticulitis     Fatty liver     GERD (gastroesophageal reflux disease)     Hypercholesteremia     Liver disease     fatty liver    Nicotine vapor product user     Obesity     SUN (obstructive sleep apnea)     uses CPAP    Psychiatric disorder     depression    Thyroid disease     hypothyroid       Current Outpatient Prescriptions on File Prior to Visit   Medication Sig Dispense Refill    escitalopram oxalate (LEXAPRO) 20 mg tablet TAKE 1 TABLET BY MOUTH EVERY DAY 90 Tab 1    lindane (KWELL) 1 % shampoo Apply shampoo to dry hair for 4 minutes, add small amounts of water, lather and rinse 60 mL 0    levothyroxine (SYNTHROID) 88 mcg tablet Take 1 Tab by mouth Daily (before breakfast). 90 Tab 5    beclomethasone (QVAR) 80 mcg/actuation aero Take 1 Puff by inhalation two (2) times a day.  1 Inhaler 0    doxycycline (MONODOX) 100 mg capsule Take 1 Cap by mouth two (2) times a day. 20 Cap 0    albuterol (PROVENTIL HFA, VENTOLIN HFA, PROAIR HFA) 90 mcg/actuation inhaler Take 1 Puff by inhalation every six (6) hours as needed for Wheezing. 1 Inhaler 2    MAG HYDROX/ALUMINUM HYD/SIMETH (ANTACID ANTI-GAS PO) Take 1 Tab by mouth as needed.  cholecalciferol (VITAMIN D3) 1,000 unit tablet Take 1,000 Units by mouth daily.  lovastatin (MEVACOR) 20 mg tablet TAKE 1 TAB BY MOUTH NIGHTLY. 30 Tab 5    montelukast (SINGULAIR) 10 mg tablet Take 1 Tab by mouth daily. 30 Tab 4    albuterol (PROVENTIL, VENTOLIN) 90 mcg/Actuation inhaler Take 2 Puffs by inhalation every six (6) hours as needed. No current facility-administered medications on file prior to visit.         Past Surgical History:   Procedure Laterality Date    COLONOSCOPY N/A 11/14/2017    COLONOSCOPY performed by Lali Caban MD at [facility] ENDOSCOPY   [facility] SURGERY  2005, 2006    L5 & S 1    HX BREAST BIOPSY      Left    HX HYSTERECTOMY  6/22/09    18 [facility]    HX ORTHOPAEDIC Left 1972    thumb surgery    HX ORTHOPAEDIC Left 2000    left knee surgery    HX ORTHOPAEDIC Left 2013    HX TUBAL LIGATION      REMOVAL OF KIDNEY STONE      RENAL STENT         Family History   Problem Relation Age of Onset    Cancer Mother 54     Lung cancer    Heart Disease Father     Hypertension Father     Elevated Lipids Father     Heart Attack Father     Stroke Father      x 3    Elevated Lipids Sister     Heart Disease Brother     Hypertension Brother     Elevated Lipids Brother     Elevated Lipids Sister     Elevated Lipids Sister     Heart Disease Sister     Cancer Sister      uterine    Heart Disease Sister     Cancer Sister      uterine    Heart Disease Sister    [place] Cancer Sister      colon cancer with liver mets    Bleeding Prob Brother     Heart Attack Brother     Anesth Problems Neg Hx      Reviewed and no changes     Social History     Social History    Marital status:      Spouse name: N/A    Number of children: N/A    Years of education: N/A     Occupational History    Not on file.      Social History Main Topics    Smoking status: Former Smoker     Packs/day: 1.50     Years: 25.00     Quit date: 02/2015    Smokeless tobacco: Never Used    Alcohol use No    Drug use: No    Sexual activity: Yes     Partners: Male     Birth control/ protection: Surgical     Other Topics Concern    Not on file     Social History Narrative            Visit Vitals    /78 (BP 1 Location: Right arm, BP Patient Position: Sitting)    Pulse 69    Temp 97.8 °F (36.6 °C) (Oral)    Resp 18    Ht 5' 7\" (1.702 m)    Wt 257 lb (116.6 kg)    LMP 06/22/2009    SpO2 99%    BMI 40.25 kg/m2       Physical Examination:   General - Appears in pain,  female  HEENT - PERRL, TM no erythema/opacification, normal nasal turbinates, oropharynx no erythema or exudate, MMM  Neck - supple, no bruits, no TMG, no LAD  Pulm - clear to auscultation bilaterally  Cardio - RRR, normal S1 S2, no murmur gallops or rubs  Abd - soft, nontender, no masses, no HSM  Extrem - no edema, +2 distal pulses  Psych - normal affect, appropriate mood    Lab Results   Component Value Date/Time    WBC 4.0 12/20/2017 12:58 PM    Hemoglobin (POC) 12.9 10/28/2013 03:40 PM    HGB 13.4 12/20/2017 12:58 PM    Hematocrit (POC) 38 10/28/2013 03:40 PM    HCT 39.7 12/20/2017 12:58 PM    PLATELET 906 70/00/9084 12:58 PM    MCV 98 (H) 12/20/2017 12:58 PM     Lab Results   Component Value Date/Time    Sodium 142 11/16/2017 09:40 AM    Potassium 4.6 11/16/2017 09:40 AM    Chloride 98 11/16/2017 09:40 AM    CO2 28 11/16/2017 09:40 AM    Anion gap 10 05/12/2017 02:30 AM    Glucose 103 (H) 11/16/2017 09:40 AM    BUN 11 11/16/2017 09:40 AM    Creatinine 0.89 11/16/2017 09:40 AM    BUN/Creatinine ratio 12 11/16/2017 09:40 AM    GFR est AA 86 11/16/2017 09:40 AM    GFR est non-AA 74 11/16/2017 09:40 AM    Calcium 9.8 11/16/2017 09:40 AM     Lab Results   Component Value Date/Time    Cholesterol, total 207 (H) 11/16/2017 09:40 AM    HDL Cholesterol 77 11/16/2017 09:40 AM    LDL, calculated 109 (H) 11/16/2017 09:40 AM    VLDL, calculated 21 11/16/2017 09:40 AM    Triglyceride 106 11/16/2017 09:40 AM     Lab Results   Component Value Date/Time    TSH 3.930 11/16/2017 09:40 AM     No results found for: PSA, PSA2, PSAR1, Eura Bruch, [id number], [id number], PSALT  Lab Results   Component Value Date/Time    Hemoglobin A1c 5.6 05/09/2017 08:54 AM     Lab Results   Component Value Date/Time    VITAMIN D, 25-HYDROXY 28.5 (L) 05/09/2017 08:54 AM       Lab Results   Component Value Date/Time    ALT (SGPT) 21 11/16/2017 09:40 AM    AST (SGOT) 24 11/16/2017 09:40 AM    Alk. phosphatase 67 11/16/2017 09:40 AM    Bilirubin, total 0.4 11/16/2017 09:40 AM           Assessment/Plan: 1. Back pain: severe. Patient has had 2 surgeries and follows with ortho and may need 3rd back surgery. She is getting epidural injections w/ little relief  I will start her on tramadol to improve her quality of life. Pain contract signed  Orders Placed This Encounter    COMPLIANCE DRUG SCREEN/PRESCRIPTION MONITORING    pantoprazole (PROTONIX) 40 mg tablet     Sig: Take 1 Tab by mouth daily. Indications: gastroesophageal reflux disease     Dispense:  30 Tab     Refill:  1    ibuprofen (MOTRIN) 800 mg tablet     Sig: Take 1 Tab by mouth every eight (8) hours as needed for Pain. Dispense:  90 Tab     Refill:  0    traMADol (ULTRAM) 50 mg tablet     Sig: Take 1 Tab by mouth every eight (8) hours as needed for Pain. Max Daily Amount: 150 mg. Dispense:  90 Tab     Refill:  1       2. Gastroesophageal reflux disease without esophagitis  -better on protonix, patient currently needs NSAIDs due to severe back pain therefore I will continue protonix  - pantoprazole (PROTONIX) 40 mg tablet; Take 1 Tab by mouth daily. Dispense: 30 Tab; Refill: 1    3.  Acquired hypothyroidism  -Currently on Synthroid  - TSH stable     4. Elevated cholesterol      Phan Caruso MD

## 2018-04-17 NOTE — PROGRESS NOTES
Reviewed record in preparation for visit and have obtained necessary documentation. Identified pt with two pt identifiers(name and ). Chief Complaint   Patient presents with    Back Pain    Weight Management       Health Maintenance Due   Topic Date Due    Cervical Cancer Screening  2017       Ms. Sherley Simeon has a reminder for a \"due or due soon\" health maintenance. I have asked that she discuss this further with her primary care provider for follow-up on this health maintenance. Coordination of Care Questionnaire:  :     1) Have you been to an emergency room, urgent care clinic since your last visit? no   Hospitalized since your last visit? no             2) Have you seen or consulted any other health care providers outside of [address] [place] [address] since your last visit? no  (Include any pap smears or colon screenings in this section.)    3) In the event something were to happen to you and you were unable to speak on your behalf, do you have an Advance Directive/ Living Will in place stating your wishes? NO    Do you have an Advance Directive on file? no    4) Are you interested in receiving information on Advance Directives? NO    Patient is accompanied by patient I have received verbal consent from Lauree Moritz to discuss any/all medical information while they are present in the room.

## 2018-04-17 NOTE — LETTER
Name:Matilda Thomas :1965 MR #:[id number] Provider Name:Nichole Neville MD  
*YTMV-101* BSMG-491 () Page 1 of 5 Initial [employer] publishing CONTROLLED SUBSTANCE AGREEMENT I may be prescribed medications that are controlled substances as part  of my treatment plan for management of my medical condition(s). The goal of my treatment plan is to maintain and/or improve my health and wellbeing. Because controlled substances have an increased risk of abuse or harm, continual re-evaluation is needed determine if the goals of my treatment plan are being met for my safety and the safety of others. Aline Escudero  am entering into this Controlled Substance Agreement with my provider, Mazin Hanley MD at [facility] . I understand that successful treatment requires mutual trust and honesty between me and my provider. I understand that there are state and federal laws and regulations which apply to the medications that my provider may prescribe that must be followed. I understand there are risks and benefits ts of taking the medicines that my provider may prescribe. I understand and agree that following this Agreement is necessary in continuing my provider-patient relationship and success of my treatment plan. As a part of my treatment plan, I agree to the following: COMMUNICATION: 
 
1. I will communicate fully with my provider about my medical condition(s), including the effect on my daily life and how well my medications are helping. I will tell my provider all of the medications that I take for any reason, including medications I receive from another health care provider, and will notify my provider about all issues, problems or concerns, including any side effects, which may be related to my medications. I understand that this information allows my provider to adjust my treatment plan to help manage my medical condition.  I understand that this information will become part of my permanent medical record. 2. I will notify my provider if I have a history of alcohol/drug misuse/addiction or if I have had treatment for alcohol/drug addiction in the past, or if I have a new problem with or concern about alcohol/drug use/addiction, because this increases the likelihood of high risk behaviors and may lead to serious medical conditions. 3. Females Only: I will notify my provider if I am or become pregnant, or if I intend to become pregnant, or if I intend to breastfeed. I understand that communication of these issues with my provider is important, due to possible effects my medication could have on an unborn fetus or breastfeeding child. Name:Matilda Thomas :1965 MR #:[id number] Provider Name:Nichole Wang MD  
*MFEG-205* BSMG-491 () Page 2 of 5 Initial [employer] MISUSE OF MEDICATIONS / DRUGS: 
 
1. I agree to take all controlled substances as prescribed, and will not misuse or abuse any controlled substances prescribed by my provider. For my safety, I will not increase the amount of medicine I take without first talking with and getting permission from my provider. 2. If I have a medical emergency, another health care provider may prescribe me medication. If I seek emergency treatment, I will notify my provider within seventy-two (72) hours. 3. I understand that my provider may discuss my use and/or possible misuse/abuse of controlled substances and alcohol, as appropriate, with any health care provider involved in my care, pharmacist or legal authority. ILLEGAL DRUGS: 
 
1. I will not use illegal drugs of any kind, including but not limited to marijuana, heroin, cocaine, or any prescription drug which is not prescribed to me. DRUG DIVERSION / PRESCRIPTION FRAUD: 
 
1. I will not share, sell, trade, give away, or otherwise misuse my prescriptions or medications. 2. I will not alter any prescriptions provided to me by my provider. SINGLE PROVIDER: 
 
1. I agree that all controlled substances that I take will be prescribed only by my provider (or his/her covering provider) under this Agreement. This agreement does not prevent me from seeking emergency medical treatment or receiving pain management related to a surgery. PROTECTING MEDICATIONS: 
 
1. I am responsible for keeping my prescriptions and medications in a safe and secure place including safeguarding them from loss or theft. I understand that lost, stolen or damaged/destroyed prescriptions or medications will not be replaced. Name:.Smitha Thomas :1965 MR #:[id number] Provider Name:Nichole Beck MD  
*PUAH-863* BSMG-491 () Page 3 of 5 Initial [employer] PRESCRIPTION RENEWALS/REFILLS: 
 
1. I will follow my controlled substance medication schedule as prescribed by my provider. 2. I understand and agree that I will make any requests for renewals or refills of my prescriptions only at the time of an office visit or during my providers regular office hours subject to the prescription refill requirements of the individual practice. 3. I understand that my provider may not call in prescriptions for controlled substances to my pharmacy. 4. I understand that my provider may adjust or discontinue these medications as deemed appropriate for my medical treatment plan. This Agreement does not guarantee the prescription of controlled medications. 5. I agree that if my medications are adjusted or discontinued, I will properly dispose of any remaining medications. I understand that I will be required to dispose of any remaining controlled medications prior to being provided with any prescriptions for other controlled medications.  
 
 
1. I authorize my provider and my pharmacy to cooperate fully with any local, state, or federal law enforcement agency in the investigation of any possible misuse, sale, or other diversion of my controlled substance prescriptions or medications. RISKS: 
 
 
1. I understand that if I do not adhere to this Agreement in any way, my provider may change my prescriptions, stop prescribing controlled substances or end our provider-patient relationship. 2. If my provider decides to stop prescribing medication, or decides to end our provider-patient relationship,my provider may require that I taper my medications slowly. If necessary, my provider may also provide a prescription for other medications to treat my withdrawal symptoms. UNDERSTANDING THIS AGREEMENT: 
 
I understand that my provider may adjust or stop my prescriptions for controlled substances based on my medical condition and my treatment plan. I understand that this Agreement does not guarantee that I will be prescribed medications or controlled substances. I understand that controlled substances may be just one part 
of my treatment plan. My initial on each page and my signature below shows that I have read each page of this Agreement, I have had an opportunity to ask questions, and all of my questions have been answered to my satisfaction by my provider. By signing below, I agree to comply with this Agreement, and I understand that if I do not follow the Agreements listed above, my provider may stop 
 
 
 
_________________________________________  Date/Time 4/17/2018 12:29 PM   
             (Patient Signature)

## 2018-04-18 ENCOUNTER — TELEPHONE (OUTPATIENT)
Dept: INTERNAL MEDICINE CLINIC | Age: 53
End: 2018-04-18

## 2018-04-18 NOTE — TELEPHONE ENCOUNTER
Pt is requesting a call back regarding results from last visit.  Pt contact [phone]      Message received & copied from [facility]

## 2018-04-19 NOTE — TELEPHONE ENCOUNTER
Called patient. Left detailed message for patient advising UDS has not resulted yet. Will call with results as soon as possible. Requested return call with any questions.

## 2018-04-21 LAB — DRUGS UR: NORMAL

## 2018-04-25 ENCOUNTER — HOSPITAL ENCOUNTER (OUTPATIENT)
Dept: LAB | Age: 53
Discharge: HOME | End: 2018-04-25
Payer: MEDICARE

## 2018-04-25 ENCOUNTER — OFFICE VISIT (OUTPATIENT)
Dept: SURGERY | Age: 53
End: 2018-04-25

## 2018-04-25 VITALS
OXYGEN SATURATION: 98 % | HEIGHT: 67 IN | SYSTOLIC BLOOD PRESSURE: 123 MMHG | BODY MASS INDEX: 41.09 KG/M2 | DIASTOLIC BLOOD PRESSURE: 77 MMHG | TEMPERATURE: 97.8 F | WEIGHT: 261.8 LBS | HEART RATE: 72 BPM

## 2018-04-25 DIAGNOSIS — Z90.721 S/P LEFT OOPHORECTOMY: ICD-10-CM

## 2018-04-25 DIAGNOSIS — Z90.711 HISTORY OF HYSTERECTOMY, SUPRACERVICAL: ICD-10-CM

## 2018-04-25 DIAGNOSIS — Z01.419 ENCOUNTER FOR ROUTINE GYNECOLOGICAL EXAMINATION WITH PAPANICOLAOU SMEAR OF CERVIX: Primary | ICD-10-CM

## 2018-04-25 PROCEDURE — 88142 CYTOPATH C/V THIN LAYER: CPT | Performed by: OBSTETRICS & GYNECOLOGY

## 2018-04-25 NOTE — MR AVS SNAPSHOT
[address] [address] [phone] Patient: Maxim Perez MRN: [id number] :1965 Visit Information Date & Time Provider Department Dept. Phone Encounter #  
 2018  1:45 PM Oscar Hilliard, [address] [place] [address] Surgical [address] [id number] Follow-up Instructions Return in about 1 year (around 2019), or if symptoms worsen or fail to improve. Your Appointments 2018  3:20 PM  
Any with Kath Govea MD  
[address] [place] ([facility]) Appt Note: Np, last seen by Dr. Steven Salmeron in 2017, need supplies-bring machine [address] [place] [address], [address] [address] [address] [place] [address], [address] [address] Upcoming Health Maintenance Date Due  
 PAP AKA CERVICAL CYTOLOGY 2017 MEDICARE YEARLY EXAM 5/10/2018 BREAST CANCER SCRN MAMMOGRAM 2019 COLONOSCOPY 2020 DTaP/Tdap/Td series (2 - Td) 10/5/2022 Allergies as of 2018  Review Complete On: 2018 By: Oscar Hilliard MD  
  
 Severity Noted Reaction Type Reactions Codeine  10/13/2010    Other (comments) Pcn [Penicillins]  10/13/2010    Other (comments) Current Immunizations  Reviewed on 2017 Name Date Influenza Vaccine 10/17/2013 Influenza Vaccine (Quad) PF 10/17/2017 11:30 AM, 2017 Pneumococcal Vaccine (Unspecified Type) 10/17/2015, 10/17/2013 Not reviewed this visit You Were Diagnosed With   
  
 Codes Comments Encounter for routine gynecological examination with Papanicolaou smear of cervix    -  Primary ICD-10-CM: I06.505 ICD-9-CM: V72.31, V76.2 History of hysterectomy, supracervical     ICD-10-CM: Z90.711 ICD-9-CM: V88.02 S/P left oophorectomy     ICD-10-CM: H63.363 ICD-9-CM: V45.77 Vitals BP Pulse Temp Height(growth percentile) Weight(growth percentile) LMP  
 123/77 72 97.8 °F (36.6 °C) (Temporal) 5' 7\" (1.702 m) 261 lb 12.8 oz (118.8 kg) 06/22/2009 SpO2 BMI OB Status Smoking Status 98% 41 kg/m2 Hysterectomy Former Smoker Vitals History BMI and BSA Data Body Mass Index Body Surface Area 41 kg/m 2 2.37 m 2 Preferred Pharmacy Pharmacy Name Phone [facility]/PHARMACY [id number] [address] [address] [phone] Your Updated Medication List  
  
   
This list is accurate as of 4/25/18  2:53 PM.  Always use your most recent med list.  
  
  
  
  
 albuterol 90 mcg/actuation inhaler Commonly known as:  Judene Peer Take 2 Puffs by inhalation every six (6) hours as needed. albuterol 90 mcg/actuation inhaler Commonly known as:  PROVENTIL HFA, VENTOLIN HFA, PROAIR HFA Take 1 Puff by inhalation every six (6) hours as needed for Wheezing. ANTACID ANTI-GAS PO Take 1 Tab by mouth as needed. beclomethasone 80 mcg/actuation [employer] Commonly known as:  QVAR Take 1 Puff by inhalation two (2) times a day. doxycycline 100 mg capsule Commonly known as:  Alex Romano Take 1 Cap by mouth two (2) times a day. escitalopram oxalate 20 mg tablet Commonly known as:  Arceo Estrin TAKE 1 TABLET BY MOUTH EVERY DAY  
  
 ibuprofen 800 mg tablet Commonly known as:  MOTRIN Take 1 Tab by mouth every eight (8) hours as needed for Pain.  
  
 levothyroxine 88 mcg tablet Commonly known as:  SYNTHROID Take 1 Tab by mouth Daily (before breakfast). lindane 1 % shampoo Commonly known as:  Rolene Ced Apply shampoo to dry hair for 4 minutes, add small amounts of water, lather and rinse  
  
 lovastatin 20 mg tablet Commonly known as:  MEVACOR  
TAKE 1 TAB BY MOUTH NIGHTLY. montelukast 10 mg tablet Commonly known as:  SINGULAIR Take 1 Tab by mouth daily. pantoprazole 40 mg tablet Commonly known as:  PROTONIX Take 1 Tab by mouth daily. Indications: gastroesophageal reflux disease  
  
 traMADol 50 mg tablet Commonly known as:  ULTRAM  
Take 1 Tab by mouth every eight (8) hours as needed for Pain. Max Daily Amount: 150 mg. VITAMIN D3 1,000 unit tablet Generic drug:  cholecalciferol Take 1,000 Units by mouth daily. We Performed the Following PAP, LB, RFX HPV KIXBD([id number]) [id number] CPT(R)] Follow-up Instructions Return in about 1 year (around 4/25/2019), or if symptoms worsen or fail to improve. To-Do List   
 04/25/2018 Imaging:  GEE MAMMO BI SCREENING INCL CAD Introducing [facility]! Dear Deepika Sharp: Thank you for requesting a [employer] account. Our records indicate that you already have an active [employer] account. You can access your account anytime at [email or web address] Did you know that you can access your hospital and ER discharge instructions at any time in [employer]? You can also review all of your test results from your hospital stay or ER visit. Additional Information If you have questions, please visit the Frequently Asked Questions section of the [employer] website at [email or web address] Remember, [employer] is NOT to be used for urgent needs. For medical emergencies, dial 911. Now available from your iPhone and Android! Please provide this summary of care documentation to your next provider. Your primary care clinician is listed as GUNNER Chang. If you have any questions after today's visit, please call [phone].

## 2018-04-25 NOTE — PROGRESS NOTES
SUBJECTIVE: Jigar Mehta is a 47 y.o. female who presents with desire for annual well woman exam. Patient's last menstrual period was 2009.     Allergies   Allergen Reactions    Codeine Other (comments)    Pcn [Penicillins] Other (comments)      Past Medical History:   Diagnosis Date    Asthma     Breast cyst     left     Chronic pain     LOWER BACK    Colon polyps     Diverticulitis     Fatty liver     GERD (gastroesophageal reflux disease)     Hypercholesteremia     Liver disease     fatty liver    Nicotine vapor product user     Obesity     USN (obstructive sleep apnea)     uses CPAP    Psychiatric disorder     depression    Thyroid disease     hypothyroid     Past Surgical History:   Procedure Laterality Date    COLONOSCOPY N/A 2017    COLONOSCOPY performed by Hilda Scanlon MD at hospitals ENDOSCOPY    HX BACK SURGERY  ,     L5 & S 1    HX BREAST BIOPSY      Left    HX HYSTERECTOMY  09    18 [facility]    HX ORTHOPAEDIC Left     thumb surgery    HX ORTHOPAEDIC Left     left knee surgery    HX ORTHOPAEDIC Left     HX TUBAL LIGATION      REMOVAL OF KIDNEY STONE      RENAL STENT       OB History    Grav Para Term  Abortions TAB SAB Ect Mult Living    4 3 3 0 1 0 0 1 0 3        Family History   Problem Relation Age of Onset    Cancer Mother 54     Lung cancer    Heart Disease Father     Hypertension Father     Elevated Lipids Father     Heart Attack Father     Stroke Father      x 3    Elevated Lipids Sister     Heart Disease Brother     Hypertension Brother     Elevated Lipids Brother     Elevated Lipids Sister     Elevated Lipids Sister     Heart Disease Sister     Cancer Sister      uterine    Heart Disease Sister     Cancer Sister      uterine    Heart Disease Sister     Cancer Sister      colon cancer with liver mets    Bleeding Prob Brother     Heart Attack Brother     Anesth Problems Neg Hx      Social History Social History    Marital status:      Spouse name: N/A    Number of children: N/A    Years of education: N/A     Occupational History    Not on file. Social History Main Topics    Smoking status: Former Smoker     Packs/day: 1.50     Years: 25.00     Quit date: 02/2015    Smokeless tobacco: Never Used    Alcohol use No    Drug use: No    Sexual activity: Yes     Partners: Male     Birth control/ protection: Surgical     Other Topics Concern    Not on file     Social History Narrative     Current Outpatient Prescriptions   Medication Sig Dispense Refill    pantoprazole (PROTONIX) 40 mg tablet Take 1 Tab by mouth daily. Indications: gastroesophageal reflux disease 30 Tab 1    ibuprofen (MOTRIN) 800 mg tablet Take 1 Tab by mouth every eight (8) hours as needed for Pain. 90 Tab 0    traMADol (ULTRAM) 50 mg tablet Take 1 Tab by mouth every eight (8) hours as needed for Pain. Max Daily Amount: 150 mg. 90 Tab 1    escitalopram oxalate (LEXAPRO) 20 mg tablet TAKE 1 TABLET BY MOUTH EVERY DAY 90 Tab 1    lindane (KWELL) 1 % shampoo Apply shampoo to dry hair for 4 minutes, add small amounts of water, lather and rinse 60 mL 0    levothyroxine (SYNTHROID) 88 mcg tablet Take 1 Tab by mouth Daily (before breakfast). 90 Tab 5    beclomethasone (QVAR) 80 mcg/actuation aero Take 1 Puff by inhalation two (2) times a day. 1 Inhaler 0    doxycycline (MONODOX) 100 mg capsule Take 1 Cap by mouth two (2) times a day. 20 Cap 0    albuterol (PROVENTIL HFA, VENTOLIN HFA, PROAIR HFA) 90 mcg/actuation inhaler Take 1 Puff by inhalation every six (6) hours as needed for Wheezing. 1 Inhaler 2    MAG HYDROX/ALUMINUM HYD/SIMETH (ANTACID ANTI-GAS PO) Take 1 Tab by mouth as needed.  cholecalciferol (VITAMIN D3) 1,000 unit tablet Take 1,000 Units by mouth daily.  lovastatin (MEVACOR) 20 mg tablet TAKE 1 TAB BY MOUTH NIGHTLY. 30 Tab 5    montelukast (SINGULAIR) 10 mg tablet Take 1 Tab by mouth daily.  27 Tab 4    albuterol (PROVENTIL, VENTOLIN) 90 mcg/Actuation inhaler Take 2 Puffs by inhalation every six (6) hours as needed. Review of Systems:   Constitutional: No weight change, chills or fever, anorexia, weakness or sleep disturbance . Cardiovascular: No chest pain, shortness of breath, or palpitations . Respiratory: No cough, shortness of breath, hemoptysis, or orthopnea . Neurologic: No syncope, headaches or seizures . Hematologic: No easy bruising or unusual bleeding . Psychiatric: No insomnia, confusion, depression, or anxiety . GI:No nausea and vomiting, diarrhea or constipation  . : See HPI . Musculoskeletal: No joint pain or muscle pain . Endocrine: No polydipsia, polyuria, cold intolerance, excessive fatigue, or sleep disturbance . Integumentary: No breast pain, lumps, nipple discharge, or axillary lumps . Objective:     Visit Vitals    /77    Pulse 72    Temp 97.8 °F (36.6 °C) (Temporal)    Ht 5' 7\" (1.702 m)    Wt 261 lb 12.8 oz (118.8 kg)    LMP 06/22/2009    SpO2 98%    BMI 41 kg/m2       General:  alert, cooperative, no distress, appears stated age   Skin:  no rash or abnormalities   Eyes: negative   Mouth: MMM no lesions   Lymph Nodes:  Cervical, supraclavicular, and axillary nodes normal.   Breast Exam: normal appearance, no masses or tenderness    Lungs:  clear to auscultation bilaterally   Heart:  regular rate and rhythm   Abdomen: soft, non-tender.  Bowel sounds normal. No masses,  no organomegaly   Back:  Costovertebral angle tenderness absent   Genitourinary: Pelvic exam: VULVA: normal appearing vulva with no masses, tenderness or lesions, VAGINA: normal appearing vagina with normal color and discharge, no lesions, CERVIX: normal appearing cervix without discharge or lesions, UTERUS: surgically absent, vaginal cuff well healed, ADNEXA: surgically absent left    Extremities:  extremities normal, atraumatic, no cyanosis or edema   Neurologic:  negative   Psychiatric: non focal     ASSESSMENT:      ICD-10-CM ICD-9-CM    1. Encounter for routine gynecological examination with Papanicolaou smear of cervix Z01.419 V72.31 PAP, LB, RFX HPV RGQIN([id number])     V76.2 GEE MAMMO BI SCREENING INCL CAD   2. History of hysterectomy, supracervical Z90.711 V88.02    3. S/P left oophorectomy Z90.721 V45.77         Follow-up Disposition:  Return in about 1 year (around 4/25/2019), or if symptoms worsen or fail to improve.

## 2018-04-30 NOTE — PROGRESS NOTES
Urine is is showing hydrocodone - I did not prescribe this to patient. Please check  and ask patient where she obtained hydrocodone.  Discuss pain contract with patient/violation

## 2018-05-11 ENCOUNTER — HOSPITAL ENCOUNTER (OUTPATIENT)
Dept: MAMMOGRAPHY | Age: 53
Discharge: HOME | End: 2018-05-11
Attending: OBSTETRICS & GYNECOLOGY
Payer: MEDICARE

## 2018-05-11 DIAGNOSIS — Z12.39 SCREENING BREAST EXAMINATION: ICD-10-CM

## 2018-05-11 PROCEDURE — 77067 SCR MAMMO BI INCL CAD: CPT

## 2018-05-15 DIAGNOSIS — G89.29 CHRONIC BILATERAL LOW BACK PAIN WITH BILATERAL SCIATICA: ICD-10-CM

## 2018-05-15 DIAGNOSIS — M54.41 CHRONIC BILATERAL LOW BACK PAIN WITH BILATERAL SCIATICA: ICD-10-CM

## 2018-05-15 DIAGNOSIS — M54.42 CHRONIC BILATERAL LOW BACK PAIN WITH BILATERAL SCIATICA: ICD-10-CM

## 2018-05-15 RX ORDER — CIPROFLOXACIN 500 MG/1
500 TABLET ORAL 2 TIMES DAILY
Qty: 28 TAB | Refills: 0 | Status: SHIPPED | OUTPATIENT
Start: 2018-05-15 | End: 2018-08-02 | Stop reason: ALTCHOICE

## 2018-05-15 RX ORDER — METRONIDAZOLE 500 MG/1
500 TABLET ORAL 3 TIMES DAILY
Qty: 46 TAB | Refills: 0 | Status: SHIPPED | OUTPATIENT
Start: 2018-05-15 | End: 2018-08-13 | Stop reason: SDUPTHER

## 2018-05-15 RX ORDER — IBUPROFEN 800 MG/1
800 TABLET ORAL
Qty: 90 TAB | Refills: 0 | Status: SHIPPED | OUTPATIENT
Start: 2018-05-15 | End: 2018-07-26 | Stop reason: SDUPTHER

## 2018-05-15 NOTE — TELEPHONE ENCOUNTER
PCP: Ashley Jennings MD    Last appt: 4/17/2018  Future Appointments  Date Time Provider Rozina Arizmendi   6/11/2018 3:20 PM Kierra Rock  [address]       Requested Prescriptions     Pending Prescriptions Disp Refills    ibuprofen (MOTRIN) 800 mg tablet 90 Tab 0     Sig: Take 1 Tab by mouth every eight (8) hours as needed for Pain.

## 2018-05-15 NOTE — TELEPHONE ENCOUNTER
#[phone] pt states diverticulitis has flared up and stomach is inflamed with loose stool for two weeks. Pt is requesting antibiotic to be called in for [facility] to [facility] on file. Please call pt to let her know.

## 2018-05-24 DIAGNOSIS — E78.5 HYPERLIPIDEMIA, UNSPECIFIED HYPERLIPIDEMIA TYPE: ICD-10-CM

## 2018-05-24 RX ORDER — LOVASTATIN 20 MG/1
TABLET ORAL
Qty: 30 TAB | Refills: 5 | Status: SHIPPED | OUTPATIENT
Start: 2018-05-24 | End: 2018-11-20 | Stop reason: SDUPTHER

## 2018-05-29 DIAGNOSIS — K21.9 GASTROESOPHAGEAL REFLUX DISEASE WITHOUT ESOPHAGITIS: ICD-10-CM

## 2018-05-29 NOTE — TELEPHONE ENCOUNTER
PCP: Ashley Jennings MD    Last appt: 4/17/2018  Future Appointments  Date Time Provider Rozina Arizmendi   6/11/2018 3:20 PM Kierra Rock  [address]       Requested Prescriptions     Pending Prescriptions Disp Refills    pantoprazole (PROTONIX) 40 mg tablet 90 Tab 1     Sig: Take 1 Tab by mouth daily.  Indications: gastroesophageal reflux disease

## 2018-05-30 RX ORDER — PANTOPRAZOLE SODIUM 40 MG/1
40 TABLET, DELAYED RELEASE ORAL DAILY
Qty: 90 TAB | Refills: 1 | Status: SHIPPED | OUTPATIENT
Start: 2018-05-30 | End: 2018-11-29 | Stop reason: SDUPTHER

## 2018-06-11 ENCOUNTER — OFFICE VISIT (OUTPATIENT)
Dept: SLEEP MEDICINE | Age: 53
End: 2018-06-11

## 2018-06-11 VITALS
BODY MASS INDEX: 41.28 KG/M2 | SYSTOLIC BLOOD PRESSURE: 131 MMHG | HEIGHT: 67 IN | WEIGHT: 263 LBS | OXYGEN SATURATION: 98 % | DIASTOLIC BLOOD PRESSURE: 87 MMHG | HEART RATE: 72 BPM

## 2018-06-11 DIAGNOSIS — G47.33 OSA (OBSTRUCTIVE SLEEP APNEA): Primary | ICD-10-CM

## 2018-06-11 NOTE — PROGRESS NOTES
[address] [facility] [address] [place], [address]  Tel.  [phone]  Fax. 100 [facility] 60  [place], [address] [facility]  Tel.  [phone]  Fax. [phone] [address] [address] [address]   Tel.  [phone]  Fax. [phone]     S>Smitha Luis is a 47 y.o. female seen for a positive airway pressure follow-up. She reports no problems using the device. The following problems are identified:    Drowsiness yes Problems exhaling no   Snoring no Forget to put on no   Mask Comfortable yes Can't fall asleep no   Dry Mouth no Mask falls off no   Air Leaking no Frequent awakenings no     Download reviewed. She is wondering if her oxygen is dropping at night. She admits that her sleep has improved. Therapy Apnea Index averaged over PAP use: 4 /hr which reflects improved sleep breathing condition. Allergies   Allergen Reactions    Codeine Other (comments)    Pcn [Penicillins] Other (comments)       She has a current medication list which includes the following prescription(s): lovastatin, ibuprofen, escitalopram oxalate, levothyroxine, beclomethasone, albuterol, cholecalciferol, montelukast, albuterol, pantoprazole, ciprofloxacin hcl, metronidazole, tramadol, lindane, doxycycline, and mag hydrox/aluminum hyd/simeth. .      She  has a past medical history of Asthma; Breast cyst (1996); Chronic pain; Colon polyps; Diverticulitis; Fatty liver; GERD (gastroesophageal reflux disease); Hypercholesteremia; Liver disease; Nicotine vapor product user; Obesity; SUN (obstructive sleep apnea); Psychiatric disorder; and Thyroid disease. Douglas Sleepiness Score: 7   and Modified F.O.S.Q. Score Total / 2: 17.5   which reflect improved sleep quality over therapy time.     O>    Visit Vitals    /87    Pulse 72    Ht 5' 7\" (1.702 m)    Wt 263 lb (119.3 kg)    LMP 06/22/2009    SpO2 98%    BMI 41.19 kg/m2           General:   Alert, oriented, not in distress   Neck: No JVD    Chest/Lungs:  symetrical lung expansion , no accessory muscle use    Extremities:  no obvious rashes , negative edema    Neuro:  No focal deficits ; No obvious tremor    Psych:  Normal affect ,  Normal countenance ;         A>    ICD-10-CM ICD-9-CM    1. SUN (obstructive sleep apnea) G47.33 327.23    2. BMI 40.0-44.9, adult ([facility]) Z68.41 V85.41      AHI = 21(2017). On CPAP :  6-15 cmH2O. Compliant:      yes    Therapeutic Response:  Positive    P>      * Follow-up Disposition:  Return in about 1 year (around 6/11/2019). I have ordered replacement supplies  Adjust settings to 10-15 cmH20  Nocturnal oximetry and remote download Thursday. The treatment plan was reviewed with the patient in detail and reviewed with the patient and the lead technologist. she understands that the lead technologist will be calling her  with the results and assisting with the next step in the treatment plan as outlined today during the consultation with me. All of her questions were addressed. .  * She was asked to contact our office for any problems regarding PAP therapy. * Counseling was provided regarding the importance of regular PAP use and on proper sleep hygiene and safe driving. * Re-enforced proper and regular cleaning for the device. 2. Obesity - I have counseled the patient regarding the benefits of weight loss.     Haseeb Gonzalez MD  Diplomate in Sleep Medicine  [facility]

## 2018-06-11 NOTE — PATIENT INSTRUCTIONS
217 [facility] [address] [place], [address]  Tel.  [phone]  Fax. 100 [facility] 60  [place], [address]  Tel.  [phone]  Fax. [phone] [address] [facility] [address]  Tel.  [phone]  Fax. [phone]     PROPER SLEEP HYGIENE    What to avoid  · Do not have drinks with caffeine, such as coffee or black tea, for 8 hours before bed. · Do not smoke or use other types of tobacco near bedtime. Nicotine is a stimulant and can keep you awake. · Avoid drinking alcohol late in the evening, because it can cause you to wake in the middle of the night. · Do not eat a big meal close to bedtime. If you are hungry, eat a light snack. · Do not drink a lot of water close to bedtime, because the need to urinate may wake you up during the night. · Do not read or watch TV in bed. Use the bed only for sleeping and sexual activity. What to try  · Go to bed at the same time every night, and wake up at the same time every morning. Do not take naps during the day. · Keep your bedroom quiet, dark, and cool. · Get regular exercise, but not within 3 to 4 hours of your bedtime. .  · Sleep on a comfortable pillow and mattress. · If watching the clock makes you anxious, turn it facing away from you so you cannot see the time. · If you worry when you lie down, start a worry book. Well before bedtime, write down your worries, and then set the book and your concerns aside. · Try meditation or other relaxation techniques before you go to bed. · If you cannot fall asleep, get up and go to another room until you feel sleepy. Do something relaxing. Repeat your bedtime routine before you go to bed again. · Make your house quiet and calm about an hour before bedtime. Turn down the lights, turn off the TV, log off the computer, and turn down the volume on music. This can help you relax after a busy day.     Drowsy Driving  The [address] [place] [address] Road Traffic Safety Administration cites drowsiness as a causing factor in more than 324,420 police reported crashes annually, resulting in 76,000 injuries and 1,500 deaths. Other surveys suggest 55% of people polled have driven while drowsy in the past year, 23% had fallen asleep but not crashed, 3% crashed, and 2% had and accident due to drowsy driving. Who is at risk? Young Drivers: One study of drowsy driving accidents states that 55% of the drivers were under 25 years. Of those, 75% were male. Shift Workers and Travelers: People who work overnight or travel across time zones frequently are at higher risk of experiencing Circadian Rhythm Disorders. They are trying to work and function when their body is programed to sleep. Sleep Deprived: Lack of sleep has a serious impact on your ability to pay attention or focus on a task. Consistently getting less than the average of 8 hours your body needs creates partial or cumulative sleep deprivation. Untreated Sleep Disorders: Sleep Apnea, Narcolepsy, R.L.S., and other sleep disorders (untreated) prevent a person from getting enough restful sleep. This leads to excessive daytime sleepiness and increases the risk for drowsy driving accidents by up to 7 times. Medications / Alcohol: Even over the counter medications can cause drowsiness. Medications that impair a drivers attention should have a warning label. Alcohol naturally makes you sleepy and on its own can cause accidents. Combined with excessive drowsiness its effects are amplified. Signs of Drowsy Driving:   * You don't remember driving the last few miles   * You may drift out of your bowen   * You are unable to focus and your thoughts wander   * You may yawn more often than normal   * You have difficulty keeping your eyes open / nodding off   * Missing traffic signs, speeding, or tailgating  Prevention-   Good sleep hygiene, lifestyle and behavioral choices have the most impact on drowsy driving.  There is no substitute for sleep and the average person requires 8 hours nightly. If you find yourself driving drowsy, stop and sleep. Consider the sleep hygiene tips provided during your visit as well. Medication Refill Policy: Refills for all medications require 1 week advance notice. Please have your pharmacy fax a refill request. We are unable to fax, or call in \"controled substance\" medications and you will need to pick these prescriptions up from our office. [employer] Activation    Thank you for requesting access to [employer]. Please follow the instructions below to securely access and download your online medical record. [employer] allows you to send messages to your doctor, view your test results, renew your prescriptions, schedule appointments, and more. How Do I Sign Up? 1. In your internet browser, go to [email or web address]. 2. Click on the First Time User? Click Here link in the Sign In box. You will see the New Member Sign Up page. 3. Enter your [employer] Access Code exactly as it appears below. You will not need to use this code after youve completed the sign-up process. If you do not sign up before the expiration date, you must request a new code. [employer] Access Code: Activation code not generated  Current [employer] Status: Active (This is the date your [employer] access code will )    4. Enter the last four digits of your Social Security Number (xxxx) and Date of Birth (mm/dd/yyyy) as indicated and click Submit. You will be taken to the next sign-up page. 5. Create a [employer] ID. This will be your [employer] login ID and cannot be changed, so think of one that is secure and easy to remember. 6. Create a [employer] password. You can change your password at any time. 7. Enter your Password Reset Question and Answer. This can be used at a later time if you forget your password. 8. Enter your e-mail address. You will receive e-mail notification when new information is available in [address]   9. Click Sign Up. You can now view and download portions of your medical record. 10. Click the Download Summary menu link to download a portable copy of your medical information. Additional Information    If you have questions, please call [phone]. Remember, [employer] is NOT to be used for urgent needs. For medical emergencies, dial 911.

## 2018-06-11 NOTE — PROGRESS NOTES
[address] [facility] [address] [place], [address]  Tel.  [phone]  Fax. 100 [facility] 60  [place], [address] [facility]  Tel.  [phone]  Fax. [phone] [address] [facility] [address] [place]   Tel.  [phone]  Fax. [phone]       S>Smitha Winter is a 47 y.o. female seen today to receive a home oximetry test to be used in conjunction with the patients PAP device. · Patient was educated on how to attach sensor for oximetry reading. · PAP device was setup to record oximetry data. · Instruction forms and documentation were reviewed and signed. · The patient demonstrated good understanding of the oximetry sensor. O>    Visit Vitals    /87    Pulse 72    Ht 5' 7\" (1.702 m)    Wt 263 lb (119.3 kg)    LMP 06/22/2009    SpO2 98%    BMI 41.19 kg/m2         A>  1. SUN (obstructive sleep apnea)    2. BMI 40.0-44.9, adult (HCC)          P>  · She was provided information on sleep apnea including coresponding risk factors and the importance of proper treatment. · Follow-up appointment was made to return the oximetry unit. She will be contacted once the results have been reviewed. · She was asked to contact our office for any problems regarding her oximetry test.    .

## 2018-06-12 ENCOUNTER — DOCUMENTATION ONLY (OUTPATIENT)
Dept: SLEEP MEDICINE | Age: 53
End: 2018-06-12

## 2018-06-14 ENCOUNTER — TELEPHONE (OUTPATIENT)
Dept: SLEEP MEDICINE | Age: 53
End: 2018-06-14

## 2018-06-14 DIAGNOSIS — G47.33 OBSTRUCTIVE SLEEP APNEA (ADULT) (PEDIATRIC): Primary | ICD-10-CM

## 2018-06-14 NOTE — TELEPHONE ENCOUNTER
8 minutes where oxygen level less than 88%. She is unsure if the probe came off.    PLAN - attended PAP titration starting on CPAP 10 cm  Staff to schedule  She will be called with results

## 2018-06-17 ENCOUNTER — HOSPITAL ENCOUNTER (OUTPATIENT)
Dept: SLEEP MEDICINE | Age: 53
Discharge: HOME | End: 2018-06-17
Payer: MEDICARE

## 2018-06-17 VITALS
DIASTOLIC BLOOD PRESSURE: 85 MMHG | HEIGHT: 67 IN | SYSTOLIC BLOOD PRESSURE: 121 MMHG | BODY MASS INDEX: 41.06 KG/M2 | OXYGEN SATURATION: 96 % | WEIGHT: 261.6 LBS

## 2018-06-17 DIAGNOSIS — G47.33 OBSTRUCTIVE SLEEP APNEA (ADULT) (PEDIATRIC): ICD-10-CM

## 2018-06-17 PROCEDURE — 95811 POLYSOM 6/>YRS CPAP 4/> PARM: CPT | Performed by: INTERNAL MEDICINE

## 2018-06-21 ENCOUNTER — TELEPHONE (OUTPATIENT)
Dept: SLEEP MEDICINE | Age: 53
End: 2018-06-21

## 2018-06-22 NOTE — TELEPHONE ENCOUNTER
Results of sleep study in [facility]  Lead tech to convey results to patient    Titration showed good control of apnea at CPAP 12 cmH20. No significant oxygen desaturations noted at this setting. At the last visit I had adjusted her APAP to 10-15 cm from 6-15 cm. Based on the recent test, I would like to adjust setting to 12-14 cmH20. Tech to adjust remotely and she should follow-up at her next scheduled visit. She should call if she is not comfortable with these settings.

## 2018-06-25 ENCOUNTER — DOCUMENTATION ONLY (OUTPATIENT)
Dept: SLEEP MEDICINE | Age: 53
End: 2018-06-25

## 2018-06-25 NOTE — TELEPHONE ENCOUNTER
Patient was called and a message was left for them to return our call at there earliest convenience.

## 2018-06-26 NOTE — TELEPHONE ENCOUNTER
Patient was called and setting change was  adjusted remotely and confirmed. Patient was asked to call if she is not comfortable with these setting changes.

## 2018-06-27 DIAGNOSIS — G89.29 CHRONIC BILATERAL LOW BACK PAIN WITH BILATERAL SCIATICA: ICD-10-CM

## 2018-06-27 DIAGNOSIS — M54.42 CHRONIC BILATERAL LOW BACK PAIN WITH BILATERAL SCIATICA: ICD-10-CM

## 2018-06-27 DIAGNOSIS — M54.41 CHRONIC BILATERAL LOW BACK PAIN WITH BILATERAL SCIATICA: ICD-10-CM

## 2018-06-27 NOTE — TELEPHONE ENCOUNTER
Patient states she needs a call back in reference to getting pain medication/Tramadol prescribed until her upcoming surgery on 8/30/18. Please call to advise.  Thank you

## 2018-06-28 ENCOUNTER — TELEPHONE (OUTPATIENT)
Dept: INTERNAL MEDICINE CLINIC | Age: 53
End: 2018-06-28

## 2018-06-28 RX ORDER — TRAMADOL HYDROCHLORIDE 50 MG/1
50 TABLET ORAL
Qty: 90 TAB | Refills: 1 | Status: SHIPPED | OUTPATIENT
Start: 2018-06-28 | End: 2018-08-08 | Stop reason: SDUPTHER

## 2018-06-28 NOTE — TELEPHONE ENCOUNTER
Patient requests a call back today in reference to previous encounter from yesterday about medication request. Please call to advise.  Thank you

## 2018-07-02 ENCOUNTER — TELEPHONE (OUTPATIENT)
Dept: INTERNAL MEDICINE CLINIC | Age: 53
End: 2018-07-02

## 2018-07-02 NOTE — TELEPHONE ENCOUNTER
Pt requesting a call back concerning health.  Best contact [phone]       Message received & copied from [facility]

## 2018-07-03 NOTE — TELEPHONE ENCOUNTER
Spoke with patient. Two pt identifiers confirmed. Patient state that at her last visit, she was told to come back in 6 months,  Patient states that she has an appointment in August for a pre-op and just wants to make sure that this is ok. Advised patient that yes, this is fine. Pt verbalized understanding of information discussed w/ no further questions at this time.

## 2018-07-16 ENCOUNTER — TELEPHONE (OUTPATIENT)
Dept: SLEEP MEDICINE | Age: 53
End: 2018-07-16

## 2018-07-16 NOTE — TELEPHONE ENCOUNTER
Patient was called in reference to her call to us, in regards to her concern of not feeling she is sleeping well at current  settings. Patient shared that the current setting of APAP of 12-14 cmH2O felt great for the first week but,  is now not helping her feel rested when waking up. Download it was added to patients chart for review. Patient would like to possibly open up The APAP to see if it increases past 14 CMH2O.

## 2018-07-19 NOTE — TELEPHONE ENCOUNTER
Download reviewed, questions addressed    Change settings to 12-16 cm  She will call if she has any further concerns. Follow-up as previously scheduled.   Inform patient when changes have been made

## 2018-07-26 DIAGNOSIS — G89.29 CHRONIC BILATERAL LOW BACK PAIN WITH BILATERAL SCIATICA: ICD-10-CM

## 2018-07-26 DIAGNOSIS — M54.41 CHRONIC BILATERAL LOW BACK PAIN WITH BILATERAL SCIATICA: ICD-10-CM

## 2018-07-26 DIAGNOSIS — M54.42 CHRONIC BILATERAL LOW BACK PAIN WITH BILATERAL SCIATICA: ICD-10-CM

## 2018-07-26 RX ORDER — IBUPROFEN 800 MG/1
TABLET ORAL
Qty: 90 TAB | Refills: 0 | Status: SHIPPED | OUTPATIENT
Start: 2018-07-26 | End: 2018-09-01

## 2018-08-02 ENCOUNTER — OFFICE VISIT (OUTPATIENT)
Dept: INTERNAL MEDICINE CLINIC | Age: 53
End: 2018-08-02

## 2018-08-02 ENCOUNTER — HOSPITAL ENCOUNTER (OUTPATIENT)
Dept: LAB | Age: 53
Discharge: HOME | End: 2018-08-02
Payer: MEDICARE

## 2018-08-02 VITALS
DIASTOLIC BLOOD PRESSURE: 79 MMHG | HEIGHT: 67 IN | BODY MASS INDEX: 40.81 KG/M2 | WEIGHT: 260 LBS | HEART RATE: 70 BPM | TEMPERATURE: 97.8 F | RESPIRATION RATE: 18 BRPM | OXYGEN SATURATION: 99 % | SYSTOLIC BLOOD PRESSURE: 137 MMHG

## 2018-08-02 DIAGNOSIS — Z00.00 MEDICARE ANNUAL WELLNESS VISIT, SUBSEQUENT: ICD-10-CM

## 2018-08-02 DIAGNOSIS — G89.29 CHRONIC MIDLINE LOW BACK PAIN WITH BILATERAL SCIATICA: ICD-10-CM

## 2018-08-02 DIAGNOSIS — E78.00 HIGH CHOLESTEROL: ICD-10-CM

## 2018-08-02 DIAGNOSIS — E07.89 OTHER SPECIFIED DISORDERS OF THYROID: ICD-10-CM

## 2018-08-02 DIAGNOSIS — M54.41 CHRONIC MIDLINE LOW BACK PAIN WITH BILATERAL SCIATICA: ICD-10-CM

## 2018-08-02 DIAGNOSIS — G89.4 CHRONIC PAIN SYNDROME: ICD-10-CM

## 2018-08-02 DIAGNOSIS — R73.9 ELEVATED BLOOD SUGAR: ICD-10-CM

## 2018-08-02 DIAGNOSIS — R31.9 HEMATURIA, UNSPECIFIED TYPE: ICD-10-CM

## 2018-08-02 DIAGNOSIS — M54.42 CHRONIC MIDLINE LOW BACK PAIN WITH BILATERAL SCIATICA: ICD-10-CM

## 2018-08-02 DIAGNOSIS — E03.9 ACQUIRED HYPOTHYROIDISM: ICD-10-CM

## 2018-08-02 DIAGNOSIS — Z01.818 PRE-OP EVALUATION: Primary | ICD-10-CM

## 2018-08-02 DIAGNOSIS — J45.40 MODERATE PERSISTENT ASTHMA WITHOUT COMPLICATION: ICD-10-CM

## 2018-08-02 PROCEDURE — 81001 URINALYSIS AUTO W/SCOPE: CPT

## 2018-08-02 PROCEDURE — 80053 COMPREHEN METABOLIC PANEL: CPT

## 2018-08-02 PROCEDURE — 84443 ASSAY THYROID STIM HORMONE: CPT

## 2018-08-02 PROCEDURE — 85610 PROTHROMBIN TIME: CPT

## 2018-08-02 PROCEDURE — 85025 COMPLETE CBC W/AUTO DIFF WBC: CPT

## 2018-08-02 PROCEDURE — 83036 HEMOGLOBIN GLYCOSYLATED A1C: CPT

## 2018-08-02 PROCEDURE — 82570 ASSAY OF URINE CREATININE: CPT

## 2018-08-02 PROCEDURE — 80061 LIPID PANEL: CPT

## 2018-08-02 PROCEDURE — 36415 COLL VENOUS BLD VENIPUNCTURE: CPT

## 2018-08-02 NOTE — MR AVS SNAPSHOT
[address] 
[phone] Patient: Cyn Quintero MRN: [id number] :1965 Visit Information Date & Time Provider Department Dept. Phone Encounter #  
 2018  7:45 AM Soraida Mackay, [address] [place] [address],[address] [phone] [id number] Follow-up Instructions Return in about 3 months (around 2018) for hypothyroidism. Upcoming Health Maintenance Date Due  
 MEDICARE YEARLY EXAM 5/10/2018 Influenza Age 5 to Adult 2018 BREAST CANCER SCRN MAMMOGRAM 2020 COLONOSCOPY 2020 PAP AKA CERVICAL CYTOLOGY 2021 DTaP/Tdap/Td series (2 - Td) 10/5/2022 Allergies as of 2018  Review Complete On: 2018 By: Soraida Mackay MD  
  
 Severity Noted Reaction Type Reactions Codeine  10/13/2010    Other (comments) Pcn [Penicillins]  10/13/2010    Other (comments) Current Immunizations  Reviewed on 2017 Name Date Influenza Vaccine 10/17/2013 Influenza Vaccine (Quad) PF 10/17/2017 11:30 AM, 2017 Pneumococcal Vaccine (Unspecified Type) 10/17/2015, 10/17/2013 Not reviewed this visit You Were Diagnosed With   
  
 Codes Comments Pre-op evaluation    -  Primary ICD-10-CM: F43.618 ICD-9-CM: V72.84 Moderate persistent asthma without complication     FDD-99-WG: J45.40 ICD-9-CM: 493.90 Acquired hypothyroidism     ICD-10-CM: E03.9 ICD-9-CM: 244.9 High cholesterol     ICD-10-CM: E78.00 ICD-9-CM: 272.0 Chronic midline low back pain with bilateral sciatica     ICD-10-CM: M54.41, M54.42, G89.29 ICD-9-CM: 724.2, 724.3, 338.29 Chronic pain syndrome     ICD-10-CM: G89.4 ICD-9-CM: 338.4 Elevated blood sugar     ICD-10-CM: R73.9 ICD-9-CM: 790.29 Other specified disorders of thyroid     ICD-10-CM: E07.89 ICD-9-CM: 246.8 Medicare annual wellness visit, subsequent     ICD-10-CM: Z00.00 ICD-9-CM: V70.0 Vitals BP Pulse Temp Resp Height(growth percentile) Weight(growth percentile)  
 137/79 (BP 1 Location: Right arm, BP Patient Position: Sitting) 70 97.8 °F (36.6 °C) (Oral) 18 5' 7\" (1.702 m) 260 lb (117.9 kg) LMP SpO2 BMI OB Status Smoking Status 06/22/2009 99% 40.72 kg/m2 Hysterectomy Former Smoker BMI and BSA Data Body Mass Index Body Surface Area 40.72 kg/m 2 2.36 m 2 Preferred Pharmacy Pharmacy Name Phone [id number], [place] [address] [address] [address] [phone] Your Updated Medication List  
  
   
This list is accurate as of 8/2/18  8:16 AM.  Always use your most recent med list.  
  
  
  
  
 albuterol 90 mcg/actuation inhaler Commonly known as:  [place] Take 2 Puffs by inhalation every six (6) hours as needed. albuterol 90 mcg/actuation inhaler Commonly known as:  PROVENTIL HFA, VENTOLIN HFA, PROAIR HFA Take 1 Puff by inhalation every six (6) hours as needed for Wheezing. ANTACID ANTI-GAS PO Take 1 Tab by mouth as needed. beclomethasone 80 mcg/actuation [employer] Commonly known as:  QVAR Take 1 Puff by inhalation two (2) times a day. escitalopram oxalate 20 mg tablet Commonly known as:  Shira Gale TAKE 1 TABLET BY MOUTH EVERY DAY  
  
 ibuprofen 800 mg tablet Commonly known as:  MOTRIN  
TAKE 1 TABLET BY MOUTH EVERY EIGHT (8) HOURS AS NEEDED FOR PAIN. levothyroxine 88 mcg tablet Commonly known as:  SYNTHROID Take 1 Tab by mouth Daily (before breakfast). lindane 1 % shampoo Commonly known as:  Sarah Johns Apply shampoo to dry hair for 4 minutes, add small amounts of water, lather and rinse  
  
 lovastatin 20 mg tablet Commonly known as:  MEVACOR  
TAKE 1 TAB BY MOUTH NIGHTLY. metroNIDAZOLE 500 mg tablet Commonly known as:  FLAGYL Take 1 Tab by mouth three (3) times daily. montelukast 10 mg tablet Commonly known as:  SINGULAIR Take 1 Tab by mouth daily. pantoprazole 40 mg tablet Commonly known as:  PROTONIX Take 1 Tab by mouth daily. Indications: gastroesophageal reflux disease  
  
 traMADol 50 mg tablet Commonly known as:  ULTRAM  
Take 1 Tab by mouth every eight (8) hours as needed for Pain. Max Daily Amount: 150 mg. VITAMIN D3 1,000 unit tablet Generic drug:  cholecalciferol Take 1,000 Units by mouth daily. We Performed the Following CBC WITH AUTOMATED DIFF [14160 CPT(R)] [address] MONITORING [[id number] Custom] HEMOGLOBIN A1C WITH EAG [55475 CPT(R)] LIPID PANEL [66683 CPT(R)] METABOLIC PANEL, COMPREHENSIVE [25225 CPT(R)] PROTHROMBIN TIME + INR [23908 CPT(R)] URINALYSIS W/ RFLX MICROSCOPIC [74974 CPT(R)] Follow-up Instructions Return in about 3 months (around 11/2/2018) for hypothyroidism. Patient Instructions We are doing labs today Remember to hold ibuprofen two weeks prior to surgery Medicare Wellness Visit, Female The best way to live healthy is to have a lifestyle where you eat a well-balanced diet, exercise regularly, limit alcohol use, and quit all forms of tobacco/nicotine, if applicable. Regular preventive services are another way to keep healthy. Preventive services (vaccines, screening tests, monitoring & exams) can help personalize your care plan, which helps you manage your own care. Screening tests can find health problems at the earliest stages, when they are easiest to treat. [facility] follows the current, evidence-based guidelines published by the [facility] States Noel Palmer ([facility]) when recommending preventive services for our patients. Because we follow these guidelines, sometimes recommendations change over time as research supports it. (For example, mammograms used to be recommended annually. Even though Medicare will still pay for an annual mammogram, the newer guidelines recommend a mammogram every two years for women of average risk.) Of course, you and your provider may decide to screen more often for some diseases, based on your risk and co-morbidities (chronic disease you are already diagnosed with). Preventive services for you include: - Medicare offers their members a free annual wellness visit, which is time for you and your primary care provider to discuss and plan for your preventive service needs. Take advantage of this benefit every year! 
 
-All people over age 72 should receive the recommended pneumonia vaccines. Current USPSTF guidelines recommend a series of two vaccines for the best pneumonia protection.  
 
-All adults should have a yearly flu vaccine and a tetanus vaccine every 10 years. All adults age 61 years should receive a shingles vaccine once in their lifetime.   
 
-A bone mass density test is recommended when a woman turns 65 to screen for osteoporosis. This test is only recommended once as a screening. Some providers will use this same test as a disease monitoring tool if you already have osteoporosis. -All adults age 38-68 years who are overweight should have a diabetes screening test once every three years.  
 
-Other screening tests & preventive services for persons with diabetes include: an eye exam to screen for diabetic retinopathy, a kidney function test, a foot exam, and stricter control over your cholesterol.  
 
-Cardiovascular screening for adults with routine risk involves an electrocardiogram (ECG) at intervals determined by the provider.  
 
-Colorectal cancer screenings should be done for adults age 54-65 years with normal risk. There are a number of acceptable methods of screening for this type of cancer. Each test has its own benefits and drawbacks.  Discuss with your provider what is most appropriate for you during your annual wellness visit. The different tests include: colonoscopy (considered the best screening method), a fecal occult blood test, a fecal DNA test, and sigmoidoscopy. -Breast cancer screenings are recommended every other year for women of normal risk age 54-69 years.  
 
-Cervical cancer screenings for women over age 72 are only recommended with certain risk factors.  
 
-All adults born between [facility] should be screened once for Hepatitis C. Here is a list of your current Health Maintenance items (your personalized list of preventive services) with a due date: 
  
   
    
 Flu Vaccine  In October Introducing [facility]! Dear Chuck Rodas: Thank you for requesting a Chewse account. Our records indicate that you already have an active Chewse account. You can access your account anytime at [email or web address] Did you know that you can access your hospital and ER discharge instructions at any time in Chewse? You can also review all of your test results from your hospital stay or ER visit. Additional Information If you have questions, please visit the Frequently Asked Questions section of the Chewse website at [email or web address] Remember, Chewse is NOT to be used for urgent needs. For medical emergencies, dial 911. Now available from your iPhone and Android! Please provide this summary of care documentation to your next provider. Your primary care clinician is listed as GUNNER Chang. If you have any questions after today's visit, please call [phone].

## 2018-08-02 NOTE — PROGRESS NOTES
Reviewed record in preparation for visit and have obtained necessary documentation. Identified pt with two pt identifiers(name and ). Chief Complaint   Patient presents with    Pre-op Exam     back surgery        Health Maintenance Due   Topic Date Due    Annual Well Visit  05/10/2018    Flu Vaccine  2018       Ms. Amie Castillo has a reminder for a \"due or due soon\" health maintenance. I have asked that she discuss this further with her primary care provider for follow-up on this health maintenance. Coordination of Care Questionnaire:  :     1) Have you been to an emergency room, urgent care clinic since your last visit? no   Hospitalized since your last visit? no             2) Have you seen or consulted any other health care providers outside of [address] [place] [address] since your last visit? no  (Include any pap smears or colon screenings in this section.)    3) In the event something were to happen to you and you were unable to speak on your behalf, do you have an Advance Directive/ Living Will in place stating your wishes? NO    Do you have an Advance Directive on file? no    4) Are you interested in receiving information on Advance Directives? NO    Patient is accompanied by self I have received verbal consent from Lacey Rocha to discuss any/all medical information while they are present in the room.

## 2018-08-02 NOTE — PROGRESS NOTES
CC: Pre-op Exam (back surgery 8/30)      HPI:    She is a 47 y.o. female who presents for evaluation of pre op for back surgery 08/30    Patient is having excruciating back pain going for several months. Sitting makes pain more pronounced. She has a hx of degenerative back disease with bone spurs, did back surgery in the past.   She has tingling and numbness in feet  Denies leg weakness   Denies issues with bladder and bowel  Denies fever and chills  Taking tramadol 50mg three times a day and ibuprofren 800 BID    Patient is able to go up a flight of stairs without CP or dyspnea. No hx of heart disease      ROS:  10 systems reviewed and negative other than HPI     Past Medical History:   Diagnosis Date    Asthma     Breast cyst 1996    left     Chronic pain     LOWER BACK    Colon polyps     Diverticulitis     Fatty liver     GERD (gastroesophageal reflux disease)     Hypercholesteremia     Liver disease     fatty liver    Nicotine vapor product user     Obesity     SUN (obstructive sleep apnea)     uses CPAP    Psychiatric disorder     depression    Thyroid disease     hypothyroid       Current Outpatient Prescriptions on File Prior to Visit   Medication Sig Dispense Refill    ibuprofen (MOTRIN) 800 mg tablet TAKE 1 TABLET BY MOUTH EVERY EIGHT (8) HOURS AS NEEDED FOR PAIN. 90 Tab 0    traMADol (ULTRAM) 50 mg tablet Take 1 Tab by mouth every eight (8) hours as needed for Pain. Max Daily Amount: 150 mg. 90 Tab 1    pantoprazole (PROTONIX) 40 mg tablet Take 1 Tab by mouth daily. Indications: gastroesophageal reflux disease 90 Tab 1    lovastatin (MEVACOR) 20 mg tablet TAKE 1 TAB BY MOUTH NIGHTLY. 30 Tab 5    metroNIDAZOLE (FLAGYL) 500 mg tablet Take 1 Tab by mouth three (3) times daily.  46 Tab 0    escitalopram oxalate (LEXAPRO) 20 mg tablet TAKE 1 TABLET BY MOUTH EVERY DAY 90 Tab 1    lindane (KWELL) 1 % shampoo Apply shampoo to dry hair for 4 minutes, add small amounts of water, lather and rinse 60 mL 0    levothyroxine (SYNTHROID) 88 mcg tablet Take 1 Tab by mouth Daily (before breakfast). 90 Tab 5    beclomethasone (QVAR) 80 mcg/actuation aero Take 1 Puff by inhalation two (2) times a day. 1 Inhaler 0    albuterol (PROVENTIL HFA, VENTOLIN HFA, PROAIR HFA) 90 mcg/actuation inhaler Take 1 Puff by inhalation every six (6) hours as needed for Wheezing. 1 Inhaler 2    MAG HYDROX/ALUMINUM HYD/SIMETH (ANTACID ANTI-GAS PO) Take 1 Tab by mouth as needed.  cholecalciferol (VITAMIN D3) 1,000 unit tablet Take 1,000 Units by mouth daily.  montelukast (SINGULAIR) 10 mg tablet Take 1 Tab by mouth daily. 30 Tab 4    albuterol (PROVENTIL, VENTOLIN) 90 mcg/Actuation inhaler Take 2 Puffs by inhalation every six (6) hours as needed. No current facility-administered medications on file prior to visit. Past Surgical History:   Procedure Laterality Date    COLONOSCOPY N/A 11/14/2017    COLONOSCOPY performed by Dilcia Rivas [facility] at [facility] ENDOSCOPY   [facility] SERVICES SURGERY  2005, 2006    L5 & S 1    HX BREAST BIOPSY      Left    HX HYSTERECTOMY  6/22/09    [facility]    HX OOPHORECTOMY Left     One ovary removed.     HX ORTHOPAEDIC Left 1972    thumb surgery    HX ORTHOPAEDIC Left 2000    left knee surgery    HX ORTHOPAEDIC Left 2013    HX TUBAL LIGATION      REMOVAL OF KIDNEY STONE      RENAL STENT         Family History   Problem Relation Age of Onset    Cancer Mother 54     Lung cancer    Heart Disease Father     Hypertension Father     Elevated Lipids Father     Heart Attack Father     Stroke Father      x 3    Elevated Lipids Sister     Heart Disease Brother     Hypertension Brother     Elevated Lipids Brother     Elevated Lipids Sister     Elevated Lipids Sister     Heart Disease Sister     Cancer Sister      uterine    Heart Disease Sister     Cancer Sister      uterine    Heart Disease Sister     Cancer Sister      colon cancer with liver mets    Bleeding Prob Brother     Heart Attack Brother     Anesth Problems Neg Hx      Reviewed and no changes     Social History     Social History    Marital status:      Spouse name: N/A    Number of children: N/A    Years of education: N/A     Occupational History    Not on file.      Social History Main Topics    Smoking status: Former Smoker     Packs/day: 1.50     Years: 25.00     Quit date: 02/2015    Smokeless tobacco: Never Used    Alcohol use No    Drug use: No    Sexual activity: Yes     Partners: Male     Birth control/ protection: Surgical     Other Topics Concern    Not on file     Social History Narrative            Visit Vitals    /79 (BP 1 Location: Right arm, BP Patient Position: Sitting)    Pulse 70    Temp 97.8 °F (36.6 °C) (Oral)    Resp 18    Ht 5' 7\" (1.702 m)    Wt 260 lb (117.9 kg)    LMP 06/22/2009    SpO2 99%    BMI 40.72 kg/m2       Physical Examination:   General - Well appearing female  HEENT - PERRL, TM no erythema/opacification, normal nasal turbinates, oropharynx no erythema or exudate, MMM  Neck - supple, no bruits, no TMG, no LAD  Pulm - clear to auscultation bilaterally  Cardio - RRR, normal S1 S2, no murmur gallops or rubs  Abd - soft, nontender, no masses, no HSM  Extrem - no edema, +2 distal pulses  Psych - normal affect, appropriate mood    Lab Results   Component Value Date/Time    WBC 4.0 12/20/2017 12:58 PM    Hemoglobin (POC) 12.9 10/28/2013 03:40 PM    HGB 13.4 12/20/2017 12:58 PM    Hematocrit (POC) 38 10/28/2013 03:40 PM    HCT 39.7 12/20/2017 12:58 PM    PLATELET 959 13/20/0433 12:58 PM    MCV 98 (H) 12/20/2017 12:58 PM     Lab Results   Component Value Date/Time    Sodium 142 11/16/2017 09:40 AM    Potassium 4.6 11/16/2017 09:40 AM    Chloride 98 11/16/2017 09:40 AM    CO2 28 11/16/2017 09:40 AM    Anion gap 10 05/12/2017 02:30 AM    Glucose 103 (H) 11/16/2017 09:40 AM    BUN 11 11/16/2017 09:40 AM    Creatinine 0.89 11/16/2017 09:40 AM    BUN/Creatinine ratio 12 11/16/2017 09:40 AM    GFR est AA 86 11/16/2017 09:40 AM    GFR est non-AA 74 11/16/2017 09:40 AM    Calcium 9.8 11/16/2017 09:40 AM     Lab Results   Component Value Date/Time    Cholesterol, total 207 (H) 11/16/2017 09:40 AM    HDL Cholesterol 77 11/16/2017 09:40 AM    LDL, calculated 109 (H) 11/16/2017 09:40 AM    VLDL, calculated 21 11/16/2017 09:40 AM    Triglyceride 106 11/16/2017 09:40 AM     Lab Results   Component Value Date/Time    TSH 3.930 11/16/2017 09:40 AM     No results found for: PSA, PSA2, [place], [id number], [id number], PSALT  Lab Results   Component Value Date/Time    Hemoglobin A1c 5.6 05/09/2017 08:54 AM     Lab Results   Component Value Date/Time    VITAMIN D, 25-HYDROXY 28.5 (L) 05/09/2017 08:54 AM       Lab Results   Component Value Date/Time    ALT (SGPT) 21 11/16/2017 09:40 AM    AST (SGOT) 24 11/16/2017 09:40 AM    Alk. phosphatase 67 11/16/2017 09:40 AM    Bilirubin, total 0.4 11/16/2017 09:40 AM           Assessment/Plan: This is the Subsequent Medicare Annual Wellness Exam, performed 12 months or more after the Initial AWV or the last Subsequent AWV    I have reviewed the patient's medical history in detail and updated the computerized patient record. Depression Risk Factor Screening:     PHQ over the last two weeks 8/2/2018   PHQ Not Done -   Little interest or pleasure in doing things Several days   Feeling down, depressed, irritable, or hopeless Several days   Total Score PHQ 2 2     Alcohol Risk Factor Screening: You do not drink alcohol or very rarely. Functional Ability and Level of Safety:   Hearing Loss  Hearing is good. Activities of Daily Living  The home contains: no safety equipment. Patient does total self care    Fall Risk  No flowsheet data found.     Abuse Screen  Patient is not abused    Cognitive Screening   Evaluation of Cognitive Function:  Has your family/caregiver stated any concerns about your memory: no  Normal    Patient Care Team   Patient Care Team:  Liberty Arce MD as PCP - General (Internal Medicine)  Dru Thomas MD as Physician (Sleep Medicine)  Jayden Harrell MD as Consulting Provider (Gynecology)  Genia Ann MD (Orthopedic Surgery)    Assessment/Plan   Education and counseling provided:  Are appropriate based on today's review and evaluation       Diagnoses and all orders for this visit:    1. Pre-op evaluation: patient is medically optimized to proceed with surgery. No hx of cardiac disease. Obtaining labs as requested by surgeon.  -     CBC WITH AUTOMATED DIFF  -     PROTHROMBIN TIME + INR  -     URINALYSIS W/ RFLX MICROSCOPIC  -     HEMOGLOBIN A1C WITH EAG    2. Moderate persistent asthma without complication: stable, no recent flairs on q agustina. 3. Acquired hypothyroidism  -     TSH ordered  -      Currently on synthroid    4. High cholesterol: check cholesterol today. Patient is on lovastatin/ counseled on low fat diet    -     METABOLIC PANEL, COMPREHENSIVE  -     LIPID PANEL  -     PROTHROMBIN TIME + INR  -     URINALYSIS W/ RFLX MICROSCOPIC  -     HEMOGLOBIN A1C WITH EAG    5. Chronic midline low back pain with bilateral sciatica: pain is progressing and patient will have back surgery. This is a chronic problem that is worsened. Per review of available records and patients , there are not sign of overuse, misuse, diversion, or concerning side effects. Today we reviewed: the risk of overdose, addiction, and dependency  The following changes were made to the patients current treatment plan: back surgery, advised to take tylenol in between tramadol. Patient will hold ibuprofen two weeks prior to surgery    -     COMPLIANCE DRUG SCREEN/PRESCRIPTION MONITORING      6. Elevated blood sugar  -     HEMOGLOBIN A1C WITH EAG      7.  Medicare annual wellness visit, subsequent        Health Maintenance Due   Topic Date Due    MEDICARE YEARLY EXAM  05/10/2018    Influenza Age 5 to Adult  08/01/2018

## 2018-08-02 NOTE — PATIENT INSTRUCTIONS
We are doing labs today   Remember to hold ibuprofen two weeks prior to surgery    Medicare Wellness Visit, Female    The best way to live healthy is to have a lifestyle where you eat a well-balanced diet, exercise regularly, limit alcohol use, and quit all forms of tobacco/nicotine, if applicable. Regular preventive services are another way to keep healthy. Preventive services (vaccines, screening tests, monitoring & exams) can help personalize your care plan, which helps you manage your own care. Screening tests can find health problems at the earliest stages, when they are easiest to treat. [facility] follows the current, evidence-based guidelines published by the [facility] Noel Palmer ([facility]) when recommending preventive services for our patients. Because we follow these guidelines, sometimes recommendations change over time as research supports it. (For example, mammograms used to be recommended annually. Even though Medicare will still pay for an annual mammogram, the newer guidelines recommend a mammogram every two years for women of average risk.)    Of course, you and your provider may decide to screen more often for some diseases, based on your risk and co-morbidities (chronic disease you are already diagnosed with). Preventive services for you include:    - Medicare offers their members a free annual wellness visit, which is time for you and your primary care provider to discuss and plan for your preventive service needs. Take advantage of this benefit every year!    -All people over age 72 should receive the recommended pneumonia vaccines. Current USPSTF guidelines recommend a series of two vaccines for the best pneumonia protection.     -All adults should have a yearly flu vaccine and a tetanus vaccine every 10 years.  All adults age 61 years should receive a shingles vaccine once in their lifetime.      -A bone mass density test is recommended when a woman turns 65 to screen for osteoporosis. This test is only recommended once as a screening. Some providers will use this same test as a disease monitoring tool if you already have osteoporosis. -All adults age 38-68 years who are overweight should have a diabetes screening test once every three years.     -Other screening tests & preventive services for persons with diabetes include: an eye exam to screen for diabetic retinopathy, a kidney function test, a foot exam, and stricter control over your cholesterol.     -Cardiovascular screening for adults with routine risk involves an electrocardiogram (ECG) at intervals determined by the provider.     -Colorectal cancer screenings should be done for adults age 54-65 years with normal risk. There are a number of acceptable methods of screening for this type of cancer. Each test has its own benefits and drawbacks. Discuss with your provider what is most appropriate for you during your annual wellness visit. The different tests include: colonoscopy (considered the best screening method), a fecal occult blood test, a fecal DNA test, and sigmoidoscopy. -Breast cancer screenings are recommended every other year for women of normal risk age 54-69 years.     -Cervical cancer screenings for women over age 72 are only recommended with certain risk factors.     -All adults born between [facility] should be screened once for Hepatitis C.      Here is a list of your current Health Maintenance items (your personalized list of preventive services) with a due date:               Flu Vaccine  In October

## 2018-08-04 LAB
ALBUMIN SERPL-MCNC: 4.3 G/DL (ref 3.5–5.5)
ALBUMIN/GLOB SERPL: 1.8 {RATIO} (ref 1.2–2.2)
ALP SERPL-CCNC: 73 IU/L (ref 39–117)
ALT SERPL-CCNC: 9 IU/L (ref 0–32)
APPEARANCE UR: ABNORMAL
AST SERPL-CCNC: 15 IU/L (ref 0–40)
BACTERIA #/AREA URNS HPF: ABNORMAL /[HPF]
BASOPHILS # BLD AUTO: 0 X10E3/UL (ref 0–0.2)
BASOPHILS NFR BLD AUTO: 1 %
BILIRUB SERPL-MCNC: 0.3 MG/DL (ref 0–1.2)
BILIRUB UR QL STRIP: NEGATIVE
BUN SERPL-MCNC: 13 MG/DL (ref 6–24)
BUN/CREAT SERPL: 13 (ref 9–23)
CALCIUM SERPL-MCNC: 9.6 MG/DL (ref 8.7–10.2)
CASTS URNS QL MICRO: ABNORMAL /LPF
CHLORIDE SERPL-SCNC: 101 MMOL/L (ref 96–106)
CHOLEST SERPL-MCNC: 215 MG/DL (ref 100–199)
CO2 SERPL-SCNC: 22 MMOL/L (ref 20–29)
COLOR UR: YELLOW
CREAT SERPL-MCNC: 1.01 MG/DL (ref 0.57–1)
CRYSTALS URNS MICRO: ABNORMAL
EOSINOPHIL # BLD AUTO: 0.1 X10E3/UL (ref 0–0.4)
EOSINOPHIL NFR BLD AUTO: 3 %
EPI CELLS #/AREA URNS HPF: >10 /HPF
ERYTHROCYTE [DISTWIDTH] IN BLOOD BY AUTOMATED COUNT: 13.3 % (ref 12.3–15.4)
EST. AVERAGE GLUCOSE BLD GHB EST-MCNC: 108 MG/DL
GLOBULIN SER CALC-MCNC: 2.4 G/DL (ref 1.5–4.5)
GLUCOSE SERPL-MCNC: 93 MG/DL (ref 65–99)
GLUCOSE UR QL: NEGATIVE
HBA1C MFR BLD: 5.4 % (ref 4.8–5.6)
HCT VFR BLD AUTO: 39.9 % (ref 34–46.6)
HDLC SERPL-MCNC: 85 MG/DL
HGB BLD-MCNC: 13.2 G/DL (ref 11.1–15.9)
HGB UR QL STRIP: NEGATIVE
IMM GRANULOCYTES # BLD: 0 X10E3/UL (ref 0–0.1)
IMM GRANULOCYTES NFR BLD: 0 %
INR PPP: 1 (ref 0.8–1.2)
KETONES UR QL STRIP: NEGATIVE
LDLC SERPL CALC-MCNC: 117 MG/DL (ref 0–99)
LEUKOCYTE ESTERASE UR QL STRIP: NEGATIVE
LYMPHOCYTES # BLD AUTO: 1.4 X10E3/UL (ref 0.7–3.1)
LYMPHOCYTES NFR BLD AUTO: 34 %
MCH RBC QN AUTO: 31.8 PG (ref 26.6–33)
MCHC RBC AUTO-ENTMCNC: 33.1 G/DL (ref 31.5–35.7)
MCV RBC AUTO: 96 FL (ref 79–97)
MICRO URNS: ABNORMAL
MONOCYTES # BLD AUTO: 0.2 X10E3/UL (ref 0.1–0.9)
MONOCYTES NFR BLD AUTO: 6 %
MUCOUS THREADS URNS QL MICRO: PRESENT
NEUTROPHILS # BLD AUTO: 2.4 X10E3/UL (ref 1.4–7)
NEUTROPHILS NFR BLD AUTO: 56 %
NITRITE UR QL STRIP: NEGATIVE
PH UR STRIP: 6 [PH] (ref 5–7.5)
PLATELET # BLD AUTO: 211 X10E3/UL (ref 150–379)
POTASSIUM SERPL-SCNC: 4 MMOL/L (ref 3.5–5.2)
PROT SERPL-MCNC: 6.7 G/DL (ref 6–8.5)
PROT UR QL STRIP: ABNORMAL
PROTHROMBIN TIME: 10.2 SEC (ref 9.1–12)
RBC # BLD AUTO: 4.15 X10E6/UL (ref 3.77–5.28)
RBC #/AREA URNS HPF: ABNORMAL /HPF
SODIUM SERPL-SCNC: 141 MMOL/L (ref 134–144)
SP GR UR: >=1.03 (ref 1–1.03)
TRIGL SERPL-MCNC: 67 MG/DL (ref 0–149)
UNIDENT CRYS URNS QL MICRO: PRESENT
UROBILINOGEN UR STRIP-MCNC: 0.2 MG/DL (ref 0.2–1)
VLDLC SERPL CALC-MCNC: 13 MG/DL (ref 5–40)
WBC # BLD AUTO: 4.2 X10E3/UL (ref 3.4–10.8)
WBC #/AREA URNS HPF: ABNORMAL /HPF
YEAST #/AREA URNS HPF: PRESENT /[HPF]

## 2018-08-04 NOTE — PROGRESS NOTES
Normal blood count  Normal kidney and liver function  Cholesterol is similar to last year. Mildly elevated LDL - work on healthy diet ( low fat) and exercise  No diabetes     Urine was a dirty urine sample need to repeat it.  Urinalysis ordered   Please print results and fax to ortho MD - needed for pre op

## 2018-08-06 ENCOUNTER — TELEPHONE (OUTPATIENT)
Dept: INTERNAL MEDICINE CLINIC | Age: 53
End: 2018-08-06

## 2018-08-06 RX ORDER — ESCITALOPRAM OXALATE 20 MG/1
TABLET ORAL
Qty: 90 TAB | Refills: 1 | Status: SHIPPED | OUTPATIENT
Start: 2018-08-06 | End: 2019-01-31 | Stop reason: SDUPTHER

## 2018-08-06 NOTE — TELEPHONE ENCOUNTER
Patient would like a call back regarding her test results.  Contact is [phone]       Message received & copied from [facility]

## 2018-08-07 LAB
DRUGS UR: NORMAL
SPECIMEN STATUS REPORT, ROLRST: NORMAL
T4 FREE SERPL-MCNC: 1.26 NG/DL (ref 0.82–1.77)
TSH SERPL DL<=0.005 MIU/L-ACNC: 3.01 UIU/ML (ref 0.45–4.5)

## 2018-08-08 DIAGNOSIS — G89.29 CHRONIC BILATERAL LOW BACK PAIN WITH BILATERAL SCIATICA: ICD-10-CM

## 2018-08-08 DIAGNOSIS — M54.42 CHRONIC BILATERAL LOW BACK PAIN WITH BILATERAL SCIATICA: ICD-10-CM

## 2018-08-08 DIAGNOSIS — M54.41 CHRONIC BILATERAL LOW BACK PAIN WITH BILATERAL SCIATICA: ICD-10-CM

## 2018-08-09 ENCOUNTER — TELEPHONE (OUTPATIENT)
Dept: INTERNAL MEDICINE CLINIC | Age: 53
End: 2018-08-09

## 2018-08-09 RX ORDER — ALBUTEROL SULFATE 90 UG/1
1 AEROSOL, METERED RESPIRATORY (INHALATION)
Qty: 1 INHALER | Refills: 2 | Status: SHIPPED | OUTPATIENT
Start: 2018-08-09 | End: 2019-01-17 | Stop reason: SDUPTHER

## 2018-08-09 RX ORDER — TRAMADOL HYDROCHLORIDE 50 MG/1
50 TABLET ORAL
Qty: 90 TAB | Refills: 1 | Status: SHIPPED | OUTPATIENT
Start: 2018-08-09 | End: 2018-08-26 | Stop reason: SDUPTHER

## 2018-08-09 NOTE — TELEPHONE ENCOUNTER
From: Agnes Dyer  To: Malachi Womack MD  Sent: 8/8/2018 1:54 PM EDT  Subject: Medication Renewal Request    Original authorizing provider: MD Randy Gomes.  Josef Galvez would like a refill of the following medications:  albuterol (PROVENTIL HFA, VENTOLIN HFA, PROAIR HFA) 90 mcg/actuation inhaler [Nichole Hinds MD]  beclomethasone (QVAR) 80 mcg/actuation aero [Nichole Hinds MD]  traMADol (ULTRAM) 50 mg tablet [Nichole Hinds MD]    Preferred pharmacy: [facility][id number] - [place] - [address] AT [facility]    Comment:

## 2018-08-13 ENCOUNTER — TELEPHONE (OUTPATIENT)
Dept: INTERNAL MEDICINE CLINIC | Age: 53
End: 2018-08-13

## 2018-08-13 DIAGNOSIS — J45.30 MILD PERSISTENT ASTHMA WITHOUT COMPLICATION: Primary | ICD-10-CM

## 2018-08-13 DIAGNOSIS — J45.30 MILD PERSISTENT ASTHMA WITHOUT COMPLICATION: ICD-10-CM

## 2018-08-13 NOTE — TELEPHONE ENCOUNTER
Pts pharmacy does not carry the specific Qvar Rx recently sent over however, they carry Qvar Ready Now. Pt is requesting a script of the Qvar available [facility]. [facility] also sent a fax with the available medication.  Pt. Best contact [phone]       Message received & copied from [facility] after closing on 8/10/18

## 2018-08-14 RX ORDER — METRONIDAZOLE 500 MG/1
500 TABLET ORAL 3 TIMES DAILY
Qty: 46 TAB | Refills: 0 | Status: SHIPPED | OUTPATIENT
Start: 2018-08-14 | End: 2018-08-20

## 2018-08-14 NOTE — TELEPHONE ENCOUNTER
Spoke with patient. Two pt identifiers confirmed. Patient notified per Dr. Arleth Alba that the prescription for the Qvar has been [facility] to [facility] for the one that they have available. Pt verbalized understanding of information discussed w/ no further questions at this time.

## 2018-08-14 NOTE — TELEPHONE ENCOUNTER
From: Guille   To: Luly Ortiz MD  Sent: 8/13/2018 11:01 PM EDT  Subject: Medication Renewal Request    Original authorizing provider: MD Iona Lind.  Kendy Leandro would like a refill of the following medications:  metroNIDAZOLE (FLAGYL) 500 mg tablet [Nichole Avelar MD]  beclomethasone (QVAR) 80 mcg/actuation aero [Nichole Avelar MD]    Preferred pharmacy: [place], [place] - [address] AT [facility]    Comment:

## 2018-08-20 ENCOUNTER — TELEPHONE (OUTPATIENT)
Dept: SLEEP MEDICINE | Age: 53
End: 2018-08-20

## 2018-08-20 ENCOUNTER — HOSPITAL ENCOUNTER (OUTPATIENT)
Dept: PREADMISSION TESTING | Age: 53
Discharge: HOME | End: 2018-08-20
Payer: MEDICARE

## 2018-08-20 VITALS
OXYGEN SATURATION: 95 % | WEIGHT: 261.91 LBS | TEMPERATURE: 98.3 F | HEART RATE: 74 BPM | SYSTOLIC BLOOD PRESSURE: 110 MMHG | BODY MASS INDEX: 41.11 KG/M2 | DIASTOLIC BLOOD PRESSURE: 83 MMHG | HEIGHT: 67 IN | RESPIRATION RATE: 20 BRPM

## 2018-08-20 LAB
APPEARANCE UR: ABNORMAL
ATRIAL RATE: 73 BPM
BACTERIA URNS QL MICRO: ABNORMAL /HPF
BILIRUB UR QL: NEGATIVE
CALCULATED P AXIS, ECG09: 41 DEGREES
CALCULATED R AXIS, ECG10: 14 DEGREES
CALCULATED T AXIS, ECG11: 25 DEGREES
COLOR UR: ABNORMAL
DIAGNOSIS, 93000: NORMAL
EPITH CASTS URNS QL MICRO: ABNORMAL /LPF
GLUCOSE UR STRIP.AUTO-MCNC: NEGATIVE MG/DL
HGB UR QL STRIP: NEGATIVE
HYALINE CASTS URNS QL MICRO: ABNORMAL /LPF (ref 0–5)
KETONES UR QL STRIP.AUTO: NEGATIVE MG/DL
LEUKOCYTE ESTERASE UR QL STRIP.AUTO: NEGATIVE
NITRITE UR QL STRIP.AUTO: NEGATIVE
P-R INTERVAL, ECG05: 218 MS
PH UR STRIP: 6 [PH] (ref 5–8)
PROT UR STRIP-MCNC: NEGATIVE MG/DL
Q-T INTERVAL, ECG07: 402 MS
QRS DURATION, ECG06: 86 MS
QTC CALCULATION (BEZET), ECG08: 442 MS
RBC #/AREA URNS HPF: ABNORMAL /HPF (ref 0–5)
SP GR UR REFRACTOMETRY: 1.02 (ref 1–1.03)
UA: UC IF INDICATED,UAUC: ABNORMAL
UROBILINOGEN UR QL STRIP.AUTO: 0.2 EU/DL (ref 0.2–1)
VENTRICULAR RATE, ECG03: 73 BPM
WBC URNS QL MICRO: ABNORMAL /HPF (ref 0–4)

## 2018-08-20 PROCEDURE — 81001 URINALYSIS AUTO W/SCOPE: CPT | Performed by: ORTHOPAEDIC SURGERY

## 2018-08-20 PROCEDURE — 86902 BLOOD TYPE ANTIGEN DONOR EA: CPT | Performed by: ORTHOPAEDIC SURGERY

## 2018-08-20 PROCEDURE — 87086 URINE CULTURE/COLONY COUNT: CPT | Performed by: ORTHOPAEDIC SURGERY

## 2018-08-20 PROCEDURE — 93005 ELECTROCARDIOGRAM TRACING: CPT

## 2018-08-20 PROCEDURE — 86870 RBC ANTIBODY IDENTIFICATION: CPT | Performed by: ORTHOPAEDIC SURGERY

## 2018-08-20 PROCEDURE — 86921 COMPATIBILITY TEST INCUBATE: CPT | Performed by: ORTHOPAEDIC SURGERY

## 2018-08-20 PROCEDURE — 86900 BLOOD TYPING SEROLOGIC ABO: CPT | Performed by: ORTHOPAEDIC SURGERY

## 2018-08-20 PROCEDURE — 86920 COMPATIBILITY TEST SPIN: CPT | Performed by: ORTHOPAEDIC SURGERY

## 2018-08-20 PROCEDURE — 86922 COMPATIBILITY TEST ANTIGLOB: CPT | Performed by: ORTHOPAEDIC SURGERY

## 2018-08-20 PROCEDURE — 36415 COLL VENOUS BLD VENIPUNCTURE: CPT | Performed by: ORTHOPAEDIC SURGERY

## 2018-08-20 RX ORDER — CROMOLYN SODIUM 40 MG/ML
1 SOLUTION/ DROPS OPHTHALMIC AS NEEDED
COMMUNITY
End: 2022-02-24 | Stop reason: ALTCHOICE

## 2018-08-20 NOTE — PERIOP NOTES
[facility]  Preoperative Instructions        Surgery Date 8/30/2018          Time of Arrival 8:30 a.m.    1. On the day of your surgery, please report to the Surgical Services Registration Desk and sign in at your designated time. The Surgery Center is located to the right of the Emergency Room. 2. You must have someone with you to drive you home. You should not drive a car for 24 hours following surgery. Please make arrangements for a friend or family member to stay with you for the first 24 hours after your surgery. 3. Do not have anything to eat or drink (including water, gum, mints, coffee, juice) after midnight. ?This may not apply to medications prescribed by your physician. ?(Please note below the special instructions with medications to take the morning of your procedure.)    4. We recommend you do not drink any alcoholic beverages for 24 hours before and after your surgery. 5. Contact your surgeons office for instructions on the following medications: non-steroidal anti-inflammatory drugs (i.e. Advil, Aleve), vitamins, and supplements. (Some surgeons will want you to stop these medications prior to surgery and others may allow you to take them)  **If you are currently taking Plavix, Coumadin, Aspirin and/or other blood-thinning agents, contact your surgeon for instructions. ** Your surgeon will partner with the physician prescribing these medications to determine if it is safe to stop or if you need to continue taking. Please do not stop taking these medications without instructions from your surgeon    6. Wear comfortable clothes. Wear glasses instead of contacts. Do not bring any money or jewelry. Please bring picture ID, insurance card, and any prearranged co-payment or hospital payment. Do not wear make-up, particularly mascara the morning of your surgery. Do not wear nail polish, particularly if you are having foot /hand surgery.   Wear your hair loose or down, no ponytails, buns, beatriz pins or clips. All body piercings must be removed. Please shower with antibacterial soap for three consecutive days before and on the morning of surgery, but do not apply any lotions, powders or deodorants after the shower on the day of surgery. Please use a fresh towels after each shower. Please sleep in clean clothes and change bed linens the night before surgery. Please do not shave for 48 hours prior to surgery. Shaving of the face is acceptable. 7. You should understand that if you do not follow these instructions your surgery may be cancelled. If your physical condition changes (I.e. fever, cold or flu) please contact your surgeon as soon as possible. 8. It is important that you be on time. If a situation occurs where you may be late, please call [phone] (OR Holding Area). 9. If you have any questions and or problems, please call [phone] (Pre-admission Testing). 10. Your surgery time may be subject to change. You will receive a phone call the evening prior if your time changes. 11.  If having outpatient surgery, you must have someone to drive you here, stay with you during the duration of your stay, and to drive you home at time of discharge. 12.   In an effort to improve the efficiency, privacy, and safety for all of our Pre-op patients visitors are not allowed in the Holding area. Once you arrive and are registered your family/visitors will be asked to remain in the waiting room. The Pre-op staff will get you from the Surgical Waiting Area and will explain to you and your family/visitors that the Pre-op phase is beginning. The staff will answer any questions and provide instructions for tracking of the patient, by use of the existing tracking number and color-coded status board in the waiting room.   At this time the staff will also ask for your designated spokesperson information in the event that the physician or staff need to provide an update or obtain any pertinent information. The designated spokesperson will be notified if the physician needs to speak to family during the pre-operative phase. If at any time your family/visitors has questions or concerns they may approach the volunteer desk in the waiting area for assistance. Special Instructions:    MEDICATIONS TO TAKE THE MORNING OF SURGERY WITH A SIP OF WATER: tramadol if needed, montelukast, escitalopram, pantoprazole, levothyroxine      I understand a pre-operative phone call will be made to verify my surgery time. In the event that I am not available, I give permission for a message to be left on my answering service and/or with another person?   Yes [phone]       ___________________      __________   _________    (Signature of Patient)             (Witness)                (Date and Time)

## 2018-08-20 NOTE — PERIOP NOTES
Incentive Spirometer        Using the incentive spirometer helps expand the small air sacs of your lungs, helps you breathe deeply, and helps improve your lung function. Use your incentive spirometer twice a day (10 breaths each time) prior to surgery. How to Use Your Incentive Spirometer:  1. Hold the incentive spirometer in an upright position. 2. Breathe out as usual.   3. Place the mouthpiece in your mouth and seal your lips tightly around it. 4. Take a deep breath. Breathe in slowly and as deeply as possible. Keep the blue flow rate guide between the arrows. 5. Hold your breath as long as possible. Then exhale slowly and allow the piston to fall to the bottom of the column. 6. Rest for a few seconds and repeat steps one through five at least 10 times. PAT Tidal Volume____2500___  x___2____  Date__8/20/2018_____    Ethyl Alec THE INCENTIVE SPIROMETER WITH YOU TO THE HOSPITAL ON THE DAY OF YOUR SURGERY. Opportunity given to ask and answer questions as well as to observe return demonstration.     Patient signature_____________________________    Witness____________________________

## 2018-08-21 LAB
BACTERIA SPEC CULT: NORMAL
BACTERIA SPEC CULT: NORMAL
SERVICE CMNT-IMP: NORMAL

## 2018-08-21 RX ORDER — SODIUM CHLORIDE, SODIUM LACTATE, POTASSIUM CHLORIDE, CALCIUM CHLORIDE 600; 310; 30; 20 MG/100ML; MG/100ML; MG/100ML; MG/100ML
25 INJECTION, SOLUTION INTRAVENOUS CONTINUOUS
Status: CANCELLED | OUTPATIENT
Start: 2018-08-30

## 2018-08-21 RX ORDER — LEVOFLOXACIN 5 MG/ML
500 INJECTION, SOLUTION INTRAVENOUS ONCE
Status: CANCELLED | OUTPATIENT
Start: 2018-08-30 | End: 2018-08-30

## 2018-08-21 NOTE — TELEPHONE ENCOUNTER
Called patient to review PAP download and usage. Patient has been using the PAP device since we increased her pressure with download confirming this usage. Patient stated that after the pressure change on 7/20/2018 she felt better for about two weeks then she started feeling tired. Average Device Pressure <= 90% of Time went from 14.0 cmH2O with AHI 4.1 to   14.4 cmH2O with AHI 3.6  at the new settings. Average Time in Large Leak 0-26 seconds.    Downloads added to patient chart for review

## 2018-08-22 ENCOUNTER — TELEPHONE (OUTPATIENT)
Dept: INTERNAL MEDICINE CLINIC | Age: 53
End: 2018-08-22

## 2018-08-22 LAB
BACTERIA SPEC CULT: NORMAL
CC UR VC: NORMAL
SERVICE CMNT-IMP: NORMAL

## 2018-08-22 NOTE — TELEPHONE ENCOUNTER
MD Abhilash Coe LPN        Caller: Unspecified (Today,  9:41 AM)                     Result showed mixed rachel - no specific bacteria - if patient is still having symptoms should come see me this week - ok to overbook in the morning      Spoke with patient. Two pt identifiers confirmed. Patient notified of the above message from Dr. Jayne Srinivasan. Patient states that she would like to wait a few days and see how she feels, if she is still having issues, she will call back to schedule.

## 2018-08-22 NOTE — TELEPHONE ENCOUNTER
#[phone] pt can not read the results of the urine specimen. Please call pt pertaining to this. Pt believes she has a yeast infection and will need medication sent to pharm for this. She ask that you call on both. Thanks.

## 2018-08-22 NOTE — TELEPHONE ENCOUNTER
Spoke with patient. Two pt identifiers confirmed. Patient states that she would like to know the results of her recent urine culture. Advised patient that her results have come back, but they have not been reviewed. Advised patient that someone will call her once the doctor has had a chance to review and comment. Patient states that she also thinks that she is coming down with a yeast infection and would like to know if the doctor can send something to her pharmacy. Advised patient that I will check with Dr. Margo Alberto and get back in touch with her. Pt verbalized understanding of information discussed w/ no further questions at this time.

## 2018-08-23 NOTE — H&P
Timothy Laws  Location: [place] [address] [address]  Patient #: [id number]  : 1965   / Language: [place] / Race: White  Female      History of Present Illness  The patient is a 47year old female who presents for a Recheck of Acute lumbar back pain. This condition occurred without any known injury. The last clinic visit was 10 month(s) ago. Management changes made at the last visit include ordering test(s) (NITZA). Symptoms include pain and numbness. Symptoms are located symmetrically. The patient describes the pain as burning, aching and tingling (numbness). The symptoms occur constantly. The patient describes this pain as severe and worsening. Symptoms are exacerbated by standing, recumbency and bending. Symptoms are relieved by heat packs. Pertinent medical history includes previous back surgery. The patient was previously evaluated in this clinic 10 month(s) ago. Past evaluation has included x-ray of the lumbar spine and MRI of the lumbar spine. Past treatment has included epidural injections and back surgery. Note for \"Acute lumbar back pain\": Lin Kiming Lin Diaz The patient comes in today for follow up after her recent injection.  She does have that the injection helped her symptoms on her symptoms have begun to flare up again. Tamie Camarena is interested in having a repeat injection.  Her symptoms are about the same with pain radiating into both of her buttocks and lower extremities.  No loss of bowel or bladder control. Problem List/Past Medical   Spondylolisthesis (Acquired) (738.4  M43.10)    S/P lumbar fusion (V45.4  Z98.1)    REVIEW OF SYSTEMS: Systems were reviewed by the provider.    Disc degeneration of lumbar region (722.52  M51.36)      Allergies   Penicillins    Codeine/Codeine Derivatives      Family History   Arthritis   Brother, Father. Alcohol Abuse   Father. Cancer   Mother. Bleeding disorder   Brother. Thyroid problems   Daughter, Sister.   Hypertension   Brother, Father, Sister. Hypercholesterolemia   Brother, Father, Sister. Colon Cancer   Sister. Cerebrovascular Accident   Father. Heart Disease   Brother, Father, Sister. Diabetes Mellitus   Brother. Social History  Seat Belt Use   Always uses seat belts. No drug use    Tobacco / smoke exposure   None. Sun Exposure   Occasionally. Marital status   . Alcohol use   6-9 drinks per occasion, 7 or more times, Drinks beer, Occasionally drinks more than 5 drinks per occasion, per year. Current work status   Disabled. Caffeine use   1-2 drinks per day, Coffee, Tea. Tobacco use   Former smoker. Medication History   Medications Reconciled     Pregnancy / Birth History   Pregnant   No.    Past Surgical History  Spinal Surgery    Foot Surgery   left  Arthroscopy of Knee   left  Spinal Fusion   lower back  Spinal Decompression   lower back    Diagnostic Studies History  Lumbar Spine X-ray   Results: The xrays demonstrate a stable L5-S1 anterior posterior fusion. There is a spondyloslithesis of approximately 3-4mm at L4-5 that reduces with extension. MRI, Lumbar Spine  Results: MRI of the lumbar spine shows a stable fusion at L5-S1. there is degenerative disc disease at L3-4 and L4-5 with mild facet arthrosis    Other Problems   Thyroid Disease    Unspecified Diagnosis    Hypercholesterolemia    Asthma    Arthritis      Physical Exam   Neurologic  Sensory  Light Touch - Intact - Globally. Overall Assessment of Muscle Strength and Tone reveals  Lower Extremities - Right Iliopsoas - 5/5. Left Iliopsoas - 5/5. Right Tibialis Anterior - 5/5. Left Tibialis Anterior - 5/5. Right Gastroc-Soleus - 5/5. Left Gastroc-Soleus - 5/5. Right EHL - 4-/5. Left EHL - 4-/5. General Assessment of Reflexes  Right Ankle - Clonus is not present. Left Ankle - Clonus is not present. Reflexes (Dermatomes)  2/2 Normal - Left Achilles (L5-S2), Left Knee (L2-4), Right Achilles (L5-S2) and Right Knee (L2-4).     Musculoskeletal  Global Assessment  Examination of related systems reveals - well-developed, well-nourished, in no acute distress, alert and oriented x 3. Gait and Station - normal gait and station and normal posture. Right Lower Extremity - normal strength and tone, normal range of motion without pain and no instability, subluxation or laxity. Left Lower Extremity - normal strength and tone, normal range of motion without pain and no instability, subluxation or laxity. Spine/Ribs/Pelvis  Cervical Spine - Examination of the cervical spine reveals - no tenderness to palpation, no pain, no swelling, edema or erythema, normal cervical spine movements and normal sensation. Thoracic (Dorsal) Spine - Examination of the thoracic spine reveals - no tenderness over thoracic vertebrae, no pain, normal sensation and normal thoracic spine movements. Lumbosacral Spine - Examination of the lumbosacral spine reveals - no known fractures or deformities. Inspection and Palpation - Tenderness - moderate. Assessment of pain reveals the following findings - The pain is characterized as - moderate. Location - pain refers to lower back bilaterally. ROJM - Trunk Extension - 15 degrees. Lumbar Spine Flexion - . Lumbosacral Spine - Functional Testing - Babinski Test negative, Prone Knee Bending Test negative, Slump Test negative, Straight Leg Raising Test negative. Assessment & Plan   S/P lumbar fusion (V45.4  Z98.1)  Current Plans  Pt Education - How to access health information online: discussed with patient and provided information. Pt Education - Educational materials were provided.: discussed with patient and provided information. Spondylolisthesis (Acquired) (738.4  M43.10)  Impression: She has developed a spondylolisthesis and stenosis at L4-5 abover her prior anterior posterior fusion. She has tried injections without improvement. She is a candidate for a revision lumbar fusion L4-S1 with a laminectomy at L4-5.     The risks and benefits were discussed at length with the patient and the patient has elected to proceed. Indications for surgery include failed conservative treatment. Alternative treatments, risks and the perioperative course were discussed with the patient. All questions were answered. The risks and benefits of the procedure were explained. Benefits include definitive diagnosis, relief of pain, elimination of deformity and improved function. Risks of surgery including bleeding, infection, weakness, numbness, CSF leak, failure to improve symptoms, exacerbation of medical co-morbidities and even death were discussed with the patient. Treatment options were discussed with the patient in full.(V65.49)  Current Plans  Pt Education - How to access health information online: discussed with patient and provided information. Pt Education - Educational materials were provided.: discussed with patient and provided information. Surgery to be scheduled  Procedure: Revision laminectomy L4-5 and Revsion fusion L4-S1.     Presurgical planning was preformed with the patient today      Signed by David Maldonado MD

## 2018-08-24 NOTE — TELEPHONE ENCOUNTER
Tech to address comfort issues. If she does not feel initial pressure is too high, lets increase setting to 13-15 cmH20. Remote download and route to me in 2 weeks.

## 2018-08-24 NOTE — TELEPHONE ENCOUNTER
Call placed to patient increase setting to 13-15 cmH2O per Dr. Delano Rosas. Patient this his will work better and thank me for the follow up. Patient was asked to follow up with any issues or question.

## 2018-08-26 DIAGNOSIS — G89.29 CHRONIC BILATERAL LOW BACK PAIN WITH BILATERAL SCIATICA: ICD-10-CM

## 2018-08-26 DIAGNOSIS — M54.42 CHRONIC BILATERAL LOW BACK PAIN WITH BILATERAL SCIATICA: ICD-10-CM

## 2018-08-26 DIAGNOSIS — M54.41 CHRONIC BILATERAL LOW BACK PAIN WITH BILATERAL SCIATICA: ICD-10-CM

## 2018-08-27 RX ORDER — TRAMADOL HYDROCHLORIDE 50 MG/1
TABLET ORAL
Qty: 90 TAB | Refills: 1 | Status: SHIPPED | OUTPATIENT
Start: 2018-08-27 | End: 2018-09-01

## 2018-08-28 ENCOUNTER — TELEPHONE (OUTPATIENT)
Dept: INTERNAL MEDICINE CLINIC | Age: 53
End: 2018-08-28

## 2018-08-29 ENCOUNTER — PATIENT MESSAGE (OUTPATIENT)
Dept: INTERNAL MEDICINE CLINIC | Age: 53
End: 2018-08-29

## 2018-08-29 DIAGNOSIS — B37.9 YEAST INFECTION: Primary | ICD-10-CM

## 2018-08-29 RX ORDER — FLUCONAZOLE 150 MG/1
150 TABLET ORAL DAILY
Qty: 2 TAB | Refills: 0 | Status: SHIPPED | OUTPATIENT
Start: 2018-08-29 | End: 2018-09-01

## 2018-08-29 NOTE — TELEPHONE ENCOUNTER
From: Remi Mackay  To: Lukasz Stark MD  Sent: 8/29/2018 11:56 AM EDT  Subject: Prescription Question    Can you perscibe the one pill for yeast infection the 7 day med didnt work thank you

## 2018-08-30 ENCOUNTER — HOSPITAL ENCOUNTER (INPATIENT)
Age: 53
LOS: 2 days | Discharge: HOME HEALTH | DRG: 454 | End: 2018-09-01
Attending: ORTHOPAEDIC SURGERY | Admitting: ORTHOPAEDIC SURGERY
Payer: MEDICARE

## 2018-08-30 ENCOUNTER — APPOINTMENT (OUTPATIENT)
Dept: GENERAL RADIOLOGY | Age: 53
DRG: 454 | End: 2018-08-30
Attending: ORTHOPAEDIC SURGERY
Payer: MEDICARE

## 2018-08-30 ENCOUNTER — ANESTHESIA (OUTPATIENT)
Dept: SURGERY | Age: 53
DRG: 454 | End: 2018-08-30
Payer: MEDICARE

## 2018-08-30 ENCOUNTER — ANESTHESIA EVENT (OUTPATIENT)
Dept: SURGERY | Age: 53
DRG: 454 | End: 2018-08-30
Payer: MEDICARE

## 2018-08-30 DIAGNOSIS — G89.18 POST-OP PAIN: Primary | ICD-10-CM

## 2018-08-30 PROBLEM — M48.061 SPINAL STENOSIS, LUMBAR: Status: ACTIVE | Noted: 2018-08-30

## 2018-08-30 PROCEDURE — 74011000258 HC RX REV CODE- 258: Performed by: ORTHOPAEDIC SURGERY

## 2018-08-30 PROCEDURE — C1713 ANCHOR/SCREW BN/BN,TIS/BN: HCPCS | Performed by: ORTHOPAEDIC SURGERY

## 2018-08-30 PROCEDURE — 77030026438 HC STYL ET INTUB CARD -A: Performed by: ANESTHESIOLOGY

## 2018-08-30 PROCEDURE — 77030019655 HC PRB STIM CRAN MEDT -B: Performed by: ORTHOPAEDIC SURGERY

## 2018-08-30 PROCEDURE — 74011250636 HC RX REV CODE- 250/636: Performed by: ANESTHESIOLOGY

## 2018-08-30 PROCEDURE — 77030018836 HC SOL IRR NACL ICUM -A: Performed by: ORTHOPAEDIC SURGERY

## 2018-08-30 PROCEDURE — 77030019908 HC STETH ESOPH SIMS -A: Performed by: ANESTHESIOLOGY

## 2018-08-30 PROCEDURE — 77030018846 HC SOL IRR STRL H20 ICUM -A: Performed by: ORTHOPAEDIC SURGERY

## 2018-08-30 PROCEDURE — 94760 N-INVAS EAR/PLS OXIMETRY 1: CPT

## 2018-08-30 PROCEDURE — 77030012406 HC DRN WND PENRS BARD -A: Performed by: ORTHOPAEDIC SURGERY

## 2018-08-30 PROCEDURE — 76010000173 HC OR TIME 3 TO 3.5 HR INTENSV-TIER 1: Performed by: ORTHOPAEDIC SURGERY

## 2018-08-30 PROCEDURE — 77030038600 HC TU BPLR IRR DISP STRY -B: Performed by: ORTHOPAEDIC SURGERY

## 2018-08-30 PROCEDURE — 74011250636 HC RX REV CODE- 250/636: Performed by: ORTHOPAEDIC SURGERY

## 2018-08-30 PROCEDURE — 76060000037 HC ANESTHESIA 3 TO 3.5 HR: Performed by: ORTHOPAEDIC SURGERY

## 2018-08-30 PROCEDURE — 72020 X-RAY EXAM OF SPINE 1 VIEW: CPT

## 2018-08-30 PROCEDURE — 77030034169 HC GRFT BN BIOACTV INTRFC 1G BSTEB -F: Performed by: ORTHOPAEDIC SURGERY

## 2018-08-30 PROCEDURE — 77030029099 HC BN WAX SSPC -A: Performed by: ORTHOPAEDIC SURGERY

## 2018-08-30 PROCEDURE — 77030013708 HC HNDPC SUC IRR PULS STRY –B: Performed by: ORTHOPAEDIC SURGERY

## 2018-08-30 PROCEDURE — 77030031139 HC SUT VCRL2 J&J -A: Performed by: ORTHOPAEDIC SURGERY

## 2018-08-30 PROCEDURE — 77030027521 HC SEAL BPLR AQMNTYS EVS MEDT -F: Performed by: ORTHOPAEDIC SURGERY

## 2018-08-30 PROCEDURE — 74011250636 HC RX REV CODE- 250/636

## 2018-08-30 PROCEDURE — 74011000250 HC RX REV CODE- 250: Performed by: ORTHOPAEDIC SURGERY

## 2018-08-30 PROCEDURE — 77030034475 HC MISC IMPL SPN: Performed by: ORTHOPAEDIC SURGERY

## 2018-08-30 PROCEDURE — 74011250636 HC RX REV CODE- 250/636: Performed by: PHYSICIAN ASSISTANT

## 2018-08-30 PROCEDURE — 77030037713 HC CLOSR DEV INCIS ZIP STRY -B: Performed by: ORTHOPAEDIC SURGERY

## 2018-08-30 PROCEDURE — 76210000017 HC OR PH I REC 1.5 TO 2 HR: Performed by: ORTHOPAEDIC SURGERY

## 2018-08-30 PROCEDURE — 77030004391 HC BUR FLUT MEDT -C: Performed by: ORTHOPAEDIC SURGERY

## 2018-08-30 PROCEDURE — 77030034850: Performed by: ORTHOPAEDIC SURGERY

## 2018-08-30 PROCEDURE — 77030008684 HC TU ET CUF COVD -B: Performed by: ANESTHESIOLOGY

## 2018-08-30 PROCEDURE — 65270000029 HC RM PRIVATE

## 2018-08-30 PROCEDURE — 74011000272 HC RX REV CODE- 272: Performed by: ORTHOPAEDIC SURGERY

## 2018-08-30 PROCEDURE — 77010033678 HC OXYGEN DAILY

## 2018-08-30 PROCEDURE — 74011250637 HC RX REV CODE- 250/637: Performed by: PHYSICIAN ASSISTANT

## 2018-08-30 PROCEDURE — 74011000250 HC RX REV CODE- 250

## 2018-08-30 PROCEDURE — 77030032490 HC SLV COMPR SCD KNE COVD -B: Performed by: ORTHOPAEDIC SURGERY

## 2018-08-30 PROCEDURE — 76001 XR FLUOROSCOPY OVER 60 MINUTES: CPT

## 2018-08-30 PROCEDURE — 77030022302 HC GRFT BN SPN SC OSTEO -H1: Performed by: ORTHOPAEDIC SURGERY

## 2018-08-30 DEVICE — ROD 1555501030 5.5 CCM NS CURV 30MM
Type: IMPLANTABLE DEVICE | Site: SPINE LUMBAR | Status: FUNCTIONAL
Brand: CD HORIZON® SPINAL SYSTEM

## 2018-08-30 DEVICE — GRAFT BNE SUB SM CANC FRZN MORSELIZED W/ VIABLE CELL: Type: IMPLANTABLE DEVICE | Site: SPINE LUMBAR | Status: FUNCTIONAL

## 2018-08-30 DEVICE — DBM 7509211 MAGNIFUSE 1 X 10CM
Type: IMPLANTABLE DEVICE | Site: SPINE LUMBAR | Status: FUNCTIONAL
Brand: MAGNIFUSE® BONE GRAFT

## 2018-08-30 DEVICE — GRAFT BNE SUB 7.5GM PTTY SYN SIGNAFUSE: Type: IMPLANTABLE DEVICE | Site: SPINE LUMBAR | Status: FUNCTIONAL

## 2018-08-30 DEVICE — INTERFACE IS A SYNTHETIC BIOACTIVE BONE GRAFT FOR USE IN THE REPAIR OF OSSEOUS DEFECTS. IT IS SUPPLIED AS IRREGULAR SYNTHETIC GRANULES OF BIOACTIVE GLASS (45S5 BIOGLASS), SIZED FROM 200 MICRONS TO 420 MICRONS. WHEN IMPLANTED IN LIVING TISSUE, THE MATERIAL UNDERGOES A TIME DEPENDENT SURFACE MODIFICATION. THE SURFACE REACTION RESULTS IN THE FORMATION OF A CALCIUM PHOSPHATE LAYER, WHICH IS EQUIVALENT IN COMPOSITION AND STRUCTURE TO THE HYDROXYAPATITE FOUND IN BONE MINERAL. THE BIOLOGICAL APATITE LAYER OF THE GRANULES PROVIDES AN OSTEOCONDUCTIVE SCAFFOLD FOR THE GENERATION OF NEW OSSEOUS TISSUE. NEW BONE INFILTRATES AROUND THE GRANULES ALLOWING THE REPAIR OF THE DEFECT AS THE GRANULES ARE ABSORBED.
Type: IMPLANTABLE DEVICE | Site: SPINE LUMBAR | Status: FUNCTIONAL
Brand: INTERFACE

## 2018-08-30 RX ORDER — FENTANYL CITRATE 50 UG/ML
INJECTION, SOLUTION INTRAMUSCULAR; INTRAVENOUS AS NEEDED
Status: DISCONTINUED | OUTPATIENT
Start: 2018-08-30 | End: 2018-08-30 | Stop reason: HOSPADM

## 2018-08-30 RX ORDER — SODIUM CHLORIDE 0.9 % (FLUSH) 0.9 %
5-10 SYRINGE (ML) INJECTION EVERY 8 HOURS
Status: DISCONTINUED | OUTPATIENT
Start: 2018-08-31 | End: 2018-09-01 | Stop reason: HOSPADM

## 2018-08-30 RX ORDER — MORPHINE SULFATE 2 MG/ML
2 INJECTION, SOLUTION INTRAMUSCULAR; INTRAVENOUS
Status: ACTIVE | OUTPATIENT
Start: 2018-08-30 | End: 2018-08-31

## 2018-08-30 RX ORDER — PHENYLEPHRINE HCL IN 0.9% NACL 0.4MG/10ML
SYRINGE (ML) INTRAVENOUS AS NEEDED
Status: DISCONTINUED | OUTPATIENT
Start: 2018-08-30 | End: 2018-08-30 | Stop reason: HOSPADM

## 2018-08-30 RX ORDER — ACETAMINOPHEN 500 MG
1000 TABLET ORAL EVERY 6 HOURS
Status: DISCONTINUED | OUTPATIENT
Start: 2018-08-30 | End: 2018-09-01 | Stop reason: HOSPADM

## 2018-08-30 RX ORDER — LIDOCAINE HYDROCHLORIDE 20 MG/ML
INJECTION, SOLUTION EPIDURAL; INFILTRATION; INTRACAUDAL; PERINEURAL AS NEEDED
Status: DISCONTINUED | OUTPATIENT
Start: 2018-08-30 | End: 2018-08-30 | Stop reason: HOSPADM

## 2018-08-30 RX ORDER — SUCCINYLCHOLINE CHLORIDE 20 MG/ML
INJECTION INTRAMUSCULAR; INTRAVENOUS AS NEEDED
Status: DISCONTINUED | OUTPATIENT
Start: 2018-08-30 | End: 2018-08-30 | Stop reason: HOSPADM

## 2018-08-30 RX ORDER — OXYCODONE HYDROCHLORIDE 5 MG/1
5 TABLET ORAL
Status: DISCONTINUED | OUTPATIENT
Start: 2018-08-30 | End: 2018-08-31 | Stop reason: ALTCHOICE

## 2018-08-30 RX ORDER — HYDROMORPHONE HYDROCHLORIDE 2 MG/ML
INJECTION, SOLUTION INTRAMUSCULAR; INTRAVENOUS; SUBCUTANEOUS AS NEEDED
Status: DISCONTINUED | OUTPATIENT
Start: 2018-08-30 | End: 2018-08-30 | Stop reason: HOSPADM

## 2018-08-30 RX ORDER — SODIUM CHLORIDE 0.9 % (FLUSH) 0.9 %
5-10 SYRINGE (ML) INJECTION AS NEEDED
Status: DISCONTINUED | OUTPATIENT
Start: 2018-08-30 | End: 2018-09-01 | Stop reason: HOSPADM

## 2018-08-30 RX ORDER — PROPOFOL 10 MG/ML
INJECTION, EMULSION INTRAVENOUS AS NEEDED
Status: DISCONTINUED | OUTPATIENT
Start: 2018-08-30 | End: 2018-08-30 | Stop reason: HOSPADM

## 2018-08-30 RX ORDER — ONDANSETRON 2 MG/ML
4 INJECTION INTRAMUSCULAR; INTRAVENOUS
Status: DISPENSED | OUTPATIENT
Start: 2018-08-30 | End: 2018-08-31

## 2018-08-30 RX ORDER — LIDOCAINE HYDROCHLORIDE 10 MG/ML
0.1 INJECTION, SOLUTION EPIDURAL; INFILTRATION; INTRACAUDAL; PERINEURAL AS NEEDED
Status: DISCONTINUED | OUTPATIENT
Start: 2018-08-30 | End: 2018-08-30 | Stop reason: HOSPADM

## 2018-08-30 RX ORDER — OXYCODONE HYDROCHLORIDE 5 MG/1
10 TABLET ORAL
Status: DISCONTINUED | OUTPATIENT
Start: 2018-08-30 | End: 2018-08-31 | Stop reason: ALTCHOICE

## 2018-08-30 RX ORDER — FACIAL-BODY WIPES
10 EACH TOPICAL DAILY PRN
Status: DISCONTINUED | OUTPATIENT
Start: 2018-09-01 | End: 2018-09-01 | Stop reason: HOSPADM

## 2018-08-30 RX ORDER — SODIUM CHLORIDE 0.9 % (FLUSH) 0.9 %
5-10 SYRINGE (ML) INJECTION AS NEEDED
Status: DISCONTINUED | OUTPATIENT
Start: 2018-08-30 | End: 2018-08-30 | Stop reason: HOSPADM

## 2018-08-30 RX ORDER — DIPHENHYDRAMINE HYDROCHLORIDE 50 MG/ML
12.5 INJECTION, SOLUTION INTRAMUSCULAR; INTRAVENOUS AS NEEDED
Status: DISCONTINUED | OUTPATIENT
Start: 2018-08-30 | End: 2018-08-30 | Stop reason: HOSPADM

## 2018-08-30 RX ORDER — EPHEDRINE SULFATE 50 MG/ML
INJECTION, SOLUTION INTRAVENOUS AS NEEDED
Status: DISCONTINUED | OUTPATIENT
Start: 2018-08-30 | End: 2018-08-30 | Stop reason: HOSPADM

## 2018-08-30 RX ORDER — DEXAMETHASONE SODIUM PHOSPHATE 4 MG/ML
INJECTION, SOLUTION INTRA-ARTICULAR; INTRALESIONAL; INTRAMUSCULAR; INTRAVENOUS; SOFT TISSUE AS NEEDED
Status: DISCONTINUED | OUTPATIENT
Start: 2018-08-30 | End: 2018-08-30 | Stop reason: HOSPADM

## 2018-08-30 RX ORDER — GLYCOPYRROLATE 0.2 MG/ML
INJECTION INTRAMUSCULAR; INTRAVENOUS AS NEEDED
Status: DISCONTINUED | OUTPATIENT
Start: 2018-08-30 | End: 2018-08-30 | Stop reason: HOSPADM

## 2018-08-30 RX ORDER — NALOXONE HYDROCHLORIDE 0.4 MG/ML
0.4 INJECTION, SOLUTION INTRAMUSCULAR; INTRAVENOUS; SUBCUTANEOUS AS NEEDED
Status: DISCONTINUED | OUTPATIENT
Start: 2018-08-30 | End: 2018-09-01 | Stop reason: HOSPADM

## 2018-08-30 RX ORDER — VANCOMYCIN HYDROCHLORIDE 1 G/20ML
INJECTION, POWDER, LYOPHILIZED, FOR SOLUTION INTRAVENOUS AS NEEDED
Status: DISCONTINUED | OUTPATIENT
Start: 2018-08-30 | End: 2018-08-30 | Stop reason: HOSPADM

## 2018-08-30 RX ORDER — SODIUM CHLORIDE, SODIUM LACTATE, POTASSIUM CHLORIDE, CALCIUM CHLORIDE 600; 310; 30; 20 MG/100ML; MG/100ML; MG/100ML; MG/100ML
25 INJECTION, SOLUTION INTRAVENOUS CONTINUOUS
Status: DISCONTINUED | OUTPATIENT
Start: 2018-08-30 | End: 2018-08-30 | Stop reason: HOSPADM

## 2018-08-30 RX ORDER — SODIUM CHLORIDE 0.9 % (FLUSH) 0.9 %
5-10 SYRINGE (ML) INJECTION EVERY 8 HOURS
Status: DISCONTINUED | OUTPATIENT
Start: 2018-08-30 | End: 2018-08-30 | Stop reason: HOSPADM

## 2018-08-30 RX ORDER — SODIUM CHLORIDE 9 MG/ML
125 INJECTION, SOLUTION INTRAVENOUS CONTINUOUS
Status: DISPENSED | OUTPATIENT
Start: 2018-08-30 | End: 2018-08-31

## 2018-08-30 RX ORDER — ACETAMINOPHEN 325 MG/1
650 TABLET ORAL EVERY 6 HOURS
Status: DISCONTINUED | OUTPATIENT
Start: 2018-08-30 | End: 2018-08-30 | Stop reason: SDUPTHER

## 2018-08-30 RX ORDER — VANCOMYCIN/0.9 % SOD CHLORIDE 1.5G/250ML
1500 PLASTIC BAG, INJECTION (ML) INTRAVENOUS EVERY 12 HOURS
Status: COMPLETED | OUTPATIENT
Start: 2018-08-30 | End: 2018-08-31

## 2018-08-30 RX ORDER — PROPOFOL 10 MG/ML
INJECTION, EMULSION INTRAVENOUS
Status: DISCONTINUED | OUTPATIENT
Start: 2018-08-30 | End: 2018-08-30 | Stop reason: HOSPADM

## 2018-08-30 RX ORDER — POLYETHYLENE GLYCOL 3350 17 G/17G
17 POWDER, FOR SOLUTION ORAL DAILY
Status: DISCONTINUED | OUTPATIENT
Start: 2018-08-31 | End: 2018-09-01 | Stop reason: HOSPADM

## 2018-08-30 RX ORDER — ONDANSETRON 2 MG/ML
INJECTION INTRAMUSCULAR; INTRAVENOUS AS NEEDED
Status: DISCONTINUED | OUTPATIENT
Start: 2018-08-30 | End: 2018-08-30 | Stop reason: HOSPADM

## 2018-08-30 RX ORDER — FENTANYL CITRATE 50 UG/ML
25 INJECTION, SOLUTION INTRAMUSCULAR; INTRAVENOUS
Status: COMPLETED | OUTPATIENT
Start: 2018-08-30 | End: 2018-08-30

## 2018-08-30 RX ORDER — LEVOFLOXACIN 5 MG/ML
500 INJECTION, SOLUTION INTRAVENOUS ONCE
Status: COMPLETED | OUTPATIENT
Start: 2018-08-30 | End: 2018-08-30

## 2018-08-30 RX ORDER — KETOROLAC TROMETHAMINE 30 MG/ML
30 INJECTION, SOLUTION INTRAMUSCULAR; INTRAVENOUS EVERY 6 HOURS
Status: COMPLETED | OUTPATIENT
Start: 2018-08-30 | End: 2018-08-31

## 2018-08-30 RX ORDER — CYCLOBENZAPRINE HCL 10 MG
10 TABLET ORAL
Status: DISCONTINUED | OUTPATIENT
Start: 2018-08-30 | End: 2018-09-01 | Stop reason: HOSPADM

## 2018-08-30 RX ORDER — MORPHINE SULFATE IN 0.9 % NACL 150MG/30ML
PATIENT CONTROLLED ANALGESIA SYRINGE INTRAVENOUS
Status: DISCONTINUED | OUTPATIENT
Start: 2018-08-30 | End: 2018-09-01 | Stop reason: HOSPADM

## 2018-08-30 RX ORDER — VANCOMYCIN 1.75 GRAM/500 ML IN 0.9 % SODIUM CHLORIDE INTRAVENOUS
1750 ONCE
Status: COMPLETED | OUTPATIENT
Start: 2018-08-30 | End: 2018-08-30

## 2018-08-30 RX ORDER — AMOXICILLIN 250 MG
1 CAPSULE ORAL 2 TIMES DAILY
Status: DISCONTINUED | OUTPATIENT
Start: 2018-08-30 | End: 2018-09-01 | Stop reason: HOSPADM

## 2018-08-30 RX ORDER — MIDAZOLAM HYDROCHLORIDE 1 MG/ML
INJECTION, SOLUTION INTRAMUSCULAR; INTRAVENOUS AS NEEDED
Status: DISCONTINUED | OUTPATIENT
Start: 2018-08-30 | End: 2018-08-30 | Stop reason: HOSPADM

## 2018-08-30 RX ORDER — ROCURONIUM BROMIDE 10 MG/ML
INJECTION, SOLUTION INTRAVENOUS AS NEEDED
Status: DISCONTINUED | OUTPATIENT
Start: 2018-08-30 | End: 2018-08-30 | Stop reason: HOSPADM

## 2018-08-30 RX ORDER — HYDROMORPHONE HYDROCHLORIDE 1 MG/ML
0.2 INJECTION, SOLUTION INTRAMUSCULAR; INTRAVENOUS; SUBCUTANEOUS
Status: DISCONTINUED | OUTPATIENT
Start: 2018-08-30 | End: 2018-08-30 | Stop reason: HOSPADM

## 2018-08-30 RX ORDER — ACETAMINOPHEN 10 MG/ML
INJECTION, SOLUTION INTRAVENOUS AS NEEDED
Status: DISCONTINUED | OUTPATIENT
Start: 2018-08-30 | End: 2018-08-30 | Stop reason: HOSPADM

## 2018-08-30 RX ORDER — DIPHENHYDRAMINE HYDROCHLORIDE 50 MG/ML
12.5 INJECTION, SOLUTION INTRAMUSCULAR; INTRAVENOUS
Status: ACTIVE | OUTPATIENT
Start: 2018-08-30 | End: 2018-08-31

## 2018-08-30 RX ADMIN — HYDROMORPHONE HYDROCHLORIDE 0.4 MG: 2 INJECTION, SOLUTION INTRAMUSCULAR; INTRAVENOUS; SUBCUTANEOUS at 16:38

## 2018-08-30 RX ADMIN — FENTANYL CITRATE 50 MCG: 50 INJECTION, SOLUTION INTRAMUSCULAR; INTRAVENOUS at 13:46

## 2018-08-30 RX ADMIN — DEXAMETHASONE SODIUM PHOSPHATE 10 MG: 4 INJECTION, SOLUTION INTRA-ARTICULAR; INTRALESIONAL; INTRAMUSCULAR; INTRAVENOUS; SOFT TISSUE at 13:35

## 2018-08-30 RX ADMIN — ONDANSETRON 4 MG: 2 INJECTION INTRAMUSCULAR; INTRAVENOUS at 15:50

## 2018-08-30 RX ADMIN — FENTANYL CITRATE 25 MCG: 50 INJECTION, SOLUTION INTRAMUSCULAR; INTRAVENOUS at 16:55

## 2018-08-30 RX ADMIN — OXYCODONE HYDROCHLORIDE 10 MG: 5 TABLET ORAL at 17:40

## 2018-08-30 RX ADMIN — VANCOMYCIN HYDROCHLORIDE 1500 MG: 10 INJECTION, POWDER, LYOPHILIZED, FOR SOLUTION INTRAVENOUS at 23:55

## 2018-08-30 RX ADMIN — MIDAZOLAM HYDROCHLORIDE 1 MG: 1 INJECTION, SOLUTION INTRAMUSCULAR; INTRAVENOUS at 13:05

## 2018-08-30 RX ADMIN — LEVOFLOXACIN 500 MG: 5 INJECTION, SOLUTION INTRAVENOUS at 13:22

## 2018-08-30 RX ADMIN — ACETAMINOPHEN 1000 MG: 10 INJECTION, SOLUTION INTRAVENOUS at 15:06

## 2018-08-30 RX ADMIN — KETOROLAC TROMETHAMINE 30 MG: 30 INJECTION, SOLUTION INTRAMUSCULAR at 19:54

## 2018-08-30 RX ADMIN — PROPOFOL 75 MCG/KG/MIN: 10 INJECTION, EMULSION INTRAVENOUS at 13:35

## 2018-08-30 RX ADMIN — FENTANYL CITRATE 25 MCG: 50 INJECTION, SOLUTION INTRAMUSCULAR; INTRAVENOUS at 18:14

## 2018-08-30 RX ADMIN — PROPOFOL 150 MG: 10 INJECTION, EMULSION INTRAVENOUS at 13:16

## 2018-08-30 RX ADMIN — Medication: at 17:14

## 2018-08-30 RX ADMIN — KETOROLAC TROMETHAMINE 30 MG: 30 INJECTION, SOLUTION INTRAMUSCULAR at 23:55

## 2018-08-30 RX ADMIN — LIDOCAINE HYDROCHLORIDE 80 MG: 20 INJECTION, SOLUTION EPIDURAL; INFILTRATION; INTRACAUDAL; PERINEURAL at 13:16

## 2018-08-30 RX ADMIN — FENTANYL CITRATE 25 MCG: 50 INJECTION, SOLUTION INTRAMUSCULAR; INTRAVENOUS at 17:54

## 2018-08-30 RX ADMIN — FENTANYL CITRATE 25 MCG: 50 INJECTION, SOLUTION INTRAMUSCULAR; INTRAVENOUS at 17:00

## 2018-08-30 RX ADMIN — SODIUM CHLORIDE, SODIUM LACTATE, POTASSIUM CHLORIDE, AND CALCIUM CHLORIDE: 600; 310; 30; 20 INJECTION, SOLUTION INTRAVENOUS at 15:00

## 2018-08-30 RX ADMIN — Medication 40 MCG: at 15:16

## 2018-08-30 RX ADMIN — FENTANYL CITRATE 25 MCG: 50 INJECTION, SOLUTION INTRAMUSCULAR; INTRAVENOUS at 17:05

## 2018-08-30 RX ADMIN — GLYCOPYRROLATE 0.2 MG: 0.2 INJECTION INTRAMUSCULAR; INTRAVENOUS at 14:09

## 2018-08-30 RX ADMIN — EPHEDRINE SULFATE 5 MG: 50 INJECTION, SOLUTION INTRAVENOUS at 14:09

## 2018-08-30 RX ADMIN — ACETAMINOPHEN 650 MG: 325 TABLET ORAL at 19:54

## 2018-08-30 RX ADMIN — HYDROMORPHONE HYDROCHLORIDE 0.2 MG: 2 INJECTION, SOLUTION INTRAMUSCULAR; INTRAVENOUS; SUBCUTANEOUS at 14:02

## 2018-08-30 RX ADMIN — ACETAMINOPHEN 1000 MG: 500 TABLET ORAL at 23:54

## 2018-08-30 RX ADMIN — EPHEDRINE SULFATE 5 MG: 50 INJECTION, SOLUTION INTRAVENOUS at 15:25

## 2018-08-30 RX ADMIN — Medication 40 MCG: at 14:34

## 2018-08-30 RX ADMIN — SUCCINYLCHOLINE CHLORIDE 120 MG: 20 INJECTION INTRAMUSCULAR; INTRAVENOUS at 13:16

## 2018-08-30 RX ADMIN — Medication 40 MCG: at 15:30

## 2018-08-30 RX ADMIN — EPHEDRINE SULFATE 5 MG: 50 INJECTION, SOLUTION INTRAVENOUS at 14:54

## 2018-08-30 RX ADMIN — ROCURONIUM BROMIDE 5 MG: 10 INJECTION, SOLUTION INTRAVENOUS at 13:16

## 2018-08-30 RX ADMIN — FENTANYL CITRATE 25 MCG: 50 INJECTION, SOLUTION INTRAMUSCULAR; INTRAVENOUS at 18:09

## 2018-08-30 RX ADMIN — FENTANYL CITRATE 50 MCG: 50 INJECTION, SOLUTION INTRAMUSCULAR; INTRAVENOUS at 13:16

## 2018-08-30 RX ADMIN — FENTANYL CITRATE 25 MCG: 50 INJECTION, SOLUTION INTRAMUSCULAR; INTRAVENOUS at 16:50

## 2018-08-30 RX ADMIN — FENTANYL CITRATE 25 MCG: 50 INJECTION, SOLUTION INTRAMUSCULAR; INTRAVENOUS at 18:04

## 2018-08-30 RX ADMIN — EPHEDRINE SULFATE 5 MG: 50 INJECTION, SOLUTION INTRAVENOUS at 14:12

## 2018-08-30 RX ADMIN — ROCURONIUM BROMIDE 20 MG: 10 INJECTION, SOLUTION INTRAVENOUS at 13:20

## 2018-08-30 RX ADMIN — SENNOSIDES AND DOCUSATE SODIUM 1 TABLET: 8.6; 5 TABLET ORAL at 19:54

## 2018-08-30 RX ADMIN — SODIUM CHLORIDE, SODIUM LACTATE, POTASSIUM CHLORIDE, AND CALCIUM CHLORIDE 25 ML/HR: 600; 310; 30; 20 INJECTION, SOLUTION INTRAVENOUS at 12:43

## 2018-08-30 RX ADMIN — SODIUM CHLORIDE 125 ML/HR: 900 INJECTION, SOLUTION INTRAVENOUS at 17:10

## 2018-08-30 RX ADMIN — Medication 40 MCG: at 14:54

## 2018-08-30 RX ADMIN — VANCOMYCIN HYDROCHLORIDE 1750 MG: 10 INJECTION, POWDER, LYOPHILIZED, FOR SOLUTION INTRAVENOUS at 12:43

## 2018-08-30 NOTE — ANESTHESIA PREPROCEDURE EVALUATION
Anesthetic History No history of anesthetic complications Review of Systems / Medical History Patient summary reviewed, nursing notes reviewed and pertinent labs reviewed Pulmonary Sleep apnea: CPAP Asthma Comments: Former smoker - Quit 2015 - 37.5 pack years Neuro/Psych Psychiatric history Comments: Acquired Spondylolisthesis Annular Tear of Lumbar Disc Depression Cardiovascular Hyperlipidemia Exercise tolerance: <4 METS Comments: Normal Nuclear Stress Test (5/31/17) GI/Hepatic/Renal 
  
GERD Renal disease: stones Liver disease Comments: Hx Colon polyps Hx Diverticulitis Hx fatty liver Endo/Other Hypothyroidism Morbid obesity and arthritis Other Findings Comments: Chronic LOW BACK pain Physical Exam 
 
Airway Mallampati: II 
 
Neck ROM: normal range of motion Cardiovascular Regular rate and rhythm,  S1 and S2 normal,  no murmur, click, rub, or gallop Dental 
 
Dentition: Full lower dentures and Full upper dentures Pulmonary Breath sounds clear to auscultation Abdominal 
GI exam deferred Other Findings Anesthetic Plan ASA: 3 Anesthesia type: general 
 
Monitoring Plan: BIS Induction: Intravenous Anesthetic plan and risks discussed with: Patient

## 2018-08-30 NOTE — IP AVS SNAPSHOT
[address] [facility] 
[phone] Patient: Lilyan Severs MRN: [id number] :1965 About your hospitalization You were admitted on:  2018 You last received care in the:  [facility] 3 ORTHOPEDICS You were discharged on:  2018 Why you were hospitalized Your primary diagnosis was:  Not on File Your diagnoses also included:  Spinal Stenosis, Lumbar Follow-up Information Follow up With Details Comments Contact Info Jack Castellanos MD   [facility] [address] [facility] 
[phone] [facility]  This is the provider for your rolling walker  [address] [facility] [address] 
[phone] Discharge Orders None A check yuly indicates which time of day the medication should be taken. My Medications START taking these medications Instructions Each Dose to Equal  
 Morning Noon Evening Bedtime HYDROmorphone 2 mg tablet Commonly known as:  DILAUDID Your last dose was: Your next dose is: Take 1 Tab by mouth every four (4) hours as needed. Max Daily Amount: 12 mg.  
 2 mg CONTINUE taking these medications Instructions Each Dose to Equal  
 Morning Noon Evening Bedtime  
 albuterol 90 mcg/actuation inhaler Commonly known as:  PROVENTIL HFA, VENTOLIN HFA, PROAIR HFA Your last dose was: Your next dose is: Take 1 Puff by inhalation every six (6) hours as needed for Wheezing. 1 Puff  
    
   
   
   
  
 beclomethasone 80 mcg/actuation Aero Commonly known as:  QVAR Your last dose was: Your next dose is: Take 1 Puff by inhalation two (2) times a day. 1 Puff  
    
   
   
   
  
 cromolyn 4 % ophthalmic solution Commonly known as:  OPTICROM Your last dose was: Your next dose is:    
   
   
 Administer 1 Drop to both eyes as needed. Use in affected eye(s) 1 Drop  
    
   
   
   
  
 escitalopram oxalate 20 mg tablet Commonly known as:  Cloria Kohut Your last dose was: Your next dose is: TAKE 1 TABLET BY MOUTH EVERY DAY  
     
   
   
   
  
 levothyroxine 88 mcg tablet Commonly known as:  SYNTHROID Your last dose was: Your next dose is: Take 1 Tab by mouth Daily (before breakfast). 88 mcg  
    
   
   
   
  
 lovastatin 20 mg tablet Commonly known as:  MEVACOR Your last dose was: Your next dose is: TAKE 1 TAB BY MOUTH NIGHTLY. montelukast 10 mg tablet Commonly known as:  SINGULAIR Your last dose was: Your next dose is: Take 1 Tab by mouth daily. 10 mg  
    
   
   
   
  
 pantoprazole 40 mg tablet Commonly known as:  PROTONIX Your last dose was: Your next dose is: Take 1 Tab by mouth daily. Indications: gastroesophageal reflux disease 40 mg  
    
   
   
   
  
 VITAMIN D3 1,000 unit tablet Generic drug:  cholecalciferol Your last dose was: Your next dose is: Take 1,000 Units by mouth daily. 1000 Units STOP taking these medications   
 fluconazole 150 mg tablet Commonly known as:  DIFLUCAN  
   
  
 ibuprofen 800 mg tablet Commonly known as:  MOTRIN  
   
  
 traMADol 50 mg tablet Commonly known as:  ULTRAM  
   
  
  
  
Where to Get Your Medications Information on where to get these meds will be given to you by the nurse or doctor. ! Ask your nurse or doctor about these medications HYDROmorphone 2 mg tablet Opioid Education  Prescription Opioids: What You Need to Know: 
 
 
Procedure: Procedure(s): REVISION LAMINECTOMY L4-5 AND REVISION FUSION L4-S1   PCP: Santa Gan MD 
 
Follow up appointments 
-follow up with Dr. Dr. Linda Buchanan in 2 weeks. Call [phone] to make an appointment as soon as you get home from the hospital. 
 
[address] [place], [place] [address]y: ____________________   phone: _______________________ The agency will contact you to arrange dates/times for visits. Please call them if you do not hear from them within 24 hours after you are discharged Physical therapy 3 times a week for 3 weeks Nursing-initial assessment and as needed When to call your Orthopaedic Surgeon: 
-Signs of infection-if your incision is red; continues to have drainage; drainage has a foul odor or if you have a persistent fever over 101 degrees for 24 hours 
-Nausea or vomiting, severe headache 
-Loss of bowel or bladder function, inability to urinate 
-Changes in sensation in your arms or legs (numbness, tingling, loss of color) -Increased weakness-greater than before your surgery 
-Severe pain or pain not relieved by medications 
-Signs of a blood clot in your leg-calf pain, tenderness, redness, swelling of lower leg When to call your Primary Care Physician: 
-Concerns about medical conditions such as diabetes, high blood pressure, asthma, congestive heart failure 
-Call if blood sugars are elevated, persistent headache or dizziness, coughing or congestion, constipation or diarrhea, burning with urination, abnormal heart rate When to call 911 and go to the nearest emergency room: 
-Acute onset of chest pain, shortness of breath, difficulty breathing Activity -- You are going home a well person, be as active as possible. Your only exercise should be walking. Start with short frequent walks and increase your walking distance each day. 
-Limit the amount of time you sit to 20-30 minute intervals. Sitting for prolonged periods of time will be uncomfortable for you following surgery. 
-Do NOT lift anything over 5 pounds 
-Do NOT do any straining, twisting or bending 
-When you are in bed, you may lay on your back or on either side. Do NOT lie on your stomach Brace 
-If you have a back brace, you should wear your brace at all times when you are out of bed. Do not wear the brace while in bed or showering. 
-Remember to always wear a cotton t-shirt underneath your brace. 
-Do not bend or twist when your brace is off Diet 
-Resume usual diet; drink plenty of fluids; eat foods high in fiber 
-It is important to have regular bowel movements. Pain medications may cause constipation. You may want to take a stool softener (such as Senokot-S or Colace) to prevent constipation. 
 -If constipation occurs, take a laxative (such as Dulcolax tablets, Milk of Magnesia, or a suppository). Laxatives should only be used if the above preventable measures have failed and you still have not had a bowel movement after three days Driving 
-You may not drive or return to work until instructed by your physician. However, you may ride in the car for short periods of time. Incision Care Your incision has been closed with absorbable sutures and the Zipline skin closure system. This will assist with healing. The Ariel Amsler is to remain on your incision for 2 weeks. A dry dressing (ABD and tape) will be placed over it and should be changed daily, for at least the first several days after your surgery. If you have no incisional drainage, you may leave the incision open to air if you wish, still leaving the Zipline in place.  Please make sure to wash your hands prior to touching your dressing. You may take brief showers but do not run the water directly onto the wound. After your shower, blot your incision dry with a soft towel and replace the dry dressing. Do not allow the tape to come in contact with the Zipline. Do not rub or apply any lotions or ointments to your incision site. Do not soak or scrub your wound. The Zipline dressing will be removed during your two week follow-up appointment. If you experience drainage leaking from underneath the Zipline or if it peels off before 2 weeks, please contact your orthopedic surgeons office. Showering 
-You may shower in approximately 4 days after your surgery.   
-Leave the dressing on during your shower. Do NOT allow the water to run directly onto your dressing. Once you get out of the shower, gently pat the dressing dry. -Reminder- your brace can be removed while showering. Remember to not bend or twist while your brace is off.   
-Do not take a tub bath. Preventing blood clots 
-You have been given T.E.D. stockings to wear. Continue to wear these for 7 days after your discharge. Put them on in the morning and take them off at night.   
-They are used to increase your circulation and prevent blood clots from forming in your legs 
-T. E.D. stockings can be machine washed, temperature not to exceed 160° F (71°C) and machine dried for 15 to 20 minutes, temperature not to exceed 250° F (121°C). Pain management 
-Take pain medication as prescribed; decrease the amount you use as your pain lessens 
-DO not wait until you are in extreme pain to take your medication. 
-Avoid alcoholic beverages while taking pain medication Pain Medication Safety DO: 
-Read the Medication Guide  
-Take your medicine exactly as prescribed  
-Store your medicine away from children and in a safe place  
-Flush unused medicine down the toilet  
-Call your healthcare provider for medical advice about side effects.  You may report side effects to FDA at 6-546-FDA-4889.  
-Please be aware that many medications contain Tylenol. We do not want you to over medicate so please read the information below as a guide. Do not take more than 4 Grams of Tylenol in a 24 hour period. (There are 1000 milligrams in one Gram) Percocet contains 325 mg of Tylenol per tablet (do not take more than 12 tablets in 24 hours) Lortab contains 500 mg of Tylenol per tablet (do not take more than 8 tablets in 24 hours) Norco contains 325 mg of Tylenol per tablet (do not take more than 12 tablets in 24 hours). DO NOT: 
-Do not give your medicine to others  
-Do not take medicine unless it was prescribed for you  
-Do not stop taking your medicine without talking to your healthcare provider  
-Do not break, chew, crush, dissolve, or inject your medicine. If you cannot swallow your medicine whole, talk to your healthcare provider. 
-Do not drink alcohol while taking this medicine 
-Do not take anti-inflammatory medications or aspirin unless instructed by your     physician. ACO Transitions of Care Introducing [employer] [address] Komal Guerra offers a voluntary care coordination program to provide high quality service and care to [facility] fee-for-service beneficiaries. Valente Can was designed to help you enhance your health and well-being through the following services: ? Transitions of Care  support for individuals who are transitioning from one care setting to another (example: Hospital to home). ?  Chronic and Complex Care Coordination  support for individuals and caregivers of those with serious or chronic illnesses or with more than one chronic (ongoing) condition and those who take a number of different medications. If you meet specific medical criteria, a [facility] [address] may call you directly to coordinate your care with your primary care physician and your other care providers. For questions about the [facility] programs, please, contact your physicians office. For general questions or additional information about Accountable Care Organizations: 
Please visit www.medicare.gov/acos. html or call 1-800-MEDICARE (0-627.925.9415) TTY users should call 3-933.119.4554. Introducing [facility]! Dear Mike Reyna: Thank you for requesting a [employer] account. Our records indicate that you already have an active [employer] account. You can access your account anytime at [email or web address] Did you know that you can access your hospital and ER discharge instructions at any time in [employer]? You can also review all of your test results from your hospital stay or ER visit. Additional Information If you have questions, please visit the Frequently Asked Questions section of the [employer] website at [email or web address] Remember, [employer] is NOT to be used for urgent needs. For medical emergencies, dial 911. Now available from your iPhone and Android! Introducing Scottie Christy As a Eva Huffman patient, I wanted to make you aware of our electronic visit tool called Scottie Bakerriojose l. [facility] 24/7 allows you to connect within minutes with a medical provider 24 hours a day, seven days a week via a mobile device or tablet or logging into a secure website from your computer. You can access Scottie Christy from anywhere in the [place].  
 
A virtual visit might be right for you when you have a simple condition and feel like you just dont want to get out of bed, or cant get away from work for an appointment, when your regular [facility] provider is not available (evenings, weekends or holidays), or when youre out of town and need minor care. Electronic visits cost only $49 and if the [facility] 24/7 provider determines a prescription is needed to treat your condition, one can be electronically transmitted to a nearby pharmacy*. Please take a moment to enroll today if you have not already done so. The enrollment process is free and takes just a few minutes. To enroll, please download the [employer] venancio to your tablet or phone, or visit [email or web address] to enroll on your computer. And, as an [address] [place] [address] patient with a [employer] account, the results of your visits will be scanned into your electronic medical record and your primary care provider will be able to view the scanned results. We urge you to continue to see your regular [facility] provider for your ongoing medical care. And while your primary care provider may not be the one available when you seek a [employer] virtual visit, the peace of mind you get from getting a real diagnosis real time can be priceless. For more information on [employer], view our Frequently Asked Questions (FAQs) at [email or web address]. Sincerely, 
 
Gabriella Romero MD 
Chief Medical Officer Rica Guerra *:  certain medications cannot be prescribed via [employer] Providers Seen During Your Hospitalization Provider Specialty Primary office phone Adilene Torres MD Orthopedic Surgery [phone] Your Primary Care Physician (PCP) Primary Care Physician Office Phone Office Fax Valentina Campos [phone] You are allergic to the following Allergen Reactions Codeine Hives Pcn (Penicillins) Hives Recent Documentation Height Weight BMI OB Status Smoking Status 1.702 m 117.4 kg 40.54 kg/m2 Hysterectomy Former Smoker Emergency Contacts Name Discharge Info Relation Home Work Mobile Valeriy Elam CAREGIVER [3] Spouse [3] [phone] Patient Belongings The following personal items are in your possession at time of discharge: 
  Dental Appliances: Uppers, Lowers  Visual Aid: Glasses, At bedside      Home Medications: None   Jewelry: None  Clothing: Footwear, Undergarments, Shirt, Pants    Other Valuables: Eyeglasses  Personal Items Sent to Safe: na 
 
  
  
 Please provide this summary of care documentation to your next provider. Signatures-by signing, you are acknowledging that this After Visit Summary has been reviewed with you and you have received a copy. Patient Signature:  ____________________________________________________________ Date:  ____________________________________________________________  
  
Kahnh Hernandez Provider Signature:  ____________________________________________________________ Date:  ____________________________________________________________

## 2018-08-30 NOTE — IP AVS SNAPSHOT
[address] 
[phone] Patient: Radha Mensah MRN: [id number] :1965 A check yuly indicates which time of day the medication should be taken. My Medications START taking these medications Instructions Each Dose to Equal  
 Morning Noon Evening Bedtime HYDROmorphone 2 mg tablet Commonly known as:  DILAUDID Your last dose was: Your next dose is: Take 1 Tab by mouth every four (4) hours as needed. Max Daily Amount: 12 mg.  
 2 mg CONTINUE taking these medications Instructions Each Dose to Equal  
 Morning Noon Evening Bedtime  
 albuterol 90 mcg/actuation inhaler Commonly known as:  PROVENTIL HFA, VENTOLIN HFA, PROAIR HFA Your last dose was: Your next dose is: Take 1 Puff by inhalation every six (6) hours as needed for Wheezing. 1 Puff  
    
   
   
   
  
 beclomethasone 80 mcg/actuation Aero Commonly known as:  QVAR Your last dose was: Your next dose is: Take 1 Puff by inhalation two (2) times a day. 1 Puff  
    
   
   
   
  
 cromolyn 4 % ophthalmic solution Commonly known as:  OPTICROM Your last dose was: Your next dose is:    
   
   
 Administer 1 Drop to both eyes as needed. Use in affected eye(s) 1 Drop  
    
   
   
   
  
 escitalopram oxalate 20 mg tablet Commonly known as:  Shelly Eileen Your last dose was: Your next dose is: TAKE 1 TABLET BY MOUTH EVERY DAY  
     
   
   
   
  
 levothyroxine 88 mcg tablet Commonly known as:  SYNTHROID Your last dose was: Your next dose is: Take 1 Tab by mouth Daily (before breakfast). 88 mcg  
    
   
   
   
  
 lovastatin 20 mg tablet Commonly known as:  MEVACOR Your last dose was: Your next dose is: TAKE 1 TAB BY MOUTH NIGHTLY. montelukast 10 mg tablet Commonly known as:  SINGULAIR Your last dose was: Your next dose is: Take 1 Tab by mouth daily. 10 mg  
    
   
   
   
  
 pantoprazole 40 mg tablet Commonly known as:  PROTONIX Your last dose was: Your next dose is: Take 1 Tab by mouth daily. Indications: gastroesophageal reflux disease 40 mg  
    
   
   
   
  
 VITAMIN D3 1,000 unit tablet Generic drug:  cholecalciferol Your last dose was: Your next dose is: Take 1,000 Units by mouth daily. 1000 Units STOP taking these medications   
 fluconazole 150 mg tablet Commonly known as:  DIFLUCAN  
   
  
 ibuprofen 800 mg tablet Commonly known as:  MOTRIN  
   
  
 traMADol 50 mg tablet Commonly known as:  ULTRAM  
   
  
  
  
Where to Get Your Medications Information on where to get these meds will be given to you by the nurse or doctor. ! Ask your nurse or doctor about these medications HYDROmorphone 2 mg tablet

## 2018-08-30 NOTE — IP AVS SNAPSHOT
Summary of Care Report The Summary of Care report has been created to help improve care coordination. Users with access to [employer] or [address] (Web-based application) may access additional patient information including the Discharge Summary. If you are not currently a [address] user and need more information, please call the number listed below in the [place] [address] section and ask to be connected with Medical Records. Facility Information Name Address Phone [address] [address] [phone] Patient Information Patient Name Sex NATHANIEL Mg ([id number]) Female 1965 Discharge Information Admitting Provider Service Area Unit Lul Villaseñor MD / [address] [facility] [phone] Discharge Provider Discharge Date/Time Discharge Disposition Destination (none) 2018 (Pending) [facility] (none) Patient Language Language ENGLISH [13] Hospital Problems as of 2018  Reviewed: 2018  9:13 AM by Yajaira Gurrola MD  
  
  
  
 Class Noted - Resolved Last Modified POA Active Problems Spinal stenosis, lumbar  2018 - Present 2018 by Evie Paiz PA-C Unknown Entered by Evie Paiz PA-C Non-Hospital Problems as of 2018  Reviewed: 2018  9:13 AM by Yajaira Gurrola MD  
  
  
  
 Class Noted - Resolved Last Modified Active Problems History of hysterectomy, supracervical  2012 - Present 2012 by Madeline Ruiz Entered by Madeline Ruiz S/P left oophorectomy  2012 - Present 2012 by Madeline Ruiz Entered by Madeline Ruiz Glaucoma  2014 - Present 2014 by Madeline Ruiz Entered by Madeline Ruiz Overview Signed 2014  2:33 PM by Madeline Ruiz History of 
  
  
  Glaucoma of right eye  3/16/2016 - Present 3/16/2016 by Nemo Ulloa Entered by Nemo Ulloa Asthma  1/26/2017 - Present 1/26/2017 by Karina Diez MD  
  Entered by Karina Diez MD  
  Insomnia  1/26/2017 - Present 1/26/2017 by Karina Diez MD  
  Entered by Karina Diez MD  
  Diverticulitis of large intestine without perforation or abscess without bleeding  3/17/2017 - Present 3/17/2017 by Karina Diez MD  
  Entered by Karina Diez MD  
  Diverticulosis of large intestine without hemorrhage  3/24/2017 - Present 3/24/2017 by Nemo Ulloa Entered by Nemo Ulloa Diverticulitis  Unknown - Present 5/10/2017 by Karina Diez MD  
  Entered by Karina Diez MD  
  Fatty liver  Unknown - Present 11/16/2017 by Karina Diez MD  
  Entered by Karina Diez MD  
  Obesity, morbid ([address]  4/17/2018 - Present 4/17/2018 by Karina Diez MD  
  Entered by Karina Diez MD  
  
You are allergic to the following Allergen Reactions Codeine Hives Pcn (Penicillins) Hives Current Discharge Medication List  
  
START taking these medications Dose & Instructions Dispensing Information Comments HYDROmorphone 2 mg tablet Commonly known as:  DILAUDID Dose:  2 mg Take 1 Tab by mouth every four (4) hours as needed. Max Daily Amount: 12 mg. Quantity:  50 Tab Refills:  0 CONTINUE these medications which have NOT CHANGED Dose & Instructions Dispensing Information Comments  
 albuterol 90 mcg/actuation inhaler Commonly known as:  PROVENTIL HFA, VENTOLIN HFA, PROAIR HFA Dose:  1 Puff Take 1 Puff by inhalation every six (6) hours as needed for Wheezing. Quantity:  1 Inhaler Refills:  2  
   
 beclomethasone 80 mcg/actuation Aero Commonly known as:  QVAR Dose:  1 Puff Take 1 Puff by inhalation two (2) times a day. Quantity:  1 Inhaler Refills:  0 cromolyn 4 % ophthalmic solution Commonly known as:  OPTICROM Dose:  1 Drop Administer 1 Drop to both eyes as needed. Use in affected eye(s) Refills:  0  
   
 escitalopram oxalate 20 mg tablet Commonly known as:  [place] TAKE 1 TABLET BY MOUTH EVERY DAY Quantity:  90 Tab Refills:  1  
   
 levothyroxine 88 mcg tablet Commonly known as:  SYNTHROID Dose:  88 mcg Take 1 Tab by mouth Daily (before breakfast). Quantity:  90 Tab Refills:  5  
   
 lovastatin 20 mg tablet Commonly known as:  MEVACOR  
 TAKE 1 TAB BY MOUTH NIGHTLY. Quantity:  30 Tab Refills:  5  
   
 montelukast 10 mg tablet Commonly known as:  SINGULAIR Dose:  10 mg Take 1 Tab by mouth daily. Quantity:  30 Tab Refills:  4  
   
 pantoprazole 40 mg tablet Commonly known as:  PROTONIX Dose:  40 mg Take 1 Tab by mouth daily. Indications: gastroesophageal reflux disease Quantity:  90 Tab Refills:  1 VITAMIN D3 1,000 unit tablet Generic drug:  cholecalciferol Dose:  1000 Units Take 1,000 Units by mouth daily. Refills:  0 STOP taking these medications Comments  
 fluconazole 150 mg tablet Commonly known as:  DIFLUCAN  
   
   
 ibuprofen 800 mg tablet Commonly known as:  MOTRIN  
   
   
 traMADol 50 mg tablet Commonly known as:  ULTRAM  
   
   
  
  
  
Current Immunizations Name Date Influenza Vaccine 10/17/2013 Influenza Vaccine (Quad) PF 10/17/2017, 1/26/2017 Pneumococcal Vaccine (Unspecified Type) 10/17/2015, 10/17/2013 Surgery Information ID Date/Time Status Primary Surgeon All Procedures Location [id number] 8/30/2018 Maggy Alcala MD REVISION LAMINECTOMY L4-5 AND REVISION FUSION L4-S1  [facility] MAIN OR Follow-up Information Follow up With Details Comments Contact Info Edward Vogel MD   [facility] [address] [place] [address] 
[phone] [employer]  This is the provider for your rolling walker  Evin [place] [facility] [address] 
[phone] Discharge Instructions After Hospital Care Plan:  Discharge Instructions Lumbar Fusion Surgery Dr. Tameka Coughlin Patient Name: Glennda Sandhoff Date of procedure: 8/30/2018  Date of discharge:  
 
Procedure: Procedure(s): REVISION LAMINECTOMY L4-5 AND REVISION FUSION L4-S1   PCP: Timmy Feng MD 
 
Follow up appointments 
-follow up with Dr. Dr. Tameka Coughlin in 2 weeks. Call [phone] to make an appointment as soon as you get home from the hospital. 
 
[address] [place], [place] [address]y: ____________________   phone: _______________________ The agency will contact you to arrange dates/times for visits. Please call them if you do not hear from them within 24 hours after you are discharged Physical therapy 3 times a week for 3 weeks Nursing-initial assessment and as needed When to call your Orthopaedic Surgeon: 
-Signs of infection-if your incision is red; continues to have drainage; drainage has a foul odor or if you have a persistent fever over 101 degrees for 24 hours 
-Nausea or vomiting, severe headache 
-Loss of bowel or bladder function, inability to urinate 
-Changes in sensation in your arms or legs (numbness, tingling, loss of color) -Increased weakness-greater than before your surgery 
-Severe pain or pain not relieved by medications 
-Signs of a blood clot in your leg-calf pain, tenderness, redness, swelling of lower leg When to call your Primary Care Physician: 
-Concerns about medical conditions such as diabetes, high blood pressure, asthma, congestive heart failure 
-Call if blood sugars are elevated, persistent headache or dizziness, coughing or congestion, constipation or diarrhea, burning with urination, abnormal heart rate When to call 911 and go to the nearest emergency room: 
-Acute onset of chest pain, shortness of breath, difficulty breathing Activity -- You are going home a well person, be as active as possible. Your only exercise should be walking. Start with short frequent walks and increase your walking distance each day. 
-Limit the amount of time you sit to 20-30 minute intervals. Sitting for prolonged periods of time will be uncomfortable for you following surgery. 
-Do NOT lift anything over 5 pounds 
-Do NOT do any straining, twisting or bending 
-When you are in bed, you may lay on your back or on either side. Do NOT lie on your stomach Brace 
-If you have a back brace, you should wear your brace at all times when you are out of bed. Do not wear the brace while in bed or showering. 
-Remember to always wear a cotton t-shirt underneath your brace. 
-Do not bend or twist when your brace is off Diet 
-Resume usual diet; drink plenty of fluids; eat foods high in fiber 
-It is important to have regular bowel movements. Pain medications may cause constipation. You may want to take a stool softener (such as Senokot-S or Colace) to prevent constipation. 
 -If constipation occurs, take a laxative (such as Dulcolax tablets, Milk of Magnesia, or a suppository). Laxatives should only be used if the above preventable measures have failed and you still have not had a bowel movement after three days Driving 
-You may not drive or return to work until instructed by your physician. However, you may ride in the car for short periods of time. Incision Care Your incision has been closed with absorbable sutures and the Zipline skin closure system. This will assist with healing. The Benita Ing is to remain on your incision for 2 weeks. A dry dressing (ABD and tape) will be placed over it and should be changed daily, for at least the first several days after your surgery. If you have no incisional drainage, you may leave the incision open to air if you wish, still leaving the Zipline in place.  Please make sure to wash your hands prior to touching your dressing. You may take brief showers but do not run the water directly onto the wound. After your shower, blot your incision dry with a soft towel and replace the dry dressing. Do not allow the tape to come in contact with the Zipline. Do not rub or apply any lotions or ointments to your incision site. Do not soak or scrub your wound. The Zipline dressing will be removed during your two week follow-up appointment. If you experience drainage leaking from underneath the Zipline or if it peels off before 2 weeks, please contact your orthopedic surgeons office. Showering 
-You may shower in approximately 4 days after your surgery.   
-Leave the dressing on during your shower. Do NOT allow the water to run directly onto your dressing. Once you get out of the shower, gently pat the dressing dry. -Reminder- your brace can be removed while showering. Remember to not bend or twist while your brace is off.   
-Do not take a tub bath. Preventing blood clots 
-You have been given T.E.D. stockings to wear. Continue to wear these for 7 days after your discharge. Put them on in the morning and take them off at night.   
-They are used to increase your circulation and prevent blood clots from forming in your legs 
-T. E.D. stockings can be machine washed, temperature not to exceed 160° F (71°C) and machine dried for 15 to 20 minutes, temperature not to exceed 250° F (121°C). Pain management 
-Take pain medication as prescribed; decrease the amount you use as your pain lessens 
-DO not wait until you are in extreme pain to take your medication. 
-Avoid alcoholic beverages while taking pain medication Pain Medication Safety DO: 
-Read the Medication Guide  
-Take your medicine exactly as prescribed  
-Store your medicine away from children and in a safe place  
-Flush unused medicine down the toilet  
-Call your healthcare provider for medical advice about side effects.  You may report side effects to FDA at 0-264-FDA-1156.  
-Please be aware that many medications contain Tylenol. We do not want you to over medicate so please read the information below as a guide. Do not take more than 4 Grams of Tylenol in a 24 hour period. (There are 1000 milligrams in one Gram) Percocet contains 325 mg of Tylenol per tablet (do not take more than 12 tablets in 24 hours) Lortab contains 500 mg of Tylenol per tablet (do not take more than 8 tablets in 24 hours) Norco contains 325 mg of Tylenol per tablet (do not take more than 12 tablets in 24 hours). DO NOT: 
-Do not give your medicine to others  
-Do not take medicine unless it was prescribed for you  
-Do not stop taking your medicine without talking to your healthcare provider  
-Do not break, chew, crush, dissolve, or inject your medicine. If you cannot swallow your medicine whole, talk to your healthcare provider. 
-Do not drink alcohol while taking this medicine 
-Do not take anti-inflammatory medications or aspirin unless instructed by your     physician. Chart Review Routing History Recipient Method Report Sent By Ector Petit MD  
Phone: [phone] In Vas[facility] Finley [[id number]] 6/14/2018 11:57 AM 6/14/2018 Dae Cid MD  
Phone: [phone] In [facility] [[id number]] 6/25/2018 11:29 AM 6/17/2018 Allan Petit MD  
Phone: [phone] [facility] [[id number][facility] 7/17/2018  9:11 AM 7/17/2018

## 2018-08-30 NOTE — ANESTHESIA POSTPROCEDURE EVALUATION
Post-Anesthesia Evaluation and Assessment Patient: Raymundo Fernandez MRN: [id number]  SSN: [id number] YOB: 1965  Age: 46 y.o. Sex: female Cardiovascular Function/Vital Signs Visit Vitals  /66  Pulse 81  Temp 36.7 °C (98 °F)  Resp 14  
 Ht 5' 7\" (1.702 m)  Wt 117.4 kg (258 lb 13.1 oz)  SpO2 93%  BMI 40.54 kg/m2 Patient is status post general anesthesia for Procedure(s): REVISION LAMINECTOMY L4-5 AND REVISION FUSION L4-S1 . Nausea/Vomiting: None Postoperative hydration reviewed and adequate. Pain: 
Pain Scale 1: Numeric (0 - 10) (08/30/18 1815) Pain Intensity 1: 3 (08/30/18 1815) Managed Neurological Status:  
Neuro (WDL): Within Defined Limits (08/30/18 1634) Neuro LUE Motor Response: Purposeful (08/30/18 1634) LLE Motor Response: Purposeful (08/30/18 1634) RUE Motor Response: Purposeful (08/30/18 1634) RLE Motor Response: Purposeful (08/30/18 1634) At baseline Mental Status and Level of Consciousness: Arousable Pulmonary Status:  
O2 Device: Nasal cannula (08/30/18 1800) Adequate oxygenation and airway patent Complications related to anesthesia: None Post-anesthesia assessment completed. No concerns Signed By: Frantz Rocha MD   
 August 30, 2018

## 2018-08-30 NOTE — PERIOP NOTES
Handoff Report from Operating Room to PACU Report received from GLADYS Ovalle RN and Dipti Melgoza CRNA regarding Henrietta Flakes. Surgeon(s): 
Corwin Obregon MD  And Procedure(s) (LRB): 
REVISION LAMINECTOMY L4-5 AND REVISION FUSION L4-S1  (N/A)  confirmed  
with allergies, drains and dressings discussed. Anesthesia type, drugs, patient history, complications, estimated blood loss, vital signs, intake and output, and last pain medication, lines and temperature were reviewed.

## 2018-08-30 NOTE — PERIOP NOTES
Urine output 75 mL since arrival to PACU at 1634. This has been brought to the attention of Dr. Arlette Villalobos by telephone. No new orders obtained.

## 2018-08-30 NOTE — PERIOP NOTES
Notified Dr. Inga Farrell of patient's continued 10/10 back pain despite pain medication, including PCA. Dr. Inga Farrell ordered PCA to be put on hold and to continue administering Fentanyl.

## 2018-08-30 NOTE — PERIOP NOTES
TRANSFER - OUT REPORT: 
 
Verbal report given to Raymundo Tompkins RN on Ciro Silvestre  being transferred to  for routine post - op Report consisted of patients Situation, Background, Assessment and  
Recommendations(SBAR). Information from the following report(s) SBAR, OR Summary, Intake/Output, MAR and Recent Results was reviewed with the receiving nurse. Opportunity for questions and clarification was provided. Patient transported with: 
 Monitor O2 @ 3 liters Registered Nurse Tech

## 2018-08-30 NOTE — PERIOP NOTES
Patient: Jd Bustillo MRN: [id number]  SSN: [id number] YOB: 1965  Age: 46 y.o. Sex: female Patient is status post Procedure(s): REVISION LAMINECTOMY L4-5 AND REVISION FUSION L4-S1 . Surgeon(s) and Role: Doc MD Catalino - Primary Local/Dose/Irrigation:  See [facility] Peripheral IV 08/30/18 Left Hand (Active) Site Assessment Clean, dry, & intact 8/30/2018 12:40 PM  
Phlebitis Assessment 0 8/30/2018 12:40 PM  
Infiltration Assessment 0 8/30/2018 12:40 PM  
Dressing Status Clean, dry, & intact 8/30/2018 12:40 PM  
Dressing Type Tape;Transparent 8/30/2018 12:40 PM  
Hub Color/Line Status Pink; Infusing 8/30/2018 12:40 PM  
      
Hemovac Left; Lower Back (Active) Airway - Endotracheal Tube 08/30/18 Oral (Active) Line Faizan Lips 8/30/2018 12:00 AM  
         
 
 
 
Dressing/Packing:  Wound Back-DRESSING TYPE: Adhesive wound dressing (Mastisol); ABD pad;Special tape (comment); Other (Comment) (zip closure system) (08/30/18 1615) Splint/Cast:  ] Other:  Lopez

## 2018-08-30 NOTE — BRIEF OP NOTE
BRIEF OPERATIVE NOTE Date of Procedure: 8/30/2018 Preoperative Diagnosis: Acquired spondylolisthesis [M43.10] Annular tear of lumbar disc [M51.36] Postoperative Diagnosis: Acquired spondylolisthesis [M43.10] Procedure(s): REVISION LAMINECTOMY L4-5 AND REVISION FUSION L4-S1 Surgeon(s) and Role: Luis Alberto Heller MD - Primary Surgical Assistant: Corrinne Puma PA-C Surgical Staff: 
Circ-1: Jose Manuel Gutierrez RN 
Circ-Relief: Christine Gonzalez RN 
Circ-Intern: Brendan Vázquez Radiology Technician: Rima Camacho; RT Janeth Scrub Tech-1: Montse Wu Scrub Tech-Relief: Yahir Landry Event Time In Incision Start [id number] Incision Close 1614 Anesthesia: General  
Estimated Blood Loss: See full op note Specimens: * No specimens in log * Findings: Lumbar stenosis Complications: None Implants:  
Implant Name Type Inv. Item Serial No.  Lot No. LRB No. Used Action GRAFT BNE [employer]  --  - [id number]  GRAFT [employer]NE [employer]  --  [id number] MUSCULOSKELETAL TRANS 1510 N/A 1 Implanted GRAFT BNE [employer]  --  - [id number]  GRAFT BNE [employer]  --  [id number] MUSCULOSKELETAL TRANS 1210 N/A 1 Implanted GRAFT BNE SUB SD 1X10CM -- MAGNIFUSE - [id number]  GRAFT BNE SUB SD 1X10CM -- MAGNIFUSE [id number] [employer] SPINALGRAFT TECH NA N/A 1 Implanted Expandable Interbody Device, Standard   NA [employer] [facility] DAN [id number] N/A 2 Implanted GRAFT BNE PUTTY BIOACTV 7.5GM -- SIGNAFUSE - SNA  GRAFT BNE PUTTY BIOACTV 7.5GM -- SIGNAFUSE NA [id number] [facility] [id number] N/A 2 Implanted GRAFT BNE BIOACTV INTERFC 1GM -- INTERFACE - SNA  GRAFT BNE BIOACTV INTERFC 1GM -- INTERFACE NA [id number] [facility] [id number] N/A 2 Implanted SCR SET SPNE BRK-OFF 5.5MM TI --  - SNA  SCR SET SPNE BRK-OFF 5.5MM TI --  NA [employer] DANEK NA N/A 6 Implanted SCR SPNE MAS 6.5X50 CC -- CD HORIZON SOLERA - SNA  SCR SPNE MAS 6.5X50 CC -- CD HORIZON SOLERA NA [employer] SOFAMOR DANEK NA N/A 2 Implanted SCR SPNE MAS 7.5X45 CC -- CD HORIZON SOLERA - SNA  SCR SPNE MAS 7.5X45 CC -- CD HORIZON SOLERA NA [employer] SOFAMOR DANEK NA N/A 2 Implanted 8.5x 35 MAS   NA [employer] NA N/A 2 Implanted DARBY SPNE CP4 NS CRV 5.5X70MM -- CD HORIZON SOLERA - SNA  DARBY SPNE CP4 NS CRV 5.5X70MM -- CD HORIZON SOLERA NA [employer] SOFAMOR DANEK NA N/A 2 Implanted DARBY SPNE CCM NS CRV 5.5X30MM -- CD HORIZON SOLERA - SNA   DARBY SPNE CCM NS CRV 5.5X30MM -- CD HORIZON SOLERA NA [employer] SOFAMOR DANEK NA N/A 1 Implanted

## 2018-08-31 LAB
ANION GAP SERPL CALC-SCNC: 10 MMOL/L (ref 5–15)
BUN SERPL-MCNC: 11 MG/DL (ref 6–20)
BUN/CREAT SERPL: 12 (ref 12–20)
CALCIUM SERPL-MCNC: 8.4 MG/DL (ref 8.5–10.1)
CHLORIDE SERPL-SCNC: 106 MMOL/L (ref 97–108)
CO2 SERPL-SCNC: 23 MMOL/L (ref 21–32)
CREAT SERPL-MCNC: 0.91 MG/DL (ref 0.55–1.02)
GLUCOSE SERPL-MCNC: 148 MG/DL (ref 65–100)
HGB BLD-MCNC: 10.9 G/DL (ref 11.5–16)
POTASSIUM SERPL-SCNC: 4.2 MMOL/L (ref 3.5–5.1)
SODIUM SERPL-SCNC: 139 MMOL/L (ref 136–145)

## 2018-08-31 PROCEDURE — 74011250637 HC RX REV CODE- 250/637: Performed by: ORTHOPAEDIC SURGERY

## 2018-08-31 PROCEDURE — G8988 SELF CARE GOAL STATUS: HCPCS | Performed by: OCCUPATIONAL THERAPIST

## 2018-08-31 PROCEDURE — 97530 THERAPEUTIC ACTIVITIES: CPT | Performed by: OCCUPATIONAL THERAPIST

## 2018-08-31 PROCEDURE — 85018 HEMOGLOBIN: CPT | Performed by: PHYSICIAN ASSISTANT

## 2018-08-31 PROCEDURE — 0ST20ZZ RESECTION OF LUMBAR VERTEBRAL DISC, OPEN APPROACH: ICD-10-PCS | Performed by: ORTHOPAEDIC SURGERY

## 2018-08-31 PROCEDURE — 94760 N-INVAS EAR/PLS OXIMETRY 1: CPT

## 2018-08-31 PROCEDURE — 4A11X4G MONITORING OF PERIPHERAL NERVOUS ELECTRICAL ACTIVITY, INTRAOPERATIVE, EXTERNAL APPROACH: ICD-10-PCS | Performed by: ORTHOPAEDIC SURGERY

## 2018-08-31 PROCEDURE — 65270000029 HC RM PRIVATE

## 2018-08-31 PROCEDURE — 97116 GAIT TRAINING THERAPY: CPT

## 2018-08-31 PROCEDURE — 36415 COLL VENOUS BLD VENIPUNCTURE: CPT | Performed by: PHYSICIAN ASSISTANT

## 2018-08-31 PROCEDURE — 0SP304Z REMOVAL OF INTERNAL FIXATION DEVICE FROM LUMBOSACRAL JOINT, OPEN APPROACH: ICD-10-PCS | Performed by: ORTHOPAEDIC SURGERY

## 2018-08-31 PROCEDURE — G8979 MOBILITY GOAL STATUS: HCPCS

## 2018-08-31 PROCEDURE — 80048 BASIC METABOLIC PNL TOTAL CA: CPT | Performed by: PHYSICIAN ASSISTANT

## 2018-08-31 PROCEDURE — 0SG30AJ FUSION OF LUMBOSACRAL JOINT WITH INTERBODY FUSION DEVICE, POSTERIOR APPROACH, ANTERIOR COLUMN, OPEN APPROACH: ICD-10-PCS | Performed by: ORTHOPAEDIC SURGERY

## 2018-08-31 PROCEDURE — 97535 SELF CARE MNGMENT TRAINING: CPT | Performed by: OCCUPATIONAL THERAPIST

## 2018-08-31 PROCEDURE — 97165 OT EVAL LOW COMPLEX 30 MIN: CPT | Performed by: OCCUPATIONAL THERAPIST

## 2018-08-31 PROCEDURE — G8978 MOBILITY CURRENT STATUS: HCPCS

## 2018-08-31 PROCEDURE — 74011250637 HC RX REV CODE- 250/637: Performed by: PHYSICIAN ASSISTANT

## 2018-08-31 PROCEDURE — 0SG3071 FUSION OF LUMBOSACRAL JOINT WITH AUTOLOGOUS TISSUE SUBSTITUTE, POSTERIOR APPROACH, POSTERIOR COLUMN, OPEN APPROACH: ICD-10-PCS | Performed by: ORTHOPAEDIC SURGERY

## 2018-08-31 PROCEDURE — 74011250636 HC RX REV CODE- 250/636: Performed by: PHYSICIAN ASSISTANT

## 2018-08-31 PROCEDURE — G8987 SELF CARE CURRENT STATUS: HCPCS | Performed by: OCCUPATIONAL THERAPIST

## 2018-08-31 PROCEDURE — 97162 PT EVAL MOD COMPLEX 30 MIN: CPT

## 2018-08-31 PROCEDURE — 77010033678 HC OXYGEN DAILY

## 2018-08-31 PROCEDURE — 0SG0071 FUSION OF LUMBAR VERTEBRAL JOINT WITH AUTOLOGOUS TISSUE SUBSTITUTE, POSTERIOR APPROACH, POSTERIOR COLUMN, OPEN APPROACH: ICD-10-PCS | Performed by: ORTHOPAEDIC SURGERY

## 2018-08-31 PROCEDURE — 0SG00AJ FUSION OF LUMBAR VERTEBRAL JOINT WITH INTERBODY FUSION DEVICE, POSTERIOR APPROACH, ANTERIOR COLUMN, OPEN APPROACH: ICD-10-PCS | Performed by: ORTHOPAEDIC SURGERY

## 2018-08-31 RX ORDER — PRAVASTATIN SODIUM 10 MG/1
20 TABLET ORAL
Status: DISCONTINUED | OUTPATIENT
Start: 2018-08-31 | End: 2018-09-01 | Stop reason: HOSPADM

## 2018-08-31 RX ORDER — HYDROMORPHONE HYDROCHLORIDE 2 MG/1
2 TABLET ORAL
Status: DISCONTINUED | OUTPATIENT
Start: 2018-08-31 | End: 2018-09-01 | Stop reason: HOSPADM

## 2018-08-31 RX ORDER — HYDROXYZINE 25 MG/1
25 TABLET, FILM COATED ORAL
Status: DISCONTINUED | OUTPATIENT
Start: 2018-08-31 | End: 2018-09-01 | Stop reason: HOSPADM

## 2018-08-31 RX ORDER — ESCITALOPRAM OXALATE 10 MG/1
20 TABLET ORAL DAILY
Status: DISCONTINUED | OUTPATIENT
Start: 2018-08-31 | End: 2018-09-01 | Stop reason: HOSPADM

## 2018-08-31 RX ORDER — MELATONIN
1000 DAILY
Status: DISCONTINUED | OUTPATIENT
Start: 2018-08-31 | End: 2018-09-01 | Stop reason: HOSPADM

## 2018-08-31 RX ORDER — CROMOLYN SODIUM 40 MG/ML
1 SOLUTION/ DROPS OPHTHALMIC AS NEEDED
Status: DISCONTINUED | OUTPATIENT
Start: 2018-08-31 | End: 2018-09-01 | Stop reason: HOSPADM

## 2018-08-31 RX ORDER — MONTELUKAST SODIUM 10 MG/1
10 TABLET ORAL DAILY
Status: DISCONTINUED | OUTPATIENT
Start: 2018-08-31 | End: 2018-09-01 | Stop reason: HOSPADM

## 2018-08-31 RX ORDER — ALBUTEROL SULFATE 90 UG/1
1 AEROSOL, METERED RESPIRATORY (INHALATION)
Status: DISCONTINUED | OUTPATIENT
Start: 2018-08-31 | End: 2018-09-01 | Stop reason: HOSPADM

## 2018-08-31 RX ORDER — LEVOTHYROXINE SODIUM 88 UG/1
88 TABLET ORAL
Status: DISCONTINUED | OUTPATIENT
Start: 2018-08-31 | End: 2018-09-01 | Stop reason: HOSPADM

## 2018-08-31 RX ORDER — HYDROCORTISONE 25 MG/G
CREAM TOPICAL
Status: DISCONTINUED | OUTPATIENT
Start: 2018-08-31 | End: 2018-09-01 | Stop reason: HOSPADM

## 2018-08-31 RX ORDER — HYDROMORPHONE HYDROCHLORIDE 2 MG/1
4 TABLET ORAL
Status: DISCONTINUED | OUTPATIENT
Start: 2018-08-31 | End: 2018-08-31

## 2018-08-31 RX ORDER — PANTOPRAZOLE SODIUM 40 MG/1
40 TABLET, DELAYED RELEASE ORAL DAILY
Status: DISCONTINUED | OUTPATIENT
Start: 2018-08-31 | End: 2018-09-01 | Stop reason: HOSPADM

## 2018-08-31 RX ADMIN — HYDROXYZINE HYDROCHLORIDE 25 MG: 25 TABLET, FILM COATED ORAL at 17:51

## 2018-08-31 RX ADMIN — OXYCODONE HYDROCHLORIDE 10 MG: 5 TABLET ORAL at 03:20

## 2018-08-31 RX ADMIN — MONTELUKAST SODIUM 10 MG: 10 TABLET, FILM COATED ORAL at 09:16

## 2018-08-31 RX ADMIN — ESCITALOPRAM OXALATE 20 MG: 10 TABLET ORAL at 09:15

## 2018-08-31 RX ADMIN — ACETAMINOPHEN 1000 MG: 500 TABLET ORAL at 05:30

## 2018-08-31 RX ADMIN — ONDANSETRON 4 MG: 2 INJECTION, SOLUTION INTRAMUSCULAR; INTRAVENOUS at 05:16

## 2018-08-31 RX ADMIN — OXYCODONE HYDROCHLORIDE 10 MG: 5 TABLET ORAL at 15:11

## 2018-08-31 RX ADMIN — HYDROMORPHONE HYDROCHLORIDE 2 MG: 2 TABLET ORAL at 17:54

## 2018-08-31 RX ADMIN — HYDROCORTISONE: 25 CREAM TOPICAL at 17:51

## 2018-08-31 RX ADMIN — HYDROMORPHONE HYDROCHLORIDE 2 MG: 2 TABLET ORAL at 21:44

## 2018-08-31 RX ADMIN — KETOROLAC TROMETHAMINE 30 MG: 30 INJECTION, SOLUTION INTRAMUSCULAR at 05:30

## 2018-08-31 RX ADMIN — VITAMIN D, TAB 1000IU (100/BT) 1000 UNITS: 25 TAB at 09:16

## 2018-08-31 RX ADMIN — OXYCODONE HYDROCHLORIDE 10 MG: 5 TABLET ORAL at 09:15

## 2018-08-31 RX ADMIN — OXYCODONE HYDROCHLORIDE 10 MG: 5 TABLET ORAL at 12:07

## 2018-08-31 RX ADMIN — Medication 10 ML: at 12:10

## 2018-08-31 RX ADMIN — PRAVASTATIN SODIUM 20 MG: 10 TABLET ORAL at 21:35

## 2018-08-31 RX ADMIN — LEVOTHYROXINE SODIUM 88 MCG: 88 TABLET ORAL at 09:15

## 2018-08-31 RX ADMIN — PANTOPRAZOLE SODIUM 40 MG: 40 TABLET, DELAYED RELEASE ORAL at 09:16

## 2018-08-31 RX ADMIN — Medication 10 ML: at 05:31

## 2018-08-31 RX ADMIN — KETOROLAC TROMETHAMINE 30 MG: 30 INJECTION, SOLUTION INTRAMUSCULAR at 12:07

## 2018-08-31 RX ADMIN — ACETAMINOPHEN 1000 MG: 500 TABLET ORAL at 17:51

## 2018-08-31 RX ADMIN — OXYCODONE HYDROCHLORIDE 10 MG: 5 TABLET ORAL at 06:30

## 2018-08-31 RX ADMIN — FLUTICASONE FUROATE 1 PUFF: 100 POWDER RESPIRATORY (INHALATION) at 11:37

## 2018-08-31 RX ADMIN — Medication 10 ML: at 21:35

## 2018-08-31 NOTE — PROGRESS NOTES
Problem: Mobility Impaired (Adult and Pediatric) Goal: *Acute Goals and Plan of Care (Insert Text) Physical Therapy Goals Initiated 8/31/2018 1. Patient will move from supine to sit and sit to supine , scoot up and down and roll side to side in bed with independence within 4 days. 2. Patient will perform sit to stand with modified independence within 4 days. 3. Patient will ambulate with modified independence for 350 feet with the least restrictive device within 4 days. 4. Patient will verbalize and demonstrate understanding of spinal precautions (No bending, lifting greater than 5 lbs, or twisting; log-roll technique; frequent repositioning as instructed) within 4 days. physical Therapy EVALUATION Patient: Kasia Guerra (68 y.o. female) Date: 8/31/2018 Primary Diagnosis: Acquired spondylolisthesis [M43.10] Annular tear of lumbar disc [M51.36] Procedure(s) (LRB): 
REVISION LAMINECTOMY L4-5 AND REVISION FUSION L4-S1  (N/A) 1 Day Post-Op Precautions:   Fall, Spinal, Other (comment) (TLSO brace on when OOB) ASSESSMENT : 
Based on the objective data described below, the patient presents with back pain, decreased balance and decreased mobility skills following above spine surgery yesterday. She was lying in bed when PT arrived and agreed to evaluation. Cleared by nursing for mobilization. OT reports patient was feeling very lightheaded and needed increased assistance earlier today. PTA she reports being independent in all areas of function, but has had progressively increasing pain in her back and legs which has limited her activity tolerance with walking and standing activities. Currently she needed cg assistance for supine to sitting using a log roll technique with cues from PT to improve her performance. Sit to standing was cga and bed to chair was cga using a RW.  She tolerated walking with a RW cg assistance for 120 feet with cues needed to maintain upright posture. She was mildly unsteady with turns. She was left up in a chair with all her needs met at the end of the evaluation. She will benefit from continued PT to improve her balance and mobility skills. Recommend HH PT upon discharge. Patient will benefit from skilled intervention to address the above impairments. Patients rehabilitation potential is considered to be Good Factors which may influence rehabilitation potential include:  
[x]         None noted 
[]         Mental ability/status []         Medical condition 
[]         Home/family situation and support systems 
[]         Safety awareness 
[]         Pain tolerance/management 
[]         Other: PLAN : 
Recommendations and Planned Interventions: 
[x]           Bed Mobility Training             []    Neuromuscular Re-Education 
[x]           Transfer Training                   [x]    TLSO training 
[x]           Gait Training                         []    Modalities []           Therapeutic Exercises           []    Edema Management/Control 
[x]           Therapeutic Activities            [x]    Patient and Family Training/Education 
[]           Other (comment): Frequency/Duration: Patient will be followed by physical therapy  twice daily to address goals. Discharge Recommendations: Home Health Further Equipment Recommendations for Discharge: rolling walker SUBJECTIVE:  
Patient stated I was very lightheaded earlier in the bathroom, but feel better now.  OBJECTIVE DATA SUMMARY:  
HISTORY:   
Past Medical History:  
Diagnosis Date  Arthritis  Asthma  Breast cyst 1996  
 left, removed  Chronic pain LOWER BACK  Colon polyps  Diverticulitis  Fatty liver  GERD (gastroesophageal reflux disease)  Hypercholesteremia  Nicotine vapor product user  Obesity  SUN (obstructive sleep apnea)   
 uses CPAP  
 Psychiatric disorder   
 depression  Thyroid disease   
 hypothyroid Past Surgical History:  
Procedure Laterality Date  COLONOSCOPY N/A 11/14/2017 COLONOSCOPY performed by Dilcia Rivas MD at [facility] SUR[facility]  2005, 2006 L5 & S 1  
 HX BREAST BIOPSY Left  HX HYSTERECTOMY  6/22/09 18 [facility]  HX OOPHORECTOMY Left One ovary removed.  HX ORTHOPAEDIC Left 1972  
 thumb surgery  HX ORTHOPAEDIC Left 2000  
 left knee surgery  HX ORTHOPAEDIC Left 2013  
 ankle  HX TUBAL LIGATION    
 REMOVAL OF KIDNEY STONE  RENAL STENT Prior Level of Function/Home Situation: she reports being independent in all areas of function, but has had progressively increasing pain in her back and legs which has limited her activity tolerance with walking and standing activities. Personal factors and/or comorbidities impacting plan of care: None Home Situation # Steps to Enter: 0 Wheelchair Ramp: No 
Current DME Used/Available at Home: CPAP, Grab bars, Adaptive dressing aides, Adaptive bathing aides, Hollie Angelina, straight, Alexander Bene, rollator, Alexander Benoit, rolling, [id number] chair Tub or Shower Type: Tub/Shower combination EXAMINATION/PRESENTATION/DECISION MAKING:  
Critical Behavior: 
Neurologic State: Alert Orientation Level: Oriented X4 Cognition: Appropriate decision making, Appropriate safety awareness, Appropriate for age attention/concentration, Follows commands Safety/Judgement: Awareness of environment, Insight into deficits Hearing: 
  
Skin:   
Edema:  
Range Of Motion: 
AROM: Generally decreased, functional 
  
  
  
  
  
  
  
Strength:   
Strength: Generally decreased, functional 
  
  
  
  
  
  
Tone & Sensation:  
Tone: Normal 
  
  
  
  
  
  
  
  
   
Coordination: 
Coordination: Generally decreased, functional 
Vision:  
Acuity: Within Defined Limits Corrective Lenses: Glasses Functional Mobility: 
Bed Mobility: 
Rolling: Contact guard assistance Supine to Sit: Contact guard assistance Sit to Supine: Contact guard assistance Scooting: Supervision Transfers: 
Sit to Stand: Contact guard assistance Stand to Sit: Contact guard assistance Bed to Chair: Minimum assistance Balance:  
Sitting: Intact Standing: Impaired Standing - Static: Fair Standing - Dynamic : Fair Ambulation/Gait Training: 
Distance (ft): 120 Feet (ft) Assistive Device: Walker, rolling;Gait belt Ambulation - Level of Assistance: Contact guard assistance Gait Description (WDL): Exceptions to [facility] Gait Abnormalities: Shuffling gait;Circumduction;Decreased step clearance;Trunk sway increased Base of Support: Widened Speed/Salma: Pace decreased (<100 feet/min) Step Length: Left shortened;Right shortened Functional Measure: 
Barthel Index: 
 
Bathin Bladder: 10 Bowels: 10 
Groomin Dressin Feeding: 10 Mobility: 10 Stairs: 0 Toilet Use: 5 Transfer (Bed to Chair and Back): 10 Total: 60 Barthel and G-code impairment scale: 
Percentage of impairment CH 
0% CI 
1-19% CJ 
20-39% CK 
40-59% CL 
60-79% CM 
80-99% CN 
100% Barthel Score 0-100 100 99-80 79-60 59-40 20-39 1-19 
 0 Barthel Score 0-20 20 17-19 13-16 9-12 5-8 1-4 0 The Barthel ADL Index: Guidelines 1. The index should be used as a record of what a patient does, not as a record of what a patient could do. 2. The main aim is to establish degree of independence from any help, physical or verbal, however minor and for whatever reason. 3. The need for supervision renders the patient not independent. 4. A patient's performance should be established using the best available evidence. Asking the patient, friends/relatives and nurses are the usual sources, but direct observation and common sense are also important. However direct testing is not needed. 5. Usually the patient's performance over the preceding 24-48 hours is important, but occasionally longer periods will be relevant. 6. Middle categories imply that the patient supplies over 50 per cent of the effort. 7. Use of aids to be independent is allowed. Farshad Chacon., Barthel, D.W. ([id number]). Functional evaluation: the Barthel Index. [facility] (14)2. LUPILLO Chaves, Radha Baker., Jada White., [place], [facility] (1999). Measuring the change indisability after inpatient rehabilitation; comparison of the responsiveness of the Barthel Index and Functional [place] Measure. Journal of Neurology, Neurosurgery, and Psychiatry, 66(4), 827-553. HIPOLITO ThayerA, NIKIA Loya, & Karlo Olvera M.A. (2004.) Assessment of post-stroke quality of life in cost-effectiveness studies: The usefulness of the Barthel Index and the EuroQoL-5D. [facility], 13, 756-27 G codes: In compliance with CMSs Claims Based Outcome Reporting, the following G-code set was chosen for this patient based on their primary functional limitation being treated: The outcome measure chosen to determine the severity of the functional limitation was the Barthel with a score of 60/100 which was correlated with the impairment scale. ? Mobility - Walking and Moving Around:  
  - CURRENT STATUS: CJ - 20%-39% impaired, limited or restricted  - GOAL STATUS: CI - 1%-19% impaired, limited or restricted  - D/C STATUS:  ---------------To be determined--------------- Physical Therapy Evaluation Charge Determination History Examination Presentation Decision-Making HIGH Complexity :3+ comorbidities / personal factors will impact the outcome/ POC  MEDIUM Complexity : 3 Standardized tests and measures addressing body structure, function, activity limitation and / or participation in recreation  MEDIUM Complexity : Evolving with changing characteristics  Other outcome measures Barthel  MEDIUM Based on the above components, the patient evaluation is determined to be of the following complexity level: MEDIUM 
 Pain: 
Pain Scale 1: Numeric (0 - 10) Pain Intensity 1: 8 Pain Location 1: Back Pain Intervention(s) 1: Medication (see MAR) Activity Tolerance:  
Fair. Please refer to the flowsheet for vital signs taken during this treatment. After treatment:  
[x]         Patient left in no apparent distress sitting up in chair 
[]         Patient left in no apparent distress in bed 
[x]         Call bell left within reach [x]         Nursing notified 
[]         Caregiver present 
[]         Bed alarm activated COMMUNICATION/EDUCATION:  
The patients plan of care was discussed with: Occupational Therapist, Registered Nurse and . [x]         Fall prevention education was provided and the patient/caregiver indicated understanding. [x]         Patient/family have participated as able in goal setting and plan of care. [x]         Patient/family agree to work toward stated goals and plan of care. []         Patient understands intent and goals of therapy, but is neutral about his/her participation. []         Patient is unable to participate in goal setting and plan of care. Thank you for this referral. 
Lanny Lazaro, PT Time Calculation: 25 mins

## 2018-08-31 NOTE — PROGRESS NOTES
Spiritual Care Partner Volunteer visited patient in Ortho on 8/31/2018. Documented by: 
GLADYS Westbrook

## 2018-08-31 NOTE — DISCHARGE INSTRUCTIONS
After Hospital Care Plan:  Discharge Instructions Lumbar Fusion Surgery   Dr. Ana Lilia Tony   Patient Name: Misti Reeves    Date of procedure: 8/30/2018  Date of discharge:     Procedure: Procedure(s):  REVISION LAMINECTOMY L4-5 AND REVISION FUSION L4-S1   PCP: Iglesia Jolley MD    Follow up appointments  -follow up with Dr. Dr. Ana Lilia Tony in 2 weeks. Call [phone] to make an appointment as soon as you get home from the hospital.    [address] [place], [place] [address]y: ____________________   phone: _______________________  The agency will contact you to arrange dates/times for visits. Please call them if you do not hear from them within 24 hours after you are discharged  Physical therapy 3 times a week for 3 weeks  Nursing-initial assessment and as needed    When to call your Orthopaedic Surgeon:  -Signs of infection-if your incision is red; continues to have drainage; drainage has a foul odor or if you have a persistent fever over 101 degrees for 24 hours  -Nausea or vomiting, severe headache  -Loss of bowel or bladder function, inability to urinate  -Changes in sensation in your arms or legs (numbness, tingling, loss of color)  -Increased weakness-greater than before your surgery  -Severe pain or pain not relieved by medications  -Signs of a blood clot in your leg-calf pain, tenderness, redness, swelling of lower leg    When to call your Primary Care Physician:  -Concerns about medical conditions such as diabetes, high blood pressure, asthma, congestive heart failure  -Call if blood sugars are elevated, persistent headache or dizziness, coughing or congestion, constipation or diarrhea, burning with urination, abnormal heart rate    When to call 911 and go to the nearest emergency room:  -Acute onset of chest pain, shortness of breath, difficulty breathing    Activity  -- You are going home a well person, be as active as possible. Your only exercise should be walking.   Start with short frequent walks and increase your walking distance each day.  -Limit the amount of time you sit to 20-30 minute intervals. Sitting for prolonged periods of time will be uncomfortable for you following surgery.  -Do NOT lift anything over 5 pounds  -Do NOT do any straining, twisting or bending  -When you are in bed, you may lay on your back or on either side. Do NOT lie on your stomach    Brace  -If you have a back brace, you should wear your brace at all times when you are out of bed. Do not wear the brace while in bed or showering.  -Remember to always wear a cotton t-shirt underneath your brace.  -Do not bend or twist when your brace is off    Diet  -Resume usual diet; drink plenty of fluids; eat foods high in fiber  -It is important to have regular bowel movements. Pain medications may cause constipation. You may want to take a stool softener (such as Senokot-S or Colace) to prevent constipation.   -If constipation occurs, take a laxative (such as Dulcolax tablets, Milk of Magnesia, or a suppository). Laxatives should only be used if the above preventable measures have failed and you still have not had a bowel movement after three days    Driving  -You may not drive or return to work until instructed by your physician. However, you may ride in the car for short periods of time. Incision Care  Your incision has been closed with absorbable sutures and the Zipline skin closure system. This will assist with healing. The Jasbir Riggers is to remain on your incision for 2 weeks. A dry dressing (ABD and tape) will be placed over it and should be changed daily, for at least the first several days after your surgery. If you have no incisional drainage, you may leave the incision open to air if you wish, still leaving the Zipline in place. Please make sure to wash your hands prior to touching your dressing. You may take brief showers but do not run the water directly onto the wound.  After your shower, blot your incision dry with a soft towel and replace the dry dressing. Do not allow the tape to come in contact with the Zipline. Do not rub or apply any lotions or ointments to your incision site. Do not soak or scrub your wound. The Zipline dressing will be removed during your two week follow-up appointment. If you experience drainage leaking from underneath the Zipline or if it peels off before 2 weeks, please contact your orthopedic surgeons office. Showering  -You may shower in approximately 4 days after your surgery.    -Leave the dressing on during your shower. Do NOT allow the water to run directly onto your dressing. Once you get out of the shower, gently pat the dressing dry. -Reminder- your brace can be removed while showering. Remember to not bend or twist while your brace is off.    -Do not take a tub bath. Preventing blood clots  -You have been given T.E.D. stockings to wear. Continue to wear these for 7 days after your discharge. Put them on in the morning and take them off at night.    -They are used to increase your circulation and prevent blood clots from forming in your legs  -T. E.D. stockings can be machine washed, temperature not to exceed 160° F (71°C) and machine dried for 15 to 20 minutes, temperature not to exceed 250° F (121°C). Pain management  -Take pain medication as prescribed; decrease the amount you use as your pain lessens  -DO not wait until you are in extreme pain to take your medication.  -Avoid alcoholic beverages while taking pain medication    Pain Medication Safety  DO:  -Read the Medication Guide   -Take your medicine exactly as prescribed   -Store your medicine away from children and in a safe place   -Flush unused medicine down the toilet   -Call your healthcare provider for medical advice about side effects. You may report side effects to FDA at 6-467-FDA-5862.   -Please be aware that many medications contain Tylenol.   We do not want you to over medicate so please read the information below as a guide. Do not take more than 4 Grams of Tylenol in a 24 hour period. (There are 1000 milligrams in one Gram)                                                                                                                                                                                                                                                Percocet contains 325 mg of Tylenol per tablet (do not take more than 12 tablets in 24 hours)  Lortab contains 500 mg of Tylenol per tablet (do not take more than 8 tablets in 24 hours)  Norco contains 325 mg of Tylenol per tablet (do not take more than 12 tablets in 24 hours). DO NOT:  -Do not give your medicine to others   -Do not take medicine unless it was prescribed for you   -Do not stop taking your medicine without talking to your healthcare provider   -Do not break, chew, crush, dissolve, or inject your medicine. If you cannot swallow your medicine whole, talk to your healthcare provider.  -Do not drink alcohol while taking this medicine  -Do not take anti-inflammatory medications or aspirin unless instructed by your     physician.

## 2018-08-31 NOTE — PROGRESS NOTES
Bedside and Verbal shift change report given to Harinder MASSEY (oncoming nurse) by Kavon Pierre (offgoing nurse). Report included the following information SBAR, Kardex, MAR and Recent Results.

## 2018-08-31 NOTE — PROGRESS NOTES
Occupational Therapy Goals Initiated 8/31/2018 1. Patient will perform grooming standing at sink with supervision/set-up within 7 day(s). 2.  Patient will perform upper body dressing with supervision/set-up within 7 day(s). 3.  Patient will perform lower body dressing with supervision/set-up within 7 day(s). 4.  Patient will perform toilet transfers with supervision/set-up within 7 day(s). 5.  Patient will perform all aspects of toileting with supervision/set-up within 7 day(s). Occupational Therapy EVALUATION Patient: Haleigh Zuniga (5 y.o. female) Date: 8/31/2018 Primary Diagnosis: Acquired spondylolisthesis [M43.10] Annular tear of lumbar disc [M51.36] Procedure(s) (LRB): 
REVISION LAMINECTOMY L4-5 AND REVISION FUSION L4-S1  (N/A) 1 Day Post-Op Precautions: spine, TLSO when up OOB, No sitting longer than 20 minutes ASSESSMENT : 
Based on the objective data described below, the patient presents with post op spine precautions, GW, decreased activity tolerance and decreased balance which is impairing her functional independence. She is functioning below her independent to mod I baseline, now performing ADLs at an independent to min A level and is CGA to min A for functional mobility. Patient will benefit from skilled intervention to address the above impairments and she will need HHOT and PT, with the supervision/assistance of her family PRN after discharge. Patients rehabilitation potential is considered to be Good Factors which may influence rehabilitation potential include:  
[x]             None noted []             Mental ability/status []             Medical condition []             Home/family situation and support systems []             Safety awareness []             Pain tolerance/management 
[]             Other: PLAN : 
Recommendations and Planned Interventions: 
[x]               Self Care Training                  []        Therapeutic Activities [x]               Functional Mobility Training    []        Cognitive Retraining 
[x]               Therapeutic Exercises           [x]        Endurance Activities [x]               Balance Training                   []        Neuromuscular Re-Education []               Visual/Perceptual Training     [x]   Home Safety Training 
[x]               Patient Education                 [x]        Family Training/Education []               Other (comment): Frequency/Duration: Patient will be followed by occupational therapy 5 times a week to address goals. Discharge Recommendations: Home Health OT and PT, with supervision/assistance of family PRN Further Equipment Recommendations for Discharge: TBD OBJECTIVE DATA SUMMARY:  
 
Past Medical History:  
Diagnosis Date  Arthritis  Asthma  Breast cyst 1996  
 left, removed  Chronic pain LOWER BACK  Colon polyps  Diverticulitis  Fatty liver  GERD (gastroesophageal reflux disease)  Hypercholesteremia  Nicotine vapor product user  Obesity  SUN (obstructive sleep apnea)   
 uses CPAP  
 Psychiatric disorder   
 depression  Thyroid disease   
 hypothyroid Past Surgical History:  
Procedure Laterality Date  COLONOSCOPY N/A 11/14/2017 COLONOSCOPY performed by Eliud Martin MD at [address] SERVICES SURGERY  2005, 2006 L5 & S 1  
 HX BREAST BIOPSY Left  HX HYSTERECTOMY  6/22/09 18 [facility]  HX OOPHORECTOMY Left One ovary removed.  HX ORTHOPAEDIC Left 1972  
 thumb surgery  HX ORTHOPAEDIC Left 2000  
 left knee surgery  HX ORTHOPAEDIC Left 2013  
 ankle  HX TUBAL LIGATION    
 REMOVAL OF KIDNEY STONE  RENAL STENT Prior Level of Function/Home Situation: ambulate with cane or RW PRN, but recently not using an AD for ambulation. Independent to mod I for ADLs, and some IADLs Expanded or extensive additional review of patient history:  
 
Home Situation Current DME Used/Available at Home: CPAP, Grab bars, Adaptive dressing aides, Adaptive bathing aides, [address], [place], straight, Walker, rollator, Belita Gallop, rolling, Shower chair Tub or Shower Type: Tub/Shower combination 
[x]  Right hand dominant   []  Left hand dominantCognitive/Behavioral Status: 
Neurologic State: Alert Orientation Level: Oriented X4 Cognition: Appropriate decision making; Appropriate safety awareness; Appropriate for age attention/concentration; Follows commands Safety/Judgement: Awareness of environment; Insight into deficits Vision/Perceptual:   
 Acuity: Within Defined Limits Corrective Lenses: Glasses Range of Motion: 
AROM: Generally decreased, functional 
  
Strength: 
Strength: Generally decreased, functional 
Coordination: 
Coordination: Generally decreased, functional 
    
  
Tone & Sensation: 
Tone: Normal 
Balance: 
Sitting: Intact Standing: Impaired Standing - Static: Fair Standing - Dynamic : Fair Functional Mobility and Transfers for ADLs:Bed Mobility: 
Rolling: Contact guard assistance Supine to Sit: Contact guard assistance Sit to Supine: Contact guard assistance Scooting: Supervision Transfers: 
Sit to Stand: Contact guard assistance Bed to Chair: Minimum assistance Toilet Transfer : Contact guard assistance ADL Assessment: 
Feeding: Independent Oral Facial Hygiene/Grooming: Contact guard assistance Bathing: Minimum assistance Upper Body Dressing: Supervision;Setup Lower Body Dressing: Minimum assistance Toileting: Minimum assistance Functional Measure: 
Barthel Index: 
 
Bathin Bladder: 10 Bowels: 10 
Groomin Dressin Feeding: 10 Mobility: 10 Stairs: 0 Toilet Use: 5 Transfer (Bed to Chair and Back): 10 Total: 60 Barthel and G-code impairment scale: 
Percentage of impairment CH 
0% CI 
1-19% CJ 
20-39% CK 
40-59% CL 
60-79% CM 
80-99% CN 
100% Barthel Score 0-100 100 99-80 79-60 59-40 20-39 1-19 
 0 Barthel Score 0-20 20 17-19 13-16 9-12 5-8 1-4 0 The Barthel ADL Index: Guidelines 1. The index should be used as a record of what a patient does, not as a record of what a patient could do. 2. The main aim is to establish degree of independence from any help, physical or verbal, however minor and for whatever reason. 3. The need for supervision renders the patient not independent. 4. A patient's performance should be established using the best available evidence. Asking the patient, friends/relatives and nurses are the usual sources, but direct observation and common sense are also important. However direct testing is not needed. 5. Usually the patient's performance over the preceding 24-48 hours is important, but occasionally longer periods will be relevant. 6. Middle categories imply that the patient supplies over 50 per cent of the effort. 7. Use of aids to be independent is allowed. Radha Porter., Barthel, D.W. (7342). Functional evaluation: the Barthel Index. [address] [facility] (14)2. STEFFI MarmolejoF, [place]., [facility] [place] [place], [address] [facility] (1999). Measuring the change indisability after inpatient rehabilitation; comparison of the responsiveness of the Barthel Index and Functional [place] Measure. Journal of Neurology, Neurosurgery, and Psychiatry, 66(4), 637-368. Yaw Huddleston, [place].A, Jesús López  W.J.BASSEM, & Anika Rosario, MMONA. (2004.) Assessment of post-stroke quality of life in cost-effectiveness studies: The usefulness of the Barthel Index and the EuroQoL-5D. [facility], 13, 837-68 In compliance with CMSs Claims Based Outcome Reporting, the following G-code set was chosen for this patient based on their primary functional limitation being treated:  
 
The outcome measure chosen to determine the severity of the functional limitation was the Barthel Index with a score of 60/100 which was correlated with the impairment scale. ? Self Care:  
  - CURRENT STATUS: CJ - 20%-39% impaired, limited or restricted  - GOAL STATUS:  CI - 1%-19% impaired, limited or restricted  - D/C STATUS:  ---------------To be determined---------------  
 
ADL Intervention and task modifications: 
Lower Body Dressing Assistance Underpants: Supervision/set-up; Compensatory technique training Socks: Supervision/set-up; Compensatory technique training Leg Crossed Method Used: Yes Position Performed: Seated in chair;Standing Cues: Verbal cues provided;Visual cues provided Toileting Clothing Management: Contact guard assistance Cues: Verbal cues provided Cognitive Retraining Safety/Judgement: Awareness of environment; Insight into deficits Max A to randi/herbert TLSO Pain: 
Pain Scale 1: Numeric (0 - 10) Pain Intensity 1: 3 Pain Location 1: Back Pain Orientation 1: Lower;Mid 
Pain Description 1: Aching; Sore Pain Intervention(s) 1: Medication (see MAR) Activity Tolerance:  
Fair Please refer to the flowsheet for vital signs taken during this treatment. After treatment:  
[] Patient left in no apparent distress sitting up in chair 
[x] Patient left in no apparent distress in bed 
[x] Call bell left within reach [x] Nursing notified 
[] Caregiver present 
[] Bed alarm activated COMMUNICATION/EDUCATION:  
The patients plan of care was discussed with: Physical Therapist and Registered Nurse. [x] Home safety education was provided and the patient/caregiver indicated understanding. [x] Patient/family have participated as able in goal setting and plan of care. [x] Patient/family agree to work toward stated goals and plan of care. [] Patient understands intent and goals of therapy, but is neutral about his/her participation. [] Patient is unable to participate in goal setting and plan of care. This patients plan of care is appropriate for delegation to [facility].  
 
Thank you for this referral. 
 Zeus Forbes, OTR/L Time Calculation: 36 mins

## 2018-08-31 NOTE — OP NOTES
[address]  OPERATIVE REPORT    Dru Rushing  MR#: [id number]  : 1965  ACCOUNT #: [de-identified]   DATE OF SERVICE: 2018    PREOPERATIVE DIAGNOSES:  Lumbar stenosis, junctional, L4-5, lumbar spondylolisthesis, L4-5, status post L5-S1 lumbar fusion. POSTOPERATIVE DIAGNOSES:  Lumbar stenosis, junctional, L4-5, lumbar spondylolisthesis, L4-5, status post L5-S1 lumbar fusion. PROCEDURE PERFORMED:  Revision laminectomy, L4-5, revision fusion, L4-S1. SURGEON:  Scarlet Edwards MD    ASSISTANT:  Ruiz Grullon PA-C    ANESTHESIA:  General.    ESTIMATED BLOOD LOSS:  Minimal.    SPECIMENS REMOVED:  None    COMPLICATIONS:  None. IMPLANTS:  Medtronic Solerate and Elevate    INDICATIONS:  This is a pleasant 69-year-old female approximately 10 years out from an anterior/posterior fusion at L5-S1, now with breakdown at L4-L5 with lateral recess stenosis and spondylolisthesis. The patient had failed conservative treatment and understood the risks and the benefits. DESCRIPTION OF PROCEDURE:  The patient was identified, brought to the operative suite, and underwent general anesthesia without difficulty. A Lopez catheter was placed. Preoperative neuromonitoring was placed and baselines obtained. These remained stable throughout the surgical procedure. Back was prepped and draped sterilely. Timeout was performed, at which time we then did a midline incision exposing her previous hardware and instrumentation at L5-S1, which was solid and intact. Transverse processes of L4 were identified as well as the remainder of the L4-5 facet. We then removed the remainder of the L5 lamina and the L4 lamina for a central laminectomy revision. Lateral recess stenosis was noted, particularly at L4-5 bilaterally due to facet hypertrophy as well as ligamentum hypertrophy. Ligamentum was resected as well as partial facet resections.   The superior articular process was also undercut for the foraminal decompression. After satisfactory decompression, we then cannulated the pedicles of L4 bilaterally using standard bony landmarks. We placed Medtronic Solera screws, 6.5 x 50 mm screws, at that level. We removed and replaced the instrumentation at L5-S1 and replaced them with 7.5 x 45 mm screws at L5 and 8.5 x 35 at S1. We then did bilateral annulotomies at L4-L5 and evacuated the disk completely. The trough base is up to approximately 12 mm, and then we packed 2 small Siena's in the anterior 1/3 column, followed by 2 Medtronic Elevate cages 8 x 23 mm in length. These were expanded to 12 mm with approximately 8 degrees of lordosis. Wounds were thoroughly irrigated with pulse lavage with bacitracin. We then bent the 70 mm timothy to the appropriate lordosis attaching it with pedicle screws and set screws. Final x-rays demonstrated stable fixation. Medium Hemovac placed. The patient returned to the PACU in stable condition.       MD John Harp / MARCIE  D: 08/31/2018 09:15     T: 08/31/2018 10:03  JOB #: [id number]

## 2018-09-01 VITALS
DIASTOLIC BLOOD PRESSURE: 64 MMHG | HEIGHT: 67 IN | BODY MASS INDEX: 40.62 KG/M2 | OXYGEN SATURATION: 97 % | SYSTOLIC BLOOD PRESSURE: 104 MMHG | RESPIRATION RATE: 16 BRPM | WEIGHT: 258.82 LBS | HEART RATE: 66 BPM | TEMPERATURE: 98.1 F

## 2018-09-01 LAB — HGB BLD-MCNC: 10 G/DL (ref 11.5–16)

## 2018-09-01 PROCEDURE — 97110 THERAPEUTIC EXERCISES: CPT

## 2018-09-01 PROCEDURE — 97116 GAIT TRAINING THERAPY: CPT

## 2018-09-01 PROCEDURE — 74011250637 HC RX REV CODE- 250/637: Performed by: ORTHOPAEDIC SURGERY

## 2018-09-01 PROCEDURE — 74011250637 HC RX REV CODE- 250/637: Performed by: PHYSICIAN ASSISTANT

## 2018-09-01 PROCEDURE — 85018 HEMOGLOBIN: CPT | Performed by: PHYSICIAN ASSISTANT

## 2018-09-01 PROCEDURE — 36415 COLL VENOUS BLD VENIPUNCTURE: CPT | Performed by: PHYSICIAN ASSISTANT

## 2018-09-01 RX ORDER — HYDROMORPHONE HYDROCHLORIDE 2 MG/1
2 TABLET ORAL
Qty: 50 TAB | Refills: 0 | Status: SHIPPED | OUTPATIENT
Start: 2018-09-01 | End: 2018-10-02 | Stop reason: ALTCHOICE

## 2018-09-01 RX ADMIN — HYDROMORPHONE HYDROCHLORIDE 2 MG: 2 TABLET ORAL at 10:00

## 2018-09-01 RX ADMIN — HYDROMORPHONE HYDROCHLORIDE 2 MG: 2 TABLET ORAL at 02:42

## 2018-09-01 RX ADMIN — ESCITALOPRAM OXALATE 20 MG: 10 TABLET ORAL at 10:00

## 2018-09-01 RX ADMIN — PANTOPRAZOLE SODIUM 40 MG: 40 TABLET, DELAYED RELEASE ORAL at 10:00

## 2018-09-01 RX ADMIN — HYDROMORPHONE HYDROCHLORIDE 2 MG: 2 TABLET ORAL at 06:44

## 2018-09-01 RX ADMIN — FLUTICASONE FUROATE 1 PUFF: 100 POWDER RESPIRATORY (INHALATION) at 09:57

## 2018-09-01 RX ADMIN — Medication 10 ML: at 05:52

## 2018-09-01 RX ADMIN — ACETAMINOPHEN 1000 MG: 500 TABLET ORAL at 00:41

## 2018-09-01 RX ADMIN — ACETAMINOPHEN 1000 MG: 500 TABLET ORAL at 05:52

## 2018-09-01 RX ADMIN — MONTELUKAST SODIUM 10 MG: 10 TABLET, FILM COATED ORAL at 10:00

## 2018-09-01 RX ADMIN — VITAMIN D, TAB 1000IU (100/BT) 1000 UNITS: 25 TAB at 10:00

## 2018-09-01 RX ADMIN — LEVOTHYROXINE SODIUM 88 MCG: 88 TABLET ORAL at 06:44

## 2018-09-01 NOTE — PROGRESS NOTES
CM was advised by pt she has her own RW at home. CM canceled RW order via [facility]. Care Management Interventions PCP Verified by CM: Yes Mode of Transport at Discharge: Other (see comment) Transition of Care Consult (CM Consult): Discharge Planning Discharge Durable Medical Equipment: No 
Health Maintenance Reviewed: Yes Physical Therapy Consult: Yes Occupational Therapy Consult: Yes Speech Therapy Consult: No 
Current Support Network: Own Home Confirm Follow Up Transport: Family Plan discussed with Pt/Family/Caregiver: Yes Discharge Location Discharge Placement: Home with family assistance TERI Matias, CM [facility] [phone]

## 2018-09-01 NOTE — PROGRESS NOTES
CM contacted [facility] to provide pt with RW.  
 
CM left message and return call will be provided to CM within 20 minutes. Care Management Interventions PCP Verified by CM: Yes Mode of Transport at Discharge: Other (see comment) Transition of Care Consult (CM Consult): Discharge Planning Discharge Durable Medical Equipment: Yes (RW) Health Maintenance Reviewed: Yes Physical Therapy Consult: Yes Occupational Therapy Consult: Yes Speech Therapy Consult: No 
Current Support Network: Own Home Confirm Follow Up Transport: Family Plan discussed with Pt/Family/Caregiver: Yes Discharge Location Discharge Placement: Home with family assistance TERI Mcneil, CM [facility] [phone]

## 2018-09-01 NOTE — PROGRESS NOTES
ORTHO POST OP SPINE PROGRESS NOTE 2018 Admit Date: 2018 Admit Diagnosis: Acquired spondylolisthesis [M43.10] Annular tear of lumbar disc [M51.36] Procedure: Procedure(s): REVISION LAMINECTOMY L4-5 AND REVISION FUSION L4-S1 Post Op day: 2 Days Post-Op Subjective:  
 
Ciro Silvestre is a patient who has no complaints. Review of Systems: A review of systems was negative. Objective:  
 
PT/OT:  
Distance Ambulated:          
Time Ambulated (min):       
Ambulation Response: Activity Response: Tolerated well Assistive Device:              Assistive Device: Fall prevention device, Walker (comment) Vital Signs:   
Blood pressure 112/52, pulse 70, temperature 97.9 °F (36.6 °C), resp. rate 16, height 5' 7\" (1.702 m), weight 117.4 kg (258 lb 13.1 oz), last menstrual period 2009, SpO2 97 %. Temp (24hrs), Av.9 °F (36.6 °C), Min:97.5 °F (36.4 °C), Max:98.2 °F (36.8 °C) 
 
 
  
 1901 -  0700 In: -  
Out: 9766 [Urine:2200; Drains:345] LAB:   
Recent Labs  
   18 
 0314 HGB  10.0* Wound/Drain Assessment: 
Drain:   
 
Dressing:  
 
Physical Exam: 
General:  Alert and oriented. No acute distress. Heart:  Respirations unlabored. Extremities: No evidence of cyanosis. Pulses: Moves both upper and lower extremities. Neurologic: no deficit Musculoskeletal: Calves soft, non tender. Assessment:  
  
Patient Active Problem List  
Diagnosis Code  History of hysterectomy, supracervical Z90.711  S/P left oophorectomy Z90.721  Glaucoma H40.9  Glaucoma of right eye H40.9  Asthma J45.909  Insomnia G47.00  Diverticulitis of large intestine without perforation or abscess without bleeding K57.32  
 Diverticulosis of large intestine without hemorrhage K57.30  Diverticulitis K57.92  
 Fatty liver K76.0  Obesity, morbid ([address] E66.01  
 Spinal stenosis, lumbar M48.061 Plan:  
 
Continue PT/OT/Rehab Cleared by pt/ot - d/c home today Pain control 
scds/teds Discharge To: HOME Signed By: Marian Otero DO

## 2018-09-01 NOTE — PROGRESS NOTES
Problem: Mobility Impaired (Adult and Pediatric) Goal: *Acute Goals and Plan of Care (Insert Text) Physical Therapy Goals Initiated 8/31/2018 1. Patient will move from supine to sit and sit to supine , scoot up and down and roll side to side in bed with independence within 4 days. 2. Patient will perform sit to stand with modified independence within 4 days. 3. Patient will ambulate with modified independence for 350 feet with the least restrictive device within 4 days. 4. Patient will verbalize and demonstrate understanding of spinal precautions (No bending, lifting greater than 5 lbs, or twisting; log-roll technique; frequent repositioning as instructed) within 4 days. physical Therapy TREATMENT Patient: Lester Mariee (78 y.o. female) Date: 9/1/2018 Diagnosis: Acquired spondylolisthesis [M43.10], Annular tear of lumbar disc [M51.36] Procedure(s) (LRB): 
REVISION LAMINECTOMY L4-5 AND REVISION FUSION L4-S1  (N/A) 2 Days Post-Op Precautions:  (spine, TLSO when up OOB, No sitting longer than 20 minutes ) Chart, physical therapy assessment, plan of care and goals were reviewed. ASSESSMENT: progressing very well towards goals, no LOB or SOB, very eager to go home, does well with transfers and ther-ex, pt is up ad-dank and amb in combs, vc's for safety and proper RW use. Progression toward goals: 
[x]      Improving appropriately and progressing toward goals 
[]      Improving slowly and progressing toward goals 
[]      Not making progress toward goals and plan of care will be adjusted PLAN: 
Patient continues to benefit from skilled intervention to address the above impairments. Continue treatment per established plan of care. Discharge Recommendations:  Outpatient when MD prescribes Further Equipment Recommendations for Discharge:  bedside commode and rolling walker SUBJECTIVE: The patient stated 3/3 back precautions. Reviewed all 3 with patient. OBJECTIVE DATA SUMMARY:  
 
Critical Behavior: 
Neurologic State: Alert, Appropriate for age Orientation Level: Oriented X4 Cognition: Appropriate decision making, Appropriate for age attention/concentration, Appropriate safety awareness, Follows commands Safety/Judgement: Awareness of environment, Insight into deficits Functional Mobility Training: 
Bed Mobility: pt up ad-dank in room on arrival 
Brace donned with  minimal assistance/contact guard assist  
 
Transfers: 
Sit to Stand: Independent Stand to Sit: Independent Interventions: Verbal cues Level of Assistance: Independent Balance: 
Sitting: Intact; Without support Standing: Intact; With support Standing - Static: Good;Constant support Standing - Dynamic : Good Ambulation/Gait Training: 
Distance (ft): 200 Feet (ft) Assistive Device: Gait belt;Walker, rolling Ambulation - Level of Assistance: Supervision Gait Abnormalities: Decreased step clearance Right Side Weight Bearing: Full Left Side Weight Bearing: Full Base of Support: Widened Speed/Salma: Pace decreased (<100 feet/min) Step Length: Left shortened;Right shortened Therapeutic Exercises:  
sitting EXERCISE Sets Reps Active Active Assist  
Passive Comments Ankle pumps 1 10 [x] [] [] bilat Heel raises 1 10 [x] [] [] \" Toe tap 1 10 [x] [] [] \" Knee ext 1 10 [x] [] [] \" Hip flex 1 10 [x] [] [] \"  
 
Pain: 
Pain Scale 1: Numeric (0 - 10) Pain Intensity 1: 8 Pain Location 1: Back Pain Orientation 1: Lower;Mid 
Pain Description 1: Aching Pain Intervention(s) 1: Medication (see MAR) Activity Tolerance: good After treatment:  
[x]  Patient left in no apparent distress sitting up in chair 
[]  Patient left in no apparent distress in bed 
[x]  Call bell left within reach [x]  Nursing notified 
[]  Caregiver present 
[]  Bed alarm activated COMMUNICATION/COLLABORATION:  
The patients plan of care was discussed with: Registered Nurse Jazmin Voung, PTA Time Calculation: 25 mins

## 2018-09-03 LAB
ABO + RH BLD: NORMAL
ANTIGENS PRESENT RBC DONR: NORMAL
ANTIGENS PRESENT RBC DONR: NORMAL
BLD PROD TYP BPU: NORMAL
BLD PROD TYP BPU: NORMAL
BLOOD BANK CMNT PATIENT-IMP: NORMAL
BLOOD GROUP ANTIBODIES SERPL: NORMAL
BLOOD GROUP ANTIBODIES SERPL: NORMAL
BPU ID: NORMAL
BPU ID: NORMAL
CROSSMATCH RESULT,%XM: NORMAL
CROSSMATCH RESULT,%XM: NORMAL
SPECIMEN EXP DATE BLD: NORMAL
STATUS OF UNIT,%ST: NORMAL
STATUS OF UNIT,%ST: NORMAL
UNIT DIVISION, %UDIV: 0
UNIT DIVISION, %UDIV: 0

## 2018-09-04 ENCOUNTER — PATIENT OUTREACH (OUTPATIENT)
Dept: INTERNAL MEDICINE CLINIC | Age: 53
End: 2018-09-04

## 2018-09-04 NOTE — PROGRESS NOTES
Hospital Discharge Follow-Up Date/Time:  2018 2:31 PM 
 
Patient was admitted to [facility] on 18 and discharged on 18 for revision laminectomy L4-5 and revision fusion L4-S1. The physician discharge summary was not available at the time of outreach. Patient was contacted within 1 business days of discharge. Top Challenges reviewed with the provider  
- reports outreach to Dr. Berta Pizarro office today to notify of unrelieved pain; reports dilaudid dose increased to 4 mg every four hours as needed) and new prescription for Flexeril 10 mg take 1 tab by mouth three times daily as needed 
- patient to contact Dr. Berta Pizarro office to schedule post-op f/u appointment Method of communication with provider :chart routing Inpatient RRAT score: 17 Was this a readmission? no  
Patient stated reason for the readmission: N/A Nurse Navigator (NN) contacted the patient by telephone to perform post hospital discharge assessment for routine XIOMY management. Verified name and  with patient as identifiers. Provided introduction to self, and explanation of the Nurse Navigator role. Reviewed discharge instructions and red flags with patient who verbalized understanding. Patient given an opportunity to ask questions and does not have any further questions or concerns at this time. The patient agrees to contact the PCP office for questions related to their healthcare. NN provided contact information for future reference. Disease Specific:   N/A Home Health orders at discharge: [address]: N/A Date of initial visit: N/A Durable Medical Equipment ordered/company: No; already has [employer] Durable Medical Equipment received: N/A Recommended OP Follow-Up: 
-follow up with Dr. Tawana Linares in 2 weeks. . 
- Dr. Marshal Way Barriers to care? none reported and/or identified at this time.  Lives with a roommate in one-level house. Denies transportation barrier; roommate will be assisting with patient transportation. Denies financial barrier(s) to obtaining her medications. Daughter and roommate are assisting patient with completion of ADLs post-op; reports independent with ADLs at baseline. Advance Care Planning:  
Does patient have an Advance Directive:  not on file; education provided. Patient declines referral to certified facilitator and/or interest in completing an [address] Directive at this time. Medication(s):  
New Medications at Discharge: dilaudid Changed Medications at Discharge: none Discontinued Medications at Discharge: diflucan, ibuprofen, tramadol Patient declines to complete medication reconciliation during this encounter due to currently laying in bed resting. patient reports no barriers to obtaining medications identified at this time. Referral to Pharm D needed: no  
 
 
 
[facility] follow up appointment(s): No future appointments. Non-[facility] follow up appointment(s): Dr. Scooter Alejandra - to be scheduled by patient [facility]:  information provided as a resource Goals Post Hospitalization  Prevent complications post hospitalization. 9/4/2018  
- reports dilaudid dose increased to 4 mg every four hours and new order for Flexeril 10 mg three times daily by Dr. Scooter Alejandra today due to patient c/o unrelieved pain post-discharge 
- denies constipation; last bowel movement 9/3/18. Denies difficulty urinating. 
- reviewed activity restrictions 
- reports adherence with brace and GIULIANA stockings as ordered by Dr. Scooter Alejandra - patient will contact Dr. Deacon Bloom office to schedule two week post-op f/u appointment - patient will contact PCP office to schedule XIOMY/HFU appointment after she schedules her appointment with Orthopedic Surgery - patient will monitor for and report signs/symptoms of infection to Dr. Deacon Bloom office

## 2018-09-10 NOTE — DISCHARGE SUMMARY
Procedure(s):  REVISION LAMINECTOMY L4-5 AND REVISION FUSION L4-S1  Operative Report      Date of Surgery: 8/30/2018     Preoperative Diagnosis:  Acquired spondylolisthesis [M43.10]  Annular tear of lumbar disc [M51.36]    Postoperative Diagnosis: Acquired spondylolisthesis [M43.10]    Procedure: Procedure(s):  REVISION LAMINECTOMY L4-5 AND REVISION FUSION L4-S1      Surgeon: Shahnaz Aguirre MD    History and Hospital Course:  Jenene Leyden is a pleasant 46y.o. year old female who has complaints of low back pain. Diagnostic testing found a spondylolisthesis and stenosis at L4-5 abover her prior anterior posterior fusion. Having failed conservative treatment, the patient elected to undergo operative intervention. She tolerated the procedure well and was admitted post-operatively for antibiotics and pain control. On post-op day 1, the patient was doing well. She had no complaints. She was afebrile with stable vital signs. She was started on a regular diet. The PCA was discontinued and the patient was started on oral pain medications. She was allowed to started getting out of bed with physical therapy. IV antibiotics were continued. On post-op day 2, the patient continued to progress well. The patient was deemed ready for discharge. She was discharged to home with home health. She was tolerating a regular diet and pain was well controlled with oral pain medications. The patient was discharged with prescriptions for pain medications. She was instructed to wear her brace at all times when out of bed. She was instructed to limit her bending, lifting and twisting. The patient will follow-up in 10-14 days for repeat x-rays and a wound check.       Signed By: Shahnaz Aguirre MD

## 2018-09-12 ENCOUNTER — PATIENT OUTREACH (OUTPATIENT)
Dept: INTERNAL MEDICINE CLINIC | Age: 53
End: 2018-09-12

## 2018-09-12 RX ORDER — METHOCARBAMOL 750 MG/1
750 TABLET, FILM COATED ORAL 3 TIMES DAILY
COMMUNITY
End: 2018-11-30 | Stop reason: SDUPTHER

## 2018-09-12 NOTE — PROGRESS NOTES
NN outreach to patient in order to perform routine XIOMY follow-up for Ortho diagnosis; two patient identifiers verified. Goals Addressed             Most Recent       Post Hospitalization     Prevent complications post hospitalization. On track (9/12/2018)             9/4/2018   - reports dilaudid dose increased to 4 mg every four hours and new order for Flexeril 10 mg three times daily by Dr. Amie Wislon today due to patient c/o unrelieved pain post-discharge  - denies constipation; last bowel movement 9/3/18. Denies difficulty urinating.  - reviewed activity restrictions  - reports adherence with brace and GIULIANA stockings as ordered by Dr. Amie Wilson  - patient will contact Dr. Jovita Reyes office to schedule two week post-op f/u appointment  - patient will contact PCP office to schedule XIOMY/HFU appointment after she schedules her appointment with Orthopedic Surgery  - patient will monitor for and report signs/symptoms of infection to Dr. Jovita Reyes office    9/12/2018   - continues to take Dilaudid 4 mg every 4 hours and robaxin 750 mg tablet three times daily as needed; reports Flexeril changed to Robaxin by Dr. Amie Wilson on 9/5/18 due to no relief provided by Flexeril. Patient reports improvement in pain with medication change; states, \"These pills help a lot. \" Reports outreach to Dr. Jovita Reyes office today to request medication refill of Dilaudid and Robaxin. - denies drainage leaking from underneath the [address]; reports that her Daughter assesses dressing daily. Advised patient that, per hospital discharge instructions, [address] is to remain in place and will be removed by Orthopedic Surgeon at two week f/u office visit  - discussed emergency preparedness for inclement weather ([facility]); daughter is currently staying with her post-discharge and will continue to stay with patient during [facility].  Patient reportedly has bottled water, canned goods, non-perishable food items, flashlights in the event of a power outage.  - has post-op f/u appointment with Dr. Emiliano Allison on 9/17/18  - has hospital follow-up appointment with Dr. Derick Wtit on 9/20/18              Future Appointments:  Future Appointments  Date Time Provider Rozina Arizmendi   9/20/2018 2:30 Natalia Rowland MD [address]        Last Appointment My Department:  8/2/2018

## 2018-09-18 ENCOUNTER — PATIENT OUTREACH (OUTPATIENT)
Dept: INTERNAL MEDICINE CLINIC | Age: 53
End: 2018-09-18

## 2018-09-18 ENCOUNTER — TELEPHONE (OUTPATIENT)
Dept: INTERNAL MEDICINE CLINIC | Age: 53
End: 2018-09-18

## 2018-09-18 RX ORDER — GABAPENTIN 300 MG/1
300 CAPSULE ORAL DAILY
COMMUNITY
End: 2018-12-13 | Stop reason: CLARIF

## 2018-09-18 NOTE — TELEPHONE ENCOUNTER
Pt returning missed call. Stated leave message.  Best contact [phone]       Message received & copied from [facility]

## 2018-09-18 NOTE — PROGRESS NOTES
NN attempted outreach to patient in order to perform routine XIOMY follow-up for Ortho diagnosis; two lvm requesting a return phone call to this NN.

## 2018-09-18 NOTE — TELEPHONE ENCOUNTER
Pt is returning the nurse's call and would like a call back.  Pt's callback: [phone]       Message received & copied from [facility]

## 2018-09-18 NOTE — PROGRESS NOTES
NN outreach to patient today in order to perform routine XIOMY follow-up for Ortho diagnosis; two patient identifiers verified. Goals Addressed             Most Recent       Post Hospitalization     Prevent complications post hospitalization. On track (9/18/2018)             9/4/2018   - reports dilaudid dose increased to 4 mg every four hours and new order for Flexeril 10 mg three times daily by Dr. Roxanne Jackson today due to patient c/o unrelieved pain post-discharge  - denies constipation; last bowel movement 9/3/18. Denies difficulty urinating.  - reviewed activity restrictions  - reports adherence with brace and GIULIANA stockings as ordered by Dr. Roxanne Jackson  - patient will contact Dr. Garza Nurse office to schedule two week post-op f/u appointment  - patient will contact PCP office to schedule XIOMY/HFU appointment after she schedules her appointment with Orthopedic Surgery  - patient will monitor for and report signs/symptoms of infection to Dr. Garza Nurse office    9/12/2018   - continues to take Dilaudid 4 mg every 4 hours and robaxin 750 mg tablet three times daily as needed; reports Flexeril changed to Robaxin by Dr. Roxanne Jackson on 9/5/18 due to no relief provided by Flexeril. Patient reports improvement in pain with medication change; states, \"These pills help a lot. \" Reports outreach to Dr. Garza Nurse office today to request medication refill of Dilaudid and Robaxin. - denies drainage leaking from underneath the [address]; reports that her Daughter assesses dressing daily. Advised patient that, per hospital discharge instructions, [address] is to remain in place and will be removed by Orthopedic Surgeon at two week f/u office visit  - discussed emergency preparedness for inclement weather ([facility]); daughter is currently staying with her post-discharge and will continue to stay with patient during [place].  Patient reportedly has bottled water, canned goods, non-perishable food items, flashlights in the event of a power outage.  - has post-op f/u appointment with Dr. Delisa Alejandro on 9/17/18  - has hospital follow-up appointment with Dr. Michelle Parks on 9/20/18 9/18/2018  - attended post-op f/u with Ortho NP on 9/17/18; reports [employer] dressing removed by Ortho provider yesterday and incision is now open to air. Next scheduled office visit is 10/17/18 with Dr. Delisa Alejandro. - currently taking Gabapentin 300 mg daily for 7 days then 300 mg twice daily for 7 days then 300 mg three times daily. Continues to take Dilaudid 4 mg 1-2 tablets every 6 hours as needed and robaxin 750 mg tablet three times daily as needed. Currently rates pain a 6/10 (numerical pain scale) in her lower back and pain is described as \"sharp, but it's went down a lot though. \"  - denies constipation; last bowel movement 9/16/18. Not currently taking stool softener. Encouraged use of OTC stool softener while taking Dilaudid to prevent constipation; she will have her  pick this up for her today. Reviewed with Dr. Michelle Parks today via face to face communication. - no longer wearing T.E.D. Stockings; reports that she did wear T. E.D stockings for 7 days post-discharge per Dr. Dylon Corral order   - will continue to monitor incision site for signs/symptoms of infection and contact Dr. Dylon Corral office for questions/concerns  - will attend hospital follow-up with Dr. Michelle Parks 9/20/18              Future Appointments:  Future Appointments  Date Time Provider Rozina Arizmendi   9/20/2018 2:30 Ray Fountain MD [address]            Last Appointment My Department:  8/2/2018

## 2018-09-20 ENCOUNTER — OFFICE VISIT (OUTPATIENT)
Dept: INTERNAL MEDICINE CLINIC | Age: 53
End: 2018-09-20

## 2018-09-20 VITALS
WEIGHT: 269 LBS | BODY MASS INDEX: 42.22 KG/M2 | OXYGEN SATURATION: 96 % | DIASTOLIC BLOOD PRESSURE: 72 MMHG | HEIGHT: 67 IN | SYSTOLIC BLOOD PRESSURE: 102 MMHG | RESPIRATION RATE: 14 BRPM | TEMPERATURE: 98.1 F | HEART RATE: 75 BPM

## 2018-09-20 DIAGNOSIS — J45.30 MILD PERSISTENT ASTHMA WITHOUT COMPLICATION: ICD-10-CM

## 2018-09-20 DIAGNOSIS — Z09 HOSPITAL DISCHARGE FOLLOW-UP: Primary | ICD-10-CM

## 2018-09-20 DIAGNOSIS — Z23 ENCOUNTER FOR IMMUNIZATION: ICD-10-CM

## 2018-09-20 NOTE — PROGRESS NOTES
Reviewed record in preparation for visit and have obtained necessary documentation. Identified pt with two pt identifiers(name and ). Chief Complaint   Patient presents with    Follow-up     back surgery on        Health Maintenance Due   Topic Date Due    Flu Vaccine  2018       Ms. Juana Gillespie has a reminder for a \"due or due soon\" health maintenance. I have asked that she discuss this further with her primary care provider for follow-up on this health maintenance. Coordination of Care Questionnaire:  :     1) Have you been to an emergency room, urgent care clinic since your last visit? no   Hospitalized since your last visit? no             2) Have you seen or consulted any other health care providers outside of [address] [place] [address] since your last visit? no  (Include any pap smears or colon screenings in this section.)    3) In the event something were to happen to you and you were unable to speak on your behalf, do you have an Advance Directive/ Living Will in place stating your wishes? NO    Do you have an Advance Directive on file? no    4) Are you interested in receiving information on Advance Directives? YES    Patient is accompanied by self I have received verbal consent from Rome Ruano to discuss any/all medical information while they are present in the room.

## 2018-09-20 NOTE — PROGRESS NOTES
CC: Follow-up (back surgery on 8/30)      HPI:    She is a 46 y.o. female who presents for evaluation of hospital follow up     Patient had back surgery        Date of Surgery: 8/30/2018      Preoperative Diagnosis:  Acquired spondylolisthesis [M43.10]  Annular tear of lumbar disc [M51.36]     Postoperative Diagnosis: Acquired spondylolisthesis [M43.10]     Procedure: Procedure(s):  REVISION LAMINECTOMY L4-5 AND REVISION FUSION L4-S1       Surgeon: Marquise Johnson MD     Summary of hospital course     Diagnostic testing found a spondylolisthesis and stenosis at L4-5 abover her prior anterior posterior fusion. Having failed conservative treatment, the patient elected to undergo operative intervention - revision laminectomy which was successful. Patient feels her pain is better since her surgery  She is currently on dilaudid and slowly decreasing her dose. She denies constipation         Having issues with Qvar not covered by insurance       ROS:  10 systems reviewed and negative other than HPI     Past Medical History:   Diagnosis Date    Arthritis     Asthma     Breast cyst 1996    left, removed    Chronic pain     LOWER BACK    Colon polyps     Diverticulitis     Fatty liver     GERD (gastroesophageal reflux disease)     Hypercholesteremia     Nicotine vapor product user     Obesity     SUN (obstructive sleep apnea)     uses CPAP    Psychiatric disorder     depression    Thyroid disease     hypothyroid       Current Outpatient Prescriptions on File Prior to Visit   Medication Sig Dispense Refill    gabapentin (NEURONTIN) 300 mg capsule Take 300 mg by mouth daily.  methocarbamol (ROBAXIN) 750 mg tablet Take 750 mg by mouth three (3) times daily.  HYDROmorphone (DILAUDID) 2 mg tablet Take 1 Tab by mouth every four (4) hours as needed. Max Daily Amount: 12 mg.  (Patient taking differently: Take 2-4 mg by mouth every four (4) hours as needed.) 50 Tab 0    cromolyn (OPTICROM) 4 % ophthalmic solution Administer 1 Drop to both eyes as needed. Use in affected eye(s)      albuterol (PROVENTIL HFA, VENTOLIN HFA, PROAIR HFA) 90 mcg/actuation inhaler Take 1 Puff by inhalation every six (6) hours as needed for Wheezing. 1 Inhaler 2    escitalopram oxalate (LEXAPRO) 20 mg tablet TAKE 1 TABLET BY MOUTH EVERY DAY 90 Tab 1    pantoprazole (PROTONIX) 40 mg tablet Take 1 Tab by mouth daily. Indications: gastroesophageal reflux disease 90 Tab 1    lovastatin (MEVACOR) 20 mg tablet TAKE 1 TAB BY MOUTH NIGHTLY. 30 Tab 5    levothyroxine (SYNTHROID) 88 mcg tablet Take 1 Tab by mouth Daily (before breakfast). 90 Tab 5    cholecalciferol (VITAMIN D3) 1,000 unit tablet Take 1,000 Units by mouth daily.  montelukast (SINGULAIR) 10 mg tablet Take 1 Tab by mouth daily. 30 Tab 4     No current facility-administered medications on file prior to visit. Past Surgical History:   Procedure Laterality Date    COLONOSCOPY N/A 11/14/2017    COLONOSCOPY performed by Dee Dee Carmen MD at [facility] ENDOSCOPY   [facility] SERVICES SURGERY  2005, 2006    L5 & S 1    HX BACK SURGERY  08/30/2018    HX BREAST BIOPSY      Left    HX HYSTERECTOMY  6/22/09    [facility]    HX OOPHORECTOMY Left     One ovary removed.     HX ORTHOPAEDIC Left 1972    thumb surgery    HX ORTHOPAEDIC Left 2000    left knee surgery    HX ORTHOPAEDIC Left 2013    ankle    HX TUBAL LIGATION      REMOVAL OF KIDNEY STONE      RENAL STENT         Family History   Problem Relation Age of Onset    Cancer Mother 54     Lung cancer    Heart Disease Father     Hypertension Father     Elevated Lipids Father     Heart Attack Father     Stroke Father      x 3    Elevated Lipids Sister     Heart Disease Brother     Hypertension Brother     Elevated Lipids Brother     Elevated Lipids Sister     Elevated Lipids Sister     Heart Disease Sister     Cancer Sister      uterine    Heart Disease Sister     Cancer Sister      uterine    Heart Disease Sister    [facility] Cancer Sister      colon cancer with liver mets    Bleeding Prob Brother     Heart Attack Brother     Anesth Problems Neg Hx      Reviewed and no changes     Social History     Social History    Marital status:      Spouse name: N/A    Number of children: N/A    Years of education: N/A     Occupational History    Not on file.      Social History Main Topics    Smoking status: Former Smoker     Packs/day: 1.50     Years: 25.00     Quit date: 02/2015    Smokeless tobacco: Never Used    Alcohol use No    Drug use: No    Sexual activity: Yes     Partners: Male     Birth control/ protection: Surgical     Other Topics Concern    Not on file     Social History Narrative            Visit Vitals    /72 (BP 1 Location: Right arm, BP Patient Position: Sitting)    Pulse 75    Temp 98.1 °F (36.7 °C) (Oral)    Resp 14    Ht 5' 7\" (1.702 m)    Wt 269 lb (122 kg)    LMP 06/22/2009    SpO2 96%    BMI 42.13 kg/m2       Physical Examination:   General - Well appearing female   Wearing a back brace  HEENT - PERRL, TM no erythema/opacification, normal nasal turbinates, oropharynx no erythema or exudate, MMM  Neck - supple, no bruits, no TMG, no LAD  Pulm - clear to auscultation bilaterally  Cardio - RRR, normal S1 S2, no murmur gallops or rubs  Abd - soft, nontender, no masses, no HSM  Extrem - no edema, +2 distal pulses  Psych - normal affect, appropriate mood    Lab Results   Component Value Date/Time    WBC 4.2 08/02/2018 08:19 AM    Hemoglobin (POC) 12.9 10/28/2013 03:40 PM    HGB 10.0 (L) 09/01/2018 03:14 AM    Hematocrit (POC) 38 10/28/2013 03:40 PM    HCT 39.9 08/02/2018 08:19 AM    PLATELET 104 38/33/5648 08:19 AM    MCV 96 08/02/2018 08:19 AM     Lab Results   Component Value Date/Time    Sodium 139 08/31/2018 03:29 AM    Potassium 4.2 08/31/2018 03:29 AM    Chloride 106 08/31/2018 03:29 AM    CO2 23 08/31/2018 03:29 AM    Anion gap 10 08/31/2018 03:29 AM    Glucose 148 (H) 08/31/2018 03:29 AM BUN 11 08/31/2018 03:29 AM    Creatinine 0.91 08/31/2018 03:29 AM    BUN/Creatinine ratio 12 08/31/2018 03:29 AM    GFR est AA >60 08/31/2018 03:29 AM    GFR est non-AA >60 08/31/2018 03:29 AM    Calcium 8.4 (L) 08/31/2018 03:29 AM     Lab Results   Component Value Date/Time    Cholesterol, total 215 (H) 08/02/2018 08:19 AM    HDL Cholesterol 85 08/02/2018 08:19 AM    LDL, calculated 117 (H) 08/02/2018 08:19 AM    VLDL, calculated 13 08/02/2018 08:19 AM    Triglyceride 67 08/02/2018 08:19 AM     Lab Results   Component Value Date/Time    TSH 3.010 08/02/2018 08:19 AM     No results found for: PSA, PSA2, Ezella League, [id number], [id number], PSALT  Lab Results   Component Value Date/Time    Hemoglobin A1c 5.4 08/02/2018 08:19 AM     Lab Results   Component Value Date/Time    VITAMIN D, 25-HYDROXY 28.5 (L) 05/09/2017 08:54 AM       Lab Results   Component Value Date/Time    ALT (SGPT) 9 08/02/2018 08:19 AM    AST (SGOT) 15 08/02/2018 08:19 AM    Alk. phosphatase 73 08/02/2018 08:19 AM    Bilirubin, total 0.3 08/02/2018 08:19 AM           Assessment/Plan:    1. Encounter for immunization  - Influenza virus vaccine (QUADRIVALENT PRES FREE SYRINGE) IM (22107)  - AL IMMUNIZ ADMIN,1 SINGLE/COMB VAC/TOXOID    2. Mild persistent asthma without complication  - Qvar not covered lets try changing to arnuity  - fluticasone furoate (ARNUITY ELLIPTA) 100 mcg/actuation dsdv inhaler; Take 1 Puff by inhalation daily. Dispense: 2 Inhaler; Refill: 4    3. Hospital discharge follow-up  - hospital course reviewed patient was admitted for a revision laminectomy and tolerated it well     Hypothyroidism: has been stable     Follow-up Disposition:  Return in about 6 months (around 3/20/2019) for Hypothyroidism .     Sly Alexis MD

## 2018-09-20 NOTE — MR AVS SNAPSHOT
[address] [place] [facility]. 
[phone] Patient: Dustin Martin MRN: [id number] :1965 Visit Information Date & Time Provider Department Dept. Phone Encounter #  
 2018  2:30 PM [place], [address] [place] [address],[address] [phone] [id number] Follow-up Instructions Return in about 6 months (around 3/20/2019) for Hypothyroidism . Upcoming Health Maintenance Date Due Influenza Age 5 to Adult 2018 MEDICARE YEARLY EXAM 8/3/2019 BREAST CANCER SCRN MAMMOGRAM 2020 COLONOSCOPY 2020 PAP AKA CERVICAL CYTOLOGY 2021 DTaP/Tdap/Td series (2 - Td) 10/5/2022 Allergies as of 2018  Review Complete On: 2018 By: Chio Maynard LPN Severity Noted Reaction Type Reactions Codeine  10/13/2010    Hives Pcn [Penicillins]  10/13/2010    Hives Current Immunizations  Reviewed on 2017 Name Date Influenza Vaccine 10/17/2013 Influenza Vaccine (Quad) PF  Incomplete, 10/17/2017 11:30 AM, 2017 Pneumococcal Vaccine (Unspecified Type) 10/17/2015, 10/17/2013 Not reviewed this visit You Were Diagnosed With   
  
 Codes Comments Hospital discharge follow-up    -  Primary ICD-10-CM: [facility] ICD-9-CM: V67.59 Encounter for immunization     ICD-10-CM: I90 ICD-9-CM: V03.89 Mild persistent asthma without complication     PHAN-48-JY: J45.30 ICD-9-CM: 493.90 Vitals BP Pulse Temp Resp Height(growth percentile) Weight(growth percentile) 102/72 (BP 1 Location: Right arm, BP Patient Position: Sitting) 75 98.1 °F (36.7 °C) (Oral) 14 5' 7\" (1.702 m) 269 lb (122 kg) LMP SpO2 BMI OB Status Smoking Status 2009 96% 42.13 kg/m2 Hysterectomy Former Smoker BMI and BSA Data Body Mass Index Body Surface Area  
 42.13 kg/m 2 2.4 m 2 Preferred Pharmacy Pharmacy Name Phone [id number] [place] [address] [phone] Your Updated Medication List  
  
   
This list is accurate as of 9/20/18  2:53 PM.  Always use your most recent med list.  
  
  
  
  
 albuterol 90 mcg/actuation inhaler Commonly known as:  PROVENTIL HFA, VENTOLIN HFA, PROAIR HFA Take 1 Puff by inhalation every six (6) hours as needed for Wheezing. cromolyn 4 % ophthalmic solution Commonly known as:  OPTICROM Administer 1 Drop to both eyes as needed. Use in affected eye(s)  
  
 escitalopram oxalate 20 mg tablet Commonly known as:  Colan Big TAKE 1 TABLET BY MOUTH EVERY DAY  
  
 fluticasone furoate 100 mcg/actuation Dsdv inhaler Commonly known as:  ARNUITY ELLIPTA Take 1 Puff by inhalation daily. gabapentin 300 mg capsule Commonly known as:  NEURONTIN Take 300 mg by mouth daily. HYDROmorphone 2 mg tablet Commonly known as:  DILAUDID Take 1 Tab by mouth every four (4) hours as needed. Max Daily Amount: 12 mg.  
  
 levothyroxine 88 mcg tablet Commonly known as:  SYNTHROID Take 1 Tab by mouth Daily (before breakfast). lovastatin 20 mg tablet Commonly known as:  MEVACOR  
TAKE 1 TAB BY MOUTH NIGHTLY. methocarbamol 750 mg tablet Commonly known as:  ROBAXIN Take 750 mg by mouth three (3) times daily. montelukast 10 mg tablet Commonly known as:  SINGULAIR Take 1 Tab by mouth daily. pantoprazole 40 mg tablet Commonly known as:  PROTONIX Take 1 Tab by mouth daily. Indications: gastroesophageal reflux disease VITAMIN D3 1,000 unit tablet Generic drug:  cholecalciferol Take 1,000 Units by mouth daily. Prescriptions Sent to Pharmacy Refills  
 fluticasone furoate (ARNUITY ELLIPTA) 100 mcg/actuation dsdv inhaler 4 Sig: Take 1 Puff by inhalation daily.   
 Class: Normal  
 Pharmacy: [facility]/pharmacy #[id number]- [place]ree [address] [employer]  #: [phone] Route: Inhalation We Performed the Following INFLUENZA VIRUS VAC QUAD,SPLIT,PRESV FREE SYRINGE IM [id number] CPT(R)] [place] IMMUNIZ ADMIN,1 SINGLE/COMB VAC/TOXOID [id number] CPT(R)] Follow-up Instructions Return in about 6 months (around 3/20/2019) for Hypothyroidism . Patient Instructions Changed inhaler from qvar to arnuity, hopefull it will be covered by insurance Introducing [facility] [place] [facility]! Dear Stevie Francisco: Thank you for requesting a [employer] account. Our records indicate that you already have an active [employer] account. You can access your account anytime at [email or web address] Did you know that you can access your hospital and ER discharge instructions at any time in [employer]? You can also review all of your test results from your hospital stay or ER visit. Additional Information If you have questions, please visit the Frequently Asked Questions section of the [employer] website at [email or web address] Remember, [employer] is NOT to be used for urgent needs. For medical emergencies, dial 911. Now available from your iPhone and Android! Please provide this summary of care documentation to your next provider. Your primary care clinician is listed as GUNNER Chang. If you have any questions after today's visit, please call [phone].

## 2018-10-02 ENCOUNTER — PATIENT OUTREACH (OUTPATIENT)
Dept: INTERNAL MEDICINE CLINIC | Age: 53
End: 2018-10-02

## 2018-10-02 RX ORDER — OXYCODONE HYDROCHLORIDE 10 MG/1
10 TABLET ORAL
COMMUNITY
End: 2018-10-25 | Stop reason: ALTCHOICE

## 2018-10-02 NOTE — PROGRESS NOTES
NN outreach to patient today in order to perform routine XIOMY follow-up for Ortho diagnosis; two patient identifiers verified. Goals Addressed Most Recent Post Hospitalization  Prevent complications post hospitalization. On track (10/2/2018)  
       
  9/4/2018  
- reports dilaudid dose increased to 4 mg every four hours and new order for Flexeril 10 mg three times daily by Dr. Jessica Frankel today due to patient c/o unrelieved pain post-discharge 
- denies constipation; last bowel movement 9/3/18. Denies difficulty urinating. 
- reviewed activity restrictions 
- reports adherence with brace and GIULIANA stockings as ordered by Dr. Jessica Frankel - patient will contact Dr. Janay Carreon office to schedule two week post-op f/u appointment - patient will contact PCP office to schedule XIOMY/HFU appointment after she schedules her appointment with Orthopedic Surgery - patient will monitor for and report signs/symptoms of infection to Dr. Janay Carreon office 9/12/2018  
- continues to take Dilaudid 4 mg every 4 hours and robaxin 750 mg tablet three times daily as needed; reports Flexeril changed to Robaxin by Dr. Jessica Frankel on 9/5/18 due to no relief provided by Flexeril. Patient reports improvement in pain with medication change; states, \"These pills help a lot. \" Reports outreach to Dr. Janay Carreon office today to request medication refill of Dilaudid and Robaxin. - denies drainage leaking from underneath the [address] [place] [address]; reports that her Daughter assesses dressing daily. Advised patient that, per hospital discharge instructions, [address] [place] [address] is to remain in place and will be removed by Orthopedic Surgeon at two week f/u office visit 
- discussed emergency preparedness for inclement weather ([facility]); daughter is currently staying with her post-discharge and will continue to stay with patient during [facility].  Patient reportedly has bottled water, canned goods, non-perishable food items, flashlights in the event of a power outage. 
- has post-op f/u appointment with Dr. Lucia Ratliff on 9/17/18 
- has hospital follow-up appointment with Dr. Ophelia Lopez on 9/20/18 9/18/2018 
- attended post-op f/u with Ortho NP on 9/17/18; reports Zipline dressing removed by Ortho provider yesterday and incision is now open to air. Next scheduled office visit is 10/17/18 with Dr. Lucia Ratliff. - currently taking Gabapentin 300 mg daily for 7 days then 300 mg twice daily for 7 days then 300 mg three times daily. Continues to take Dilaudid 4 mg 1-2 tablets every 6 hours as needed and robaxin 750 mg tablet three times daily as needed. Currently rates pain a 6/10 (numerical pain scale) in her lower back and pain is described as \"sharp, but it's went down a lot though. \" 
- denies constipation; last bowel movement 9/16/18. Not currently taking stool softener. Encouraged use of OTC stool softener while taking Dilaudid to prevent constipation; she will have her  pick this up for her today. Reviewed with Dr. Ophelia Lopez today via face to face communication. - no longer wearing T.E.D. Stockings; reports that she did wear T. E.D stockings for 7 days post-discharge per Dr. Adeola Isaac order  
- will continue to monitor incision site for signs/symptoms of infection and contact Dr. Adeola Isaac office for questions/concerns 
- will attend hospital follow-up with Dr. Ophelia Lopez 9/20/18 
 
10/2/2018 
- attended [facility] appointment with Dr. Ophelia Lopez on 9/20/18; Qvar changed to Arnuity due to coverage/cost, advised to f/u with PCP in 6 months 
- states, \"I'm doing good, the recovery is going real fast.\" 
- patient contacted Ortho yesterday due to pain management concerns, no pain relief with taking Dilaudid; Dilaudid discontinued by Ortho, new order for Oxycodone 10 mg tablet. Med rec updated.  Currently rates pain a 6-7/10 (numerical pain scale); most recent dose of Oxycodone 0830 this morning. Continues to take gabapentin and robaxin; continues to express concerns regarding pain management and unrelieved pain. Patient will contact Dr. Gemini Guerra office as needed for pain management concerns. - incision is open to air; denies redness, swelling, drainage at site - post-op office visit scheduled with Dr. Fouzia Yadav 10/17/18. Patient will contact Dr. Gemini Guerra office with questions/concerns. Future Appointments: 
Future Appointments Date Time Provider Rozina Arizmendi 3/21/2019 3:00 PM MD Radha [address] Last Appointment My Department: 
9/20/2018

## 2018-10-12 ENCOUNTER — PATIENT OUTREACH (OUTPATIENT)
Dept: INTERNAL MEDICINE CLINIC | Age: 53
End: 2018-10-12

## 2018-10-12 ENCOUNTER — TELEPHONE (OUTPATIENT)
Dept: INTERNAL MEDICINE CLINIC | Age: 53
End: 2018-10-12

## 2018-10-12 NOTE — PROGRESS NOTES
NN outreach to patient today in order to perform routine XIOMY follow-up for Ortho diagnosis; lvm requesting a return phone call to this NN. Patient has graduated from the Transitions of Care Coordination  program on 10/12/2018. Goals Addressed             Most Recent       Post Hospitalization     COMPLETED: Prevent complications post hospitalization. On track (10/12/2018)             9/4/2018   - reports dilaudid dose increased to 4 mg every four hours and new order for Flexeril 10 mg three times daily by Dr. Ab Hoffman today due to patient c/o unrelieved pain post-discharge  - denies constipation; last bowel movement 9/3/18. Denies difficulty urinating.  - reviewed activity restrictions  - reports adherence with brace and GIULIANA stockings as ordered by Dr. Ab Hoffman  - patient will contact Dr. Enma Lozano office to schedule two week post-op f/u appointment  - patient will contact PCP office to schedule XIOMY/HFU appointment after she schedules her appointment with Orthopedic Surgery  - patient will monitor for and report signs/symptoms of infection to Dr. Enma Lozano office    9/12/2018   - continues to take Dilaudid 4 mg every 4 hours and robaxin 750 mg tablet three times daily as needed; reports Flexeril changed to Robaxin by Dr. Ab Hoffman on 9/5/18 due to no relief provided by Flexeril. Patient reports improvement in pain with medication change; states, \"These pills help a lot. \" Reports outreach to Dr. Enma Lozano office today to request medication refill of Dilaudid and Robaxin. - denies drainage leaking from underneath the [address] [place] [address]; reports that her Daughter assesses dressing daily.  Advised patient that, per hospital discharge instructions, [address] [place] [address] is to remain in place and will be removed by Orthopedic Surgeon at two week f/u office visit  - discussed emergency preparedness for inclement weather ([facility]); daughter is currently staying with her post-discharge and will continue to stay with patient during [facility]. Patient reportedly has bottled water, canned goods, non-perishable food items, flashlights in the event of a power outage.  - has post-op f/u appointment with Dr. Shalom Barr on 9/17/18  - has hospital follow-up appointment with Dr. Danyelle Stafford on 9/20/18 9/18/2018  - attended post-op f/u with Ortho NP on 9/17/18; reports Hernandez Feller dressing removed by Ortho provider yesterday and incision is now open to air. Next scheduled office visit is 10/17/18 with Dr. Shalom Barr. - currently taking Gabapentin 300 mg daily for 7 days then 300 mg twice daily for 7 days then 300 mg three times daily. Continues to take Dilaudid 4 mg 1-2 tablets every 6 hours as needed and robaxin 750 mg tablet three times daily as needed. Currently rates pain a 6/10 (numerical pain scale) in her lower back and pain is described as \"sharp, but it's went down a lot though. \"  - denies constipation; last bowel movement 9/16/18. Not currently taking stool softener. Encouraged use of OTC stool softener while taking Dilaudid to prevent constipation; she will have her  pick this up for her today. Reviewed with Dr. Danyelle Stafford today via face to face communication. - no longer wearing T.E.D. Stockings; reports that she did wear T. E.D stockings for 7 days post-discharge per Dr. Mary Cummings order   - will continue to monitor incision site for signs/symptoms of infection and contact Dr. Mary Cummings office for questions/concerns  - will attend hospital follow-up with Dr. Danyelle Stafford 9/20/18    10/2/2018  - attended [facility] appointment with Dr. Danyelle Stafford on 9/20/18;  Qvar changed to Arnuity due to coverage/cost, advised to f/u with PCP in 6 months  - states, \"I'm doing good, the recovery is going real fast.\"  - patient contacted Ortho yesterday due to pain management concerns, no pain relief with taking Dilaudid; Dilaudid discontinued by Ortho, new order for Oxycodone 10 mg tablet. Med rec updated. Currently rates pain a 6-7/10 (numerical pain scale); most recent dose of Oxycodone 0830 this morning. Continues to take gabapentin and robaxin; continues to express concerns regarding pain management and unrelieved pain. Patient will contact Dr. Kaycee Patel office as needed for pain management concerns. - incision is open to air; denies redness, swelling, drainage at site  - post-op office visit scheduled with Dr. Janine Shea 10/17/18. Patient will contact Dr. Kaycee Patel office with questions/concerns. 10/12/2018  - states, \"I feel a little bit better. \"  - reports improvement in pain level; currently taking oxycodone 10 mg tablet for pain management. Currently rates pain a 5-6/10 (numerical pain scale); most recent dose of oxycodone was this morning at approximately 1030.  - wearing brace as ordered by Dr. Janine Shea  - daughter is monitoring incision site; denies redness, swelling, drainage at site- patient states, \"It looks awesome, it really does. \"  -c/o left knee pain; NN inquired about presence of edema and patient states, \"The swelling is off and on, but it's always been like that. \" Reports history of left knee pain, however onset of increased pain approximately one week ago; states, \"I don't know if it's this sciatica or what, but the pain killers I'm on now ain't even touching it. \" Denies redness and/or warmth of left lower extremity; denies injury. Followed by Dr. Darby Robles; reports history of Cortisone Injections in Left Knee. Patient will contact Dr. Darby Robles' office to notify and schedule follow-up. Also provided patient with contact information for [facility] to utilize as a resource.   - next scheduled office visit with Dr. Janine Shea 10/17/18  - was able to fill arnuity for $8 for 30-day supply  - has routine 6 month follow-up scheduled with Dr. Lee Merchant 3/21/19; reminded patient today of this scheduled appointment  - To the best of this NN's knowledge, this patient had no additional ED visits or Hospital Admissions during 30 day XIOMY period following admission to [facility] 8/30/18-9/1/18.   - XIOMY period has ended. No CCM needs reported/identified at this time. Goal Completed; Episode Resolved.                           Future Appointments:  Future Appointments  Date Time Provider Rozina Arizmendi   3/21/2019 3:00 PM MD Radha [address]            Last Appointment My Department:  9/20/2018

## 2018-10-12 NOTE — PROGRESS NOTES
NN attempted outreach to patient today in order to perform routine XIOMY follow-up for Ortho diagnosis; lvm requesting a return phone call to this NN.

## 2018-10-16 ENCOUNTER — PATIENT OUTREACH (OUTPATIENT)
Dept: INTERNAL MEDICINE CLINIC | Age: 53
End: 2018-10-16

## 2018-10-17 ENCOUNTER — HOSPITAL ENCOUNTER (EMERGENCY)
Age: 53
Discharge: HOME | End: 2018-10-17
Attending: EMERGENCY MEDICINE
Payer: MEDICARE

## 2018-10-17 ENCOUNTER — HOSPITAL ENCOUNTER (OUTPATIENT)
Dept: VASCULAR SURGERY | Age: 53
Discharge: HOME | End: 2018-10-17
Attending: ORTHOPAEDIC SURGERY
Payer: MEDICARE

## 2018-10-17 VITALS
SYSTOLIC BLOOD PRESSURE: 126 MMHG | HEIGHT: 67 IN | WEIGHT: 261 LBS | HEART RATE: 81 BPM | OXYGEN SATURATION: 100 % | DIASTOLIC BLOOD PRESSURE: 84 MMHG | TEMPERATURE: 99 F | BODY MASS INDEX: 40.97 KG/M2 | RESPIRATION RATE: 18 BRPM

## 2018-10-17 DIAGNOSIS — I82.402 ACUTE DEEP VEIN THROMBOSIS (DVT) OF LEFT LOWER EXTREMITY, UNSPECIFIED VEIN (HCC): Primary | ICD-10-CM

## 2018-10-17 DIAGNOSIS — M79.89 SWELLING OF LIMB: ICD-10-CM

## 2018-10-17 PROCEDURE — 99282 EMERGENCY DEPT VISIT SF MDM: CPT

## 2018-10-17 PROCEDURE — 93971 EXTREMITY STUDY: CPT

## 2018-10-17 NOTE — PROCEDURES
[address]  *** FINAL REPORT ***    Name: Ector Cerda  MRN: [id number]    Outpatient  : 13 Aug 1965  HIS Order #: [id number]  [id number] [facility] Visit #: [id number]  Date: 17 Oct 2018    TYPE OF TEST: Peripheral Venous Testing    REASON FOR TEST  Pain in limb, Limb swelling    Left Leg:-  Deep venous thrombosis:           Yes  Proximal extent of thrombus:      Common Femoral  Superficial venous thrombosis:    Yes  Deep venous insufficiency:        Not examined  Superficial venous insufficiency: Not examined      INTERPRETATION/FINDINGS  PROCEDURE:  Color duplex ultrasound imaging of lower extremity veins. FINDINGS:       Right: The common femoral vein is patent and without evidence of  thrombus. Phasic flow is observed. This extremity was not otherwise  evaluated. Left:   Consistent with thrombosis involving the common femoral  junction, common femoral, femoral, and popliteal veins as demonstrated   by vein non/partial-compressibility, and by a narrowing or occlusion  of the flow channel on color Doppler imaging. Limited visualization of   the posterior tibial and peroneal veins. IMPRESSION:  There is evidence of vein thrombosis, as described above. The ultrasound appearance suggests a more acute than chronic  process. Due to limited visualization of calf veins, isolated calf  vein thrombosis cannot be excluded. ADDITIONAL COMMENTS    Verbal report of preliminary findings given by [facility] to nurse of  Dr. Cee Vinson by phone on date 10/17/2018 at time [phone]. Niurka Antony [facility] 192 [phone], 300 [address] [place] [address]. [address] [place] [id number]. I have personally reviewed the data relevant to the interpretation of  this  study.     TECHNOLOGIST: Keith Oconnell  Signed: 10/17/2018 05:21 PM    PHYSICIAN: Ted Holguin MD  Signed: 10/18/2018 07:50 AM

## 2018-10-17 NOTE — ED PROVIDER NOTES
46 y.o. female with past medical history significant for hypercholesteremia, asthma, obesity, diverticulitis, GERD, hypothyroid, depression, fatty liver, and arthritis who presents from home with chief complaint of evaluation for blood clot. Patient underwent revision laminectomy L4-5 and revision fusion L4-S1 for acquired spondylolisthesis on 08/30/2018 which was performed by Dr. lAdo Negrete MD with no complications. Patient states onset two days ago of left thigh pain and leg pain. Patient reports constant left thigh and leg pain with associated swelling and discoloration. Patient was seen by her PCP prior to arrival, completed duplex of left leg which was positive for DVT, and was referred to [address] [place] [address] ED for further evaluation. Patient presents to [address] [place] [address] ED with continuation of left leg and left thigh pain rated as 8/10 pain with associated swelling and discoloration. Patient reports aggravation of left thigh and leg pain with ambulating, and denies any alleviating factors. Pt denies foot pain, epistaxis, fever, chills, cough, congestion, shortness of breath, chest pain, abdominal pain, nausea, vomiting, diarrhea, blood in stool or black tarry stool, hematuria, difficulty with urination or dysuria. There are no other acute medical concerns at this time. PCP: Romina Rock MD 
 
Note written by Sarah Patino, as dictated by Hoyt Romberg, MD 6:09 PM  
 
 
 
The history is provided by the patient. Past Medical History:  
Diagnosis Date  Arthritis  Asthma  Breast cyst 1996  
 left, removed  Chronic pain LOWER BACK  Colon polyps  Diverticulitis  Fatty liver  GERD (gastroesophageal reflux disease)  Hypercholesteremia  Nicotine vapor product user  Obesity  SUN (obstructive sleep apnea)   
 uses CPAP  
 Psychiatric disorder   
 depression  Thyroid disease   
 hypothyroid Past Surgical History:  
Procedure Laterality Date [facility] BACK SURGERY  2005, 2006 L5 & S 1  
 HX BACK SURGERY  08/30/2018  HX BREAST BIOPSY Left  HX HYSTERECTOMY  6/22/09 [address] [facility] [address] [facility]  HX OOPHORECTOMY Left One ovary removed.  HX ORTHOPAEDIC Left 1972  
 thumb surgery  HX ORTHOPAEDIC Left 2000  
 left knee surgery  HX ORTHOPAEDIC Left 2013  
 ankle  HX TUBAL LIGATION    
 REMOVAL OF KIDNEY STONE  RENAL STENT Family History:  
Problem Relation Age of Onset Chel Sheffield Mother 54 Lung cancer  Heart Disease Father  Hypertension Father  Elevated Lipids Father  Heart Attack Father  Stroke Father   
     x 3  
 Elevated Lipids Sister  Heart Disease Brother  Hypertension Brother  Elevated Lipids Brother  Elevated Lipids Sister  Elevated Lipids Sister  Heart Disease Sister  Cancer Sister   
     uterine  Heart Disease Sister  Cancer Sister   
     uterine  Heart Disease Sister  Cancer Sister   
     colon cancer with liver mets  Bleeding Prob Brother  Heart Attack Brother  Anesth Problems Neg Hx Social History Socioeconomic History  Marital status:  Spouse name: Not on file  Number of children: Not on file  Years of education: Not on file  Highest education level: Not on file Social Needs  Financial resource strain: Not on file  Food insecurity - worry: Not on file  Food insecurity - inability: Not on file  Transportation needs - medical: Not on file  Transportation needs - non-medical: Not on file Occupational History  Not on file Tobacco Use  Smoking status: Former Smoker Packs/day: 1.50 Years: 25.00 Pack years: 37.50 Last attempt to quit: 02/2015 Years since quitting: 3.7  Smokeless tobacco: Never Used Substance and Sexual Activity  Alcohol use: No  
 Drug use: No  
 Sexual activity: Yes  
  Partners: Male   Birth control/protection: Surgical  
 Other Topics Concern  Not on file Social History Narrative  Not on file ALLERGIES: Codeine and Pcn [penicillins] Review of Systems Constitutional: Negative for appetite change, chills and fever. HENT: Negative for congestion, rhinorrhea, sore throat and trouble swallowing. Eyes: Negative for photophobia. Respiratory: Negative for cough and shortness of breath. Cardiovascular: Negative for chest pain and palpitations. Gastrointestinal: Negative for abdominal pain, nausea and vomiting. Genitourinary: Negative for difficulty urinating, dysuria, frequency and hematuria. Musculoskeletal: Negative for arthralgias. Leg pain, thigh pain Skin: Positive for color change. Neurological: Negative for dizziness, syncope and weakness. Psychiatric/Behavioral: Negative for behavioral problems. The patient is not nervous/anxious. All other systems reviewed and are negative. Vitals:  
 10/17/18 1728 BP: 126/84 Pulse: 81 Resp: 18 Temp: 99 °F (37.2 °C) SpO2: 100% Weight: 118.4 kg (261 lb) Height: 5' 7\" (1.702 m) Physical Exam  
Constitutional: She is oriented to person, place, and time. She appears well-developed and well-nourished. No distress. HENT:  
Head: Normocephalic and atraumatic. Eyes: Conjunctivae and EOM are normal.  
Neck: Normal range of motion. Neck supple. Cardiovascular: Normal rate, regular rhythm and normal heart sounds. 2/2 dorsalis pedis pulse on the left Pulmonary/Chest: Effort normal and breath sounds normal. No respiratory distress. Abdominal: Soft. Bowel sounds are normal. She exhibits no distension. There is no tenderness. Musculoskeletal: Normal range of motion. Tender, swollen left thigh Neurological: She is alert and oriented to person, place, and time. Skin: Skin is warm and dry. Psychiatric: She has a normal mood and affect.  Her behavior is normal. Judgment and thought content normal.  
 Nursing note and vitals reviewed. Note written by Sarah Lewis, as dictated by Debra Baker MD 6:09 PM 
  
 
MDM Procedures

## 2018-10-17 NOTE — ED TRIAGE NOTES
Pt had study done for L leg swelling and pain and was positive for DVT.   Sent to ER for further eval.

## 2018-10-17 NOTE — DISCHARGE INSTRUCTIONS
Learning About Deep Vein Thrombosis  What is deep vein thrombosis? A deep vein thrombosis (DVT) is a blood clot in certain veins of the legs, pelvis, or arms. The clot is usually in the legs. DVT may damage the vein and cause the area to ache, swell, and change color. DVT also can lead to sores. DVT in these veins needs to be treated because the clots can get bigger, break loose, and travel through the bloodstream to the lungs. A blood clot in a lung can cause death. Blood clots can form in the veins when you are not active for a long period of time. For example, they can form if you need to stay in bed because of a health problem or must sit for a long time on an airplane or in a car. Surgery or an injury can damage your blood vessels and cause a clot to form. Cancer also can cause DVT. And some people have blood that clots too easily, which is a problem that may run in families. A risk factor is something that makes you more likely to develop a disease. Here are some major risk factors for DVT:  · You have surgery. · You have to stay in bed for more than 3 days (such as in the hospital). · Your blood is likely to clot because of an injury, cancer, or inherited condition. Here are some minor risk factors for DVT:  · You take birth control hormones. · You are pregnant. · You are in a car or airplane for a long trip. What are the symptoms? Symptoms of DVT may include:  · Swelling in the affected area. · Redness and warmth in the affected area. · Pain or tenderness. You may have pain only when you touch the affected area or when you stand or walk. If your doctor thinks you may have DVT, you will probably have an ultrasound test. You may have other tests as well. How can you prevent DVT? · Exercise your lower leg muscles to help blood flow in your legs. Point your toes up toward your head so the calves of your legs are stretched, then relax and repeat.  This is a good exercise to do when you are sitting for long periods of time. · Get out of bed as soon as you can after an illness or surgery. If you need to stay in bed, do the leg exercise noted above every hour when you are awake. · Use special stockings called compression stockings. These stockings are tight at the feet with a gradually looser fit on the leg. Many doctors recommend that you wear compression stockings during a journey longer than 8 hours. · Take breaks when you are on long trips. Stop the car and walk around. On long airplane flights, walk up and down the aisle hourly, and flex and point your feet every 20 minutes while sitting. · Take blood-thinning medicines after some types of surgery if your doctor recommends it. Blood thinners also may be used if you are likely to develop clots. How is DVT treated? Treatment for DVT usually involves taking blood thinners. These medicines are given through a vein (intravenously, or IV) or as a pill. Talk with your doctor about which medicine is right for you. Your doctor also may suggest that you prop up or elevate your leg when possible, take walks, and wear compression stockings. These measures may help reduce the pain and swelling that can happen with DVT. Follow-up care is a key part of your treatment and safety. Be sure to make and go to all appointments, and call your doctor if you are having problems. It's also a good idea to know your test results and keep a list of the medicines you take. Where can you learn more? Go to http://olivier-akil.info/. Enter C523 in the search box to learn more about \"Learning About Deep Vein Thrombosis. \"  Current as of: November 21, 2017  Content Version: 11.8  © 6737-7454 [employer]. Care instructions adapted under license by [employer] (which disclaims liability or warranty for this information).  If you have questions about a medical condition or this instruction, always ask your healthcare professional. [employer] disclaims any warranty or liability for your use of this information.

## 2018-10-18 ENCOUNTER — TELEPHONE (OUTPATIENT)
Dept: INTERNAL MEDICINE CLINIC | Age: 53
End: 2018-10-18

## 2018-10-18 NOTE — TELEPHONE ENCOUNTER
Spoke with patient. Two pt identifiers confirmed. Patient states that she was seen in the ED on 10/17/18 for a DVT. Patient states that the ED prescribed eliquis and she can not afford it. Patient would like to know if Dr. Janel Lei will change her to coumadin. Advised patient that I will send her request to Dr. Janel eLi and will call her back with instructiions. Pt verbalized understanding of information discussed w/ no further questions at this time.

## 2018-10-18 NOTE — TELEPHONE ENCOUNTER
#[phone] pt states she was prescribed a new medication that the pharmacy doesn't have in stock. Pt has a blood clot in her left leg groin area that is causing a lot of pain and is swollen triple the size. Pt states she needs coumadin right away. Please call as soon as possible as pt is crying in pain literally.

## 2018-10-18 NOTE — TELEPHONE ENCOUNTER
MD Adrián Sanz, LPN   Caller: Unspecified (Today,  8:45 AM)             coumandin 5 mg daily and follow up in one week for INR      Patient has been notified, see previous note

## 2018-10-18 NOTE — TELEPHONE ENCOUNTER
Spoke with patient. Two pt identifiers confirmed. Patient notified per Dr. Eleazar Malik that a prescription for Coumadin 5mg daily has been sent to her pharmacy on file. Patient counseled on when to take the medication, no alcohol and to follow a consistent diet. Patient advised to call the office ASAP is she notices blood in her stool or urine or has any issues with bleeding that she can not get under control. Patient has been scheduled for her followup with Dr. Eleazar Malik on 10/25/18 at 3:45pm  Patient advised if anything changes or if unable to keep this appointment to call the office  Pt verbalized understanding of information discussed w/ no further questions at this time.

## 2018-10-24 ENCOUNTER — TELEPHONE (OUTPATIENT)
Dept: INTERNAL MEDICINE CLINIC | Age: 53
End: 2018-10-24

## 2018-10-24 RX ORDER — ENOXAPARIN SODIUM 100 MG/ML
100 INJECTION SUBCUTANEOUS EVERY 12 HOURS
Qty: 10 SYRINGE | Refills: 0 | Status: SHIPPED | OUTPATIENT
Start: 2018-10-24 | End: 2018-10-25 | Stop reason: ALTCHOICE

## 2018-10-25 ENCOUNTER — OFFICE VISIT (OUTPATIENT)
Dept: INTERNAL MEDICINE CLINIC | Age: 53
End: 2018-10-25

## 2018-10-25 VITALS
SYSTOLIC BLOOD PRESSURE: 118 MMHG | RESPIRATION RATE: 18 BRPM | HEART RATE: 72 BPM | OXYGEN SATURATION: 99 % | TEMPERATURE: 97.8 F | DIASTOLIC BLOOD PRESSURE: 81 MMHG | WEIGHT: 261 LBS | HEIGHT: 67 IN | BODY MASS INDEX: 40.97 KG/M2

## 2018-10-25 DIAGNOSIS — I82.4Y2 ACUTE DEEP VEIN THROMBOSIS (DVT) OF PROXIMAL VEIN OF LEFT LOWER EXTREMITY (HCC): Primary | ICD-10-CM

## 2018-10-25 PROBLEM — I82.409 DVT (DEEP VENOUS THROMBOSIS) (HCC): Status: ACTIVE | Noted: 2018-10-18

## 2018-10-25 LAB
INR BLD: 3.4
PT POC: 41.1 SECONDS
VALID INTERNAL CONTROL?: YES

## 2018-10-25 RX ORDER — HYDROCODONE BITARTRATE AND ACETAMINOPHEN 10; 325 MG/1; MG/1
1 TABLET ORAL
COMMUNITY
End: 2018-10-25

## 2018-10-25 RX ORDER — WARFARIN 2.5 MG/1
2.5 TABLET ORAL DAILY
Qty: 30 TAB | Refills: 0 | Status: SHIPPED | OUTPATIENT
Start: 2018-10-25 | End: 2018-11-23 | Stop reason: SDUPTHER

## 2018-10-25 RX ORDER — HYDROMORPHONE HYDROCHLORIDE 2 MG/1
2 TABLET ORAL
Qty: 40 TAB | Refills: 0 | Status: SHIPPED | OUTPATIENT
Start: 2018-10-25 | End: 2018-11-01 | Stop reason: SDUPTHER

## 2018-10-25 NOTE — PATIENT INSTRUCTIONS
Decrease the dose of warfarin to 2.5 mg ( Thursday and Tuesdays) and the other days continue the 5mg.  Follow up in 8 days next Friday for an INR check   STOP lovenox      Goal INR is 2-3 today your INR was 3.4 which means blood is too thin    Do not take aspirin or advil or motrin     You make take tylenol

## 2018-10-25 NOTE — TELEPHONE ENCOUNTER
I called patient to discuss my chart message about increase pain in location of blood clot in leg - the patient did not answer and a voice mail was left for patient

## 2018-10-25 NOTE — PROGRESS NOTES
Reviewed record in preparation for visit and have obtained necessary documentation. Identified pt with two pt identifiers(name and ). Chief Complaint   Patient presents with    Blood Clot       Health Maintenance Due   Topic Date Due    Shingles Vaccine (1 of 2) 2014       Ms. Ian Gamboa has a reminder for a \"due or due soon\" health maintenance. I have asked that she discuss this further with her primary care provider for follow-up on this health maintenance. Coordination of Care Questionnaire:  :     1) Have you been to an emergency room, urgent care clinic since your last visit? no   Hospitalized since your last visit? no             2) Have you seen or consulted any other health care providers outside of [facility] since your last visit? no  (Include any pap smears or colon screenings in this section.)    3) In the event something were to happen to you and you were unable to speak on your behalf, do you have an Advance Directive/ Living Will in place stating your wishes? NO    Do you have an Advance Directive on file? no    4) Are you interested in receiving information on Advance Directives? NO    Patient is accompanied by  I have received verbal consent from Prema Seals to discuss any/all medical information while they are present in the room.

## 2018-10-25 NOTE — PROGRESS NOTES
CC: Blood Clot      HPI:    She is a 46 y.o. female who presents for evaluation of DVT  Patient noted swelling in the legs for 2 weeks then patient was seen in Dr Awilda Benson office and [phone] showed blood clot, eliquis was prescribed and patient could not afford it. Started on coumadin 5mg daily ( lovenox prescribed for bridging) . Today's INR is 3.4  Patient is in excruciating pain in the leg. Noco is not effective and not able to ambulate. Recall she recently had back surgery   Patient is unable to bear weight on leg. ROS:  10 systems reviewed and negative other than HPI     Past Medical History:   Diagnosis Date    Arthritis     Asthma     Breast cyst 1996    left, removed    Chronic pain     LOWER BACK    Colon polyps     Diverticulitis     Fatty liver     GERD (gastroesophageal reflux disease)     Hypercholesteremia     Nicotine vapor product user     Obesity     SUN (obstructive sleep apnea)     uses CPAP    Psychiatric disorder     depression    Thyroid disease     hypothyroid       Current Outpatient Medications on File Prior to Visit   Medication Sig Dispense Refill    fluticasone furoate (ARNUITY ELLIPTA) 100 mcg/actuation dsdv inhaler Take 1 Puff by inhalation daily. 2 Inhaler 4    gabapentin (NEURONTIN) 300 mg capsule Take 300 mg by mouth daily.  methocarbamol (ROBAXIN) 750 mg tablet Take 750 mg by mouth three (3) times daily.  cromolyn (OPTICROM) 4 % ophthalmic solution Administer 1 Drop to both eyes as needed. Use in affected eye(s)      albuterol (PROVENTIL HFA, VENTOLIN HFA, PROAIR HFA) 90 mcg/actuation inhaler Take 1 Puff by inhalation every six (6) hours as needed for Wheezing. 1 Inhaler 2    escitalopram oxalate (LEXAPRO) 20 mg tablet TAKE 1 TABLET BY MOUTH EVERY DAY 90 Tab 1    pantoprazole (PROTONIX) 40 mg tablet Take 1 Tab by mouth daily. Indications: gastroesophageal reflux disease 90 Tab 1    lovastatin (MEVACOR) 20 mg tablet TAKE 1 TAB BY MOUTH NIGHTLY.  30 Tab 5  levothyroxine (SYNTHROID) 88 mcg tablet Take 1 Tab by mouth Daily (before breakfast). 90 Tab 5    cholecalciferol (VITAMIN D3) 1,000 unit tablet Take 1,000 Units by mouth daily.  montelukast (SINGULAIR) 10 mg tablet Take 1 Tab by mouth daily. 30 Tab 4     No current facility-administered medications on file prior to visit. Past Surgical History:   Procedure Laterality Date    HX BACK SURGERY  2005, 2006    L5 & S 1    HX BACK SURGERY  08/30/2018    HX BREAST BIOPSY      Left    HX HYSTERECTOMY  6/22/09    LSH LSO    HX OOPHORECTOMY Left     One ovary removed.     HX ORTHOPAEDIC Left 1972    thumb surgery    HX ORTHOPAEDIC Left 2000    left knee surgery    HX ORTHOPAEDIC Left 2013    ankle    HX TUBAL LIGATION      REMOVAL OF KIDNEY STONE      RENAL STENT         Family History   Problem Relation Age of Onset    Cancer Mother 54        Lung cancer    Heart Disease Father     Hypertension Father     Elevated Lipids Father     Heart Attack Father     Stroke Father         x 3    Elevated Lipids Sister     Heart Disease Brother     Hypertension Brother     Elevated Lipids Brother     Elevated Lipids Sister     Elevated Lipids Sister     Heart Disease Sister     Cancer Sister         uterine    Heart Disease Sister     Cancer Sister         uterine    Heart Disease Sister    [place] Cancer Sister         colon cancer with liver mets    Bleeding Prob Brother     Heart Attack Brother     Anesth Problems Neg Hx      Reviewed and no changes     Social History     Socioeconomic History    Marital status:      Spouse name: Not on file    Number of children: Not on file    Years of education: Not on file    Highest education level: Not on file   Social Needs    Financial resource strain: Not on file    Food insecurity - worry: Not on file    Food insecurity - inability: Not on file   [employer] needs - medical: Not on file   [employer] needs - non-medical: Not on file   Occupational History    Not on file   Tobacco Use    Smoking status: Former Smoker     Packs/day: 1.50     Years: 25.00     Pack years: 37.50     Last attempt to quit: 02/2015     Years since quitting: 3.7    Smokeless tobacco: Never Used   Substance and Sexual Activity    Alcohol use: No    Drug use: No    Sexual activity: Yes     Partners: Male     Birth control/protection: Surgical   Other Topics Concern    Not on file   Social History Narrative    Not on file            Visit Vitals  /81 (BP 1 Location: Right arm, BP Patient Position: Sitting)   Pulse 72   Temp 97.8 °F (36.6 °C) (Oral)   Resp 18   Ht 5' 7\" (1.702 m)   Wt 261 lb (118.4 kg)   LMP 06/22/2009   SpO2 99%   BMI 40.88 kg/m²       Physical Examination:   General - Well appearing female  HEENT - PERRL, TM no erythema/opacification, normal nasal turbinates, oropharynx no erythema or exudate, MMM  Neck - supple, no bruits, no TMG, no LAD  Pulm - clear to auscultation bilaterally  Cardio - RRR, normal S1 S2, no murmur gallops or rubs  Abd - soft, nontender, no masses, no HSM  Extrem - swelling in the left leg extensive swelling, and redness  No redness  Psych - normal affect, appropriate mood    Lab Results   Component Value Date/Time    WBC 4.2 08/02/2018 08:19 AM    Hemoglobin (POC) 12.9 10/28/2013 03:40 PM    HGB 10.0 (L) 09/01/2018 03:14 AM    Hematocrit (POC) 38 10/28/2013 03:40 PM    HCT 39.9 08/02/2018 08:19 AM    PLATELET 445 48/36/5265 08:19 AM    MCV 96 08/02/2018 08:19 AM     Lab Results   Component Value Date/Time    Sodium 139 08/31/2018 03:29 AM    Potassium 4.2 08/31/2018 03:29 AM    Chloride 106 08/31/2018 03:29 AM    CO2 23 08/31/2018 03:29 AM    Anion gap 10 08/31/2018 03:29 AM    Glucose 148 (H) 08/31/2018 03:29 AM    BUN 11 08/31/2018 03:29 AM    Creatinine 0.91 08/31/2018 03:29 AM    BUN/Creatinine ratio 12 08/31/2018 03:29 AM    GFR est AA >60 08/31/2018 03:29 AM    GFR est non-AA >60 08/31/2018 03:29 AM Calcium 8.4 (L) 08/31/2018 03:29 AM     Lab Results   Component Value Date/Time    Cholesterol, total 215 (H) 08/02/2018 08:19 AM    HDL Cholesterol 85 08/02/2018 08:19 AM    LDL, calculated 117 (H) 08/02/2018 08:19 AM    VLDL, calculated 13 08/02/2018 08:19 AM    Triglyceride 67 08/02/2018 08:19 AM     Lab Results   Component Value Date/Time    TSH 3.010 08/02/2018 08:19 AM     No results found for: PSA, PSA2, PSAR1, Franciso Batters, [id number], [id number], PSALT  Lab Results   Component Value Date/Time    Hemoglobin A1c 5.4 08/02/2018 08:19 AM     Lab Results   Component Value Date/Time    VITAMIN D, 25-HYDROXY 28.5 (L) 05/09/2017 08:54 AM       Lab Results   Component Value Date/Time    ALT (SGPT) 9 08/02/2018 08:19 AM    AST (SGOT) 15 08/02/2018 08:19 AM    Alk. phosphatase 73 08/02/2018 08:19 AM    Bilirubin, total 0.3 08/02/2018 08:19 AM      INR is 3.4     Assessment/Plan:    1. Provoked/ after sx/  Acute deep vein thrombosis (DVT) of proximal vein of left lower extremity ([facility] [address]  eliquis not covered  - INR is 3.4   - stop lovenox  - change in coumadin dose - Decrease the dose of warfarin to 2.5 mg ( Thursday and Tuesdays) and the other days continue the 5mg. Follow up in 8 days next Friday for an INR check   - severe pain in leg   - HYDROmorphone (DILAUDID) 2 mg tablet; Take 1 Tab by mouth every four (4) hours as needed for Pain. Max Daily Amount: 12 mg. Dispense: 40 Tab; Refill: 0  - AMB POC PT/INR  - warfarin (COUMADIN) 2.5 mg tablet; Take 1 Tab by mouth daily. Take 1 pill Tuesday and Thursday  Dispense: 30 Tab; Refill: 0        Follow-up Disposition:  Return in about 3 months (around 1/25/2019).     Kika Jeffers MD

## 2018-11-01 ENCOUNTER — OFFICE VISIT (OUTPATIENT)
Dept: INTERNAL MEDICINE CLINIC | Age: 53
End: 2018-11-01

## 2018-11-01 VITALS
HEIGHT: 67 IN | OXYGEN SATURATION: 99 % | BODY MASS INDEX: 40.97 KG/M2 | DIASTOLIC BLOOD PRESSURE: 67 MMHG | TEMPERATURE: 97.8 F | RESPIRATION RATE: 18 BRPM | HEART RATE: 59 BPM | SYSTOLIC BLOOD PRESSURE: 96 MMHG | WEIGHT: 261 LBS

## 2018-11-01 DIAGNOSIS — M79.605 PAIN OF LEFT LOWER EXTREMITY: Primary | ICD-10-CM

## 2018-11-01 DIAGNOSIS — I82.4Y2 ACUTE DEEP VEIN THROMBOSIS (DVT) OF PROXIMAL VEIN OF LEFT LOWER EXTREMITY (HCC): ICD-10-CM

## 2018-11-01 LAB
INR BLD: 36
PT POC: 3 SECONDS
VALID INTERNAL CONTROL?: YES

## 2018-11-01 RX ORDER — WARFARIN SODIUM 5 MG/1
5 TABLET ORAL DAILY
Qty: 30 TAB | Refills: 5 | Status: SHIPPED | OUTPATIENT
Start: 2018-11-01 | End: 2018-12-03

## 2018-11-01 RX ORDER — HYDROMORPHONE HYDROCHLORIDE 2 MG/1
2 TABLET ORAL
Qty: 40 TAB | Refills: 0 | Status: SHIPPED | OUTPATIENT
Start: 2018-11-01 | End: 2018-11-12

## 2018-11-01 NOTE — PROGRESS NOTES
Reviewed record in preparation for visit and have obtained necessary documentation. Identified pt with two pt identifiers(name and ). Chief Complaint   Patient presents with    Blood Clot       Health Maintenance Due   Topic Date Due    Shingles Vaccine (1 of 2) 2014       Ms. Beau Haji has a reminder for a \"due or due soon\" health maintenance. I have asked that she discuss this further with her primary care provider for follow-up on this health maintenance. Coordination of Care Questionnaire:  :     1) Have you been to an emergency room, urgent care clinic since your last visit? no   Hospitalized since your last visit? no             2) Have you seen or consulted any other health care providers outside of [address] [place] [address] since your last visit? no  (Include any pap smears or colon screenings in this section.)    3) In the event something were to happen to you and you were unable to speak on your behalf, do you have an Advance Directive/ Living Will in place stating your wishes? NO    Do you have an Advance Directive on file? no    4) Are you interested in receiving information on Advance Directives? NO    Patient is accompanied by self I have received verbal consent from Alison Araujo to discuss any/all medical information while they are present in the room.

## 2018-11-01 NOTE — PROGRESS NOTES
CC: Blood Clot      HPI:    She is a 46 y.o. female who presents for evaluation of DVT    Recall   Patient noted swelling in the legs for 2 weeks then patient was seen in Dr Christine Swanson office and [phone] showed blood clot, eliquis was prescribed and patient could not afford it. Started on coumadin 5mg daily ( lovenox prescribed for bridging) . Patient is in excruciating pain in the leg. Noco is not effective and not able to ambulate. Recall she recently had back surgery   Patient is unable to bear weight on leg. Patient is taking coumadin 2.5mg Tuesday and Thursday 2.5mg and all other days 5mg     Pain is better but taking hydromorphone 2mg every 4 hours  And has 10 pills left. Initially had to take 4mg for pain relief     ROS:  10 systems reviewed and negative other than HPI     Past Medical History:   Diagnosis Date    Arthritis     Asthma     Breast cyst 1996    left, removed    Chronic pain     LOWER BACK    Colon polyps     Diverticulitis     DVT (deep venous thrombosis) ([facility] [address] 10/18/2018    provoked after back sx    Fatty liver     GERD (gastroesophageal reflux disease)     Hypercholesteremia     Nicotine vapor product user     Obesity     SUN (obstructive sleep apnea)     uses CPAP    Psychiatric disorder     depression    Thyroid disease     hypothyroid       Current Outpatient Medications on File Prior to Visit   Medication Sig Dispense Refill    HYDROmorphone (DILAUDID) 2 mg tablet Take 1 Tab by mouth every four (4) hours as needed for Pain. Max Daily Amount: 12 mg. 40 Tab 0    warfarin (COUMADIN) 2.5 mg tablet Take 1 Tab by mouth daily. Take 1 pill Tuesday and Thursday 30 Tab 0    fluticasone furoate (ARNUITY ELLIPTA) 100 mcg/actuation dsdv inhaler Take 1 Puff by inhalation daily. 2 Inhaler 4    gabapentin (NEURONTIN) 300 mg capsule Take 300 mg by mouth daily.  methocarbamol (ROBAXIN) 750 mg tablet Take 750 mg by mouth three (3) times daily.       cromolyn (OPTICROM) 4 % ophthalmic solution Administer 1 Drop to both eyes as needed. Use in affected eye(s)      albuterol (PROVENTIL HFA, VENTOLIN HFA, PROAIR HFA) 90 mcg/actuation inhaler Take 1 Puff by inhalation every six (6) hours as needed for Wheezing. 1 Inhaler 2    escitalopram oxalate (LEXAPRO) 20 mg tablet TAKE 1 TABLET BY MOUTH EVERY DAY 90 Tab 1    pantoprazole (PROTONIX) 40 mg tablet Take 1 Tab by mouth daily. Indications: gastroesophageal reflux disease 90 Tab 1    lovastatin (MEVACOR) 20 mg tablet TAKE 1 TAB BY MOUTH NIGHTLY. 30 Tab 5    levothyroxine (SYNTHROID) 88 mcg tablet Take 1 Tab by mouth Daily (before breakfast). 90 Tab 5    cholecalciferol (VITAMIN D3) 1,000 unit tablet Take 1,000 Units by mouth daily.  montelukast (SINGULAIR) 10 mg tablet Take 1 Tab by mouth daily. 30 Tab 4     No current facility-administered medications on file prior to visit. Past Surgical History:   Procedure Laterality Date    HX BACK SURGERY  2005, 2006    L5 & S 1    HX BACK SURGERY  08/30/2018    HX BREAST BIOPSY      Left    HX HYSTERECTOMY  6/22/09    LSH LSO    HX OOPHORECTOMY Left     One ovary removed.     HX ORTHOPAEDIC Left 1972    thumb surgery    HX ORTHOPAEDIC Left 2000    left knee surgery    HX ORTHOPAEDIC Left 2013    ankle    HX TUBAL LIGATION      REMOVAL OF KIDNEY STONE      RENAL STENT         Family History   Problem Relation Age of Onset    Cancer Mother 54        Lung cancer    Heart Disease Father     Hypertension Father     Elevated Lipids Father     Heart Attack Father     Stroke Father         x 3    Elevated Lipids Sister     Heart Disease Brother     Hypertension Brother     Elevated Lipids Brother     Elevated Lipids Sister     Elevated Lipids Sister     Heart Disease Sister     Cancer Sister         uterine    Heart Disease Sister     Cancer Sister         uterine    Heart Disease Sister     Cancer Sister         colon cancer with liver mets    Bleeding Prob Brother     Heart Attack Brother     Anesth Problems Neg Hx      Reviewed and no changes     Social History     Socioeconomic History    Marital status:      Spouse name: Not on file    Number of children: Not on file    Years of education: Not on file    Highest education level: Not on file   Social Needs    Financial resource strain: Not on file    Food insecurity - worry: Not on file    Food insecurity - inability: Not on file    Transportation needs - medical: Not on file   [employer] needs - non-medical: Not on file   Occupational History    Not on file   Tobacco Use    Smoking status: Former Smoker     Packs/day: 1.50     Years: 25.00     Pack years: 37.50     Last attempt to quit: 02/2015     Years since quitting: 3.7    Smokeless tobacco: Never Used   Substance and Sexual Activity    Alcohol use: No    Drug use: No    Sexual activity: Yes     Partners: Male     Birth control/protection: Surgical   Other Topics Concern    Not on file   Social History Narrative    Not on file            Visit Vitals  BP 96/67 (BP 1 Location: Right arm, BP Patient Position: Sitting)   Pulse (!) 59   Temp 97.8 °F (36.6 °C)   Resp 18   Ht 5' 7\" (1.702 m)   Wt 261 lb (118.4 kg)   LMP 06/22/2009   SpO2 99%   BMI 40.88 kg/m²       Physical Examination:   General - Well appearing female  HEENT - PERRL, TM no erythema/opacification, normal nasal turbinates, oropharynx no erythema or exudate, MMM  Neck - supple, no bruits, no TMG, no LAD  Pulm - clear to auscultation bilaterally  Cardio - RRR, normal S1 S2, no murmur gallops or rubs  Abd - soft, nontender, no masses, no HSM  Extrem - swelling in the left leg extensive swelling - slightly better today  Psych - normal affect, appropriate mood    Lab Results   Component Value Date/Time    WBC 4.2 08/02/2018 08:19 AM    Hemoglobin (POC) 12.9 10/28/2013 03:40 PM    HGB 10.0 (L) 09/01/2018 03:14 AM    Hematocrit (POC) 38 10/28/2013 03:40 PM    HCT 39.9 08/02/2018 08:19 AM PLATELET 778 18/87/3738 08:19 AM    MCV 96 08/02/2018 08:19 AM     Lab Results   Component Value Date/Time    Sodium 139 08/31/2018 03:29 AM    Potassium 4.2 08/31/2018 03:29 AM    Chloride 106 08/31/2018 03:29 AM    CO2 23 08/31/2018 03:29 AM    Anion gap 10 08/31/2018 03:29 AM    Glucose 148 (H) 08/31/2018 03:29 AM    BUN 11 08/31/2018 03:29 AM    Creatinine 0.91 08/31/2018 03:29 AM    BUN/Creatinine ratio 12 08/31/2018 03:29 AM    GFR est AA >60 08/31/2018 03:29 AM    GFR est non-AA >60 08/31/2018 03:29 AM    Calcium 8.4 (L) 08/31/2018 03:29 AM     Lab Results   Component Value Date/Time    Cholesterol, total 215 (H) 08/02/2018 08:19 AM    HDL Cholesterol 85 08/02/2018 08:19 AM    LDL, calculated 117 (H) 08/02/2018 08:19 AM    VLDL, calculated 13 08/02/2018 08:19 AM    Triglyceride 67 08/02/2018 08:19 AM     Lab Results   Component Value Date/Time    TSH 3.010 08/02/2018 08:19 AM     No results found for: PSA, PSA2, Theta Jorge, [id number], [id number], PSALT  Lab Results   Component Value Date/Time    Hemoglobin A1c 5.4 08/02/2018 08:19 AM     Lab Results   Component Value Date/Time    VITAMIN D, 25-HYDROXY 28.5 (L) 05/09/2017 08:54 AM       Lab Results   Component Value Date/Time    ALT (SGPT) 9 08/02/2018 08:19 AM    AST (SGOT) 15 08/02/2018 08:19 AM    Alk. phosphatase 73 08/02/2018 08:19 AM    Bilirubin, total 0.3 08/02/2018 08:19 AM      INR is 3.4     Assessment/Plan:    Provoked/ after sx/  Acute deep vein thrombosis (DVT) of proximal vein of left lower extremity ([address]  eliquis not covered  - INR is 3.0 continue current dose 2.5 mg Twice a week ( T-T) and all other days 5mg   - follow up in INR clinic in 10 days     - severe pain in leg : due to extensive clot slightly better.  Discussed decreasing frequency of dilaudid and weaning off  - HYDROmorphone (DILAUDID) 2 mg tablet; 40 pills given today  - warfarin (COUMADIN) 5mg refilled         Follow-up Disposition: Not on File    Nichole Mayi Sutherland MD

## 2018-11-12 ENCOUNTER — ANTI-COAG VISIT (OUTPATIENT)
Dept: INTERNAL MEDICINE CLINIC | Age: 53
End: 2018-11-12

## 2018-11-12 DIAGNOSIS — I82.4Y2 ACUTE DEEP VEIN THROMBOSIS (DVT) OF PROXIMAL VEIN OF LEFT LOWER EXTREMITY (HCC): Primary | ICD-10-CM

## 2018-11-12 LAB
INR BLD: 3 (ref 1–1.5)
PT POC: 36.1 SECONDS (ref 9.1–12)
VALID INTERNAL CONTROL?: YES

## 2018-11-12 NOTE — PROGRESS NOTES
Today your INR was 3.0. Your goal INR is 2-3. You have a 2.5 mg and a 5 mg tablet of Coumadin (warfarin). Take Coumadin as follows; Take 2.5 mg on Tuesdays and Thursdays; 5 mg on all other days. Come back in 3 weeks for your next finger stick/INR blood test.   
 
Avoid any over the counter items containing aspirin or ibuprofen, and avoid great swings in general diet. Avoid alcohol consumption. Please notify the INR nurse if you are started on any new medication including over the counter or herbal supplements. Also, please notify your INR nurse if any of your other prescription or over the counter medications have been discontinued. Call [facility] at [phone] if you have any signs of abnormal bleeding/blood clot. Taking Warfarin Safely: Care Instructions Your Care Instructions Warfarin is a medicine that you take to prevent blood clots. It is often called a blood thinner. Doctors give warfarin (such as Coumadin) to reduce the risk of blood clots. You may be at risk for blood clots if you have atrial fibrillation or deep vein thrombosis. Some other health problems may also put you at risk. Warfarin slows the amount of time it takes for your blood to clot. It can cause bleeding problems. Even if you've been taking warfarin for a while, it's important to know how to take it safely. Foods and other medicines can affect the way warfarin works. Some can make warfarin work too well. This can cause bleeding problems. And some can make it work poorly, so that it does not prevent blood clots very well. You will need regular blood tests to check how long it takes for your blood to form a clot. This test is called a PT or prothrombin time test. The result of the test is called an INR level. Depending on the test results, your doctor or anticoagulation clinic may adjust your dose of warfarin. Follow-up care is a key part of your treatment and safety.  Be sure to make and go to all appointments, and call your doctor if you are having problems. It's also a good idea to know your test results and keep a list of the medicines you take. How can you care for yourself at home? Take warfarin safely · Take your warfarin at the same time each day. · If you miss a dose of warfarin, don't take an extra dose to make up for it. Your doctor can tell you exactly what to do so you don't take too much or too little. · Wear medical alert jewelry that lets others know that you take warfarin. You can buy this at most [employer]. · Don't take warfarin if you are pregnant or planning to get pregnant. Talk to your doctor about how you can prevent getting pregnant while you are taking it. · Don't change your dose or stop taking warfarin unless your doctor tells you to. Effects of medicines and food on warfarin · Don't start or stop taking any medicines, vitamins, or natural remedies unless you first talk to your doctor. Many medicines can affect how warfarin works. These include aspirin and other pain relievers, over-the-counter medicines, multivitamins, dietary supplements, and herbal products. · Tell all of your doctors and pharmacists that you take warfarin. Some prescription medicines can affect how warfarin works. · Keep the amount of vitamin K in your diet about the same from day to day. Do not suddenly eat a lot more or a lot less food that is rich in vitamin K than you usually do. Vitamin K affects how warfarin works and how your blood clots. Talk with your doctor before making big changes in your diet. Vitamin K is in many foods, such as: 
¨ Leafy greens, such as kale, cabbage, spinach, Swiss chard, and lettuce. ¨ Canola and soybean oils. ¨ Green vegetables, such as asparagus, broccoli, and [place] sprouts. ¨ Vegetable drinks, green tea leaves, and some dietary supplement drinks. · Avoid cranberry juice and other cranberry products. They can increase the effects of warfarin. · Limit your use of alcohol. Avoid bleeding by preventing falls and injuries · Wear slippers or shoes with nonskid soles. · Remove throw rugs and clutter. · Rearrange furniture and electrical cords to keep them out of walking paths. · Keep stairways, porches, and outside walkways well lit. Use night-lights in hallways and bathrooms. · Be extra careful when you work with sharp tools or knives. When should you call for help? Call 911 anytime you think you may need emergency care. For example, call if: 
· You have a sudden, severe headache that is different from past headaches. Call your doctor now or seek immediate medical care if: 
· You have any abnormal bleeding, such as: 
¨ Nosebleeds. ¨ Vaginal bleeding that is different (heavier, more frequent, at a different time of the month) than what you are used to. ¨ Bloody or black stools, or rectal bleeding. ¨ Bloody or pink urine. Watch closely for changes in your health, and be sure to contact your doctor if you have any problems. Where can you learn more? Go to http://olivier-akil.info/. Enter F249 in the search box to learn more about \"Taking Warfarin Safely: Care Instructions. \" Current as of: January 27, 2016 Content Version: 11.1 © 9508-5877 [employer]. Care instructions adapted under license by [employer] (which disclaims liability or warranty for this information).  If you have questions about a medical condition or this instruction, always ask your healthcare professional. [place] [address] any warranty or liability for your use of this information.

## 2018-11-12 NOTE — PROGRESS NOTES
Pharmacy Progress Note  - Anticoagulation Management Ms. Ian Gamboa was seen today for an initial pharmacist-led anticoagulation management visit today for a provoked left LE DVT s/p a laminectomy (L4-S1) and spinal fusion ( on 08/30/18) in conjunction with Dr. Hawk Cooley. This DVT was identified and confirmed during orthopedic surgery follow up and by LE doppler on 10/18/18 at [facility]. This is her first venous thromboembolic event. Patient's significant risk factors include: extensive family history for cardiovascular disease/thromboembolism, recent back surgery, and sedentary lifestyle following surgery. Initially prescribed Eliquis but could not afford due to cost. Endorses a 2-day period where she was not on any anticoagulation. Warfarin therapy initiated on 10/25/18. No lovenox bridge therapy needed due to supratherapeutic POC INR of 3.4. Previously followed by Dr. Dontae Sotomayor (cardiology) but has not seen him recently. Up until today, patient's anticoagulation management has been completed by patient's PCP (Dr. Dillan Kolb). Duration of warfarin therapy will be determined by provider's assessment. A warfarin education booklet was provided. The signs and symptoms of DVT/PE,  INR goals and vitamin-K containing food items were discussed during this visit. Patient was able to relay back information using the teach back method. Pain level has improved. No longer on PRN dilaudid. Pain management deescalated to tramadol 50 mg PRN today. I agree with Dr. Hawk Cooley' note. The collaborative practice agreement was signed by the patient. Thank you for the consult, Leighann De Anda, TorinD

## 2018-11-12 NOTE — PROGRESS NOTES
Pharmacy Progress Note  - Anticoagulation Management S/O:  Devendra Elise  is a 46 y.o. female seen today for anticoagulation management for the diagnosis of Deep Vein Thrombosis . Anticoagulation History: Patient was recently started on warfarin after being diagnosed with a provoked DVT due to back surgery (August 2018) on 10/18/18. Patient reported being in a brace and not being able to move around very much contributed to the development of the DVT. She was able to notice the signs and symptoms of the DVT and decided to go to the ER where an ultrasound (10/18/18) confirmed the diagnosis. Patient has a significant family history of cardiac disease with recurrent blood clots. Patient's brother (warfarin) and two sisters (eliquis and warfarin) are being managed on anticoagulants as well. Patient has seen cardiologist Dr. Almita Bhakta in the past; however, she states that she has not had any cardiac issues. Patient was initially prescribed Eliquis, but never started therapy due to cost. After starting warfarin, patient's INR was 3.4 on 10/25/18; therefore, bridge therapy with lovenox was not needed. · INR Goal:  2.0-3.0 · Current warfarin regimen: 2.5 mg on Tuesdays and Thursdays; 5 mg on all other days · Warfarin tablet strength:   2.5 mg and 5 mg · Duration of therapy: TBD; patient reported 6 month duration via physician Today's Patient Findings: · Does not usually eat greens; maybe eats them every once in a while - 0 servings of greens/week is her baseline · Patient's pain is better controlled; pain now located in her back as opposed to her leg. She is no longer taking dilaudid. She has now been prescribed tramadol 50 mg to manage her pain Pertinent positives includes: No significant changes since last visit INR (POC) today (normal INR range 0.8-1.2) :   
Recent Results (from the past 12 hour(s)) AMB POC PT/INR Collection Time: 11/12/18  4:13 PM  
Result Value Ref Range VALID INTERNAL CONTROL POC Yes Prothrombin time (POC) 36.1 (A) 9.1 - 12 seconds INR POC 3.0 (A) 1 - 1.5 · Adherence: · Able to recall regimen? YES 
· Miss/extra dose? NO 
· Need refill? NO Upcoming procedure(s):  NO 
 
 
Past Medical History:  
Diagnosis Date  Arthritis  Asthma  Breast cyst 1996  
 left, removed  Chronic pain LOWER BACK  Colon polyps  Diverticulitis  DVT (deep venous thrombosis) ([facility] [address] 10/18/2018  
 provoked after back sx  Fatty liver  GERD (gastroesophageal reflux disease)  Hypercholesteremia  Nicotine vapor product user  Obesity  SUN (obstructive sleep apnea)   
 uses CPAP  
 Psychiatric disorder   
 depression  Thyroid disease   
 hypothyroid Current Outpatient Medications Medication Sig  warfarin (COUMADIN) 5 mg tablet Take 1 Tab by mouth daily.  warfarin (COUMADIN) 2.5 mg tablet Take 1 Tab by mouth daily. Take 1 pill Tuesday and Thursday  fluticasone furoate (ARNUITY ELLIPTA) 100 mcg/actuation dsdv inhaler Take 1 Puff by inhalation daily.  gabapentin (NEURONTIN) 300 mg capsule Take 300 mg by mouth daily.  methocarbamol (ROBAXIN) 750 mg tablet Take 750 mg by mouth three (3) times daily.  cromolyn (OPTICROM) 4 % ophthalmic solution Administer 1 Drop to both eyes as needed. Use in affected eye(s)  albuterol (PROVENTIL HFA, VENTOLIN HFA, PROAIR HFA) 90 mcg/actuation inhaler Take 1 Puff by inhalation every six (6) hours as needed for Wheezing.  escitalopram oxalate (LEXAPRO) 20 mg tablet TAKE 1 TABLET BY MOUTH EVERY DAY  pantoprazole (PROTONIX) 40 mg tablet Take 1 Tab by mouth daily. Indications: gastroesophageal reflux disease  lovastatin (MEVACOR) 20 mg tablet TAKE 1 TAB BY MOUTH NIGHTLY.  levothyroxine (SYNTHROID) 88 mcg tablet Take 1 Tab by mouth Daily (before breakfast).  cholecalciferol (VITAMIN D3) 1,000 unit tablet Take 1,000 Units by mouth daily as needed.  montelukast (SINGULAIR) 10 mg tablet Take 1 Tab by mouth daily. No current facility-administered medications for this visit. CBC: 
Lab Results Component Value Date/Time WBC 4.2 08/02/2018 08:19 AM  
 Hemoglobin (POC) 12.9 10/28/2013 03:40 PM  
 HGB 10.0 (L) 09/01/2018 03:14 AM  
 Hematocrit (POC) 38 10/28/2013 03:40 PM  
 HCT 39.9 08/02/2018 08:19 AM  
 PLATELET 738 73/40/2272 08:19 AM  
 MCV 96 08/02/2018 08:19 AM  
 
INR History:   (normal INR range 0.8-1.2) Date   INR   PT 
11/1/18 3.0  36.0 
10/25/18 3.4  41.1 · Medications that can increase  INR: None · Medications that can decrease INR: None A/P:   Considering Ms. Thomas's past history, todays findings, and per Anticoagulation Collaborative Practice Agreement/Protocol: 1. POC INR is Therapeutic for INR goal today. 2. Continue  2.5 mg on Tuesdays and Thursdays; 5 mg on all other days Patient was instructed to schedule an appointment in 3 weeks prior to leaving the clinic. Medication reconciliation was completed during the visit. Medications Discontinued During This Encounter Medication Reason  HYDROmorphone (DILAUDID) 2 mg tablet Not A Current Medication A full discussion of the nature of anticoagulants has been carried out. A full discussion of the need for frequent and regular monitoring, precise dosage adjustment and compliance was stressed. Side effects of potential bleeding were discussed and Ms. Ellen Blankenship was instructed to call [phone] if there are any signs of abnormal bleeding. Ms. Ellen Blankenship was instructed to avoid any OTC items containing aspirin or ibuprofen and prior to starting any new OTC products to consult with her physician or pharmacist to ensure no drug interactions are present. Ms. Ellen Blankenship was instructed to avoid any major changes in her general diet and to avoid alcohol consumption.  
 
Ms. Ellen Blankenship was provided information in the AVS that includes topics on understanding coumadin therapy, drug interaction considerations, vitamin K and coumadin use, interactions with foods and supplements containing vitamin K, and the use of herbal products. MsTo Ian Gamboa verbalized her understanding of all instructions and will call the office with any questions, concerns, or signs of abnormal bleeding or blood clot. Notifications of recommendations will be sent to Dr. Krystle Yun MD for review Thank you, Gayla Rojas 
PGY-1 Pharmacy Resident

## 2018-11-20 DIAGNOSIS — E78.5 HYPERLIPIDEMIA, UNSPECIFIED HYPERLIPIDEMIA TYPE: ICD-10-CM

## 2018-11-20 RX ORDER — LOVASTATIN 20 MG/1
TABLET ORAL
Qty: 30 TAB | Refills: 5 | Status: SHIPPED | OUTPATIENT
Start: 2018-11-20 | End: 2019-03-20 | Stop reason: SDUPTHER

## 2018-11-23 DIAGNOSIS — I82.4Y2 ACUTE DEEP VEIN THROMBOSIS (DVT) OF PROXIMAL VEIN OF LEFT LOWER EXTREMITY (HCC): ICD-10-CM

## 2018-11-23 RX ORDER — WARFARIN 2.5 MG/1
2.5 TABLET ORAL DAILY
Qty: 30 TAB | Refills: 0 | Status: SHIPPED | OUTPATIENT
Start: 2018-11-23 | End: 2019-03-05 | Stop reason: SDUPTHER

## 2018-11-29 DIAGNOSIS — K21.9 GASTROESOPHAGEAL REFLUX DISEASE WITHOUT ESOPHAGITIS: ICD-10-CM

## 2018-11-29 RX ORDER — PANTOPRAZOLE SODIUM 40 MG/1
TABLET, DELAYED RELEASE ORAL
Qty: 90 TAB | Refills: 1 | Status: SHIPPED | OUTPATIENT
Start: 2018-11-29 | End: 2019-05-16 | Stop reason: SDUPTHER

## 2018-11-30 ENCOUNTER — HOSPITAL ENCOUNTER (OUTPATIENT)
Dept: LAB | Age: 53
Discharge: HOME | End: 2018-11-30
Payer: MEDICARE

## 2018-11-30 ENCOUNTER — OFFICE VISIT (OUTPATIENT)
Dept: INTERNAL MEDICINE CLINIC | Age: 53
End: 2018-11-30

## 2018-11-30 VITALS
RESPIRATION RATE: 18 BRPM | SYSTOLIC BLOOD PRESSURE: 122 MMHG | TEMPERATURE: 97.8 F | BODY MASS INDEX: 40.97 KG/M2 | WEIGHT: 261 LBS | DIASTOLIC BLOOD PRESSURE: 81 MMHG | HEART RATE: 64 BPM | OXYGEN SATURATION: 99 % | HEIGHT: 67 IN

## 2018-11-30 DIAGNOSIS — G89.29 CHRONIC BILATERAL LOW BACK PAIN WITH BILATERAL SCIATICA: ICD-10-CM

## 2018-11-30 DIAGNOSIS — E03.9 ACQUIRED HYPOTHYROIDISM: ICD-10-CM

## 2018-11-30 DIAGNOSIS — M54.41 CHRONIC BILATERAL LOW BACK PAIN WITH BILATERAL SCIATICA: ICD-10-CM

## 2018-11-30 DIAGNOSIS — M54.42 CHRONIC BILATERAL LOW BACK PAIN WITH BILATERAL SCIATICA: ICD-10-CM

## 2018-11-30 DIAGNOSIS — K76.0 FATTY LIVER: ICD-10-CM

## 2018-11-30 DIAGNOSIS — I82.4Y2 ACUTE DEEP VEIN THROMBOSIS (DVT) OF PROXIMAL VEIN OF LEFT LOWER EXTREMITY (HCC): Primary | ICD-10-CM

## 2018-11-30 DIAGNOSIS — M48.062 SPINAL STENOSIS OF LUMBAR REGION WITH NEUROGENIC CLAUDICATION: ICD-10-CM

## 2018-11-30 PROBLEM — M25.562 CHRONIC PAIN OF LEFT KNEE: Status: ACTIVE | Noted: 2017-09-06

## 2018-11-30 PROBLEM — M17.10 OSTEOARTHROSIS, LOCALIZED, PRIMARY, KNEE: Status: ACTIVE | Noted: 2017-09-06

## 2018-11-30 PROCEDURE — 80048 BASIC METABOLIC PNL TOTAL CA: CPT

## 2018-11-30 PROCEDURE — 36415 COLL VENOUS BLD VENIPUNCTURE: CPT

## 2018-11-30 PROCEDURE — 85025 COMPLETE CBC W/AUTO DIFF WBC: CPT

## 2018-11-30 PROCEDURE — 84443 ASSAY THYROID STIM HORMONE: CPT

## 2018-11-30 RX ORDER — PREGABALIN 75 MG/1
150 CAPSULE ORAL 2 TIMES DAILY
Qty: 60 CAP | Refills: 0 | Status: SHIPPED | OUTPATIENT
Start: 2018-11-30 | End: 2018-12-12 | Stop reason: SDUPTHER

## 2018-11-30 RX ORDER — LEVOTHYROXINE SODIUM 88 UG/1
88 TABLET ORAL
Qty: 90 TAB | Refills: 5 | Status: SHIPPED | OUTPATIENT
Start: 2018-11-30 | End: 2019-02-17 | Stop reason: SDUPTHER

## 2018-11-30 RX ORDER — METHOCARBAMOL 750 MG/1
750 TABLET, FILM COATED ORAL 3 TIMES DAILY
Qty: 90 TAB | Refills: 1 | Status: SHIPPED | OUTPATIENT
Start: 2018-11-30 | End: 2018-12-28 | Stop reason: DRUGHIGH

## 2018-11-30 NOTE — PROGRESS NOTES
Reviewed record in preparation for visit and have obtained necessary documentation. Identified pt with two pt identifiers(name and ). Chief Complaint Patient presents with  Blood Clot Health Maintenance Due Topic Date Due  Shingles Vaccine (1 of 2) 2014 Ms. Devin Simms has a reminder for a \"due or due soon\" health maintenance. I have asked that she discuss this further with her primary care provider for follow-up on this health maintenance. Coordination of Care Questionnaire: 
:  
 
1) Have you been to an emergency room, urgent care clinic since your last visit? no  
Hospitalized since your last visit? no          
 
2) Have you seen or consulted any other health care providers outside of [address] [place] [address] since your last visit? no  (Include any pap smears or colon screenings in this section.) 3) In the event something were to happen to you and you were unable to speak on your behalf, do you have an Advance Directive/ Living Will in place stating your wishes? NO Do you have an Advance Directive on file? no 
 
4) Are you interested in receiving information on Advance Directives? NO Patient is accompanied by self I have received verbal consent from Mian Wilson to discuss any/all medical information while they are present in the room.

## 2018-11-30 NOTE — PROGRESS NOTES
CC: Blood Clot 
chronic back pain HPI: 
 
DVT: provoked in the setting of recent surgery - left leg. ~ 2 months ago. October Leg pain resolved, swelling improving On coumadin at INR clinic, plan to treat for 6 months, will repeat US prior to stopping therapy Back pain: patient had extensive back surgery in September complicated with DVT Patient is having significant back pain, tramadol is not working with pain. Having pain radiate to legs ( sciatica pain) On gabapentin 300mg TID Will start PT starting thursday - this  Will be challenging as she has significant OA of knees on the left Hypothyroidism - euthyroid denies constipation, hair loss. Depression/anxiety: stable on lexapro ROS: 
10 systems reviewed and negative other than HPI Past Medical History:  
Diagnosis Date  Arthritis  Asthma  Breast cyst 1996  
 left, removed  Chronic pain LOWER BACK  Colon polyps  Diverticulitis  DVT (deep venous thrombosis) ([facility] [address] 10/18/2018  
 provoked after back sx  Fatty liver  GERD (gastroesophageal reflux disease)  Hypercholesteremia  Nicotine vapor product user  Obesity  SUN (obstructive sleep apnea)   
 uses CPAP  
 Psychiatric disorder   
 depression  Thyroid disease   
 hypothyroid Current Outpatient Medications on File Prior to Visit Medication Sig Dispense Refill  pantoprazole (PROTONIX) 40 mg tablet TAKE 1 TABLET BY MOUTH DAILY. INDICATIONS: GASTROESOPHAGEAL REFLUX DISEASE 90 Tab 1  
 warfarin (COUMADIN) 2.5 mg tablet TAKE 1 TAB BY MOUTH DAILY. TAKE 1 PILL TUESDAY AND THURSDAY 30 Tab 0  
 lovastatin (MEVACOR) 20 mg tablet TAKE 1 TABLET BY MOUTH NIGHTLY 30 Tab 5  warfarin (COUMADIN) 5 mg tablet Take 1 Tab by mouth daily. 30 Tab 5  
 fluticasone furoate (ARNUITY ELLIPTA) 100 mcg/actuation dsdv inhaler Take 1 Puff by inhalation daily. 2 Inhaler 4  
 gabapentin (NEURONTIN) 300 mg capsule Take 300 mg by mouth daily.  methocarbamol (ROBAXIN) 750 mg tablet Take 750 mg by mouth three (3) times daily.  cromolyn (OPTICROM) 4 % ophthalmic solution Administer 1 Drop to both eyes as needed. Use in affected eye(s)  albuterol (PROVENTIL HFA, VENTOLIN HFA, PROAIR HFA) 90 mcg/actuation inhaler Take 1 Puff by inhalation every six (6) hours as needed for Wheezing. 1 Inhaler 2  
 escitalopram oxalate (LEXAPRO) 20 mg tablet TAKE 1 TABLET BY MOUTH EVERY DAY 90 Tab 1  
 levothyroxine (SYNTHROID) 88 mcg tablet Take 1 Tab by mouth Daily (before breakfast). 90 Tab 5  cholecalciferol (VITAMIN D3) 1,000 unit tablet Take 1,000 Units by mouth daily as needed.  montelukast (SINGULAIR) 10 mg tablet Take 1 Tab by mouth daily. 30 Tab 4 No current facility-administered medications on file prior to visit. Past Surgical History:  
Procedure Laterality Date  COLONOSCOPY N/A 11/14/2017 COLONOSCOPY performed by Bj Chavez MD at [facility] END[facility]COPY [facility] SURGERY  2005, 2006 L5 & S 1  
 HX BACK SURGERY  08/30/2018  HX BREAST BIOPSY Left  HX HYSTERECTOMY  6/22/09 18 [facility]  HX OOPHORECTOMY Left One ovary removed.  HX ORTHOPAEDIC Left 1972  
 thumb surgery  HX ORTHOPAEDIC Left 2000  
 left knee surgery  HX ORTHOPAEDIC Left 2013  
 ankle  HX TUBAL LIGATION    
 REMOVAL OF KIDNEY STONE  RENAL STENT Family History Problem Relation Age of Onset Marsha Joseph Mother 54 Lung cancer  Heart Disease Father  Hypertension Father  Elevated Lipids Father  Heart Attack Father  Stroke Father   
     x 3  
 Elevated Lipids Sister  Heart Disease Brother  Hypertension Brother  Elevated Lipids Brother  Elevated Lipids Sister  Elevated Lipids Sister  Heart Disease Sister  Cancer Sister   
     uterine  Heart Disease Sister  Cancer Sister   
     uterine  Heart Disease Sister  Cancer Sister colon cancer with liver mets  Bleeding Prob Brother  Heart Attack Brother  Anesth Problems Neg Hx Reviewed and no changes Social History Socioeconomic History  Marital status:  Spouse name: Not on file  Number of children: Not on file  Years of education: Not on file  Highest education level: Not on file Social Needs  Financial resource strain: Not on file  Food insecurity - worry: Not on file  Food insecurity - inability: Not on file  Transportation needs - medical: Not on file  Transportation needs - non-medical: Not on file Occupational History  Not on file Tobacco Use  Smoking status: Former Smoker Packs/day: 1.50 Years: 25.00 Pack years: 37.50 Last attempt to quit: 02/2015 Years since quitting: 3.8  Smokeless tobacco: Never Used Substance and Sexual Activity  Alcohol use: No  
 Drug use: No  
 Sexual activity: Yes  
  Partners: Male Birth control/protection: Surgical  
Other Topics Concern  Not on file Social History Narrative  Not on file Visit Vitals /81 (BP 1 Location: Right arm, BP Patient Position: Sitting) Pulse 64 Temp 97.8 °F (36.6 °C) (Oral) Resp 18 Ht 5' 7\" (1.702 m) Wt 261 lb (118.4 kg) LMP 06/22/2009 SpO2 99% BMI 40.88 kg/m² Physical Examination:  
General - Well appearing female HEENT - PERRL, TM no erythema/opacification, normal nasal turbinates, oropharynx no erythema or exudate, MMM Neck - supple, no bruits, no TMG, no LAD Pulm - clear to auscultation bilaterally Cardio - RRR, normal S1 S2, no murmur gallops or rubs Abd - soft, nontender, no masses, no HSM Extrem - swelling in the left leg improved Walking with a cane ( previously on wheelchair) Psych - normal affect, appropriate mood Lab Results Component Value Date/Time  WBC 4.2 08/02/2018 08:19 AM  
 Hemoglobin (POC) 12.9 10/28/2013 03:40 PM  
 HGB 10.0 (L) 09/01/2018 03:14 AM  
 Hematocrit (POC) 38 10/28/2013 03:40 PM  
 HCT 39.9 08/02/2018 08:19 AM  
 PLATELET 439 56/74/1371 08:19 AM  
 MCV 96 08/02/2018 08:19 AM  
 
Lab Results Component Value Date/Time Sodium 139 08/31/2018 03:29 AM  
 Potassium 4.2 08/31/2018 03:29 AM  
 Chloride 106 08/31/2018 03:29 AM  
 CO2 23 08/31/2018 03:29 AM  
 Anion gap 10 08/31/2018 03:29 AM  
 Glucose 148 (H) 08/31/2018 03:29 AM  
 BUN 11 08/31/2018 03:29 AM  
 Creatinine 0.91 08/31/2018 03:29 AM  
 BUN/Creatinine ratio 12 08/31/2018 03:29 AM  
 GFR est AA >60 08/31/2018 03:29 AM  
 GFR est non-AA >60 08/31/2018 03:29 AM  
 Calcium 8.4 (L) 08/31/2018 03:29 AM  
 
Lab Results Component Value Date/Time Cholesterol, total 215 (H) 08/02/2018 08:19 AM  
 HDL Cholesterol 85 08/02/2018 08:19 AM  
 LDL, calculated 117 (H) 08/02/2018 08:19 AM  
 VLDL, calculated 13 08/02/2018 08:19 AM  
 Triglyceride 67 08/02/2018 08:19 AM  
 
Lab Results Component Value Date/Time TSH 3.010 08/02/2018 08:19 AM  
 
No results found for: PSA, Karan Huynh, PSAR3, [id number], [id number], PSALT Lab Results Component Value Date/Time Hemoglobin A1c 5.4 08/02/2018 08:19 AM  
 
Lab Results Component Value Date/Time VITAMIN D, 25-HYDROXY 28.5 (L) 05/09/2017 08:54 AM  
   
Lab Results Component Value Date/Time ALT (SGPT) 9 08/02/2018 08:19 AM  
 AST (SGOT) 15 08/02/2018 08:19 AM  
 Alk. phosphatase 73 08/02/2018 08:19 AM  
 Bilirubin, total 0.3 08/02/2018 08:19 AM  
 
 INR is 3.4 Assessment/Plan: 1. Provoked/ after sx/  Acute deep vein thrombosis (DVT) of proximal vein of left lower extremity ([address] 
eliquis not covered - INR is therapeutic / following with pharmacist here  
 - goal to repeat US in APril and if resolved will DC coumadin 2.  Chronic back pain: patient had extensive back surgery - will start PT 
-given sciatica symptoms will start patient on lyrica low dose if effective wean off gabapentin and increase lyrica 
- refilled muscle relaxant 3. Hypothyroidism: check TSH, refilled synthroid 88 mcg 4. GERD: stable on PPI 5. Depression: stable on lexapro Orders Placed This Encounter  TSH AND FREE T4  
 METABOLIC PANEL, BASIC  CBC WITH AUTOMATED DIFF  pregabalin (LYRICA) 75 mg capsule Sig: Take 2 Caps by mouth two (2) times a day. Max Daily Amount: 300 mg. Dispense:  60 Cap Refill:  0  
 methocarbamol (ROBAXIN) 750 mg tablet Sig: Take 1 Tab by mouth three (3) times daily. Dispense:  90 Tab Refill:  1  
 levothyroxine (SYNTHROID) 88 mcg tablet Sig: Take 1 Tab by mouth Daily (before breakfast). Dispense:  90 Tab Refill:  5 Follow-up Disposition: Not on File Loli Burch MD

## 2018-12-01 LAB
BASOPHILS # BLD AUTO: 0 X10E3/UL (ref 0–0.2)
BASOPHILS NFR BLD AUTO: 0 %
BUN SERPL-MCNC: 11 MG/DL (ref 6–24)
BUN/CREAT SERPL: 12 (ref 9–23)
CALCIUM SERPL-MCNC: 9.7 MG/DL (ref 8.7–10.2)
CHLORIDE SERPL-SCNC: 98 MMOL/L (ref 96–106)
CO2 SERPL-SCNC: 26 MMOL/L (ref 20–29)
CREAT SERPL-MCNC: 0.94 MG/DL (ref 0.57–1)
EOSINOPHIL # BLD AUTO: 0.1 X10E3/UL (ref 0–0.4)
EOSINOPHIL NFR BLD AUTO: 3 %
ERYTHROCYTE [DISTWIDTH] IN BLOOD BY AUTOMATED COUNT: 15.1 % (ref 12.3–15.4)
GLUCOSE SERPL-MCNC: 86 MG/DL (ref 65–99)
HCT VFR BLD AUTO: 37.9 % (ref 34–46.6)
HGB BLD-MCNC: 12 G/DL (ref 11.1–15.9)
IMM GRANULOCYTES # BLD: 0 X10E3/UL (ref 0–0.1)
IMM GRANULOCYTES NFR BLD: 0 %
LYMPHOCYTES # BLD AUTO: 1.3 X10E3/UL (ref 0.7–3.1)
LYMPHOCYTES NFR BLD AUTO: 37 %
MCH RBC QN AUTO: 29.5 PG (ref 26.6–33)
MCHC RBC AUTO-ENTMCNC: 31.7 G/DL (ref 31.5–35.7)
MCV RBC AUTO: 93 FL (ref 79–97)
MONOCYTES # BLD AUTO: 0.3 X10E3/UL (ref 0.1–0.9)
MONOCYTES NFR BLD AUTO: 7 %
NEUTROPHILS # BLD AUTO: 1.8 X10E3/UL (ref 1.4–7)
NEUTROPHILS NFR BLD AUTO: 53 %
PLATELET # BLD AUTO: 257 X10E3/UL (ref 150–379)
POTASSIUM SERPL-SCNC: 4.2 MMOL/L (ref 3.5–5.2)
RBC # BLD AUTO: 4.07 X10E6/UL (ref 3.77–5.28)
SODIUM SERPL-SCNC: 142 MMOL/L (ref 134–144)
T4 FREE SERPL-MCNC: 1.13 NG/DL (ref 0.82–1.77)
TSH SERPL DL<=0.005 MIU/L-ACNC: 2 UIU/ML (ref 0.45–4.5)
WBC # BLD AUTO: 3.5 X10E3/UL (ref 3.4–10.8)

## 2018-12-03 ENCOUNTER — ANTI-COAG VISIT (OUTPATIENT)
Dept: INTERNAL MEDICINE CLINIC | Age: 53
End: 2018-12-03

## 2018-12-03 DIAGNOSIS — I82.4Y2 ACUTE DEEP VEIN THROMBOSIS (DVT) OF PROXIMAL VEIN OF LEFT LOWER EXTREMITY (HCC): Primary | ICD-10-CM

## 2018-12-03 LAB
INR BLD: 2.5 (ref 1–1.5)
PT POC: 29.5 SECONDS (ref 9.1–12)
VALID INTERNAL CONTROL?: YES

## 2018-12-03 RX ORDER — WARFARIN SODIUM 5 MG/1
5 TABLET ORAL DAILY
Qty: 30 TAB | Refills: 5
Start: 2018-12-03 | End: 2019-08-15 | Stop reason: ALTCHOICE

## 2018-12-03 NOTE — PATIENT INSTRUCTIONS
Today your INR was 2.5. Your goal INR is 2.0-3.0. You have a 2.5 and 5 mg tablet of Coumadin (warfarin). Take Coumadin as follows:    Continue warfarin 2.5 mg on Tuesdays and Thursdays; 5 mg daily the rest of the week. Come back in 4 weeks for your next finger stick/INR blood test.      Avoid any over the counter items containing aspirin or ibuprofen, and avoid great swings in general diet. Avoid alcohol consumption. Please notify the INR nurse if you are started on any new medication including over the counter or herbal supplements. Also, please notify your INR nurse if any of your other prescription or over the counter medications have been discontinued. Call [facility] at [phone] if you have any signs of abnormal bleeding/blood clot. Taking Warfarin Safely: Care Instructions  Your Care Instructions  Warfarin is a medicine that you take to prevent blood clots. It is often called a blood thinner. Doctors give warfarin (such as Coumadin) to reduce the risk of blood clots. You may be at risk for blood clots if you have atrial fibrillation or deep vein thrombosis. Some other health problems may also put you at risk. Warfarin slows the amount of time it takes for your blood to clot. It can cause bleeding problems. Even if you've been taking warfarin for a while, it's important to know how to take it safely. Foods and other medicines can affect the way warfarin works. Some can make warfarin work too well. This can cause bleeding problems. And some can make it work poorly, so that it does not prevent blood clots very well. You will need regular blood tests to check how long it takes for your blood to form a clot. This test is called a PT or prothrombin time test. The result of the test is called an INR level. Depending on the test results, your doctor or anticoagulation clinic may adjust your dose of warfarin. Follow-up care is a key part of your treatment and safety.  Be sure to make and go to all appointments, and call your doctor if you are having problems. It's also a good idea to know your test results and keep a list of the medicines you take. How can you care for yourself at home? Take warfarin safely  · Take your warfarin at the same time each day. · If you miss a dose of warfarin, don't take an extra dose to make up for it. Your doctor can tell you exactly what to do so you don't take too much or too little. · Wear medical alert jewelry that lets others know that you take warfarin. You can buy this at most [employer]. · Don't take warfarin if you are pregnant or planning to get pregnant. Talk to your doctor about how you can prevent getting pregnant while you are taking it. · Don't change your dose or stop taking warfarin unless your doctor tells you to. Effects of medicines and food on warfarin  · Don't start or stop taking any medicines, vitamins, or natural remedies unless you first talk to your doctor. Many medicines can affect how warfarin works. These include aspirin and other pain relievers, over-the-counter medicines, multivitamins, dietary supplements, and herbal products. · Tell all of your doctors and pharmacists that you take warfarin. Some prescription medicines can affect how warfarin works. · Keep the amount of vitamin K in your diet about the same from day to day. Do not suddenly eat a lot more or a lot less food that is rich in vitamin K than you usually do. Vitamin K affects how warfarin works and how your blood clots. Talk with your doctor before making big changes in your diet. Vitamin K is in many foods, such as:  ¨ Leafy greens, such as kale, cabbage, spinach, Swiss chard, and lettuce. ¨ Canola and soybean oils. ¨ Green vegetables, such as asparagus, broccoli, and Meta sprouts. ¨ Vegetable drinks, green tea leaves, and some dietary supplement drinks. · Avoid cranberry juice and other cranberry products.  They can increase the effects of warfarin. · Limit your use of alcohol. Avoid bleeding by preventing falls and injuries  · Wear slippers or shoes with nonskid soles. · Remove throw rugs and clutter. · Rearrange furniture and electrical cords to keep them out of walking paths. · Keep stairways, porches, and outside walkways well lit. Use night-lights in hallways and bathrooms. · Be extra careful when you work with sharp tools or knives. When should you call for help? Call 911 anytime you think you may need emergency care. For example, call if:  · You have a sudden, severe headache that is different from past headaches. Call your doctor now or seek immediate medical care if:  · You have any abnormal bleeding, such as:  ¨ Nosebleeds. ¨ Vaginal bleeding that is different (heavier, more frequent, at a different time of the month) than what you are used to. ¨ Bloody or black stools, or rectal bleeding. ¨ Bloody or pink urine. Watch closely for changes in your health, and be sure to contact your doctor if you have any problems. Where can you learn more? Go to http://olivier-akil.info/. Enter R816 in the search box to learn more about \"Taking Warfarin Safely: Care Instructions. \"  Current as of: January 27, 2016  Content Version: 11.1  © 4150-1939 [employer]. Care instructions adapted under license by [employer] (which disclaims liability or warranty for this information).  If you have questions about a medical condition or this instruction, always ask your healthcare professional. [place] [address] any warranty or liability for your use of this information.

## 2018-12-05 ENCOUNTER — TELEPHONE (OUTPATIENT)
Dept: INTERNAL MEDICINE CLINIC | Age: 53
End: 2018-12-05

## 2018-12-05 NOTE — PROGRESS NOTES
Pharmacy Progress Note  - Anticoagulation Management    S/O:  Ms. Zoie Ramos  is a 46 y.o. female seen today for anticoagulation management for the diagnosis of Provoked, LE Deep Vein Thrombosis. She ambulates w/ a cane. HPI:  Provoked LE DVT secondary to back surgery (10/18/18) and immobility. Dx confirmed by [address] (10/18/18). Reports significant FHx of cardiac disease and recurrent blood clots. Pt's brother and 2 sisters are on anticoagulants, warfarin and Eliquis, respectively. Initially prescribed Eliquis but did not fill rx due to cost.  Warfarin therapy started w/o lovenox bridge d/t therapeutic INR. She is motivated to \"cure\" this DVT. Interim History:  Pt seen by Dr. Lee Merchant on 11/30/18. Leg pain resolved and swelling continues to improve. Tramadol discontinued for sciatic pain and replaced by Lyrica 75 mg and methocarbamol 750 mg. Pt will continue gabapentin 300 mg TID until supply completes. Will have PT evaluation on 12/19/18. · Warfarin start date:  ~10/25/18  · INR Goal:  2.0-3.0    · Current warfarin regimen:  2.5 mg Tues, Thurs, and 5 mg daily ROW                     · Warfarin tablet strength:   5 mg and 2.5 mg  · Duration of therapy:  ~ April 2019 -  US prior to therapy discontinuation    Today's Patient Findings:    Pertinent positives includes:  Medication change   · Inquires about steroid knee injection. Has hx of receiving this injection several times a year  · Lyrica + gabapentin + robaxin are helping her sciatic pain    INR (POC) today (normal INR range 0.8-1.2) :    · INR = 2.5   · PT = 29.5    · Adherence:   · Able to recall regimen? YES  · Miss/extra dose? NO  · Need refill?  NO    Upcoming procedure(s):  NO    Past Medical History:   Diagnosis Date    Arthritis     Asthma     Breast cyst 1996    left, removed    Chronic pain     LOWER BACK    Colon polyps     Diverticulitis     DVT (deep venous thrombosis) ([facility] [address] 10/18/2018    provoked after back sx    Fatty liver     GERD (gastroesophageal reflux disease)     Hypercholesteremia     Nicotine vapor product user     Obesity     SUN (obstructive sleep apnea)     uses CPAP    Psychiatric disorder     depression    Thyroid disease     hypothyroid       Current Outpatient Medications   Medication Sig    warfarin (COUMADIN) 5 mg tablet Take 1 Tab by mouth daily. Take 1 tablet by mouth daily on Mon, Wed, Fri, Sat, and Sunday. Take 2.5 mg strength on Tuesday and Thursday.  pregabalin (LYRICA) 75 mg capsule Take 2 Caps by mouth two (2) times a day. Max Daily Amount: 300 mg.    methocarbamol (ROBAXIN) 750 mg tablet Take 1 Tab by mouth three (3) times daily.  levothyroxine (SYNTHROID) 88 mcg tablet Take 1 Tab by mouth Daily (before breakfast).  pantoprazole (PROTONIX) 40 mg tablet TAKE 1 TABLET BY MOUTH DAILY. INDICATIONS: GASTROESOPHAGEAL REFLUX DISEASE    warfarin (COUMADIN) 2.5 mg tablet TAKE 1 TAB BY MOUTH DAILY. TAKE 1 PILL TUESDAY AND THURSDAY    lovastatin (MEVACOR) 20 mg tablet TAKE 1 TABLET BY MOUTH NIGHTLY    fluticasone furoate (ARNUITY ELLIPTA) 100 mcg/actuation dsdv inhaler Take 1 Puff by inhalation daily.  gabapentin (NEURONTIN) 300 mg capsule Take 300 mg by mouth daily.  cromolyn (OPTICROM) 4 % ophthalmic solution Administer 1 Drop to both eyes as needed. Use in affected eye(s)    albuterol (PROVENTIL HFA, VENTOLIN HFA, PROAIR HFA) 90 mcg/actuation inhaler Take 1 Puff by inhalation every six (6) hours as needed for Wheezing.  escitalopram oxalate (LEXAPRO) 20 mg tablet TAKE 1 TABLET BY MOUTH EVERY DAY    cholecalciferol (VITAMIN D3) 1,000 unit tablet Take 1,000 Units by mouth daily as needed.  montelukast (SINGULAIR) 10 mg tablet Take 1 Tab by mouth daily. No current facility-administered medications for this visit.       CBC:    Lab Results   Component Value Date/Time    WBC 3.5 11/30/2018 04:00 PM    Hemoglobin (POC) 12.9 10/28/2013 03:40 PM    HGB 12.0 11/30/2018 04:00 PM    Hematocrit (POC) 38 10/28/2013 03:40 PM    HCT 37.9 11/30/2018 04:00 PM    PLATELET 891 67/40/4549 04:00 PM    MCV 93 11/30/2018 04:00 PM     Lab Results   Component Value Date/Time    Protein, total 6.7 08/02/2018 08:19 AM    Albumin 4.3 08/02/2018 08:19 AM     INR History:   (normal INR range 0.8-1.2)     Date   INR   PT   Dose/Comments  12/03/18 2.5  29.5  11/01/18          3.0                   36.0  10/25/18          3.4                   41.1      A/P:   Considering Ms. Thomas's past history, todays findings, and per Anticoagulation Collaborative Practice Agreement/Protocol:    1. POC INR (2.5) is remains therapeutic for INR goal today. 2.  Continue warfarin 2.5 mg on Tues, Thurs, and 5 mg daily ROW. 3. Discuss w/ patient regarding expected duration of warfarin therapy and PCP's plan to check doppler prior to discontinuation in April 2019.   4. Discuss w/ patient regarding effect of prednisone burst therapy w/ warfarin. Pt prefers to wait until she completes warfarin first before any future steroid knee injections. 5. Discuss concurrent lyrica + gabapentin + robaxin therapy and potential fall risk w/ Pt. Patient was instructed to schedule an appointment in 4 weeks prior to leaving the clinic. Medication reconciliation was completed during the visit. Medications Discontinued During This Encounter   Medication Reason    warfarin (COUMADIN) 5 mg tablet Other       A full discussion of the nature of anticoagulants has been carried out. A full discussion of the need for frequent and regular monitoring, precise dosage adjustment and compliance was stressed. Side effects of potential bleeding were discussed and Ms. Tom Carreon was instructed to call [phone] if there are any signs of abnormal bleeding.   Ms. Tom Carreon was instructed to avoid any OTC items containing aspirin or ibuprofen and prior to starting any new OTC products to consult with her physician or pharmacist to ensure no drug interactions are present. Ms. Macy York was instructed to avoid any major changes in her general diet and to avoid alcohol consumption. Ms. Macy York was provided information in the AVS that includes topics on understanding coumadin therapy, drug interaction considerations, vitamin K and coumadin use, interactions with foods and supplements containing vitamin K, and the use of herbal products. Ms. Macy York verbalized her understanding of all instructions and will call the office with any questions, concerns, or signs of abnormal bleeding or blood clot.     Notifications of recommendations will be sent to Dr. Matthew Wright MD for review    Thank you for the consult,  Torin BenavidesD

## 2018-12-05 NOTE — TELEPHONE ENCOUNTER
Patient states she needs a call back to see if she can get something called in for what patient states is a Sinus Infection or if appt is required. Please call.  Thank you

## 2018-12-06 NOTE — TELEPHONE ENCOUNTER
MD Sonia Vegas LPN   Caller: Unspecified (Yesterday,  4:05 PM)             Sinus infections are 90  % of the time viral or allergic, recommend zyrtec 10mg and flonase nasal spray daily   If symptoms persist for 7 days then will send antibiotic   Also antibiotics interact with coumadin so need to avoid if possible      Spoke with patient. Two pt identifiers confirmed. Patient notified of the above message from Dr. Dru Avalos. Pt verbalized understanding of information discussed w/ no further questions at this time.

## 2018-12-06 NOTE — TELEPHONE ENCOUNTER
Spoke with patient. Two pt identifiers confirmed. Patient states that she thinks that she thinks that she may have a sinus infection. Patient states that she has nasal drainage, and a awful headache. Patient states that she has a lot of pressure in her face and ears. Patient denies any other symptoms. Patient denies fever. Patient states that she would like to see if Dr. Tacos Leung would be willing to send in a prescription for an antibiotic. Advised patient that I will send her request to Dr. Tacos Leung and will call her back once she responds. Pt verbalized understanding of information discussed w/ no further questions at this time.

## 2018-12-11 ENCOUNTER — TELEPHONE (OUTPATIENT)
Dept: INTERNAL MEDICINE CLINIC | Age: 53
End: 2018-12-11

## 2018-12-11 NOTE — TELEPHONE ENCOUNTER
Regarding: Non-Urgent Medical Question  Contact: [phone]  ----- Message from [address] [place] [address] [address], Generic sent at 12/11/2018  6:31 AM EST -----    Im taking 2 trumadol 50mg  every 6 hours it seems to help can you plz refill thank you

## 2018-12-12 DIAGNOSIS — M48.062 SPINAL STENOSIS OF LUMBAR REGION WITH NEUROGENIC CLAUDICATION: ICD-10-CM

## 2018-12-13 ENCOUNTER — TELEPHONE (OUTPATIENT)
Dept: INTERNAL MEDICINE CLINIC | Age: 53
End: 2018-12-13

## 2018-12-13 RX ORDER — PREGABALIN 75 MG/1
150 CAPSULE ORAL 2 TIMES DAILY
Qty: 60 CAP | Refills: 0 | Status: SHIPPED | OUTPATIENT
Start: 2018-12-13 | End: 2019-02-18

## 2018-12-13 NOTE — TELEPHONE ENCOUNTER
pregabalin (LYRICA) 75 mg capsule [[id number]]   Order Details   Dose: 150 mg Route: Oral Frequency: 2 TIMES DAILY   Dispense Quantity: 60 Cap Refills: 0 Fills remaining: --           Sig: Take 2 Caps by mouth two (2) times a day.  Max Daily Amount: 300 mg.          Written Date: 12/13/18 Expiration Date: --     Start Date: 12/13/18 End Date: --            Ordering Provider:  -- MAT #:  -- NPI:  --    Authorizing Provider:  Romina Rock MD MAT #:  [id number] NPI:  [id number]    Ordering User:  Romina Rock MD            Diagnosis Association: Spinal stenosis of lumbar region with neurogenic claudication (T49.845)      Original Order:  pregabalin (LYRICA) 75 mg capsule [[id number]]      Pharmacy:  [facility]/pharmacy #[id number]- [place], [place] - [address] AT [facility]        Prescription has been called into [facility] pharmacy

## 2018-12-18 ENCOUNTER — TELEPHONE (OUTPATIENT)
Dept: INTERNAL MEDICINE CLINIC | Age: 53
End: 2018-12-18

## 2018-12-18 NOTE — TELEPHONE ENCOUNTER
Pharmacy Progress Note - Telephone Encounter    S/O:  Ms. Dakota Smith called with questions regarding her DVT and \"new red spots and tingling feelings around my navel area. \"  Verified patients identifiers (name & ) per HIPAA policy. - Red spots around navel area started 30 mins ago and self-resolved. Tingling, migrating pain began shortly thereafter - transitions from abdomen to leg. Denies fever, leg erythema, or aching pain. Pt has hx of sciatic pain. Currently takes tramadol, lyrica, gabapentin, and robaxin. - Concern that these new symptoms are related to her DVT. Wonders if she should be on lovenox. A/P:  - Pt's INR (last seen 12/3 = 2.5) remains therapeutic for her goal of 2-3. Discuss plan to check LE US in ~2019 before warfarin therapy discontinuation.   - Lovenox therapy not warranted at this time. - Continue to watch sciatic pain/new onset of \"red spots. \" Review lyrica/gabapentin's role in managing sciatic pain. - Remind patient of upcoming appt w/ Dr. Ana Knight on () and INR appt on .   - Patient endorses understanding to the provided information. All questions were answered. Encourage Pt to call if she has any additional questions.     Thank you,  Leighann Ayala, PharmD

## 2018-12-18 NOTE — TELEPHONE ENCOUNTER
Patient states she needs a call back from Dr. Elfego Ashley to discuss blood clot found in leg. Patient was reminded of upcoming appt on 12/28/18. Please call.  Thank you

## 2018-12-28 ENCOUNTER — TELEPHONE (OUTPATIENT)
Dept: INTERNAL MEDICINE CLINIC | Age: 53
End: 2018-12-28

## 2018-12-28 DIAGNOSIS — M54.41 CHRONIC BILATERAL LOW BACK PAIN WITH BILATERAL SCIATICA: ICD-10-CM

## 2018-12-28 DIAGNOSIS — G89.29 CHRONIC BILATERAL LOW BACK PAIN WITH BILATERAL SCIATICA: ICD-10-CM

## 2018-12-28 DIAGNOSIS — M54.42 CHRONIC BILATERAL LOW BACK PAIN WITH BILATERAL SCIATICA: ICD-10-CM

## 2018-12-28 DIAGNOSIS — M48.062 SPINAL STENOSIS OF LUMBAR REGION WITH NEUROGENIC CLAUDICATION: ICD-10-CM

## 2018-12-28 RX ORDER — METHOCARBAMOL 500 MG/1
500 TABLET, FILM COATED ORAL 4 TIMES DAILY
Qty: 90 TAB | Refills: 0 | Status: SHIPPED | OUTPATIENT
Start: 2018-12-28 | End: 2019-01-25 | Stop reason: SDUPTHER

## 2018-12-28 RX ORDER — METHOCARBAMOL 750 MG/1
750 TABLET, FILM COATED ORAL 4 TIMES DAILY
Qty: 90 TAB | Refills: 1 | Status: SHIPPED | OUTPATIENT
Start: 2018-12-28 | End: 2018-12-28 | Stop reason: DRUGHIGH

## 2018-12-28 NOTE — TELEPHONE ENCOUNTER
Regarding: Non-Urgent Medical Question  Contact: [phone]  ----- Message from [address] [place] [address] [address], Generic sent at 12/28/2018 11:45 AM EST -----    I took myself off the lyrica cause it made me feel like all my nerves were going crazy i started take my Other nerve pills n seams to work better can you refill them for me and my methocarbamal plpablo longoria monday

## 2018-12-31 ENCOUNTER — TELEPHONE (OUTPATIENT)
Dept: INTERNAL MEDICINE CLINIC | Age: 53
End: 2018-12-31

## 2018-12-31 NOTE — TELEPHONE ENCOUNTER
Pharmacy Progress Note - Telephone Encounter    S/O: Ms. Darell Laguna was contacted via an outbound telephone call to follow up on her missed INR appointment today. A voicemail was left for patient to return my call.       Thank you,  Marian Pimentel, PharmD

## 2019-01-03 ENCOUNTER — TELEPHONE (OUTPATIENT)
Dept: INTERNAL MEDICINE CLINIC | Age: 54
End: 2019-01-03

## 2019-01-03 NOTE — TELEPHONE ENCOUNTER
Called [facility] to give them clarification on patients Warfarin. Patient should take 2.5 on Tuesday and Thursday and all other days 5.   Spoke with the pharmacist at 1622 pm

## 2019-01-07 ENCOUNTER — TELEPHONE (OUTPATIENT)
Dept: INTERNAL MEDICINE CLINIC | Age: 54
End: 2019-01-07

## 2019-01-07 NOTE — TELEPHONE ENCOUNTER
Pharmacy Progress Note - Telephone Encounter    S/O: Ms. Nidia Castillo was contacted via an outbound telephone call to follow up on rescheduling her missed INR appointment today. Verified patients identifiers (name & ) per HIPAA policy. - Pt reports to be doing well. Endorses difficulty with transportation. Requests to have INR appt scheduled following upcoming PCP visit. A/P:  - Since patient has been therapeutic for the past few INR checks. Will reschedule her appointment to 19 @ 10:30 AM following PCP's visit at 9:45 AM.   - Patient endorses understanding to the provided information. All questions were answered. - Encourage pt to reach out if she has any questions.      Thank you,  Maura Duque, TorinD

## 2019-01-07 NOTE — TELEPHONE ENCOUNTER
Pharmacy Progress Note - Telephone Encounter    S/O: Ms. Al Balderas was contacted via an outbound telephone call to follow up on missed INR appointment today. Left a voicemail for patient to return my call.      Thank you,  Doroteo Hubbard, TorinD

## 2019-01-07 NOTE — TELEPHONE ENCOUNTER
Spoke with patient. Two pt identifiers confirmed. Patient advised that she is setup to have her INR checked on the same day as her appointment with Dr. Nidia Santiago. Pt verbalized understanding of information discussed w/ no further questions at this time.

## 2019-01-07 NOTE — TELEPHONE ENCOUNTER
----- Message from Marlene Frost sent at 1/7/2019 10:50 AM EST -----  Regarding: Dr. Jay Jay Guardado has a scheduled appointment on January 31,2019 and pt requesting to have her INR done on the same day.  Pt best contact number is [phone]      Message copied/pasted from [facility]

## 2019-01-10 ENCOUNTER — TELEPHONE (OUTPATIENT)
Dept: INTERNAL MEDICINE CLINIC | Age: 54
End: 2019-01-10

## 2019-01-10 NOTE — TELEPHONE ENCOUNTER
Pharmacy Progress Note - Telephone Encounter    S/O: Ms. Winchester Knows called via an inbound telephone call today. Verified patients identifiers (name & ) per HIPAA policy. - Pt wants to know if it is safe her to participate in PT for her back given recent DVT. A/P:  - PT will definitely help w/ pt's balance, strength, and overall recovery. - D/w pt regarding risk of bruising/bleeding if she was to injured herself during PT.    - Remind pt of follow up appt on 19.   - Patient endorses understanding to the provided information. All questions were answered.       Thank you,  Carmen Ghosh, PharmD

## 2019-01-16 ENCOUNTER — TELEPHONE (OUTPATIENT)
Dept: INTERNAL MEDICINE CLINIC | Age: 54
End: 2019-01-16

## 2019-01-16 NOTE — TELEPHONE ENCOUNTER
Regarding: Non-Urgent Medical Question  Contact: [phone]  ----- Message from [address] [place] [address] [address], Generic sent at 1/16/2019  9:59 AM EST -----    Can you call me in a  sleeping pill. please . Thank You .

## 2019-01-17 RX ORDER — ALBUTEROL SULFATE 90 UG/1
1 AEROSOL, METERED RESPIRATORY (INHALATION)
Qty: 1 INHALER | Refills: 2 | Status: SHIPPED | OUTPATIENT
Start: 2019-01-17 | End: 2019-02-17 | Stop reason: SDUPTHER

## 2019-01-25 RX ORDER — METHOCARBAMOL 500 MG/1
TABLET, FILM COATED ORAL
Qty: 90 TAB | Refills: 0 | Status: SHIPPED | OUTPATIENT
Start: 2019-01-25 | End: 2019-02-12 | Stop reason: SDUPTHER

## 2019-01-28 ENCOUNTER — TELEPHONE (OUTPATIENT)
Dept: INTERNAL MEDICINE CLINIC | Age: 54
End: 2019-01-28

## 2019-01-28 NOTE — TELEPHONE ENCOUNTER
Pharmacy Progress Note - Telephone Call    Ms. Ludwin Hernandez 46 y. o. was contacted via an outbound telephone call regarding INR appointment follow up today. A voicemail was left for patient to return my call.        Torin SmithD

## 2019-01-31 RX ORDER — ESCITALOPRAM OXALATE 20 MG/1
TABLET ORAL
Qty: 90 TAB | Refills: 1 | Status: SHIPPED | OUTPATIENT
Start: 2019-01-31 | End: 2019-07-07 | Stop reason: SDUPTHER

## 2019-02-05 RX ORDER — TRAZODONE HYDROCHLORIDE 50 MG/1
TABLET ORAL
Refills: 0 | COMMUNITY
Start: 2019-01-16 | End: 2019-03-04 | Stop reason: ALTCHOICE

## 2019-02-05 RX ORDER — TRAZODONE HYDROCHLORIDE 50 MG/1
50 TABLET ORAL
Qty: 30 TAB | Refills: 0 | Status: SHIPPED | OUTPATIENT
Start: 2019-02-05 | End: 2019-03-03 | Stop reason: SDUPTHER

## 2019-02-05 NOTE — TELEPHONE ENCOUNTER
Regarding: RE: RE: RE: Non-Urgent Medical Question  Contact: [phone]  ----- Message from [address] [place] [address] [address], [facility] sent at 2/2/2019  6:16 AM EST -----      Can you refill my trazodone 50 mg . Thank You.  ----- Message -----  From: Simón Spence LPN  Sent: 3/2/84 39:95 PM  To: Charlotte Thomas  Subject: RE: RE: Non-Urgent Medical Question    Good Afternoon,  Unfortunately the flu shot does not cover all strands of the flu. Thanks,     Paddy Miramontes. Barron Mendoza, [facility]   Please note- My Chart is for non-urgent health concerns. You can expect a reply from our office within 2 business days. You should not email your provider about emergent health issues. If you have an emergency, please call 911. If you have an urgent concern, please call our office at [phone].       ----- Message -----     From: Ata Kahn     Sent: 1/31/2019  4:03 PM EST       To: Pablo Wolf MD  Subject: RE: RE: Non-Urgent Medical Question      I got a flu shot   ----- Message -----  From: Simón Spence LPN  Sent: 6/03/09 0:22 PM  To: Charlotte Thomas  Subject: RE: Non-Urgent Medical Question    Good Afternoon,  With a temperature of 101.3, I would suggest being seen and getting a flu test.  Thanks,     Edwige Mendoza, [facility]   Please note- My Chart is for non-urgent health concerns. You can expect a reply from our office within 2 business days. You should not email your provider about emergent health issues. If you have an emergency, please call 911. If you have an urgent concern, please call our office at [phone].       ----- Message -----     From: Ata Kahn     Sent: 1/31/2019 12:16 PM EST       To: Pablo Wolf MD  Subject: Non-Urgent Medical Question    Hello whats the meds to take for sinuses running nose ,sneezing, coughing , small fever of 101.3. Recommend anything . Thank you.

## 2019-02-11 ENCOUNTER — TELEPHONE (OUTPATIENT)
Dept: INTERNAL MEDICINE CLINIC | Age: 54
End: 2019-02-11

## 2019-02-11 NOTE — TELEPHONE ENCOUNTER
Regarding: Non-Urgent Medical Question  Contact: [phone]  ----- Message from [address] [place] [address] [address]51, Generic sent at 2/7/2019  6:32 PM EST -----    Can you plz write me a pain pill n i can pick it up at the  . Thank you .

## 2019-02-12 ENCOUNTER — TELEPHONE (OUTPATIENT)
Dept: INTERNAL MEDICINE CLINIC | Age: 54
End: 2019-02-12

## 2019-02-12 DIAGNOSIS — M54.41 CHRONIC BILATERAL LOW BACK PAIN WITH BILATERAL SCIATICA: Primary | ICD-10-CM

## 2019-02-12 DIAGNOSIS — G89.29 CHRONIC BILATERAL LOW BACK PAIN WITH BILATERAL SCIATICA: Primary | ICD-10-CM

## 2019-02-12 DIAGNOSIS — M54.42 CHRONIC BILATERAL LOW BACK PAIN WITH BILATERAL SCIATICA: Primary | ICD-10-CM

## 2019-02-12 RX ORDER — METHOCARBAMOL 500 MG/1
TABLET, FILM COATED ORAL
Qty: 90 TAB | Refills: 0 | Status: SHIPPED | OUTPATIENT
Start: 2019-02-12 | End: 2019-02-17 | Stop reason: SDUPTHER

## 2019-02-12 NOTE — TELEPHONE ENCOUNTER
Regarding: RE: Pain medication   Contact: [phone]  ----- Message from [address] [place] [address] [address], [facility] sent at 2/12/2019 10:08 AM EST -----      Please call in a muscle relaxer until i c u in march thank you   ----- Message -----  From: Dhruv Smith  Sent: 2/12/19 10:05 AM  To: Paige Thomas  Subject: Pain medication     Per Dr Joya Bloch. I can prescribe muscle relaxant and or refill lyrica without appointment. All other medications will require appointment ( state law).      Please let me know if you want to make an appointment or have her send in a muscle relaxant or lyrica       Thank you,     Kendall Hall MA

## 2019-02-17 DIAGNOSIS — E03.9 ACQUIRED HYPOTHYROIDISM: ICD-10-CM

## 2019-02-18 ENCOUNTER — TELEPHONE (OUTPATIENT)
Dept: INTERNAL MEDICINE CLINIC | Age: 54
End: 2019-02-18

## 2019-02-18 DIAGNOSIS — M48.062 SPINAL STENOSIS OF LUMBAR REGION WITH NEUROGENIC CLAUDICATION: Primary | ICD-10-CM

## 2019-02-18 RX ORDER — ALBUTEROL SULFATE 90 UG/1
1 AEROSOL, METERED RESPIRATORY (INHALATION)
Qty: 1 INHALER | Refills: 2 | Status: SHIPPED | OUTPATIENT
Start: 2019-02-18 | End: 2021-01-08 | Stop reason: SDUPTHER

## 2019-02-18 RX ORDER — GABAPENTIN 300 MG/1
300 CAPSULE ORAL 3 TIMES DAILY
Qty: 90 CAP | Refills: 2 | Status: SHIPPED | OUTPATIENT
Start: 2019-02-18 | End: 2019-04-16 | Stop reason: SDUPTHER

## 2019-02-18 RX ORDER — METHOCARBAMOL 500 MG/1
TABLET, FILM COATED ORAL
Qty: 90 TAB | Refills: 0 | Status: SHIPPED | OUTPATIENT
Start: 2019-02-18 | End: 2019-03-30 | Stop reason: SDUPTHER

## 2019-02-18 RX ORDER — LEVOTHYROXINE SODIUM 88 UG/1
88 TABLET ORAL
Qty: 90 TAB | Refills: 5 | Status: SHIPPED | OUTPATIENT
Start: 2019-02-18 | End: 2020-01-07

## 2019-02-18 NOTE — TELEPHONE ENCOUNTER
Regarding: RE: Pain medication   Contact: [phone]  ----- Message from [address] [place] [address] [address], [facility] sent at 2/13/2019  5:45 PM EST -----      Can you plz refill my Gabapentin n a muscle relaxer,  please Thank you.  ----- Message -----  From: Kenya Santos  Sent: 2/12/19 10:05 AM  To: Liliam Thomas  Subject: Pain medication     Per Dr Dwight Ledezma. I can prescribe muscle relaxant and or refill lyrica without appointment. All other medications will require appointment ( state law).      Please let me know if you want to make an appointment or have her send in a muscle relaxant or lyrica       Thank you,     Devin Mullins MA

## 2019-02-18 NOTE — TELEPHONE ENCOUNTER
Muscle relaxant robaxin was filled 02/12 too early for refill    I refilled the gabapentin. I note that the patient was on Lyrica previously I am assuming that the Lyrica was too expensive and patient went back to gabapentin.   Gabapentin and Lyrica should not be taken together Lyrica was discontinued from her medication list

## 2019-02-18 NOTE — TELEPHONE ENCOUNTER
PCP: Hayley Lugo MD    Last appt: 12/31/2018  Future Appointments   Date Time Provider Rozina Arizmendi   3/21/2019  3:00 PM Appa Esteban Romo MD [facility] 87       Requested Prescriptions     Pending Prescriptions Disp Refills    levothyroxine (SYNTHROID) 88 mcg tablet 90 Tab 5     Sig: Take 1 Tab by mouth Daily (before breakfast).  albuterol (PROVENTIL HFA, VENTOLIN HFA, PROAIR HFA) 90 mcg/actuation inhaler 1 Inhaler 2     Sig: Take 1 Puff by inhalation every six (6) hours as needed for Wheezing.  methocarbamol (ROBAXIN) 500 mg tablet 90 Tab 0     Sig: TAKE 1 TABLET BY MOUTH FOUR (4) TIMES DAILY.

## 2019-02-26 ENCOUNTER — TELEPHONE (OUTPATIENT)
Dept: INTERNAL MEDICINE CLINIC | Age: 54
End: 2019-02-26

## 2019-02-26 NOTE — TELEPHONE ENCOUNTER
Pt would like a call back in reference to coumadin Levels.  Pts contact [phone]       Message received & copied from [facility]

## 2019-02-26 NOTE — TELEPHONE ENCOUNTER
Pt requesting a call back regarding \"INR Therapy\" information.  Best contact:[phone]       Message received & copied from [facility] after closing on 2/25/19

## 2019-02-27 RX ORDER — LANOLIN ALCOHOL/MO/W.PET/CERES
400 CREAM (GRAM) TOPICAL DAILY
Qty: 30 TAB | Refills: 5 | Status: SHIPPED | OUTPATIENT
Start: 2019-02-27 | End: 2019-03-29

## 2019-02-27 NOTE — TELEPHONE ENCOUNTER
Pharmacy Progress Note - Telephone Encounter    S/O: Ms. Gwen Carlton 46 y.o. female was contacted via an outbound telephone call to follow up on scheduling her INR appointment today. Verified patients identifiers (name & ) per HIPAA policy. - Pt denies any missed doses of warfarin since her last INR visit on 18. Was able to verbalize correct regimen of warfarin 2.5 mg on Tuesday and Thursday and 5 mg daily the ROW. A/P:  - Next INR visit will be on  @ 2PM.   - Patient endorses understanding to the provided information. All questions were answered.      Thank you,  Nell Mirza, PharmD

## 2019-02-27 NOTE — TELEPHONE ENCOUNTER
Regarding: Non-Urgent Medical Question  Contact: [phone]  ----- Message from [address] [place] [address] [address], Generic sent at 2/27/2019  1:46 PM EST -----    Can you prescribe me 400 mg of magnesium so i can use the bathroom. Thank You.

## 2019-03-04 RX ORDER — TRAZODONE HYDROCHLORIDE 50 MG/1
TABLET ORAL
Qty: 30 TAB | Refills: 0 | Status: SHIPPED | OUTPATIENT
Start: 2019-03-04 | End: 2019-04-02 | Stop reason: SDUPTHER

## 2019-03-05 ENCOUNTER — OFFICE VISIT (OUTPATIENT)
Dept: INTERNAL MEDICINE CLINIC | Age: 54
End: 2019-03-05

## 2019-03-05 VITALS — WEIGHT: 268.4 LBS | BODY MASS INDEX: 42.04 KG/M2

## 2019-03-05 DIAGNOSIS — I82.4Y2 ACUTE DEEP VEIN THROMBOSIS (DVT) OF PROXIMAL VEIN OF LEFT LOWER EXTREMITY (HCC): Primary | ICD-10-CM

## 2019-03-05 LAB
INR BLD: 1.5 (ref 1–1.5)
PT POC: 17.4 SECONDS (ref 9.1–12)
VALID INTERNAL CONTROL?: YES

## 2019-03-05 RX ORDER — WARFARIN 2.5 MG/1
2.5 TABLET ORAL DAILY
Qty: 30 TAB | Refills: 0 | Status: SHIPPED | OUTPATIENT
Start: 2019-03-05 | End: 2019-03-24 | Stop reason: SDUPTHER

## 2019-03-05 NOTE — PROGRESS NOTES
Pharmacy Progress Note  - Anticoagulation Management    S/O:  Ms. Isaura Olivares  is a 46 y.o. female seen today for anticoagulation management for the diagnosis of Provoked, LE Deep Vein Thrombosis. She ambulates w/ a cane. She was last seen on 12/4/18.      HPI:  Provoked LE DVT secondary to back surgery (10/18/18) and immobility. Dx confirmed by [address] (10/18/18). Reports significant FHx of cardiac disease and recurrent blood clots. Pt's brother and 2 sisters are on anticoagulants, warfarin and Eliquis, respectively. Initially prescribed Eliquis but did not fill rx due to cost.  Warfarin therapy started w/o lovenox bridge d/t therapeutic INR. She is motivated to \"cure\" this DVT. · Warfarin start date:  ~10/25/18  · INR Goal:  2.0-3.0    · Current warfarin regimen:  2.5 mg Tues, Thurs, and 5 mg daily ROW                     · Warfarin tablet strength:   5 mg and 2.5 mg  · Duration of therapy:  ~ April 2019 - LE US prior to therapy discontinuation    Today's Patient Findings:    Pertinent positives includes:  Medication change     - Trazodone started for insomnia  - Started taking forskolin 250 mg herbal supplements (2 capsules daily) for weight loss. Started late last week. Wt today: 268 lbs , up 7 lbs from Nov. 2018. - Denies any bleeding/bruising/CP/HA/LE pain at this time   - Holding off on steroid knee injections until she completes warfarin. INR (POC) today (normal INR range 0.8-1.2) :    Recent Results (from the past 12 hour(s))   AMB POC PT/INR    Collection Time: 03/05/19  2:17 PM   Result Value Ref Range    VALID INTERNAL CONTROL POC Yes     Prothrombin time (POC) 17.4 (A) 9.1 - 12 seconds    INR POC 1.5 1 - 1.5       · Adherence:   · Able to recall regimen? YES  · Miss/extra dose? NO  · Need refill?  YES    Upcoming procedure(s):  NO    Past Medical History:   Diagnosis Date    Arthritis     Asthma     Breast cyst 1996    left, removed    Chronic pain     LOWER BACK    Colon polyps  Diverticulitis     [facility]    provoked after back sx    Fatty liver     GERD (gastroesophageal reflux disease)     Hypercholesteremia     Nicotine vapor product user     Obesity     SUN (obstructive sleep apnea)     uses CPAP    Psychiatric disorder     depression    Thyroid disease     hypothyroid       Current Outpatient Medications   Medication Sig    traZODone (DESYREL) 50 mg tablet TAKE 1 TABLET BY MOUTH EVERY DAY AT NIGHT    magnesium oxide (MAG-OX) 400 mg tablet Take 1 Tab by mouth daily for 30 days.  levothyroxine (SYNTHROID) 88 mcg tablet Take 1 Tab by mouth Daily (before breakfast).  albuterol (PROVENTIL HFA, VENTOLIN HFA, PROAIR HFA) 90 mcg/actuation inhaler Take 1 Puff by inhalation every six (6) hours as needed for Wheezing.  methocarbamol (ROBAXIN) 500 mg tablet TAKE 1 TABLET BY MOUTH FOUR (4) TIMES DAILY.  gabapentin (NEURONTIN) 300 mg capsule Take 1 Cap by mouth three (3) times daily.  escitalopram oxalate (LEXAPRO) 20 mg tablet TAKE 1 TABLET BY MOUTH EVERY DAY    warfarin (COUMADIN) 5 mg tablet Take 1 Tab by mouth daily. Take 1 tablet by mouth daily on Mon, Wed, Fri, Sat, and Sunday. Take 2.5 mg strength on Tuesday and Thursday.  pantoprazole (PROTONIX) 40 mg tablet TAKE 1 TABLET BY MOUTH DAILY. INDICATIONS: GASTROESOPHAGEAL REFLUX DISEASE    warfarin (COUMADIN) 2.5 mg tablet TAKE 1 TAB BY MOUTH DAILY. TAKE 1 PILL TUESDAY AND THURSDAY    lovastatin (MEVACOR) 20 mg tablet TAKE 1 TABLET BY MOUTH NIGHTLY    fluticasone furoate (ARNUITY ELLIPTA) 100 mcg/actuation dsdv inhaler Take 1 Puff by inhalation daily.  cromolyn (OPTICROM) 4 % ophthalmic solution Administer 1 Drop to both eyes as needed. Use in affected eye(s)    cholecalciferol (VITAMIN D3) 1,000 unit tablet Take 1,000 Units by mouth daily as needed.  montelukast (SINGULAIR) 10 mg tablet Take 1 Tab by mouth daily.      No current facility-administered medications for this visit. Wt Readings from Last 3 Encounters:   11/30/18 261 lb (118.4 kg)   11/01/18 261 lb (118.4 kg)   10/25/18 261 lb (118.4 kg)       BP Readings from Last 3 Encounters:   11/30/18 122/81   11/01/18 96/67   10/25/18 118/81       CBC:    Lab Results   Component Value Date/Time    WBC 3.5 11/30/2018 04:00 PM    Hemoglobin (POC) 12.9 10/28/2013 03:40 PM    HGB 12.0 11/30/2018 04:00 PM    Hematocrit (POC) 38 10/28/2013 03:40 PM    HCT 37.9 11/30/2018 04:00 PM    PLATELET 273 85/67/0838 04:00 PM    MCV 93 11/30/2018 04:00 PM       Lab Results   Component Value Date/Time    Protein, total 6.7 08/02/2018 08:19 AM    Albumin 4.3 08/02/2018 08:19 AM         INR History:   (normal INR range 0.8-1.2)     Date   INR   PT   Dose/Comments  03/05/19 1.5  17.4 2.5 mg T, Th and 5 mg daily ROW; started forskolin supplement  12/03/18          2.5                   29.5 2.5 mg T,Th and 5 mg daily ROW  11/01/18          3.0                   36.0  10/25/18          3.4                   41.1      A/P:       Anticoagulation:  Considering Ms. Thomas's past history, todays findings, and per Anticoagulation Collaborative Practice Agreement/Protocol:    1. POC INR (1.5) is subtherapeutic for INR goal today due to recent herbal supplement intake. 2. Take warfarin 5 mg tonight. Then resume warfarin 2.5 mg Tuesday, Thursday and 5 mg daily ROW. 3. Forskolin interacts CYP 3A4 2C9 substrates. With warfarin, it can increase warfarin's metabolism. Recommend patient stop forskolin supplement for weight loss. Discussed findings with her. 4. Recommend APAP PRN for HA or knee pain. 5.  on s/sx of PE/DVT. Patient was instructed to schedule an appointment in 2 week(s) prior to leaving the clinic. Visit will be on the same day as her next PCP follow up (03/21/19). Medication reconciliation was completed during the visit. There are no discontinued medications.     A full discussion of the nature of anticoagulants has been carried out. A full discussion of the need for frequent and regular monitoring, precise dosage adjustment and compliance was stressed. Side effects of potential bleeding were discussed and Ms. Ghulam Waggoner was instructed to call [phone] if there are any signs of abnormal bleeding. Ms. Ghulam Waggoner was instructed to avoid any OTC items containing aspirin or ibuprofen and prior to starting any new OTC products to consult with her physician or pharmacist to ensure no drug interactions are present. Ms. Ghulam Waggoner was instructed to avoid any major changes in her general diet and to avoid alcohol consumption. Ms. Ghulam Waggoner was provided information in the AVS that includes topics on understanding coumadin therapy, drug interaction considerations, vitamin K and coumadin use, interactions with foods and supplements containing vitamin K, and the use of herbal products. Ms. Ghulam Waggoner verbalized her understanding of all instructions and will call the office with any questions, concerns, or signs of abnormal bleeding or blood clot.     Notifications of recommendations will be sent to Dr. Kayleen Brooke MD for review    Thank you for the consult,  Leighann Garcia PharmD

## 2019-03-05 NOTE — PATIENT INSTRUCTIONS
Today your INR was 1.5. Your goal INR is  2.0-3.0 . You have a  5 mg tablet of Coumadin (warfarin). Take Coumadin as follows:    · Take warfarin 5 mg tonight. · Then continue warfarin 2.5 mg on Tuesdays and Thursdays; 5 mg daily the rest of the week. · Stop taking the Forskolin until I call you about that medication. · Take Tylenol as needed for your headache. Limit to a maximum of 2000 mg per day. Come back in  2  week(s) for your next finger stick/INR blood test on the same day as your appointment with Dr. Kathya Hoffman. Avoid any over the counter items containing aspirin or ibuprofen, and avoid great swings in general diet. Avoid alcohol consumption. Please notify the INR nurse if you are started on any new medication including over the counter or herbal supplements. Also, please notify your INR nurse if any of your other prescription or over the counter medications have been discontinued. Call [facility] at [phone] if you have any signs of abnormal bleeding/blood clot.  ------------------------------------------------------------------------------------------------------------------  Taking Warfarin Safely: Care Instructions    Your Care Instructions  Warfarin is a medicine that you take to prevent blood clots. It is often called a blood thinner. Doctors give warfarin (such as Coumadin) to reduce the risk of blood clots. You may be at risk for blood clots if you have atrial fibrillation or deep vein thrombosis. Some other health problems may also put you at risk. Warfarin slows the amount of time it takes for your blood to clot. It can cause bleeding problems. Even if you've been taking warfarin for a while, it's important to know how to take it safely. Foods and other medicines can affect the way warfarin works. Some can make warfarin work too well. This can cause bleeding problems.  And some can make it work poorly, so that it does not prevent blood clots very well. You will need regular blood tests to check how long it takes for your blood to form a clot. This test is called a PT or prothrombin time test. The result of the test is called an INR level. Depending on the test results, your doctor or anticoagulation clinic may adjust your dose of warfarin. Follow-up care is a key part of your treatment and safety. Be sure to make and go to all appointments, and call your doctor if you are having problems. It's also a good idea to know your test results and keep a list of the medicines you take. How can you care for yourself at home? Take warfarin safely  · Take your warfarin at the same time each day. · If you miss a dose of warfarin, don't take an extra dose to make up for it. Your doctor can tell you exactly what to do so you don't take too much or too little. · Wear medical alert jewelry that lets others know that you take warfarin. You can buy this at most drugstores. · Don't take warfarin if you are pregnant or planning to get pregnant. Talk to your doctor about how you can prevent getting pregnant while you are taking it. · Don't change your dose or stop taking warfarin unless your doctor tells you to. Effects of medicines and food on warfarin  · Don't start or stop taking any medicines, vitamins, or natural remedies unless you first talk to your doctor. Many medicines can affect how warfarin works. These include aspirin and other pain relievers, over-the-counter medicines, multivitamins, dietary supplements, and herbal products. · Tell all of your doctors and pharmacists that you take warfarin. Some prescription medicines can affect how warfarin works. · Keep the amount of vitamin K in your diet about the same from day to day. Do not suddenly eat a lot more or a lot less food that is rich in vitamin K than you usually do. Vitamin K affects how warfarin works and how your blood clots. Talk with your doctor before making big changes in your diet. Vitamin K is in many foods, such as:  ¨ Leafy greens, such as kale, cabbage, spinach, Swiss chard, and lettuce. ¨ Canola and soybean oils. ¨ Green vegetables, such as asparagus, broccoli, and [place] sprouts. ¨ Vegetable drinks, green tea leaves, and some dietary supplement drinks. · Avoid cranberry juice and other cranberry products. They can increase the effects of warfarin. · Limit your use of alcohol. Avoid bleeding by preventing falls and injuries  · Wear slippers or shoes with nonskid soles. · Remove throw rugs and clutter. · Rearrange furniture and electrical cords to keep them out of walking paths. · Keep stairways, porches, and outside walkways well lit. Use night-lights in hallways and bathrooms. · Be extra careful when you work with sharp tools or knives. When should you call for help? Call 911 anytime you think you may need emergency care. For example, call if:  · You have a sudden, severe headache that is different from past headaches. Call your doctor now or seek immediate medical care if:  · You have any abnormal bleeding, such as:  ¨ Nosebleeds. ¨ Vaginal bleeding that is different (heavier, more frequent, at a different time of the month) than what you are used to. ¨ Bloody or black stools, or rectal bleeding. ¨ Bloody or pink urine. Watch closely for changes in your health, and be sure to contact your doctor if you have any problems. Where can you learn more? Go to http://olivier-akil.info/. Enter T949 in the search box to learn more about \"Taking Warfarin Safely: Care Instructions. \"  Current as of: January 27, 2016  Content Version: 11.1  © 8789-2174 [employer]. Care instructions adapted under license by [employer] (which disclaims liability or warranty for this information).  If you have questions about a medical condition or this instruction, always ask your healthcare professional. [address]  any warranty or liability for your use of this information.

## 2019-03-20 DIAGNOSIS — E78.5 HYPERLIPIDEMIA, UNSPECIFIED HYPERLIPIDEMIA TYPE: ICD-10-CM

## 2019-03-20 RX ORDER — LOVASTATIN 20 MG/1
TABLET ORAL
Qty: 90 TAB | Refills: 1 | Status: SHIPPED | OUTPATIENT
Start: 2019-03-20 | End: 2019-07-13 | Stop reason: SDUPTHER

## 2019-03-21 ENCOUNTER — OFFICE VISIT (OUTPATIENT)
Dept: INTERNAL MEDICINE CLINIC | Age: 54
End: 2019-03-21

## 2019-03-21 VITALS
TEMPERATURE: 97.8 F | BODY MASS INDEX: 41.28 KG/M2 | SYSTOLIC BLOOD PRESSURE: 125 MMHG | HEIGHT: 67 IN | OXYGEN SATURATION: 99 % | RESPIRATION RATE: 18 BRPM | HEART RATE: 72 BPM | WEIGHT: 263 LBS | DIASTOLIC BLOOD PRESSURE: 84 MMHG

## 2019-03-21 DIAGNOSIS — M48.062 SPINAL STENOSIS OF LUMBAR REGION WITH NEUROGENIC CLAUDICATION: ICD-10-CM

## 2019-03-21 DIAGNOSIS — I82.4Y2 ACUTE DEEP VEIN THROMBOSIS (DVT) OF PROXIMAL VEIN OF LEFT LOWER EXTREMITY (HCC): ICD-10-CM

## 2019-03-21 DIAGNOSIS — E03.9 ACQUIRED HYPOTHYROIDISM: ICD-10-CM

## 2019-03-21 LAB
INR BLD: 2.2 (ref 1–1.5)
PT POC: 26.5 SECONDS (ref 9.1–12)
VALID INTERNAL CONTROL?: YES

## 2019-03-21 RX ORDER — TRAMADOL HYDROCHLORIDE 50 MG/1
50 TABLET ORAL
Qty: 90 TAB | Refills: 0 | Status: SHIPPED | OUTPATIENT
Start: 2019-03-21 | End: 2019-04-16 | Stop reason: SDUPTHER

## 2019-03-21 NOTE — PROGRESS NOTES
CC: Pain (Chronic)      HPI:    She is a 46 y.o. female who presents for evaluation of pain and increased swelling in her left leg. Recall she had back surgery done in October 3308 complicated by DVT, she has struggled with pain in her leg and back. Pain is 8/10 in back and leg. Tried lyrica not effective currently on gabapentin and taking muscle relaxant. She is taking 2-3 grams of tylenol daily with some relief   Her INR was not therapeutic 2 weeks ago    ROS:  Constitutional: negative for fevers, chills, anorexia and weight loss      Past Medical History:   Diagnosis Date    Arthritis     Asthma     Breast cyst 1996    left, removed    Chronic pain     LOWER BACK    Colon polyps     Diverticulitis     [facility]    provoked after back sx    Fatty liver     GERD (gastroesophageal reflux disease)     Hypercholesteremia     Nicotine vapor product user     Obesity     SUN (obstructive sleep apnea)     uses CPAP    Psychiatric disorder     depression    Thyroid disease     hypothyroid       Current Outpatient Medications on File Prior to Visit   Medication Sig Dispense Refill    lovastatin (MEVACOR) 20 mg tablet TAKE 1 TABLET BY MOUTH NIGHTLY 90 Tab 1    warfarin (COUMADIN) 2.5 mg tablet Take 1 Tab by mouth daily. Take 1 pill Tuesday and Thursday 30 Tab 0    traZODone (DESYREL) 50 mg tablet TAKE 1 TABLET BY MOUTH EVERY DAY AT NIGHT 30 Tab 0    magnesium oxide (MAG-OX) 400 mg tablet Take 1 Tab by mouth daily for 30 days. 30 Tab 5    levothyroxine (SYNTHROID) 88 mcg tablet Take 1 Tab by mouth Daily (before breakfast). 90 Tab 5    albuterol (PROVENTIL HFA, VENTOLIN HFA, PROAIR HFA) 90 mcg/actuation inhaler Take 1 Puff by inhalation every six (6) hours as needed for Wheezing. 1 Inhaler 2    methocarbamol (ROBAXIN) 500 mg tablet TAKE 1 TABLET BY MOUTH FOUR (4) TIMES DAILY.  90 Tab 0    gabapentin (NEURONTIN) 300 mg capsule Take 1 Cap by mouth three (3) times daily. 90 Cap 2    escitalopram oxalate (LEXAPRO) 20 mg tablet TAKE 1 TABLET BY MOUTH EVERY DAY 90 Tab 1    warfarin (COUMADIN) 5 mg tablet Take 1 Tab by mouth daily. Take 1 tablet by mouth daily on Mon, Wed, Fri, Sat, and Sunday. Take 2.5 mg strength on Tuesday and Thursday. 30 Tab 5    pantoprazole (PROTONIX) 40 mg tablet TAKE 1 TABLET BY MOUTH DAILY. INDICATIONS: GASTROESOPHAGEAL REFLUX DISEASE 90 Tab 1    fluticasone furoate (ARNUITY ELLIPTA) 100 mcg/actuation dsdv inhaler Take 1 Puff by inhalation daily. 2 Inhaler 4    cromolyn (OPTICROM) 4 % ophthalmic solution Administer 1 Drop to both eyes as needed. Use in affected eye(s)      montelukast (SINGULAIR) 10 mg tablet Take 1 Tab by mouth daily. 30 Tab 4     No current facility-administered medications on file prior to visit. Past Surgical History:   Procedure Laterality Date    COLONOSCOPY N/A 11/14/2017    COLONOSCOPY performed by Sidney Donis MD at [facility] ENDOSCOPY   [facility] SERVICES SURGERY  2005, 2006    L5 & S 1    HX BACK SURGERY  08/30/2018    HX BREAST BIOPSY      Left    HX HYSTERECTOMY  6/22/09    [facility]    HX OOPHORECTOMY Left     One ovary removed.     HX ORTHOPAEDIC Left 1972    thumb surgery    HX ORTHOPAEDIC Left 2000    left knee surgery    HX ORTHOPAEDIC Left 2013    ankle    HX TUBAL LIGATION      REMOVAL OF KIDNEY STONE      RENAL STENT         Family History   Problem Relation Age of Onset    Cancer Mother 54        Lung cancer    Heart Disease Father     Hypertension Father     Elevated Lipids Father     Heart Attack Father     Stroke Father         x 3    Elevated Lipids Sister     Heart Disease Brother     Hypertension Brother     Elevated Lipids Brother     Elevated Lipids Sister     Elevated Lipids Sister     Heart Disease Sister     Cancer Sister         uterine    Heart Disease Sister     Cancer Sister         uterine    Heart Disease Sister     Cancer Sister         colon cancer with liver mets  Bleeding Prob Brother     Heart Attack Brother     Anesth Problems Neg Hx      Reviewed and no changes     Social History     Socioeconomic History    Marital status:      Spouse name: Not on file    Number of children: Not on file    Years of education: Not on file    Highest education level: Not on file   Occupational History    Not on file   Social Needs    Financial resource strain: Not on file    Food insecurity:     Worry: Not on file     Inability: Not on file    Transportation needs:     Medical: Not on file     Non-medical: Not on file   Tobacco Use    Smoking status: Former Smoker     Packs/day: 1.50     Years: 25.00     Pack years: 37.50     Last attempt to quit: 2015     Years since quittin.1    Smokeless tobacco: Never Used   Substance and Sexual Activity    Alcohol use: No    Drug use: No    Sexual activity: Yes     Partners: Male     Birth control/protection: Surgical   Lifestyle    Physical activity:     Days per week: Not on file     Minutes per session: Not on file    Stress: Not on file   Relationships    Social connections:     Talks on phone: Not on file     Gets together: Not on file     Attends Muslim service: Not on file     Active member of club or organization: Not on file     Attends meetings of clubs or organizations: Not on file     Relationship status: Not on file    Intimate partner violence:     Fear of current or ex partner: Not on file     Emotionally abused: Not on file     Physically abused: Not on file     Forced sexual activity: Not on file   Other Topics Concern    Not on file   Social History Narrative    Not on file            Visit Vitals  /84 (BP 1 Location: Right arm, BP Patient Position: Sitting)   Pulse 72   Temp 97.8 °F (36.6 °C) (Oral)   Resp 18   Ht 5' 7\" (1.702 m)   Wt 263 lb (119.3 kg)   LMP 2009   SpO2 99%   BMI 41.19 kg/m²       Physical Examination:   General - Well appearing female  HEENT - PERRL, TM no erythema/opacification, normal nasal turbinates, oropharynx no erythema or exudate, MMM  Neck - supple, no bruits, no TMG, no LAD  Pulm - clear to auscultation bilaterally  Cardio - RRR, normal S1 S2, no murmur gallops or rubs  Abd - soft, nontender, no masses, no HSM  Extrem -- left leg with increased swelling and tenderness, no redness    Psych - normal affect, appropriate mood    Lab Results   Component Value Date/Time    WBC 3.5 11/30/2018 04:00 PM    Hemoglobin (POC) 12.9 10/28/2013 03:40 PM    HGB 12.0 11/30/2018 04:00 PM    Hematocrit (POC) 38 10/28/2013 03:40 PM    HCT 37.9 11/30/2018 04:00 PM    PLATELET 472 00/53/7036 04:00 PM    MCV 93 11/30/2018 04:00 PM     Lab Results   Component Value Date/Time    Sodium 142 11/30/2018 04:00 PM    Potassium 4.2 11/30/2018 04:00 PM    Chloride 98 11/30/2018 04:00 PM    CO2 26 11/30/2018 04:00 PM    Anion gap 10 08/31/2018 03:29 AM    Glucose 86 11/30/2018 04:00 PM    BUN 11 11/30/2018 04:00 PM    Creatinine 0.94 11/30/2018 04:00 PM    BUN/Creatinine ratio 12 11/30/2018 04:00 PM    GFR est AA 80 11/30/2018 04:00 PM    GFR est non-AA 69 11/30/2018 04:00 PM    Calcium 9.7 11/30/2018 04:00 PM     Lab Results   Component Value Date/Time    Cholesterol, total 215 (H) 08/02/2018 08:19 AM    HDL Cholesterol 85 08/02/2018 08:19 AM    LDL, calculated 117 (H) 08/02/2018 08:19 AM    VLDL, calculated 13 08/02/2018 08:19 AM    Triglyceride 67 08/02/2018 08:19 AM     Lab Results   Component Value Date/Time    TSH 2.000 11/30/2018 04:00 PM     No results found for: PSA, PSA2, PSAR1, Lizy Shepherd, PSAR3, [id number], [id number], PSALT  Lab Results   Component Value Date/Time    Hemoglobin A1c 5.4 08/02/2018 08:19 AM     Lab Results   Component Value Date/Time    VITAMIN D, 25-HYDROXY 28.5 (L) 05/09/2017 08:54 AM       Lab Results   Component Value Date/Time    ALT (SGPT) 9 08/02/2018 08:19 AM    AST (SGOT) 15 08/02/2018 08:19 AM    Alk.  phosphatase 73 08/02/2018 08:19 AM Bilirubin, total 0.3 08/02/2018 08:19 AM           Assessment/Plan:      1. Provoked/ after sx/  Acute deep vein thrombosis (DVT) of proximal vein of left lower extremity (HCC)  eliquis not covered  - INR is therapeutic today however she had a period of subtherapeutic INR and complains of increased pain and swelling in her left leg    -repeat US doppler  tomorrow 9 AM      2. Chronic back pain: patient had extensive back surgery - will start PT  - on gabapentin, continues to have significant debilitating pain. Prescribed tramadol today 50mg TID  - refilled muscle relaxant      3. Hypothyroidism: TSH has been at goal, on synthroid 88 mcg     4. GERD: stable on PPI     5.  Depression: stable on Azalea Martel MD

## 2019-03-21 NOTE — PROGRESS NOTES
Reviewed record in preparation for visit and have obtained necessary documentation. Identified pt with two pt identifiers(name and ). Chief Complaint   Patient presents with    Pain (Chronic)       Health Maintenance Due   Topic Date Due    Shingles Vaccine (1 of 2) 2014       Ms. Harrison Kay has a reminder for a \"due or due soon\" health maintenance. I have asked that she discuss this further with her primary care provider for follow-up on this health maintenance. Coordination of Care Questionnaire:  :     1) Have you been to an emergency room, urgent care clinic since your last visit? no   Hospitalized since your last visit? no             2) Have you seen or consulted any other health care providers outside of [address] [place] [address] since your last visit? no  (Include any pap smears or colon screenings in this section.)    3) In the event something were to happen to you and you were unable to speak on your behalf, do you have an Advance Directive/ Living Will in place stating your wishes? NO    Do you have an Advance Directive on file? no    4) Are you interested in receiving information on Advance Directives? NO    Patient is accompanied by self I have received verbal consent from Al Balderas to discuss any/all medical information while they are present in the room.

## 2019-03-21 NOTE — PATIENT INSTRUCTIONS
Lets get US scheduled    Today your INR was 2.2 . Your goal INR is  2.0-3.0 . You have a 2.5  mg tablet of Coumadin (warfarin). Take Coumadin as follows:    Continue warfarin 2.5 mg on Tuesdays and Thursdays; 5 mg daily the rest of the week. Come back in 2 week(s) for your next finger stick/INR blood test.        Avoid any over the counter items containing aspirin or ibuprofen, and avoid great swings in general diet. Avoid alcohol consumption. Please notify the INR nurse if you are started on any new medication including over the counter or herbal supplements. Also, please notify your INR nurse if any of your other prescription or over the counter medications have been discontinued. Call [facility] at [phone] if you have any signs of abnormal bleeding/blood clot.  ------------------------------------------------------------------------------------------------------------------  Taking Warfarin Safely: Care Instructions    Your Care Instructions  Warfarin is a medicine that you take to prevent blood clots. It is often called a blood thinner. Doctors give warfarin (such as Coumadin) to reduce the risk of blood clots. You may be at risk for blood clots if you have atrial fibrillation or deep vein thrombosis. Some other health problems may also put you at risk. Warfarin slows the amount of time it takes for your blood to clot. It can cause bleeding problems. Even if you've been taking warfarin for a while, it's important to know how to take it safely. Foods and other medicines can affect the way warfarin works. Some can make warfarin work too well. This can cause bleeding problems. And some can make it work poorly, so that it does not prevent blood clots very well. You will need regular blood tests to check how long it takes for your blood to form a clot. This test is called a PT or prothrombin time test. The result of the test is called an INR level.  Depending on the test results, your doctor or anticoagulation clinic may adjust your dose of warfarin. Follow-up care is a key part of your treatment and safety. Be sure to make and go to all appointments, and call your doctor if you are having problems. It's also a good idea to know your test results and keep a list of the medicines you take. How can you care for yourself at home? Take warfarin safely  · Take your warfarin at the same time each day. · If you miss a dose of warfarin, don't take an extra dose to make up for it. Your doctor can tell you exactly what to do so you don't take too much or too little. · Wear medical alert jewelry that lets others know that you take warfarin. You can buy this at most drugstores. · Don't take warfarin if you are pregnant or planning to get pregnant. Talk to your doctor about how you can prevent getting pregnant while you are taking it. · Don't change your dose or stop taking warfarin unless your doctor tells you to. Effects of medicines and food on warfarin  · Don't start or stop taking any medicines, vitamins, or natural remedies unless you first talk to your doctor. Many medicines can affect how warfarin works. These include aspirin and other pain relievers, over-the-counter medicines, multivitamins, dietary supplements, and herbal products. · Tell all of your doctors and pharmacists that you take warfarin. Some prescription medicines can affect how warfarin works. · Keep the amount of vitamin K in your diet about the same from day to day. Do not suddenly eat a lot more or a lot less food that is rich in vitamin K than you usually do. Vitamin K affects how warfarin works and how your blood clots. Talk with your doctor before making big changes in your diet. Vitamin K is in many foods, such as:  ¨ Leafy greens, such as kale, cabbage, spinach, Swiss chard, and lettuce. ¨ Canola and soybean oils. ¨ Green vegetables, such as asparagus, broccoli, and [place] sprouts.   ¨ Vegetable drinks, green tea leaves, and some dietary supplement drinks. · Avoid cranberry juice and other cranberry products. They can increase the effects of warfarin. · Limit your use of alcohol. Avoid bleeding by preventing falls and injuries  · Wear slippers or shoes with nonskid soles. · Remove throw rugs and clutter. · Rearrange furniture and electrical cords to keep them out of walking paths. · Keep stairways, porches, and outside walkways well lit. Use night-lights in hallways and bathrooms. · Be extra careful when you work with sharp tools or knives. When should you call for help? Call 911 anytime you think you may need emergency care. For example, call if:  · You have a sudden, severe headache that is different from past headaches. Call your doctor now or seek immediate medical care if:  · You have any abnormal bleeding, such as:  ¨ Nosebleeds. ¨ Vaginal bleeding that is different (heavier, more frequent, at a different time of the month) than what you are used to. ¨ Bloody or black stools, or rectal bleeding. ¨ Bloody or pink urine. Watch closely for changes in your health, and be sure to contact your doctor if you have any problems. Where can you learn more? Go to http://olivier-akil.info/. Enter J343 in the search box to learn more about \"Taking Warfarin Safely: Care Instructions. \"  Current as of: January 27, 2016  Content Version: 11.1  © 1970-9963 [employer]. Care instructions adapted under license by [employer] (which disclaims liability or warranty for this information). If you have questions about a medical condition or this instruction, always ask your healthcare professional. [address] any warranty or liability for your use of this information.

## 2019-03-21 NOTE — PROGRESS NOTES
Pharmacy Progress Note  - Anticoagulation Management    S/O:  Ms. Smitha Thomas  is [id number] y. o. female seen for anticoagulation management for the diagnosis of Provoked, LE Deep Vein Thrombosis.  She was seen following her PCP visit today. She ambulates w/ a cane.     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 Dx confirmed by US doppler (10/18/18).  Reports significant FHx of cardiac disease and recurrent blood clots.  Pt's brother and 2 sisters are on anticoagulants, warfarin and Eliquis, respectively.  Initially prescribed Eliquis but did not fill rx due to cost.  Warfarin therapy started w/o lovenox bridge d/t therapeutic INR. Shalonda Ha is motivated to \"cure\" this DVT. Interim History: Pt c/o LLE pain w/ swelling similar to how she felt before with the DVT. Has been taking APAP 2-3 grams/day for pain. Dr. Saima Lozano ordered US to be assessed tomorrow. Now off forskolin supplement.      · Warfarin start date:  ~10/25/18  · INR Goal:  2.0-3.0    · Current warfarin regimen:  Take 5 mg x 1, then resume 2.5 mg Tues, Thurs, and 5 mg daily ROW                     · Warfarin tablet strength:   5 mg and 2.5 mg  · Duration of therapy:  ~ April 2019 - [facility] prior to therapy discontinuation    Today's Patient Findings:    Pertinent positives includes:  Lower extremity edema / pain    INR (POC) today (normal INR range 0.8-1.2) :    Recent Results (from the past 12 hour(s))   AMB POC PT/INR    Collection Time: 03/21/19  3:48 PM   Result Value Ref Range    VALID INTERNAL CONTROL POC Yes     Prothrombin time (POC) 26.5 (A) 9.1 - 12 seconds    INR POC 2.2 (A) 1 - 1.5     · Adherence:   · Able to recall regimen? YES  · Miss/extra dose? NO  · Need refill?  NO    Upcoming procedure(s):  NO    Past Medical History:   Diagnosis Date    Arthritis     Asthma     Breast cyst 1996    left, removed    Chronic pain     LOWER BACK    Colon polyps     Diverticulitis     DVT (deep venous thrombosis) (HCC) 10/18/2018    provoked after back sx    Fatty liver     GERD (gastroesophageal reflux disease)     Hypercholesteremia     Nicotine vapor product user     Obesity     SUN (obstructive sleep apnea)     uses CPAP    Psychiatric disorder     depression    Thyroid disease     hypothyroid       Allergies   Allergen Reactions    Codeine Hives    Pcn [Penicillins] Hives       Current Outpatient Medications   Medication Sig    traMADol (ULTRAM) 50 mg tablet Take 1 Tab by mouth every eight (8) hours as needed for Pain for up to 30 days. Max Daily Amount: 150 mg.    lovastatin (MEVACOR) 20 mg tablet TAKE 1 TABLET BY MOUTH NIGHTLY    warfarin (COUMADIN) 2.5 mg tablet Take 1 Tab by mouth daily. Take 1 pill Tuesday and Thursday    traZODone (DESYREL) 50 mg tablet TAKE 1 TABLET BY MOUTH EVERY DAY AT NIGHT    magnesium oxide (MAG-OX) 400 mg tablet Take 1 Tab by mouth daily for 30 days.  levothyroxine (SYNTHROID) 88 mcg tablet Take 1 Tab by mouth Daily (before breakfast).  albuterol (PROVENTIL HFA, VENTOLIN HFA, PROAIR HFA) 90 mcg/actuation inhaler Take 1 Puff by inhalation every six (6) hours as needed for Wheezing.  methocarbamol (ROBAXIN) 500 mg tablet TAKE 1 TABLET BY MOUTH FOUR (4) TIMES DAILY.  gabapentin (NEURONTIN) 300 mg capsule Take 1 Cap by mouth three (3) times daily.  escitalopram oxalate (LEXAPRO) 20 mg tablet TAKE 1 TABLET BY MOUTH EVERY DAY    warfarin (COUMADIN) 5 mg tablet Take 1 Tab by mouth daily. Take 1 tablet by mouth daily on Mon, Wed, Fri, Sat, and Sunday. Take 2.5 mg strength on Tuesday and Thursday.  pantoprazole (PROTONIX) 40 mg tablet TAKE 1 TABLET BY MOUTH DAILY. INDICATIONS: GASTROESOPHAGEAL REFLUX DISEASE    fluticasone furoate (ARNUITY ELLIPTA) 100 mcg/actuation dsdv inhaler Take 1 Puff by inhalation daily.  cromolyn (OPTICROM) 4 % ophthalmic solution Administer 1 Drop to both eyes as needed.  Use in affected eye(s)    montelukast (SINGULAIR) 10 mg tablet Take 1 Tab by mouth daily. No current facility-administered medications for this visit. Wt Readings from Last 3 Encounters:   03/21/19 263 lb (119.3 kg)   03/05/19 268 lb 6.4 oz (121.7 kg)   11/30/18 261 lb (118.4 kg)       BP Readings from Last 3 Encounters:   03/21/19 125/84   11/30/18 122/81   11/01/18 96/67       Lab Results   Component Value Date/Time    WBC 3.5 11/30/2018 04:00 PM    Hemoglobin (POC) 12.9 10/28/2013 03:40 PM    HGB 12.0 11/30/2018 04:00 PM    Hematocrit (POC) 38 10/28/2013 03:40 PM    HCT 37.9 11/30/2018 04:00 PM    PLATELET 671 26/64/4519 04:00 PM    MCV 93 11/30/2018 04:00 PM       Lab Results   Component Value Date/Time    Protein, total 6.7 08/02/2018 08:19 AM    Albumin 4.3 08/02/2018 08:19 AM         INR History:   (normal INR range 0.8-1.2)     Date   INR   PT   Dose/Comments  03/21/19 2.2  26.5 5 mg x 1, then  2.5 mg Tues, Thurs, and 5 mg daily ROW      03/05/19          1.5                   17.4     2.5 mg T, Th and 5 mg daily ROW; started forskolin supplement  12/03/18          2.5                   29.5          2.5 mg T,Th and 5 mg daily ROW  11/01/18          3.0                   36.0  10/25/18          3.4                   41.1      A/P:       Anticoagulation:  Considering Ms. Thomas's past history, todays findings, and per Anticoagulation Collaborative Practice Agreement/Protocol:    1. POC INR (2.2) is therapeutic for INR goal today. Note, reported recent APAP intake for pain management may also elevate INR reading. Will wait for US doppler results for further assessment. Given lapse in INR monitoring and subtherapeutic reading at the last visit, need to r/o new DVT. Discussed patient may need lovenox injections. 2.  Continue warfarin  2.5 mg Tues, Thurs, and 5 mg daily ROW        Patient was instructed to schedule an appointment in 2 week(s) prior to leaving the clinic. Medication reconciliation was completed during the visit.     There are no discontinued medications. A full discussion of the nature of anticoagulants has been carried out. A full discussion of the need for frequent and regular monitoring, precise dosage adjustment and compliance was stressed. Side effects of potential bleeding were discussed and Ms. Saeid Wick was instructed to call [phone] if there are any signs of abnormal bleeding. Ms. Saeid Wick was instructed to avoid any OTC items containing aspirin or ibuprofen and prior to starting any new OTC products to consult with her physician or pharmacist to ensure no drug interactions are present. Ms. Saeid Wick was instructed to avoid any major changes in her general diet and to avoid alcohol consumption. Ms. Saeid Wick was provided information in the AVS that includes topics on understanding coumadin therapy, drug interaction considerations, vitamin K and coumadin use, interactions with foods and supplements containing vitamin K, and the use of herbal products. Ms. Saeid Wick verbalized her understanding of all instructions and will call the office with any questions, concerns, or signs of abnormal bleeding or blood clot.     Notifications of recommendations will be sent to Dr. Jovi Wright MD for review    Thank you for the consult,  Leighann Hernandez PharmD

## 2019-03-22 ENCOUNTER — HOSPITAL ENCOUNTER (OUTPATIENT)
Dept: ULTRASOUND IMAGING | Age: 54
Discharge: HOME | End: 2019-03-22
Attending: INTERNAL MEDICINE
Payer: MEDICARE

## 2019-03-22 DIAGNOSIS — I82.4Y2 ACUTE DEEP VEIN THROMBOSIS (DVT) OF PROXIMAL VEIN OF LEFT LOWER EXTREMITY (HCC): ICD-10-CM

## 2019-03-22 PROCEDURE — 93971 EXTREMITY STUDY: CPT

## 2019-03-24 DIAGNOSIS — I82.4Y2 ACUTE DEEP VEIN THROMBOSIS (DVT) OF PROXIMAL VEIN OF LEFT LOWER EXTREMITY (HCC): ICD-10-CM

## 2019-03-24 RX ORDER — WARFARIN 2.5 MG/1
TABLET ORAL
Qty: 30 TAB | Refills: 0 | Status: SHIPPED | OUTPATIENT
Start: 2019-03-24 | End: 2019-06-21 | Stop reason: ALTCHOICE

## 2019-03-25 ENCOUNTER — TELEPHONE (OUTPATIENT)
Dept: INTERNAL MEDICINE CLINIC | Age: 54
End: 2019-03-25

## 2019-03-31 RX ORDER — METHOCARBAMOL 500 MG/1
TABLET, FILM COATED ORAL
Qty: 90 TAB | Refills: 0 | Status: SHIPPED | OUTPATIENT
Start: 2019-03-31 | End: 2019-04-16 | Stop reason: SDUPTHER

## 2019-04-02 RX ORDER — TRAZODONE HYDROCHLORIDE 50 MG/1
TABLET ORAL
Qty: 30 TAB | Refills: 0 | Status: SHIPPED | OUTPATIENT
Start: 2019-04-02 | End: 2019-04-16 | Stop reason: SDUPTHER

## 2019-04-04 ENCOUNTER — ANTI-COAG VISIT (OUTPATIENT)
Dept: INTERNAL MEDICINE CLINIC | Age: 54
End: 2019-04-04

## 2019-04-04 DIAGNOSIS — I82.402 DEEP VEIN THROMBOSIS (DVT) OF LEFT LOWER EXTREMITY, UNSPECIFIED CHRONICITY, UNSPECIFIED VEIN (HCC): Primary | ICD-10-CM

## 2019-04-04 LAB
INR BLD: 2.5 (ref 1–1.5)
PT POC: 30.2 SECONDS (ref 9.1–12)
VALID INTERNAL CONTROL?: YES

## 2019-04-04 RX ORDER — ACETAMINOPHEN 325 MG/1
325 TABLET ORAL
COMMUNITY
End: 2022-02-24 | Stop reason: ALTCHOICE

## 2019-04-04 NOTE — PATIENT INSTRUCTIONS
Today your INR was 2.5. Your goal INR is  2.0-3.0 . You have a  2.5 mg and 5mg tablet of Coumadin (warfarin). Take Coumadin as follows:    Continue warfarin 2.5 mg on Tuesdays and Thursdays; 5 mg daily the rest of the week. Come back in  4  week(s) for your next finger stick/INR blood test.        Avoid any over the counter items containing aspirin or ibuprofen, and avoid great swings in general diet. Avoid alcohol consumption. Please notify the INR nurse if you are started on any new medication including over the counter or herbal supplements. Also, please notify your INR nurse if any of your other prescription or over the counter medications have been discontinued. Call [facility] at [phone] if you have any signs of abnormal bleeding/blood clot.  ------------------------------------------------------------------------------------------------------------------  Taking Warfarin Safely: Care Instructions    Your Care Instructions  Warfarin is a medicine that you take to prevent blood clots. It is often called a blood thinner. Doctors give warfarin (such as Coumadin) to reduce the risk of blood clots. You may be at risk for blood clots if you have atrial fibrillation or deep vein thrombosis. Some other health problems may also put you at risk. Warfarin slows the amount of time it takes for your blood to clot. It can cause bleeding problems. Even if you've been taking warfarin for a while, it's important to know how to take it safely. Foods and other medicines can affect the way warfarin works. Some can make warfarin work too well. This can cause bleeding problems. And some can make it work poorly, so that it does not prevent blood clots very well. You will need regular blood tests to check how long it takes for your blood to form a clot. This test is called a PT or prothrombin time test. The result of the test is called an INR level.  Depending on the test results, your doctor or anticoagulation clinic may adjust your dose of warfarin. Follow-up care is a key part of your treatment and safety. Be sure to make and go to all appointments, and call your doctor if you are having problems. It's also a good idea to know your test results and keep a list of the medicines you take. How can you care for yourself at home? Take warfarin safely  · Take your warfarin at the same time each day. · If you miss a dose of warfarin, don't take an extra dose to make up for it. Your doctor can tell you exactly what to do so you don't take too much or too little. · Wear medical alert jewelry that lets others know that you take warfarin. You can buy this at most drugstores. · Don't take warfarin if you are pregnant or planning to get pregnant. Talk to your doctor about how you can prevent getting pregnant while you are taking it. · Don't change your dose or stop taking warfarin unless your doctor tells you to. Effects of medicines and food on warfarin  · Don't start or stop taking any medicines, vitamins, or natural remedies unless you first talk to your doctor. Many medicines can affect how warfarin works. These include aspirin and other pain relievers, over-the-counter medicines, multivitamins, dietary supplements, and herbal products. · Tell all of your doctors and pharmacists that you take warfarin. Some prescription medicines can affect how warfarin works. · Keep the amount of vitamin K in your diet about the same from day to day. Do not suddenly eat a lot more or a lot less food that is rich in vitamin K than you usually do. Vitamin K affects how warfarin works and how your blood clots. Talk with your doctor before making big changes in your diet. Vitamin K is in many foods, such as:  ¨ Leafy greens, such as kale, cabbage, spinach, Swiss chard, and lettuce. ¨ Canola and soybean oils. ¨ Green vegetables, such as asparagus, broccoli, and [place] sprouts.   ¨ Vegetable drinks, green tea leaves, and some dietary supplement drinks. · Avoid cranberry juice and other cranberry products. They can increase the effects of warfarin. · Limit your use of alcohol. Avoid bleeding by preventing falls and injuries  · Wear slippers or shoes with nonskid soles. · Remove throw rugs and clutter. · Rearrange furniture and electrical cords to keep them out of walking paths. · Keep stairways, porches, and outside walkways well lit. Use night-lights in hallways and bathrooms. · Be extra careful when you work with sharp tools or knives. When should you call for help? Call 911 anytime you think you may need emergency care. For example, call if:  · You have a sudden, severe headache that is different from past headaches. Call your doctor now or seek immediate medical care if:  · You have any abnormal bleeding, such as:  ¨ Nosebleeds. ¨ Vaginal bleeding that is different (heavier, more frequent, at a different time of the month) than what you are used to. ¨ Bloody or black stools, or rectal bleeding. ¨ Bloody or pink urine. Watch closely for changes in your health, and be sure to contact your doctor if you have any problems. Where can you learn more? Go to http://olivier-akil.info/. Enter V593 in the search box to learn more about \"Taking Warfarin Safely: Care Instructions. \"  Current as of: January 27, 2016  Content Version: 11.1  © 7265-7431 [employer]. Care instructions adapted under license by [employer] (which disclaims liability or warranty for this information). If you have questions about a medical condition or this instruction, always ask your healthcare professional. [place] [address] any warranty or liability for your use of this information.

## 2019-04-04 NOTE — PROGRESS NOTES
Pharmacy Progress Note  - Anticoagulation Management    S/O:  Ms. Smitha Thomas  is [id number] y. o. female seen for anticoagulation management for the diagnosis of Provoked, LE Deep Vein Thrombosis.  She was seen following her PCP visit today. She ambulates w/ a cane.     Interim History: LE US on 03/22/19 r/o new DVT. · Warfarin start date:  ~10/25/18  · INR Goal:  2.0-3.0    · Current warfarin regimen:  Take 5 mg x 1, then resume 2.5 mg Tues, Thurs, and 5 mg daily ROW                     · Warfarin tablet strength:   5 mg and 2.5 mg  · Duration of therapy: End of July 2019 per Dr. Adrián Ramirez pertinent positives includes:  No significant changes since last visit     - Taking APAP 325 mg - 1 tab BID with tramadol - helping w/ her leg pain  - Purchased compression stockings to wear -    Results for orders placed or performed in visit on 04/04/19   AMB POC PT/INR   Result Value Ref Range    VALID INTERNAL CONTROL POC Yes     Prothrombin time (POC) 30.2 (A) 9.1 - 12 seconds    INR POC 2.5 (A) 1 - 1.5       · Adherence:   · Able to recall regimen? YES  · Miss/extra dose? NO  · Need refill?  NO    Upcoming procedure(s):  NO      Past Medical History:   Diagnosis Date    Arthritis     Asthma     Breast cyst 1996    left, removed    Chronic pain     LOWER BACK    Colon polyps     Diverticulitis     [facility]    provoked after back sx    Fatty liver     GERD (gastroesophageal reflux disease)     Hypercholesteremia     Nicotine vapor product user     Obesity     SUN (obstructive sleep apnea)     uses CPAP    Psychiatric disorder     depression    Thyroid disease     hypothyroid       Allergies   Allergen Reactions    Codeine Hives    Pcn [Penicillins] Hives       Current Outpatient Medications   Medication Sig    traZODone (DESYREL) 50 mg tablet TAKE 1 TABLET BY MOUTH EVERY DAY AT NIGHT    methocarbamol (ROBAXIN) 500 mg tablet TAKE 1 TABLET BY MOUTH FOUR (4) TIMES DAILY.  warfarin (COUMADIN) 2.5 mg tablet TAKE 1 TABLET BY MOUTH DAILY ON TUESDAY AND THURSDAY    traMADol (ULTRAM) 50 mg tablet Take 1 Tab by mouth every eight (8) hours as needed for Pain for up to 30 days. Max Daily Amount: 150 mg.    lovastatin (MEVACOR) 20 mg tablet TAKE 1 TABLET BY MOUTH NIGHTLY    levothyroxine (SYNTHROID) 88 mcg tablet Take 1 Tab by mouth Daily (before breakfast).  albuterol (PROVENTIL HFA, VENTOLIN HFA, PROAIR HFA) 90 mcg/actuation inhaler Take 1 Puff by inhalation every six (6) hours as needed for Wheezing.  gabapentin (NEURONTIN) 300 mg capsule Take 1 Cap by mouth three (3) times daily.  escitalopram oxalate (LEXAPRO) 20 mg tablet TAKE 1 TABLET BY MOUTH EVERY DAY    warfarin (COUMADIN) 5 mg tablet Take 1 Tab by mouth daily. Take 1 tablet by mouth daily on Mon, Wed, Fri, Sat, and Sunday. Take 2.5 mg strength on Tuesday and Thursday.  pantoprazole (PROTONIX) 40 mg tablet TAKE 1 TABLET BY MOUTH DAILY. INDICATIONS: GASTROESOPHAGEAL REFLUX DISEASE    fluticasone furoate (ARNUITY ELLIPTA) 100 mcg/actuation dsdv inhaler Take 1 Puff by inhalation daily.  cromolyn (OPTICROM) 4 % ophthalmic solution Administer 1 Drop to both eyes as needed. Use in affected eye(s)    montelukast (SINGULAIR) 10 mg tablet Take 1 Tab by mouth daily. No current facility-administered medications for this visit.         Wt Readings from Last 3 Encounters:   03/21/19 263 lb (119.3 kg)   03/05/19 268 lb 6.4 oz (121.7 kg)   11/30/18 261 lb (118.4 kg)       BP Readings from Last 3 Encounters:   03/21/19 125/84   11/30/18 122/81   11/01/18 96/67       Lab Results   Component Value Date/Time    WBC 3.5 11/30/2018 04:00 PM    Hemoglobin (POC) 12.9 10/28/2013 03:40 PM    HGB 12.0 11/30/2018 04:00 PM    Hematocrit (POC) 38 10/28/2013 03:40 PM    HCT 37.9 11/30/2018 04:00 PM    PLATELET 409 36/59/8830 04:00 PM    MCV 93 11/30/2018 04:00 PM       Lab Results   Component Value Date/Time    Protein, total 6.7 08/02/2018 08:19 AM    Albumin 4.3 08/02/2018 08:19 AM       INR History:   (normal INR range 0.8-1.2)     Date   INR   PT   Dose/Comments  04/04/19 2.5  30.2 2.5 mg Tues, Thurs and 5 mg daily ROW  03/21/19          2.2                   26.5     5 mg x 1, then  2.5 mg Tues, Thurs, and 5 mg daily ROW      03/05/19          1.5                   17.4     2.5 mg T, Th and 5 mg daily ROW; started forskolin supplement  12/03/18          2.5                   29.5          2.5 mg T,Th and 5 mg daily ROW  11/01/18          3.0                   36.0  10/25/18          3.4                   41.1      A/P:       Anticoagulation:  Considering Ms. Thomas's past history, todays findings, and per Anticoagulation Collaborative Practice Agreement/Protocol:    1. POC INR (2.5) remains Therapeutic for INR goal today. 2.  Continue warfarin 2.5 mg Tuesday, Thursday and 5 mg daily ROW      Patient was instructed to schedule an appointment in 4 week(s) prior to leaving the clinic. Medication reconciliation was completed during the visit. There are no discontinued medications. A full discussion of the nature of anticoagulants has been carried out. A full discussion of the need for frequent and regular monitoring, precise dosage adjustment and compliance was stressed. Side effects of potential bleeding were discussed and Ms. Long Mai was instructed to call [phone] if there are any signs of abnormal bleeding. Ms. Long Mai was instructed to avoid any OTC items containing aspirin or ibuprofen and prior to starting any new OTC products to consult with her physician or pharmacist to ensure no drug interactions are present. Ms. Long Mai was instructed to avoid any major changes in her general diet and to avoid alcohol consumption.     Ms. Long Mai was provided information in the AVS that includes topics on understanding coumadin therapy, drug interaction considerations, vitamin K and coumadin use, interactions with foods and supplements containing vitamin K, and the use of herbal products. Ms. Phill Martin verbalized her understanding of all instructions and will call the office with any questions, concerns, or signs of abnormal bleeding or blood clot. Notifications of recommendations will be sent to Dr. Rachana Ghosh MD for review.     Thank you,  Leighann Beckford, TorinD

## 2019-04-16 DIAGNOSIS — M48.062 SPINAL STENOSIS OF LUMBAR REGION WITH NEUROGENIC CLAUDICATION: ICD-10-CM

## 2019-04-16 DIAGNOSIS — J45.30 MILD PERSISTENT ASTHMA WITHOUT COMPLICATION: ICD-10-CM

## 2019-04-16 DIAGNOSIS — I82.4Y2 ACUTE DEEP VEIN THROMBOSIS (DVT) OF PROXIMAL VEIN OF LEFT LOWER EXTREMITY (HCC): ICD-10-CM

## 2019-04-16 RX ORDER — TRAZODONE HYDROCHLORIDE 50 MG/1
50 TABLET ORAL
Qty: 30 TAB | Refills: 0 | Status: SHIPPED | OUTPATIENT
Start: 2019-04-16 | End: 2019-05-03 | Stop reason: SDUPTHER

## 2019-04-16 RX ORDER — GABAPENTIN 300 MG/1
300 CAPSULE ORAL 3 TIMES DAILY
Qty: 90 CAP | Refills: 2 | Status: SHIPPED | OUTPATIENT
Start: 2019-04-16 | End: 2019-07-12 | Stop reason: SDUPTHER

## 2019-04-16 RX ORDER — METHOCARBAMOL 500 MG/1
500 TABLET, FILM COATED ORAL 3 TIMES DAILY
Qty: 90 TAB | Refills: 0 | Status: SHIPPED | OUTPATIENT
Start: 2019-04-16 | End: 2019-05-06 | Stop reason: SDUPTHER

## 2019-04-16 RX ORDER — TRAMADOL HYDROCHLORIDE 50 MG/1
50 TABLET ORAL
Qty: 90 TAB | Refills: 0 | Status: SHIPPED | OUTPATIENT
Start: 2019-04-16 | End: 2020-07-06 | Stop reason: SDUPTHER

## 2019-04-16 RX ORDER — METHOCARBAMOL 500 MG/1
TABLET, FILM COATED ORAL
Qty: 90 TAB | Refills: 0 | Status: SHIPPED | OUTPATIENT
Start: 2019-04-16 | End: 2019-05-05 | Stop reason: SDUPTHER

## 2019-04-16 NOTE — TELEPHONE ENCOUNTER
traMADol (ULTRAM) 50 mg tablet [[id number]]     Order Details   Dose: 50 mg Route: Oral Frequency: EVERY 8 HOURS AS NEEDED for Pain   Dispense Quantity: 90 Tab Refills: 0 Fills remaining: --           Sig: Take 1 Tab by mouth every eight (8) hours as needed for Pain for up to 30 days. Max Daily Amount: 150 mg.          Written Date: 04/16/19 Expiration Date: --     Start Date: 04/16/19 End Date: 05/16/19            Ordering Provider:  -- MAT #:  [id number] NPI:  --    Authorizing Provider:  Margoth Milligan MD MAT #:  [id number] NPI:  [id number]    Ordering User:  Margoth Milligan MD            Diagnosis Association: Acute deep vein thrombosis (DVT) of proximal vein of left lower extremity ([facility] (I82.4Y2);  Spinal stenosis of lumbar region with neurogenic claudication (O56.180)

## 2019-04-25 ENCOUNTER — TELEPHONE (OUTPATIENT)
Dept: INTERNAL MEDICINE CLINIC | Age: 54
End: 2019-04-25

## 2019-04-25 NOTE — TELEPHONE ENCOUNTER
Pt would like to speak with the individual by the name of \"KELLY\" (blood clot specialist)  because of bruise. Mo Toussaintpeggy wanted the pt to contact her when she sees one.  Bruise in 10 inch long and 5 inches wide and one on forearm.  Callback: [phone]       Copy/paste [place]

## 2019-04-25 NOTE — TELEPHONE ENCOUNTER
Received triage call from pt. Two pt identifiers confirmed. Pt fell out of bed last night. Pt states injury on L arm 10\" long and 5\" wide. Pt states that she is calling bc she was told to call if she had an injury bc she is on blood thinners-managed by [facility]. Pt asking to be contacted by [place]. Pt not seeking Dr cheatham at this time. Pt informed [facility] [place] will be notified. Pt verbalized understanding of information discussed w/ no further questions at this time.

## 2019-04-25 NOTE — TELEPHONE ENCOUNTER
Pharmacy Progress Note - Telephone Encounter    S/O: Ms. Sherwin Paul 46 y.o. female, was contacted via an outbound telephone call today. Verified patients identifiers (name & ) per HIPAA policy.     -  States she fell out of bed. Hit her arm resulting in a bruise on arm. Denies any bleeding/shortness of breath. Has not taken any thing to manage pain. A/P:  -  pt on s/sx of VTE.    - Has appt for INR check on 19 @ 2:30 PM   - Patient endorses understanding to the provided information. All questions were answered at this time.        Thank you,  Leighann Lion, PharmD

## 2019-04-26 RX ORDER — WARFARIN SODIUM 5 MG/1
TABLET ORAL
Qty: 30 TAB | Refills: 5 | OUTPATIENT
Start: 2019-04-26

## 2019-04-26 NOTE — TELEPHONE ENCOUNTER
Contacted [facility] pharmacy (#[phone]) to verify current warfarin rx refill. It looks like the prescription charted in December 2018 was a no print. Warfarin 5 mg rx is now out of refills. Provided [facility] pharmacist with updated warfarin regimen (2.5 mg on Tuesday, Thursday and 5 mg daily ROW). Called in warfarin 5 mg #30 with 3 refills and 2.5 mg #30 with 1 refill.      Leighann Lozano, PharmD, CDE

## 2019-05-01 ENCOUNTER — ANTI-COAG VISIT (OUTPATIENT)
Dept: INTERNAL MEDICINE CLINIC | Age: 54
End: 2019-05-01

## 2019-05-01 DIAGNOSIS — I82.493 DEEP VEIN THROMBOSIS (DVT) OF OTHER VEIN OF BOTH LOWER EXTREMITIES, UNSPECIFIED CHRONICITY (HCC): Primary | ICD-10-CM

## 2019-05-01 LAB
INR BLD: 2.4 (ref 1–1.5)
PT POC: 28.7 SECONDS (ref 9.1–12)
VALID INTERNAL CONTROL?: YES

## 2019-05-01 NOTE — PROGRESS NOTES
Pharmacy Progress Note  - Anticoagulation Management    Ms. Smitha Thomas  is [id number] y. o. female seen for anticoagulation management for the diagnosis of Provoked, LE Deep Vein Thrombosis.  She ambulates w/ a cane.     Interim History: States she fell off her bed 4/25/19 and hit her arm. Has a bruise on left arm. Slowly healing. Tender w/ touch. Has not taken any additional pain medications for this. Denies any CP/SOB/LE pain/HA at this time. · Warfarin start date:  ~10/25/18  · INR Goal:  2.0-3.0    · Current warfarin regimen:  2.5 mg Tues, Thurs, and 5 mg daily ROW                     · Warfarin tablet strength:   5 mg and 2.5 mg  · Duration of therapy: End of June-July 2019 per Dr. Karo Kenney pertinent positives includes:  Falls    Results for orders placed or performed in visit on 05/01/19   AMB POC PT/INR   Result Value Ref Range    VALID INTERNAL CONTROL POC Yes     Prothrombin time (POC) 28.7 (A) 9.1 - 12 seconds    INR POC 2.4 (A) 1 - 1.5         · Adherence:   · Able to recall regimen? YES  · Miss/extra dose? NO  · Need refill? NO    Upcoming procedure(s):  NO      Past Medical History:   Diagnosis Date    Arthritis     Asthma     Breast cyst 1996    left, removed    Chronic pain     LOWER BACK    Colon polyps     Diverticulitis     DVT (deep venous thrombosis) ([facility] 10/18/2018    provoked after back sx    Fatty liver     GERD (gastroesophageal reflux disease)     Hypercholesteremia     Nicotine vapor product user     Obesity     SUN (obstructive sleep apnea)     uses CPAP    Psychiatric disorder     depression    Thyroid disease     hypothyroid       Allergies   Allergen Reactions    Codeine Hives    Pcn [Penicillins] Hives       Current Outpatient Medications   Medication Sig    fluticasone furoate (ARNUITY ELLIPTA) 100 mcg/actuation dsdv inhaler Take 1 Puff by inhalation daily.  gabapentin (NEURONTIN) 300 mg capsule Take 1 Cap by mouth three (3) times daily.     traMADol (ULTRAM) 50 mg tablet Take 1 Tab by mouth every eight (8) hours as needed for Pain for up to 30 days. Max Daily Amount: 150 mg.    methocarbamol (ROBAXIN) 500 mg tablet Take 1 Tab by mouth three (3) times daily.  traZODone (DESYREL) 50 mg tablet Take 1 Tab by mouth nightly.  methocarbamol (ROBAXIN) 500 mg tablet TAKE 1 TABLET BY MOUTH FOUR (4) TIMES DAILY.  acetaminophen (TYLENOL) 325 mg tablet Take 325 mg by mouth two (2) times daily as needed for Pain.  warfarin (COUMADIN) 2.5 mg tablet TAKE 1 TABLET BY MOUTH DAILY ON TUESDAY AND THURSDAY    lovastatin (MEVACOR) 20 mg tablet TAKE 1 TABLET BY MOUTH NIGHTLY    levothyroxine (SYNTHROID) 88 mcg tablet Take 1 Tab by mouth Daily (before breakfast).  albuterol (PROVENTIL HFA, VENTOLIN HFA, PROAIR HFA) 90 mcg/actuation inhaler Take 1 Puff by inhalation every six (6) hours as needed for Wheezing.  escitalopram oxalate (LEXAPRO) 20 mg tablet TAKE 1 TABLET BY MOUTH EVERY DAY    warfarin (COUMADIN) 5 mg tablet Take 1 Tab by mouth daily. Take 1 tablet by mouth daily on Mon, Wed, Fri, Sat, and Sunday. Take 2.5 mg strength on Tuesday and Thursday.  pantoprazole (PROTONIX) 40 mg tablet TAKE 1 TABLET BY MOUTH DAILY. INDICATIONS: GASTROESOPHAGEAL REFLUX DISEASE    cromolyn (OPTICROM) 4 % ophthalmic solution Administer 1 Drop to both eyes as needed. Use in affected eye(s)    montelukast (SINGULAIR) 10 mg tablet Take 1 Tab by mouth daily. No current facility-administered medications for this visit.         Wt Readings from Last 3 Encounters:   03/21/19 263 lb (119.3 kg)   03/05/19 268 lb 6.4 oz (121.7 kg)   11/30/18 261 lb (118.4 kg)       BP Readings from Last 3 Encounters:   03/21/19 125/84   11/30/18 122/81   11/01/18 96/67       Lab Results   Component Value Date/Time    WBC 3.5 11/30/2018 04:00 PM    Hemoglobin (POC) 12.9 10/28/2013 03:40 PM    HGB 12.0 11/30/2018 04:00 PM    Hematocrit (POC) 38 10/28/2013 03:40 PM    HCT 37.9 11/30/2018 04:00 PM    PLATELET 773 08/71/5675 04:00 PM    MCV 93 11/30/2018 04:00 PM       Lab Results   Component Value Date/Time    Protein, total 6.7 08/02/2018 08:19 AM    Albumin 4.3 08/02/2018 08:19 AM       INR History:   (normal INR range 0.8-1.2)     Date   INR   PT   Dose/Comments  05/01/19 2.4  28.7 2.5 mg Tues, Thurs and 5 mg daily ROW  04/04/19          2.5                   30.2     2.5 mg Tues, Thurs and 5 mg daily ROW  03/21/19          2.2                   26.5     5 mg x 1, then  2.5 mg Tues, Thurs, and 5 mg daily ROW      03/05/19          1.5                   17.4     2.5 mg T, Th and 5 mg daily ROW; started forskolin supplement  12/03/18          2.5                   29.5          2.5 mg T,Th and 5 mg daily ROW  11/01/18          3.0                   36.0  10/25/18          3.4                   41.1      A/P:       Anticoagulation:  Considering Ms. Thomas's past history, todays findings, and per Anticoagulation Collaborative Practice Agreement/Protocol:    1. POC INR (2.4) remains therapeutic for INR goal today. 2.  Continue warfarin 2.5 mg Tuesday, Thursday and 5 mg daily ROW. Patient was instructed to schedule an appointment in 6 week(s) prior to leaving the clinic. Medication reconciliation was completed during the visit. There are no discontinued medications. A full discussion of the nature of anticoagulants has been carried out. A full discussion of the need for frequent and regular monitoring, precise dosage adjustment and compliance was stressed. Side effects of potential bleeding were discussed and Ms. Olga Lidia Brady was instructed to call [phone] if there are any signs of abnormal bleeding. Ms. Olga Lidia Brady was instructed to avoid any OTC items containing aspirin or ibuprofen and prior to starting any new OTC products to consult with her physician or pharmacist to ensure no drug interactions are present.   Ms. Olga Lidia Brady was instructed to avoid any major changes in her general diet and to avoid alcohol consumption. Ms. Freddie Pillai was provided information in the AVS that includes topics on understanding coumadin therapy, drug interaction considerations, vitamin K and coumadin use, interactions with foods and supplements containing vitamin K, and the use of herbal products. Ms. Freddie Pillai verbalized her understanding of all instructions and will call the office with any questions, concerns, or signs of abnormal bleeding or blood clot. Notifications of recommendations will be sent to Dr. Ibis Fortune MD for review.     Thank you,  Leighann Jordan

## 2019-05-01 NOTE — PATIENT INSTRUCTIONS
Today your INR was 2.4. Your goal INR is  2.0-3.0 . You have a  5 mg tablet of Coumadin (warfarin). Take Coumadin as follows:    Continue warfarin 2.5 mg on Tuesdays and Thursdays; 5 mg daily the rest of the week. Come back in 6 week(s) for your next finger stick/INR blood test.        Avoid any over the counter items containing aspirin or ibuprofen, and avoid great swings in general diet. Avoid alcohol consumption. Please notify the INR nurse if you are started on any new medication including over the counter or herbal supplements. Also, please notify your INR nurse if any of your other prescription or over the counter medications have been discontinued. Call [facility] at [phone] if you have any signs of abnormal bleeding/blood clot.  ------------------------------------------------------------------------------------------------------------------  Taking Warfarin Safely: Care Instructions    Your Care Instructions  Warfarin is a medicine that you take to prevent blood clots. It is often called a blood thinner. Doctors give warfarin (such as Coumadin) to reduce the risk of blood clots. You may be at risk for blood clots if you have atrial fibrillation or deep vein thrombosis. Some other health problems may also put you at risk. Warfarin slows the amount of time it takes for your blood to clot. It can cause bleeding problems. Even if you've been taking warfarin for a while, it's important to know how to take it safely. Foods and other medicines can affect the way warfarin works. Some can make warfarin work too well. This can cause bleeding problems. And some can make it work poorly, so that it does not prevent blood clots very well. You will need regular blood tests to check how long it takes for your blood to form a clot. This test is called a PT or prothrombin time test. The result of the test is called an INR level.  Depending on the test results, your doctor or anticoagulation clinic may adjust your dose of warfarin. Follow-up care is a key part of your treatment and safety. Be sure to make and go to all appointments, and call your doctor if you are having problems. It's also a good idea to know your test results and keep a list of the medicines you take. How can you care for yourself at home? Take warfarin safely  · Take your warfarin at the same time each day. · If you miss a dose of warfarin, don't take an extra dose to make up for it. Your doctor can tell you exactly what to do so you don't take too much or too little. · Wear medical alert jewelry that lets others know that you take warfarin. You can buy this at most drugstores. · Don't take warfarin if you are pregnant or planning to get pregnant. Talk to your doctor about how you can prevent getting pregnant while you are taking it. · Don't change your dose or stop taking warfarin unless your doctor tells you to. Effects of medicines and food on warfarin  · Don't start or stop taking any medicines, vitamins, or natural remedies unless you first talk to your doctor. Many medicines can affect how warfarin works. These include aspirin and other pain relievers, over-the-counter medicines, multivitamins, dietary supplements, and herbal products. · Tell all of your doctors and pharmacists that you take warfarin. Some prescription medicines can affect how warfarin works. · Keep the amount of vitamin K in your diet about the same from day to day. Do not suddenly eat a lot more or a lot less food that is rich in vitamin K than you usually do. Vitamin K affects how warfarin works and how your blood clots. Talk with your doctor before making big changes in your diet. Vitamin K is in many foods, such as:  ¨ Leafy greens, such as kale, cabbage, spinach, Swiss chard, and lettuce. ¨ Canola and soybean oils. ¨ Green vegetables, such as asparagus, broccoli, and [place] sprouts.   ¨ Vegetable drinks, green tea leaves, and some dietary supplement drinks. · Avoid cranberry juice and other cranberry products. They can increase the effects of warfarin. · Limit your use of alcohol. Avoid bleeding by preventing falls and injuries  · Wear slippers or shoes with nonskid soles. · Remove throw rugs and clutter. · Rearrange furniture and electrical cords to keep them out of walking paths. · Keep stairways, porches, and outside walkways well lit. Use night-lights in hallways and bathrooms. · Be extra careful when you work with sharp tools or knives. When should you call for help? Call 911 anytime you think you may need emergency care. For example, call if:  · You have a sudden, severe headache that is different from past headaches. Call your doctor now or seek immediate medical care if:  · You have any abnormal bleeding, such as:  ¨ Nosebleeds. ¨ Vaginal bleeding that is different (heavier, more frequent, at a different time of the month) than what you are used to. ¨ Bloody or black stools, or rectal bleeding. ¨ Bloody or pink urine. Watch closely for changes in your health, and be sure to contact your doctor if you have any problems. Where can you learn more? Go to http://olivier-akil.info/. Enter V398 in the search box to learn more about \"Taking Warfarin Safely: Care Instructions. \"  Current as of: January 27, 2016  Content Version: 11.1  © 5973-1115 [employer]. Care instructions adapted under license by [employer] (which disclaims liability or warranty for this information). If you have questions about a medical condition or this instruction, always ask your healthcare professional. [place] [address] any warranty or liability for your use of this information.

## 2019-05-03 RX ORDER — TRAZODONE HYDROCHLORIDE 50 MG/1
TABLET ORAL
Qty: 30 TAB | Refills: 5 | Status: SHIPPED | OUTPATIENT
Start: 2019-05-03 | End: 2019-05-28 | Stop reason: SDUPTHER

## 2019-05-06 RX ORDER — METHOCARBAMOL 500 MG/1
TABLET, FILM COATED ORAL
Qty: 90 TAB | Refills: 0 | Status: SHIPPED | OUTPATIENT
Start: 2019-05-06 | End: 2019-06-17 | Stop reason: SDUPTHER

## 2019-05-07 ENCOUNTER — TELEPHONE (OUTPATIENT)
Dept: INTERNAL MEDICINE CLINIC | Age: 54
End: 2019-05-07

## 2019-05-07 DIAGNOSIS — Z12.31 SCREENING MAMMOGRAM, ENCOUNTER FOR: Primary | ICD-10-CM

## 2019-05-07 NOTE — TELEPHONE ENCOUNTER
Regarding: Non-Urgent Medical Question  Contact: [phone]  ----- Message from [address] [place] [address] [address], Generic sent at 5/3/2019  6:53 PM EDT -----    I need to have a mamagram done . if you would i need you to call hospital for me plz im not getting no messages back . something is wrong with my chart . can u call me plz when you get a chance.

## 2019-05-08 ENCOUNTER — APPOINTMENT (OUTPATIENT)
Dept: CT IMAGING | Age: 54
End: 2019-05-08
Attending: EMERGENCY MEDICINE
Payer: MEDICARE

## 2019-05-08 ENCOUNTER — TELEPHONE (OUTPATIENT)
Dept: INTERNAL MEDICINE CLINIC | Age: 54
End: 2019-05-08

## 2019-05-08 ENCOUNTER — HOSPITAL ENCOUNTER (EMERGENCY)
Age: 54
Discharge: HOME | End: 2019-05-08
Attending: EMERGENCY MEDICINE
Payer: MEDICARE

## 2019-05-08 ENCOUNTER — APPOINTMENT (OUTPATIENT)
Dept: GENERAL RADIOLOGY | Age: 54
End: 2019-05-08
Attending: EMERGENCY MEDICINE
Payer: MEDICARE

## 2019-05-08 VITALS
TEMPERATURE: 98.1 F | RESPIRATION RATE: 18 BRPM | SYSTOLIC BLOOD PRESSURE: 138 MMHG | OXYGEN SATURATION: 100 % | HEIGHT: 67 IN | DIASTOLIC BLOOD PRESSURE: 90 MMHG | BODY MASS INDEX: 43.18 KG/M2 | HEART RATE: 60 BPM | WEIGHT: 275.13 LBS

## 2019-05-08 DIAGNOSIS — Z79.01 CHRONIC ANTICOAGULATION: ICD-10-CM

## 2019-05-08 DIAGNOSIS — R06.02 SOB (SHORTNESS OF BREATH): ICD-10-CM

## 2019-05-08 DIAGNOSIS — I10 ACCELERATED HYPERTENSION: ICD-10-CM

## 2019-05-08 DIAGNOSIS — J20.9 ACUTE BRONCHITIS, UNSPECIFIED ORGANISM: Primary | ICD-10-CM

## 2019-05-08 LAB
ALBUMIN SERPL-MCNC: 3.7 G/DL (ref 3.5–5)
ALBUMIN/GLOB SERPL: 0.9 {RATIO} (ref 1.1–2.2)
ALP SERPL-CCNC: 88 U/L (ref 45–117)
ALT SERPL-CCNC: 18 U/L (ref 12–78)
ANION GAP SERPL CALC-SCNC: 5 MMOL/L (ref 5–15)
AST SERPL-CCNC: 15 U/L (ref 15–37)
BASOPHILS # BLD: 0 K/UL (ref 0–0.1)
BASOPHILS NFR BLD: 1 % (ref 0–1)
BILIRUB SERPL-MCNC: 0.4 MG/DL (ref 0.2–1)
BNP SERPL-MCNC: 64 PG/ML
BUN SERPL-MCNC: 15 MG/DL (ref 6–20)
BUN/CREAT SERPL: 17 (ref 12–20)
CALCIUM SERPL-MCNC: 8.8 MG/DL (ref 8.5–10.1)
CHLORIDE SERPL-SCNC: 105 MMOL/L (ref 97–108)
CK SERPL-CCNC: 59 U/L (ref 26–192)
CO2 SERPL-SCNC: 29 MMOL/L (ref 21–32)
CREAT SERPL-MCNC: 0.87 MG/DL (ref 0.55–1.02)
DIFFERENTIAL METHOD BLD: NORMAL
EOSINOPHIL # BLD: 0.1 K/UL (ref 0–0.4)
EOSINOPHIL NFR BLD: 3 % (ref 0–7)
ERYTHROCYTE [DISTWIDTH] IN BLOOD BY AUTOMATED COUNT: 13.8 % (ref 11.5–14.5)
GLOBULIN SER CALC-MCNC: 4 G/DL (ref 2–4)
GLUCOSE SERPL-MCNC: 86 MG/DL (ref 65–100)
HCT VFR BLD AUTO: 40.8 % (ref 35–47)
HGB BLD-MCNC: 12.8 G/DL (ref 11.5–16)
IMM GRANULOCYTES # BLD AUTO: 0 K/UL (ref 0–0.04)
IMM GRANULOCYTES NFR BLD AUTO: 0 % (ref 0–0.5)
INR PPP: 1.8 (ref 0.9–1.1)
LYMPHOCYTES # BLD: 1.2 K/UL (ref 0.8–3.5)
LYMPHOCYTES NFR BLD: 35 % (ref 12–49)
MCH RBC QN AUTO: 30.9 PG (ref 26–34)
MCHC RBC AUTO-ENTMCNC: 31.4 G/DL (ref 30–36.5)
MCV RBC AUTO: 98.6 FL (ref 80–99)
MONOCYTES # BLD: 0.3 K/UL (ref 0–1)
MONOCYTES NFR BLD: 7 % (ref 5–13)
NEUTS SEG # BLD: 2 K/UL (ref 1.8–8)
NEUTS SEG NFR BLD: 54 % (ref 32–75)
NRBC # BLD: 0 K/UL (ref 0–0.01)
NRBC BLD-RTO: 0 PER 100 WBC
PLATELET # BLD AUTO: 206 K/UL (ref 150–400)
PMV BLD AUTO: 9.3 FL (ref 8.9–12.9)
POTASSIUM SERPL-SCNC: 3.9 MMOL/L (ref 3.5–5.1)
PROT SERPL-MCNC: 7.7 G/DL (ref 6.4–8.2)
PROTHROMBIN TIME: 17.9 SEC (ref 9–11.1)
RBC # BLD AUTO: 4.14 M/UL (ref 3.8–5.2)
SODIUM SERPL-SCNC: 139 MMOL/L (ref 136–145)
TROPONIN I SERPL-MCNC: <0.05 NG/ML
WBC # BLD AUTO: 3.6 K/UL (ref 3.6–11)

## 2019-05-08 PROCEDURE — 82550 ASSAY OF CK (CPK): CPT

## 2019-05-08 PROCEDURE — 71046 X-RAY EXAM CHEST 2 VIEWS: CPT

## 2019-05-08 PROCEDURE — 85025 COMPLETE CBC W/AUTO DIFF WBC: CPT

## 2019-05-08 PROCEDURE — 74011636320 HC RX REV CODE- 636/320: Performed by: EMERGENCY MEDICINE

## 2019-05-08 PROCEDURE — 84484 ASSAY OF TROPONIN QUANT: CPT

## 2019-05-08 PROCEDURE — 80053 COMPREHEN METABOLIC PANEL: CPT

## 2019-05-08 PROCEDURE — 83880 ASSAY OF NATRIURETIC PEPTIDE: CPT

## 2019-05-08 PROCEDURE — 85610 PROTHROMBIN TIME: CPT

## 2019-05-08 PROCEDURE — 36415 COLL VENOUS BLD VENIPUNCTURE: CPT

## 2019-05-08 PROCEDURE — 71275 CT ANGIOGRAPHY CHEST: CPT

## 2019-05-08 PROCEDURE — 99283 EMERGENCY DEPT VISIT LOW MDM: CPT

## 2019-05-08 PROCEDURE — 93005 ELECTROCARDIOGRAM TRACING: CPT

## 2019-05-08 PROCEDURE — 74011000250 HC RX REV CODE- 250: Performed by: EMERGENCY MEDICINE

## 2019-05-08 RX ORDER — SODIUM CHLORIDE 0.9 % (FLUSH) 0.9 %
10 SYRINGE (ML) INJECTION
Status: COMPLETED | OUTPATIENT
Start: 2019-05-08 | End: 2019-05-08

## 2019-05-08 RX ORDER — IPRATROPIUM BROMIDE AND ALBUTEROL SULFATE 2.5; .5 MG/3ML; MG/3ML
3 SOLUTION RESPIRATORY (INHALATION)
Status: COMPLETED | OUTPATIENT
Start: 2019-05-08 | End: 2019-05-08

## 2019-05-08 RX ORDER — AZITHROMYCIN 250 MG/1
TABLET, FILM COATED ORAL
Qty: 6 TAB | Refills: 0 | Status: SHIPPED | OUTPATIENT
Start: 2019-05-08 | End: 2019-05-13

## 2019-05-08 RX ORDER — ALBUTEROL SULFATE 0.83 MG/ML
2.5 SOLUTION RESPIRATORY (INHALATION)
Qty: 24 EACH | Refills: 0 | Status: SHIPPED | OUTPATIENT
Start: 2019-05-08 | End: 2019-05-10 | Stop reason: SDUPTHER

## 2019-05-08 RX ORDER — PREDNISONE 50 MG/1
50 TABLET ORAL DAILY
Qty: 5 TAB | Refills: 0 | Status: SHIPPED | OUTPATIENT
Start: 2019-05-08 | End: 2019-05-13

## 2019-05-08 RX ADMIN — IPRATROPIUM BROMIDE AND ALBUTEROL SULFATE 3 ML: .5; 3 SOLUTION RESPIRATORY (INHALATION) at 18:58

## 2019-05-08 RX ADMIN — Medication 10 ML: at 18:34

## 2019-05-08 RX ADMIN — IOPAMIDOL 80 ML: 755 INJECTION, SOLUTION INTRAVENOUS at 18:34

## 2019-05-08 NOTE — ED PROVIDER NOTES
EMERGENCY DEPARTMENT HISTORY AND PHYSICAL EXAM 
 
 
Date: 5/8/2019 Patient Name: Britany Cohen Patient Age and Sex: 46 y.o. female History of Presenting Illness Chief Complaint Patient presents with  Shortness of Breath  
  x two days with associated back pain History Provided By: Patient HPI: Britany Cohen, 46 y.o. female with PMHx significant for arthritis, left breast cancer, left lower extremity DVT since October 2018 on Coumadin presents ambulatory to the emergency room with 3 days of shortness of breath with associated back pain. Patient states that her  has an acute bronchitis and has been giving her some similar symptoms including dry cough. Patient does report taking Coumadin as prescribed; her PCP was concern with the symptoms and advised ER evaluation for PE. She denies any pleuritic type chest pain; patient reports the pain is mostly in the upper thoracic region. She says her shortness of breath is worse with exertion. She also reports taking an nebulizer at home with minimal relief of symptoms. Denies any recent prolonged travel, recent surgery. Pt denies any other alleviating or exacerbating factors. Additionally, pt specifically denies any recent fever, chills, headache, nausea, vomiting, diarrhea, abdominal pain, lightheadedness, dizziness, numbness, weakness, tingling, BLE swelling, heart palpitations, urinary sxs, changes in BM, changes in PO intake, melena, hematochezia. PCP: Rachana Ghosh MD 
 
There are no other complaints, changes or physical findings at this time. Past History Past Medical History: 
Past Medical History:  
Diagnosis Date  Arthritis  Asthma  Breast cyst 1996  
 left, removed  Chronic pain LOWER BACK  Colon polyps  Diverticulitis  DVT (deep venous thrombosis) ([facility] [address] 10/18/2018  
 provoked after back sx  Fatty liver  GERD (gastroesophageal reflux disease)  Hypercholesteremia  Nicotine vapor product user  Obesity  SUN (obstructive sleep apnea)   
 uses CPAP  
 Psychiatric disorder   
 depression  Thyroid disease   
 hypothyroid Past Surgical History: 
Past Surgical History:  
Procedure Laterality Date  COLONOSCOPY N/A 2017 COLONOSCOPY performed by Delbert Washburn MD at [facility] SUR[facility]ERY  ,  L5 & S 1  
 HX BACK SURGERY  2018  HX BREAST BIOPSY Left  HX HYSTERECTOMY  09 18 [facility]  HX OOPHORECTOMY Left One ovary removed.  HX ORTHOPAEDIC Left   
 thumb surgery  HX ORTHOPAEDIC Left   
 left knee surgery  HX ORTHOPAEDIC Left   
 ankle  HX TUBAL LIGATION    
 REMOVAL OF KIDNEY STONE  RENAL STENT Family History: 
Family History Problem Relation Age of Onset Charlotte Pee Mother 54 Lung cancer  Heart Disease Father  Hypertension Father  Elevated Lipids Father  Heart Attack Father  Stroke Father   
     x 3  
 Elevated Lipids Sister  Heart Disease Brother  Hypertension Brother  Elevated Lipids Brother  Elevated Lipids Sister  Elevated Lipids Sister  Heart Disease Sister  Cancer Sister   
     uterine  Heart Disease Sister  Cancer Sister   
     uterine  Heart Disease Sister  Cancer Sister   
     colon cancer with liver mets  Bleeding Prob Brother  Heart Attack Brother  Anesth Problems Neg Hx Social History: 
Social History Tobacco Use  Smoking status: Former Smoker Packs/day: 1.50 Years: 25.00 Pack years: 37.50 Last attempt to quit: 2015 Years since quittin.2  Smokeless tobacco: Never Used Substance Use Topics  Alcohol use: No  
 Drug use: No  
 
 
Allergies: Allergies Allergen Reactions  Codeine Hives  Pcn [Penicillins] Hives Medications: No current facility-administered medications on file prior to encounter. Current Outpatient Medications on File Prior to Encounter Medication Sig Dispense Refill  fluticasone furoate (ARNUITY ELLIPTA) 100 mcg/actuation dsdv inhaler Take 1 Puff by inhalation daily. 2 Inhaler 4  
 gabapentin (NEURONTIN) 300 mg capsule Take 1 Cap by mouth three (3) times daily. 90 Cap 2  
 traMADol (ULTRAM) 50 mg tablet Take 1 Tab by mouth every eight (8) hours as needed for Pain for up to 30 days. Max Daily Amount: 150 mg. 90 Tab 0  
 methocarbamol (ROBAXIN) 500 mg tablet TAKE 1 TABLET BY MOUTH FOUR (4) TIMES DAILY. 90 Tab 0  
 traZODone (DESYREL) 50 mg tablet TAKE 1 TABLET BY MOUTH EVERY DAY AT NIGHT 30 Tab 5  
 acetaminophen (TYLENOL) 325 mg tablet Take 325 mg by mouth two (2) times daily as needed for Pain.  warfarin (COUMADIN) 2.5 mg tablet TAKE 1 TABLET BY MOUTH DAILY ON TUESDAY AND THURSDAY 30 Tab 0  
 lovastatin (MEVACOR) 20 mg tablet TAKE 1 TABLET BY MOUTH NIGHTLY 90 Tab 1  
 levothyroxine (SYNTHROID) 88 mcg tablet Take 1 Tab by mouth Daily (before breakfast). 90 Tab 5  
 albuterol (PROVENTIL HFA, VENTOLIN HFA, PROAIR HFA) 90 mcg/actuation inhaler Take 1 Puff by inhalation every six (6) hours as needed for Wheezing. 1 Inhaler 2  
 escitalopram oxalate (LEXAPRO) 20 mg tablet TAKE 1 TABLET BY MOUTH EVERY DAY 90 Tab 1  
 warfarin (COUMADIN) 5 mg tablet Take 1 Tab by mouth daily. Take 1 tablet by mouth daily on Mon, Wed, Fri, Sat, and Sunday. Take 2.5 mg strength on Tuesday and Thursday. 30 Tab 5  pantoprazole (PROTONIX) 40 mg tablet TAKE 1 TABLET BY MOUTH DAILY. INDICATIONS: GASTROESOPHAGEAL REFLUX DISEASE 90 Tab 1  cromolyn (OPTICROM) 4 % ophthalmic solution Administer 1 Drop to both eyes as needed. Use in affected eye(s)  montelukast (SINGULAIR) 10 mg tablet Take 1 Tab by mouth daily. 30 Tab 4 Review of Systems Review of Systems Constitutional: Negative. Negative for chills and fever. HENT: Negative.   Negative for congestion, facial swelling, rhinorrhea, sore throat, trouble swallowing and voice change. Eyes: Negative. Respiratory: Positive for cough, chest tightness, shortness of breath and wheezing. Negative for apnea. Cardiovascular: Negative. Negative for chest pain, palpitations and leg swelling. Gastrointestinal: Negative. Negative for abdominal distention, abdominal pain, blood in stool, constipation, diarrhea, nausea and vomiting. Endocrine: Negative. Negative for cold intolerance, heat intolerance and polyuria. Genitourinary: Negative. Negative for difficulty urinating, dysuria, flank pain, frequency, hematuria and urgency. Musculoskeletal: Negative. Negative for arthralgias, back pain, myalgias, neck pain and neck stiffness. Skin: Negative. Negative for color change and rash. Neurological: Negative. Negative for dizziness, syncope, facial asymmetry, speech difficulty, weakness, light-headedness, numbness and headaches. Hematological: Negative. Does not bruise/bleed easily. Psychiatric/Behavioral: Negative. Negative for confusion and self-injury. The patient is not nervous/anxious. Physical Exam  
Physical Exam  
Constitutional: She is oriented to person, place, and time. She appears well-developed and well-nourished. No distress. HENT:  
Head: Normocephalic and atraumatic. Mouth/Throat: Oropharynx is clear and moist. No oropharyngeal exudate. Eyes: Pupils are equal, round, and reactive to light. Conjunctivae and EOM are normal.  
Neck: Normal range of motion. Cardiovascular: Normal rate, regular rhythm and normal heart sounds. Exam reveals no gallop and no friction rub. No murmur heard. Pulmonary/Chest: Effort normal and breath sounds normal. No respiratory distress. She has no wheezes. She has no rales. She exhibits no tenderness. Abdominal: Soft. Bowel sounds are normal. She exhibits no distension and no mass. There is no tenderness. There is no rebound and no guarding. Musculoskeletal: Normal range of motion. She exhibits no edema, tenderness or deformity. Neurological: She is alert and oriented to person, place, and time. She displays normal reflexes. No cranial nerve deficit. She exhibits normal muscle tone. Coordination normal.  
Skin: Skin is warm. No rash noted. She is not diaphoretic. Psychiatric: She has a normal mood and affect. Nursing note and vitals reviewed. Diagnostic Study Results Labs - Recent Results (from the past 24 hour(s)) EKG, 12 LEAD, INITIAL Collection Time: 05/08/19  2:25 PM  
Result Value Ref Range Ventricular Rate 55 BPM  
 Atrial Rate 56 BPM  
 QRS Duration 74 ms Q-T Interval 486 ms QTC Calculation (Bezet) 464 ms Calculated R Axis 36 degrees Calculated T Axis 39 degrees Diagnosis Junctional rhythm Nonspecific ST and T wave abnormality Prolonged QT When compared with ECG of 20-AUG-2018 14:23, 
Junctional rhythm has replaced Sinus rhythm METABOLIC PANEL, COMPREHENSIVE Collection Time: 05/08/19  2:39 PM  
Result Value Ref Range Sodium 139 136 - 145 mmol/L Potassium 3.9 3.5 - 5.1 mmol/L Chloride 105 97 - 108 mmol/L  
 CO2 29 21 - 32 mmol/L Anion gap 5 5 - 15 mmol/L Glucose 86 65 - 100 mg/dL BUN 15 6 - 20 MG/DL Creatinine 0.87 0.55 - 1.02 MG/DL  
 BUN/Creatinine ratio 17 12 - 20 GFR est AA >60 >60 ml/min/1.73m2 GFR est non-AA >60 >60 ml/min/1.73m2 Calcium 8.8 8.5 - 10.1 MG/DL Bilirubin, total 0.4 0.2 - 1.0 MG/DL  
 ALT (SGPT) 18 12 - 78 U/L  
 AST (SGOT) 15 15 - 37 U/L Alk. phosphatase 88 45 - 117 U/L Protein, total 7.7 6.4 - 8.2 g/dL Albumin 3.7 3.5 - 5.0 g/dL Globulin 4.0 2.0 - 4.0 g/dL A-G Ratio 0.9 (L) 1.1 - 2.2    
CBC WITH AUTOMATED DIFF Collection Time: 05/08/19  2:39 PM  
Result Value Ref Range WBC 3.6 3.6 - 11.0 K/uL  
 RBC 4.14 3.80 - 5.20 M/uL  
 HGB 12.8 11.5 - 16.0 g/dL HCT 40.8 35.0 - 47.0 %  MCV 98.6 80.0 - 99.0 FL  
 MCH 30.9 26.0 - 34.0 PG  
 MCHC 31.4 30.0 - 36.5 g/dL  
 RDW 13.8 11.5 - 14.5 % PLATELET 075 464 - 513 K/uL MPV 9.3 8.9 - 12.9 FL  
 NRBC 0.0 0  WBC ABSOLUTE NRBC 0.00 0.00 - 0.01 K/uL NEUTROPHILS 54 32 - 75 % LYMPHOCYTES 35 12 - 49 % MONOCYTES 7 5 - 13 % EOSINOPHILS 3 0 - 7 % BASOPHILS 1 0 - 1 % IMMATURE GRANULOCYTES 0 0.0 - 0.5 % ABS. NEUTROPHILS 2.0 1.8 - 8.0 K/UL  
 ABS. LYMPHOCYTES 1.2 0.8 - 3.5 K/UL  
 ABS. MONOCYTES 0.3 0.0 - 1.0 K/UL  
 ABS. EOSINOPHILS 0.1 0.0 - 0.4 K/UL  
 ABS. BASOPHILS 0.0 0.0 - 0.1 K/UL  
 ABS. IMM. GRANS. 0.0 0.00 - 0.04 K/UL  
 DF AUTOMATED    
TROPONIN I Collection Time: 05/08/19  2:39 PM  
Result Value Ref Range Troponin-I, Qt. <0.05 <0.05 ng/mL CK W/ REFLX CKMB Collection Time: 05/08/19  2:39 PM  
Result Value Ref Range CK 59 26 - 192 U/L  
PROTHROMBIN TIME + INR Collection Time: 05/08/19  2:39 PM  
Result Value Ref Range INR 1.8 (H) 0.9 - 1.1 Prothrombin time 17.9 (H) 9.0 - 11.1 sec NT-PRO BNP Collection Time: 05/08/19  2:39 PM  
Result Value Ref Range NT pro-BNP 64 <125 PG/ML Radiologic Studies -  
CTA CHEST W OR W WO CONT Final Result IMPRESSION: No pulmonary embolism demonstrated. XR CHEST PA LAT Final Result IMPRESSION: No acute intrathoracic disease. CT Results  (Last 48 hours) 05/08/19 1834  CTA [facility] CONT Final result Impression:  IMPRESSION: No pulmonary embolism demonstrated. Narrative:  INDICATION: 2 day history of back pain and shortness of breath COMPARISON: Earlier chest x-ray EXAM: Precontrast localizer was first performed to verify the levels of the  
pulmonary arteries. Subsequently, contiguous axial images were obtained after  
the rapid IV bolus administration of 80 cc Isovue-370, followed by thin section  
axial reconstructions with multiplanar reformations.  Three-dimensional post -  
 processing was performed. CT dose reduction was achieved through use of a  
standardized protocol tailored for this examination and automatic exposure  
control for dose modulation. FINDINGS: There is no evidence for pulmonary embolism. There is no pleural  
effusion. Trace pericardial fluid is shown. Cardiac size is within normal  
limits. There is no mediastinal or hilar mass. The lungs are clear. No  
substantial osseous abnormality is shown. CXR Results  (Last 48 hours) 05/08/19 1432  XR CHEST PA LAT Final result Impression:  IMPRESSION: No acute intrathoracic disease. Narrative:  CLINICAL HISTORY: Dyspnea INDICATION: Dyspnea COMPARISON: 5/12/2017 FINDINGS:   
PA and lateral views of the chest are obtained. The cardiopericardial silhouette is within normal limits. There is no pleural  
effusion, pneumothorax or focal consolidation present. Medical Decision Making I am the first provider for this patient. I reviewed the vital signs, available nursing notes, past medical history, past surgical history, family history and social history. Vital Signs-Reviewed the patient's vital signs. Patient Vitals for the past 24 hrs: 
 Temp Pulse Resp BP SpO2  
05/08/19 1830  60 18 138/90   
05/08/19 1715     100 % 05/08/19 1420 98.1 °F (36.7 °C) (!) 58 18 (!) 145/104 97 % Pulse Oximetry Analysis - 97% on RA Cardiac Monitor:  
Rate: 58 bpm 
Rhythm: Normal Sinus Rhythm ED EKG interpretation: 
Rhythm: junctional rhythm; and regular . Rate (approx.): 55; Axis: normal; P wave: normal; QRS interval: normal ; ST/T wave: normal; Other findings: normal. This EKG was interpreted by Hesham Bhatia M.D. Records Reviewed: Nursing Notes, Old Medical Records, Previous electrocardiograms, Previous Radiology Studies and Previous Laboratory Studies Provider Notes (Medical Decision Making):  
 Patient presents with acute dyspnea. DDx: asthma, copd, pna, pulmonary edema, acute bronchitis, ACS, ptx, pna. Will obtain EKG, labs, CXR, provide O2 as needed for hypoxia, treat symptomatically and reassess. Will continue to monitor closely in ED. ED Course:  
Initial assessment performed. The patients presenting problems have been discussed, and they are in agreement with the care plan formulated and outlined with them. I have encouraged them to ask questions as they arise throughout their visit. I reviewed our electronic medical record system for any past medical records that were available that may contribute to the patient's current condition, the nursing notes and vital signs from today's visit. Mariaelena Baer MD 
Medications Administered During ED Course: 
Medications  
albuterol-ipratropium (DUO-NEB) 2.5 MG-0.5 MG/3 ML (3 mL Nebulization Given 5/8/19 1858) sodium chloride (NS) flush 10 mL (10 mL IntraVENous Given 5/8/19 1834) iopamidol (ISOVUE-370) 76 % injection 100 mL (80 mL IntraVENous Given 5/8/19 1834) ED Course as of May 09 0048 Wed May 08, 2019  
1713 Her INR is 1.8. This is subtherapeutic, higher concern for PE now. Tech at bedside to place ultrasound IV. [HW] ED Course User Index 
[HW] Byron Rg MD  
 
The patient has been re-examined after nebulizer treatments and states that they are feeling better and have no new complaints. Stable vitals without hypoxia. On auscultation, wheezing is significantly improved. The patient expresses understanding and agreement with diagnosis, care plan; including oral steroids, nebulizer or inhaler use, follow up and return instructions. The patient agrees to return in 24 hours if their symptoms are not improving or immediately if they have any change in their condition including worsening wheezing or any signs of increasing work of breathing.  
 
Progress Note: 
Patient has been reassessed and reports feeling better and symptoms have improved after ED treatment. Андрей Saunders is able to tolerate PO and ambulate per baseline. Aftab Thomas's final labs and imaging have been reviewed with her. She has been counseled regarding her diagnosis. She verbally conveys understanding and agreement of the signs, symptoms, diagnosis, treatment and prognosis and additionally agrees to follow up as recommended with Dr. Shanon Huddleston MD in 24 - 48 hours. She also agrees with the care-plan and conveys that all of her questions have been answered. I have also put together some discharge instructions for her that include: 1) educational information regarding their diagnosis, 2) how to care for their diagnosis at home, as well a 3) list of reasons why they would want to return to the ED prior to their follow-up appointment, should their condition change. I have answered all questions to the patient's satisfaction. Strict return precautions given. She both understood and agreed with plan as discussed above. Vital signs stable for discharge. Disposition: DISCHARGE The pt is ready for discharge. The pt's signs, symptoms, diagnosis, and discharge instructions have been discussed and pt has conveyed their understanding. The pt is to follow up as recommended or return to ER should their symptoms worsen. Plan has been discussed and pt is in agreement. PLAN: 
1. Discharge Medication List as of 5/8/2019  7:18 PM  
  
CONTINUE these medications which have NOT CHANGED Details  
fluticasone furoate (ARNUITY ELLIPTA) 100 mcg/actuation dsdv inhaler Take 1 Puff by inhalation daily. , Normal, Disp-2 Inhaler, R-4  
  
gabapentin (NEURONTIN) 300 mg capsule Take 1 Cap by mouth three (3) times daily. , Normal, Disp-90 Cap, R-2  
  
traMADol (ULTRAM) 50 mg tablet Take 1 Tab by mouth every eight (8) hours as needed for Pain for up to 30 days.  Max Daily Amount: 150 mg., Print, Disp-90 Tab, R-0  
  
 methocarbamol (ROBAXIN) 500 mg tablet TAKE 1 TABLET BY MOUTH FOUR (4) TIMES DAILY., Normal, Disp-90 Tab, R-0  
  
traZODone (DESYREL) 50 mg tablet TAKE 1 TABLET BY MOUTH EVERY DAY AT NIGHT, Normal, Disp-30 Tab, R-5  
  
acetaminophen (TYLENOL) 325 mg tablet Take 325 mg by mouth two (2) times daily as needed for Pain., Historical Med  
  
!! warfarin (COUMADIN) 2.5 mg tablet TAKE 1 TABLET BY MOUTH DAILY ON TUESDAY AND THURSDAY, NormalDX Code Needed  . Disp-30 Tab, R-0  
  
lovastatin (MEVACOR) 20 mg tablet TAKE 1 TABLET BY MOUTH NIGHTLY, Normal, Disp-90 Tab, R-1  
  
levothyroxine (SYNTHROID) 88 mcg tablet Take 1 Tab by mouth Daily (before breakfast). , Normal, Disp-90 Tab, R-5  
  
albuterol (PROVENTIL HFA, VENTOLIN HFA, PROAIR HFA) 90 mcg/actuation inhaler Take 1 Puff by inhalation every six (6) hours as needed for Wheezing., Normal, Disp-1 Inhaler, R-2  
  
escitalopram oxalate (LEXAPRO) 20 mg tablet TAKE 1 TABLET BY MOUTH EVERY DAY, Normal, Disp-90 Tab, R-1  
  
!! warfarin (COUMADIN) 5 mg tablet Take 1 Tab by mouth daily. Take 1 tablet by mouth daily on Mon, Wed, Fri, Sat, and Sunday. Take 2.5 mg strength on Tuesday and Thursday., No Print, Disp-30 Tab, R-5  
  
pantoprazole (PROTONIX) 40 mg tablet TAKE 1 TABLET BY MOUTH DAILY. INDICATIONS: GASTROESOPHAGEAL REFLUX DISEASE, Normal, Disp-90 Tab, R-1  
  
cromolyn (OPTICROM) 4 % ophthalmic solution Administer 1 Drop to both eyes as needed. Use in affected eye(s), Historical Med  
  
montelukast (SINGULAIR) 10 mg tablet Take 1 Tab by mouth daily. , Normal, Disp-30 Tab, R-4  
  
 !! - Potential duplicate medications found. Please discuss with provider. 2.  
Follow-up Information Follow up With Specialties Details Why Contact Info Diana Bailey MD Internal Medicine   . Sameer [address] [facility] [address] [facility] 
[phone] [facility] EMERGENCY DEPT Emergency Medicine  As needed, If symptoms worsen [address] [facility] 
[phone] Return to ED if worse Diagnosis Clinical Impression: 1. Acute bronchitis, unspecified organism 2. SOB (shortness of breath) 3. Chronic anticoagulation 4. Accelerated hypertension Attestation: 
 
I personally performed the services described in this documentation on this date 5/8/2019 for patient Kasia Guerra. I have reviewed and verified that the information is accurate and complete. Pop Briseno MD 
 
Please note that this dictation was completed with [employer] the [employer] voice recognition software. Quite often unanticipated grammatical, syntax, homophones, and other interpretive errors are inadvertently transcribed by the computer software. Please disregard these errors. Please excuse any errors that have escaped final proofreading. Thank you. This note will not be viewable in [address]

## 2019-05-08 NOTE — TELEPHONE ENCOUNTER
Regarding: RE: Non-Urgent Medical Question  Contact: [phone]  ----- Message from [address] [place] [address] [address], [facility] sent at 5/7/2019  9:10 PM EDT -----    I need a prescription filled plz . Albuterol Sol. 2.5mg/3ml Soln . so i need to  do a breathing treatment. so i can breath . I have the machine . i hope it still works i havent used it since the end of 2017 . Baudilio Bhavik     ----- Message -----  From: Nelda Abraham MD  Sent: 4/12/19 1:03 PM  To: Jeffy Thomas  Subject: RE: Non-Urgent Medical Question    Wait until you finish warfarin/ coumadin to start it.    ----- Message -----     From: Radha Mensah     Sent: 4/11/2019 12:14 PM EDT       To: Nelda Abraham MD  Subject: Non-Urgent Medical Question    Can i take vitamin E 1000 with warfrin ? Thank You .

## 2019-05-08 NOTE — TELEPHONE ENCOUNTER
Pt is requesting a call back for an appointment. Pt states that she has a blood clot in her leg.  Phone: [phone]       Message received & copied from [facility]

## 2019-05-08 NOTE — TELEPHONE ENCOUNTER
Lenetta Sever, MD Julio Dimitri, LPN   Caller: Unspecified (Today,  8:06 AM)             Her inr is therapeutic - she had a ever and repeat US showed it resolved recently. what is patient concerned with? Spoke with patient. Two pt identifiers confirmed. Patient states that she is having a lot of shortness of breath. Patient states that she is on her way to the ED for evaluation. Advised patient that if she needs a followup to give us a call. Pt verbalized understanding of information discussed w/ no further questions at this time.

## 2019-05-08 NOTE — DISCHARGE INSTRUCTIONS
Thank you for allowing us to take care of you today! We hope we addressed all of your concerns and needs. We strive to provide excellent quality care in the Emergency Department. You will receive a survey after your visit to evaluate the care you were provided. Should you receive a survey from us, we invite you to share your experience and tell us what made it excellent. It was a pleasure serving you, we invite you to share your experience with us, in our pursuit for excellence, should you be selected to receive a survey. The exam and treatment you received in the Emergency Department were for an urgent problem and are not intended as complete care. It is important that you follow up with a doctor, nurse practitioner, or physician assistant for ongoing care. If your symptoms become worse or you do not improve as expected and you are unable to reach your usual health care provider, you should return to the Emergency Department. We are available 24 hours a day. Please take your discharge instructions with you when you go to your follow-up appointment. If you have any problem arranging a follow-up appointment, contact the Emergency Department immediately. If a prescription has been provided, please have it filled as soon as possible to prevent a delay in treatment. Read the entire medication instruction sheet provided to you by the pharmacy. If you have any questions or reservations about taking the medication due to side effects or interactions with other medications, please call your primary care physician or contact the ER to speak with the charge nurse. Make an appointment with your family doctor or the physician you were referred to for follow-up of this visit as instructed on your discharge paperwork, as this is mandatory follow-up. Return to the ER if you are unable to be seen or if you are unable to be seen in a timely manner.     If you have any problem arranging the follow-up visit, contact the Emergency Department immediately. I hope you feel better and thank you again for allow us to provide you with excellent care today at [address] [place] [address]! Warmest regards,    Tomer Hogue MD  Emergency Medicine Physician  [address] [facility]              Patient Education        Bronchitis: Care Instructions  Your Care Instructions    Bronchitis is inflammation of the bronchial tubes, which carry air to the lungs. The tubes swell and produce mucus, or phlegm. The mucus and inflamed bronchial tubes make you cough. You may have trouble breathing. Most cases of bronchitis are caused by viruses like those that cause colds. Antibiotics usually do not help and they may be harmful. Bronchitis usually develops rapidly and lasts about 2 to 3 weeks in otherwise healthy people. Follow-up care is a key part of your treatment and safety. Be sure to make and go to all appointments, and call your doctor if you are having problems. It's also a good idea to know your test results and keep a list of the medicines you take. How can you care for yourself at home? · Take all medicines exactly as prescribed. Call your doctor if you think you are having a problem with your medicine. · Get some extra rest.  · Take an over-the-counter pain medicine, such as acetaminophen (Tylenol), ibuprofen (Advil, Motrin), or naproxen (Aleve) to reduce fever and relieve body aches. Read and follow all instructions on the label. · Do not take two or more pain medicines at the same time unless the doctor told you to. Many pain medicines have acetaminophen, which is Tylenol. Too much acetaminophen (Tylenol) can be harmful. · Take an over-the-counter cough medicine that contains dextromethorphan to help quiet a dry, hacking cough so that you can sleep. Avoid cough medicines that have more than one active ingredient. Read and follow all instructions on the label.   · Breathe moist air from a humidifier, hot shower, or sink filled with hot water. The heat and moisture will thin mucus so you can cough it out. · Do not smoke. Smoking can make bronchitis worse. If you need help quitting, talk to your doctor about stop-smoking programs and medicines. These can increase your chances of quitting for good. When should you call for help? Call 911 anytime you think you may need emergency care. For example, call if:    · You have severe trouble breathing.    Call your doctor now or seek immediate medical care if:    · You have new or worse trouble breathing.     · You cough up dark brown or bloody mucus (sputum).     · You have a new or higher fever.     · You have a new rash.    Watch closely for changes in your health, and be sure to contact your doctor if:    · You cough more deeply or more often, especially if you notice more mucus or a change in the color of your mucus.     · You are not getting better as expected. Where can you learn more? Go to http://olivier-akil.info/. Enter H333 in the search box to learn more about \"Bronchitis: Care Instructions. \"  Current as of: September 5, 2018  Content Version: 11.9  © 2632-6831 [employer]. Care instructions adapted under license by [employer] (which disclaims liability or warranty for this information). If you have questions about a medical condition or this instruction, always ask your healthcare professional. [place] [address] any warranty or liability for your use of this information.

## 2019-05-09 ENCOUNTER — TELEPHONE (OUTPATIENT)
Dept: INTERNAL MEDICINE CLINIC | Age: 54
End: 2019-05-09

## 2019-05-09 LAB
ATRIAL RATE: 56 BPM
CALCULATED R AXIS, ECG10: 36 DEGREES
CALCULATED T AXIS, ECG11: 39 DEGREES
DIAGNOSIS, 93000: NORMAL
Q-T INTERVAL, ECG07: 486 MS
QRS DURATION, ECG06: 74 MS
QTC CALCULATION (BEZET), ECG08: 464 MS
VENTRICULAR RATE, ECG03: 55 BPM

## 2019-05-09 NOTE — TELEPHONE ENCOUNTER
Pharmacy Progress Note - Telephone Encounter    S/O: Ms. Haleigh Zuniga 46 y.o. female contacted office/me via an inbound telephone call to discuss her ED visit yesterday for SOB and coughing today. Verified patients identifiers (name & ) per HIPAA policy. - CT and CXR completed. Neg for PE.   - INR was 1.8. Pt denies any missed doses. - Pt was prescribed albuterol HFA inhaler Q4H PRN wheezing, prednisone 50 mg daily x 5 days, and Azithromycin 250 mg x 5 day course for bronchitis. CXR Results  (Last 48 hours)               19 1432  XR CHEST PA LAT Final result    Impression:  IMPRESSION: No acute intrathoracic disease. Narrative:  CLINICAL HISTORY: Dyspnea    INDICATION: Dyspnea       COMPARISON: 2017       FINDINGS:    PA and lateral views of the chest are obtained. The cardiopericardial silhouette is within normal limits. There is no pleural   effusion, pneumothorax or focal consolidation present. CT Results (most recent):  Results from Hospital Encounter encounter on 19   CTA CHEST W OR W WO CONT    Narrative INDICATION: 2 day history of back pain and shortness of breath    COMPARISON: Earlier chest x-ray    EXAM: Precontrast localizer was first performed to verify the levels of the  pulmonary arteries. Subsequently, contiguous axial images were obtained after  the rapid IV bolus administration of 80 cc Isovue-370, followed by thin section  axial reconstructions with multiplanar reformations. Three-dimensional post -  processing was performed. CT dose reduction was achieved through use of a  standardized protocol tailored for this examination and automatic exposure  control for dose modulation. FINDINGS: There is no evidence for pulmonary embolism. There is no pleural  effusion. Trace pericardial fluid is shown. Cardiac size is within normal  limits. There is no mediastinal or hilar mass. The lungs are clear.  No  substantial osseous abnormality is shown. Impression IMPRESSION: No pulmonary embolism demonstrated. Lab Results   Component Value Date/Time    INR 1.8 (H) 05/08/2019 02:39 PM    INR 1.0 08/02/2018 08:19 AM    INR POC 2.4 (A) 05/01/2019 02:25 PM    INR POC 2.5 (A) 04/04/2019 03:02 PM    INR POC 2.2 (A) 03/21/2019 03:48 PM    Prothrombin time 17.9 (H) 05/08/2019 02:39 PM    Prothrombin time 10.2 08/02/2018 08:19 AM       A/P:  -  patient's on prednisone and Azithromycin potential increase INR. Recall last INR check on 5//19 was therapeutic (2.4) for goal of 2.0 - 3.0.   - Continue current warfarin regimen of 2.5 mg Tuesday, Thursday and 5 mg daily ROW   - Will have INR check next Wednesday. - Patient endorses understanding to the provided information. All questions were answered at this time.        Thank you,  Leighann Falk, PharmD, CDE

## 2019-05-10 ENCOUNTER — TELEPHONE (OUTPATIENT)
Dept: INTERNAL MEDICINE CLINIC | Age: 54
End: 2019-05-10

## 2019-05-10 DIAGNOSIS — J45.909 UNCOMPLICATED ASTHMA, UNSPECIFIED ASTHMA SEVERITY, UNSPECIFIED WHETHER PERSISTENT: Primary | ICD-10-CM

## 2019-05-10 RX ORDER — ALBUTEROL SULFATE 0.83 MG/ML
2.5 SOLUTION RESPIRATORY (INHALATION)
Qty: 24 EACH | Refills: 0 | Status: SHIPPED | OUTPATIENT
Start: 2019-05-10 | End: 2020-10-07

## 2019-05-10 NOTE — TELEPHONE ENCOUNTER
Regarding: RE: Non-Urgent Medical Question  Contact: [phone]  ----- Message from [address] [place] [address] 951, [facility] sent at 5/7/2019  9:10 PM EDT -----    I need a prescription filled plz . Albuterol Sol. 2.5mg/3ml Soln . so i need to  do a breathing treatment. so i can breath . I have the machine . i hope it still works i havent used it since the end of 2017 . Ellen Sullivan     ----- Message -----  From: Karina Diez MD  Sent: 4/12/19 1:03 PM  To: Crystal Thomas  Subject: RE: Non-Urgent Medical Question    Wait until you finish warfarin/ coumadin to start it.    ----- Message -----     From: Ciro Silvestre     Sent: 4/11/2019 12:14 PM EDT       To: Karina Diez MD  Subject: Non-Urgent Medical Question    Can i take vitamin E 1000 with warfrin ? Thank You .

## 2019-05-10 NOTE — TELEPHONE ENCOUNTER
Regarding: Non-Urgent Medical Question  Contact: [phone]  ----- Message from [address] [place] [address] [address], Generic sent at 5/9/2019  4:02 PM EDT -----      Hi i need you if u can put an order in for a nebulizer asap plz mine just broke i need treatment can some one plz call me   Jeannine Hawthorne

## 2019-05-10 NOTE — TELEPHONE ENCOUNTER
Patient states she needs a call back to get the status of her medication request. Patient states she needs Prior to the weekend & close of business. Please call to advise.  Thank you

## 2019-05-10 NOTE — TELEPHONE ENCOUNTER
Spoke with patient. Two pt identifiers confirmed. Patient states that [facility] can fill the prescription for the nebulizer. Patient states that the prescription needs to be sent to a pharmacy that deals with medical supplies. Advised patient that we can send the prescription to [address] [place] [address] or [facility]. Patient asked that prescription be sent to [facility].  Patient advised that prescription has been sent. Pt verbalized understanding of information discussed w/ no further questions at this time.

## 2019-05-15 ENCOUNTER — OFFICE VISIT (OUTPATIENT)
Dept: INTERNAL MEDICINE CLINIC | Age: 54
End: 2019-05-15

## 2019-05-15 VITALS — TEMPERATURE: 97.6 F

## 2019-05-15 DIAGNOSIS — I82.4Y2 ACUTE DEEP VEIN THROMBOSIS (DVT) OF PROXIMAL VEIN OF LEFT LOWER EXTREMITY (HCC): Primary | ICD-10-CM

## 2019-05-15 LAB
INR BLD: 2.9 (ref 1–1.5)
PT POC: 34.9 SECONDS (ref 9.1–12)
VALID INTERNAL CONTROL?: YES

## 2019-05-15 NOTE — PATIENT INSTRUCTIONS
Today your INR was 2.9. Your goal INR is  2.0-3.0 . You have a 2.5 mg and  5 mg tablet of Coumadin (warfarin). Take Coumadin as follows:    Continue warfarin 2.5 mg on Tuesdays and Thursdays; 5 mg daily the rest of the week. Come back in 4 week(s) for your next finger stick/INR blood test.        Avoid any over the counter items containing aspirin or ibuprofen, and avoid great swings in general diet. Avoid alcohol consumption. Please notify the INR nurse if you are started on any new medication including over the counter or herbal supplements. Also, please notify your INR nurse if any of your other prescription or over the counter medications have been discontinued. Call [facility] at [phone] if you have any signs of abnormal bleeding/blood clot.  ------------------------------------------------------------------------------------------------------------------  Taking Warfarin Safely: Care Instructions    Your Care Instructions  Warfarin is a medicine that you take to prevent blood clots. It is often called a blood thinner. Doctors give warfarin (such as Coumadin) to reduce the risk of blood clots. You may be at risk for blood clots if you have atrial fibrillation or deep vein thrombosis. Some other health problems may also put you at risk. Warfarin slows the amount of time it takes for your blood to clot. It can cause bleeding problems. Even if you've been taking warfarin for a while, it's important to know how to take it safely. Foods and other medicines can affect the way warfarin works. Some can make warfarin work too well. This can cause bleeding problems. And some can make it work poorly, so that it does not prevent blood clots very well. You will need regular blood tests to check how long it takes for your blood to form a clot. This test is called a PT or prothrombin time test. The result of the test is called an INR level.  Depending on the test results, your doctor or anticoagulation clinic may adjust your dose of warfarin. Follow-up care is a key part of your treatment and safety. Be sure to make and go to all appointments, and call your doctor if you are having problems. It's also a good idea to know your test results and keep a list of the medicines you take. How can you care for yourself at home? Take warfarin safely  · Take your warfarin at the same time each day. · If you miss a dose of warfarin, don't take an extra dose to make up for it. Your doctor can tell you exactly what to do so you don't take too much or too little. · Wear medical alert jewelry that lets others know that you take warfarin. You can buy this at most drugstores. · Don't take warfarin if you are pregnant or planning to get pregnant. Talk to your doctor about how you can prevent getting pregnant while you are taking it. · Don't change your dose or stop taking warfarin unless your doctor tells you to. Effects of medicines and food on warfarin  · Don't start or stop taking any medicines, vitamins, or natural remedies unless you first talk to your doctor. Many medicines can affect how warfarin works. These include aspirin and other pain relievers, over-the-counter medicines, multivitamins, dietary supplements, and herbal products. · Tell all of your doctors and pharmacists that you take warfarin. Some prescription medicines can affect how warfarin works. · Keep the amount of vitamin K in your diet about the same from day to day. Do not suddenly eat a lot more or a lot less food that is rich in vitamin K than you usually do. Vitamin K affects how warfarin works and how your blood clots. Talk with your doctor before making big changes in your diet. Vitamin K is in many foods, such as:  ¨ Leafy greens, such as kale, cabbage, spinach, Swiss chard, and lettuce. ¨ Canola and soybean oils. ¨ Green vegetables, such as asparagus, broccoli, and Frederic sprouts.   ¨ Vegetable drinks, green tea leaves, and some dietary supplement drinks. · Avoid cranberry juice and other cranberry products. They can increase the effects of warfarin. · Limit your use of alcohol. Avoid bleeding by preventing falls and injuries  · Wear slippers or shoes with nonskid soles. · Remove throw rugs and clutter. · Rearrange furniture and electrical cords to keep them out of walking paths. · Keep stairways, porches, and outside walkways well lit. Use night-lights in hallways and bathrooms. · Be extra careful when you work with sharp tools or knives. When should you call for help? Call 911 anytime you think you may need emergency care. For example, call if:  · You have a sudden, severe headache that is different from past headaches. Call your doctor now or seek immediate medical care if:  · You have any abnormal bleeding, such as:  ¨ Nosebleeds. ¨ Vaginal bleeding that is different (heavier, more frequent, at a different time of the month) than what you are used to. ¨ Bloody or black stools, or rectal bleeding. ¨ Bloody or pink urine. Watch closely for changes in your health, and be sure to contact your doctor if you have any problems. Where can you learn more? Go to http://olivier-akil.info/. Enter E535 in the search box to learn more about \"Taking Warfarin Safely: Care Instructions. \"  Current as of: January 27, 2016  Content Version: 11.1  © 2805-0761 [employer]. Care instructions adapted under license by [employer] (which disclaims liability or warranty for this information). If you have questions about a medical condition or this instruction, always ask your healthcare professional. [place] [address] any warranty or liability for your use of this information.

## 2019-05-15 NOTE — PROGRESS NOTES
Pharmacy Progress Note  - Anticoagulation Management    Ms. Smitha Thomas  is [id number] y. o. female seen for anticoagulation management for the diagnosis of Provoked, LE Deep Vein Thrombosis.  She ambulates w/ a cane.     Interim History:  ED visit on 5/8/19 for SOB. INR was 1.8 that day. Denies any missed warfarin doses. Pt was prescribed albuterol HFA inhaler Q4H PRN wheezing, prednisone 50 mg daily x 5 days, and Azithromycin 250 mg x 5 day course for bronchitis. Completed abx and steroids on Monday. Still using albuterol Q4H. · Warfarin start date:  ~10/25/18  · INR Goal:  2.0-3.0    · Current warfarin regimen:  2.5 mg Tues, Thurs, and 5 mg daily ROW                     · Warfarin tablet strength:   5 mg and 2.5 mg  · Duration of therapy: End of June-July 2019 per Dr. Sarah Wolf pertinent positives includes:  Medication change, Antibiotic use and Hospitalization / ED visits    Results for orders placed or performed in visit on 05/15/19   AMB POC PT/INR   Result Value Ref Range    VALID INTERNAL CONTROL POC Yes     Prothrombin time (POC) 34.9 (A) 9.1 - 12 seconds    INR POC 2.9 (A) 1 - 1.5     · Adherence:   · Able to recall regimen? YES  · Miss/extra dose? NO  · Need refill?  NO    Upcoming procedure(s):  NO      Past Medical History:   Diagnosis Date    Arthritis     Asthma     Breast cyst 1996    left, removed    Chronic pain     LOWER BACK    Colon polyps     Diverticulitis     [facility]    provoked after back sx    Fatty liver     GERD (gastroesophageal reflux disease)     Hypercholesteremia     Nicotine vapor product user     Obesity     SUN (obstructive sleep apnea)     uses CPAP    Psychiatric disorder     depression    Thyroid disease     hypothyroid       Allergies   Allergen Reactions    Codeine Hives    Pcn [Penicillins] Hives       Current Outpatient Medications   Medication Sig    fluticasone furoate (ARNUITY ELLIPTA) 100 mcg/actuation dsdv inhaler Take 1 Puff by inhalation daily.  gabapentin (NEURONTIN) 300 mg capsule Take 1 Cap by mouth three (3) times daily.  traMADol (ULTRAM) 50 mg tablet Take 1 Tab by mouth every eight (8) hours as needed for Pain for up to 30 days. Max Daily Amount: 150 mg.    albuterol (PROVENTIL VENTOLIN) 2.5 mg /3 mL (0.083 %) nebulizer solution 3 mL by Nebulization route every four (4) hours as needed for Wheezing.  Nebulizer Accessories kit Use nebulizer machine as needed for dyspnea    methocarbamol (ROBAXIN) 500 mg tablet TAKE 1 TABLET BY MOUTH FOUR (4) TIMES DAILY.  traZODone (DESYREL) 50 mg tablet TAKE 1 TABLET BY MOUTH EVERY DAY AT NIGHT    acetaminophen (TYLENOL) 325 mg tablet Take 325 mg by mouth two (2) times daily as needed for Pain.  warfarin (COUMADIN) 2.5 mg tablet TAKE 1 TABLET BY MOUTH DAILY ON TUESDAY AND THURSDAY    lovastatin (MEVACOR) 20 mg tablet TAKE 1 TABLET BY MOUTH NIGHTLY    levothyroxine (SYNTHROID) 88 mcg tablet Take 1 Tab by mouth Daily (before breakfast).  albuterol (PROVENTIL HFA, VENTOLIN HFA, PROAIR HFA) 90 mcg/actuation inhaler Take 1 Puff by inhalation every six (6) hours as needed for Wheezing.  escitalopram oxalate (LEXAPRO) 20 mg tablet TAKE 1 TABLET BY MOUTH EVERY DAY    warfarin (COUMADIN) 5 mg tablet Take 1 Tab by mouth daily. Take 1 tablet by mouth daily on Mon, Wed, Fri, Sat, and Sunday. Take 2.5 mg strength on Tuesday and Thursday.  pantoprazole (PROTONIX) 40 mg tablet TAKE 1 TABLET BY MOUTH DAILY. INDICATIONS: GASTROESOPHAGEAL REFLUX DISEASE    cromolyn (OPTICROM) 4 % ophthalmic solution Administer 1 Drop to both eyes as needed. Use in affected eye(s)    montelukast (SINGULAIR) 10 mg tablet Take 1 Tab by mouth daily. No current facility-administered medications for this visit.         Wt Readings from Last 3 Encounters:   05/08/19 275 lb 2.2 oz (124.8 kg)   03/21/19 263 lb (119.3 kg)   03/05/19 268 lb 6.4 oz (121.7 kg)       BP Readings from Last 3 Encounters:   05/08/19 138/90   03/21/19 125/84   11/30/18 122/81       Lab Results   Component Value Date/Time    Sodium 139 05/08/2019 02:39 PM    Potassium 3.9 05/08/2019 02:39 PM    Chloride 105 05/08/2019 02:39 PM    CO2 29 05/08/2019 02:39 PM    Anion gap 5 05/08/2019 02:39 PM    Glucose 86 05/08/2019 02:39 PM    BUN 15 05/08/2019 02:39 PM    Creatinine 0.87 05/08/2019 02:39 PM    BUN/Creatinine ratio 17 05/08/2019 02:39 PM    GFR est AA >60 05/08/2019 02:39 PM    GFR est non-AA >60 05/08/2019 02:39 PM    Calcium 8.8 05/08/2019 02:39 PM       Lab Results   Component Value Date/Time    WBC 3.6 05/08/2019 02:39 PM    Hemoglobin (POC) 12.9 10/28/2013 03:40 PM    HGB 12.8 05/08/2019 02:39 PM    Hematocrit (POC) 38 10/28/2013 03:40 PM    HCT 40.8 05/08/2019 02:39 PM    PLATELET 242 12/49/0500 02:39 PM    MCV 98.6 05/08/2019 02:39 PM       Lab Results   Component Value Date/Time    Protein, total 7.7 05/08/2019 02:39 PM    Albumin 3.7 05/08/2019 02:39 PM       CrCl cannot be calculated (Unknown ideal weight.). INR History:   (normal INR range 0.8-1.2)     Date   INR   PT   Dose/Comments  05/15/19 2.9  34.9 2.5 mg Tues, Thurs and 5 mg daily ROW; prednisone + azithromycin   05/08/19 1.8  17.9 [facility] ED visit  05/01/19          2.4                   28.7     2.5 mg Tues, Thurs and 5 mg daily ROW  04/04/19          2.5                   30. 2     2.5 mg Tues, Thurs and 5 mg daily ROW  03/21/19          2.2                   26.5     5 mg x 1, then  2.5 mg Tues, Thurs, and 5 mg daily ROW      03/05/19          1.5                   17.4     2.5 mg T, Th and 5 mg daily ROW; started forskolin supplement  12/03/18          2.5                   29.5          2.5 mg T,Th and 5 mg daily ROW  11/01/18          3.0                   36.0  10/25/18          3.4                   41.1    · Medications that can increase  INR:  Prednisone, Azithromycin     A/P:       Anticoagulation:  Considering Ms. Thomas's past history, todays findings, and per Anticoagulation Collaborative Practice Agreement/Protocol:    1. POC INR (2.9) is therapeutic for INR goal today. 2.  Continue warfarin 2.5 mg Tuesday, Thursday and 5 mg daily ROW    Patient was instructed to schedule an appointment in 4 week(s) prior to leaving the clinic. Medication reconciliation was completed during the visit. There are no discontinued medications. A full discussion of the nature of anticoagulants has been carried out. A full discussion of the need for frequent and regular monitoring, precise dosage adjustment and compliance was stressed. Side effects of potential bleeding were discussed and Ms. Renny Valencia was instructed to call [phone] if there are any signs of abnormal bleeding. Ms. Renny Valencia was instructed to avoid any OTC items containing aspirin or ibuprofen and prior to starting any new OTC products to consult with her physician or pharmacist to ensure no drug interactions are present. Ms. Renny Valencia was instructed to avoid any major changes in her general diet and to avoid alcohol consumption. Ms. Renny Valencia was provided information in the AVS that includes topics on understanding coumadin therapy, drug interaction considerations, vitamin K and coumadin use, interactions with foods and supplements containing vitamin K, and the use of herbal products. Ms. Renny Valencia verbalized her understanding of all instructions and will call the office with any questions, concerns, or signs of abnormal bleeding or blood clot. Notifications of recommendations will be sent to Dr. Shanon Huddleston MD for review.     Thank you,  Leighann Corral

## 2019-05-16 DIAGNOSIS — K21.9 GASTROESOPHAGEAL REFLUX DISEASE WITHOUT ESOPHAGITIS: ICD-10-CM

## 2019-05-17 ENCOUNTER — TELEPHONE (OUTPATIENT)
Dept: INTERNAL MEDICINE CLINIC | Age: 54
End: 2019-05-17

## 2019-05-17 DIAGNOSIS — J40 BRONCHITIS: Primary | ICD-10-CM

## 2019-05-17 RX ORDER — BENZONATATE 200 MG/1
200 CAPSULE ORAL
Qty: 30 CAP | Refills: 0 | Status: SHIPPED | OUTPATIENT
Start: 2019-05-17 | End: 2019-05-24

## 2019-05-17 RX ORDER — DOXYCYCLINE 100 MG/1
100 CAPSULE ORAL 2 TIMES DAILY
Qty: 14 CAP | Refills: 0 | Status: SHIPPED | OUTPATIENT
Start: 2019-05-17 | End: 2019-06-11 | Stop reason: ALTCHOICE

## 2019-05-17 RX ORDER — DOXYCYCLINE 100 MG/1
100 CAPSULE ORAL 2 TIMES DAILY
Qty: 14 CAP | Refills: 0 | Status: SHIPPED | OUTPATIENT
Start: 2019-05-17 | End: 2019-05-17 | Stop reason: SDUPTHER

## 2019-05-17 RX ORDER — PANTOPRAZOLE SODIUM 40 MG/1
TABLET, DELAYED RELEASE ORAL
Qty: 90 TAB | Refills: 1 | Status: SHIPPED | OUTPATIENT
Start: 2019-05-17 | End: 2019-10-09 | Stop reason: SDUPTHER

## 2019-05-17 NOTE — TELEPHONE ENCOUNTER
Patient states she need a call back in reference to getting stronger medication if possible due to persistent Bronchitis symptoms that patient states she was seen in the ER for & also was seen by Sowmya Newby on 5/15/19 & was advised to call if not any better. Patient states she also needs to get something prescribed to help her to cough. Please call to advise.  Thank you

## 2019-05-21 DIAGNOSIS — G89.4 CHRONIC PAIN SYNDROME: Primary | ICD-10-CM

## 2019-05-22 RX ORDER — TRAMADOL HYDROCHLORIDE 50 MG/1
50 TABLET ORAL
Qty: 90 TAB | Refills: 0 | Status: SHIPPED | OUTPATIENT
Start: 2019-05-22 | End: 2019-06-21 | Stop reason: SDUPTHER

## 2019-05-24 ENCOUNTER — TELEPHONE (OUTPATIENT)
Dept: INTERNAL MEDICINE CLINIC | Age: 54
End: 2019-05-24

## 2019-05-24 NOTE — TELEPHONE ENCOUNTER
----- Message from Chele Vela sent at 5/23/2019  3:49 PM EDT -----  Regarding: Dr. Chris Schulte requesting a call back in regards to which pharmacy her medication was sent to. Pt has contacted two different locations. Pt states [facility]. Best contact [phone].     Copy/paste envera

## 2019-05-24 NOTE — TELEPHONE ENCOUNTER
traMADol (ULTRAM) 50 mg tablet [[id number]]     Order Details   Dose: 50 mg Route: Oral Frequency: EVERY 8 HOURS AS NEEDED for Pain   Note to Pharmacy:   Not to exceed 5 additional fills before 10/13/2019        Dispense Quantity: 90 Tab Refills: 0 Fills remaining: --           Sig: Take 1 Tab by mouth every eight (8) hours as needed for Pain for up to 30 days. Max Daily Amount: 150 mg.          Written Date: 05/22/19 Expiration Date: --     Start Date: 05/22/19 End Date: 06/21/19            Ordering Provider:  -- MAT #:  -1 NPI:  --    Authorizing Provider:  Joanne Silva MD MAT #:  [id number] NPI:  [id number]    Ordering User:  Joanne Silva MD            Diagnosis Association: Chronic pain syndrome (G89.4)      Pharmacy:  [facility]/pharmacy #[id number], [place] - [address] AT [facility]        Prescription has been phoned into [facility]. [employer] message sent to patient to notify her.

## 2019-05-24 NOTE — TELEPHONE ENCOUNTER
Regarding: Non-Urgent Medical Question  Contact: [phone]  ----- Message from [address] [place] [address] [address], Generic sent at 5/23/2019  3:39 PM EDT -----    I need a refill on pain pills please. Thank you .     Maria Dolores Zhao

## 2019-05-28 RX ORDER — TRAZODONE HYDROCHLORIDE 50 MG/1
TABLET ORAL
Qty: 90 TAB | Refills: 2 | Status: SHIPPED | OUTPATIENT
Start: 2019-05-28 | End: 2020-04-24

## 2019-05-28 NOTE — TELEPHONE ENCOUNTER
PCP: Kylee Shannon MD    Last appt: 5/15/2019  Future Appointments   Date Time Provider Rozina Arizmendi   5/30/2019  2:40 PM Roxy Evangelista MD [address] [place] [address]   6/11/2019  2:45 PM Jolie Bradley [facility]   6/21/2019  2:45 PM Appa Nicole Loredo MD [facility]       Requested Prescriptions     Pending Prescriptions Disp Refills    traZODone (DESYREL) 50 mg tablet 90 Tab 2     Sig: TAKE 1 TABLET BY MOUTH EVERY DAY AT NIGHT

## 2019-05-30 ENCOUNTER — DOCUMENTATION ONLY (OUTPATIENT)
Dept: SLEEP MEDICINE | Age: 54
End: 2019-05-30

## 2019-05-30 ENCOUNTER — OFFICE VISIT (OUTPATIENT)
Dept: SLEEP MEDICINE | Age: 54
End: 2019-05-30

## 2019-05-30 VITALS
RESPIRATION RATE: 20 BRPM | WEIGHT: 279 LBS | HEIGHT: 67 IN | DIASTOLIC BLOOD PRESSURE: 83 MMHG | SYSTOLIC BLOOD PRESSURE: 126 MMHG | BODY MASS INDEX: 43.79 KG/M2 | HEART RATE: 72 BPM | OXYGEN SATURATION: 100 %

## 2019-05-30 DIAGNOSIS — G47.33 OSA (OBSTRUCTIVE SLEEP APNEA): Primary | ICD-10-CM

## 2019-05-30 NOTE — PATIENT INSTRUCTIONS
[address] [facility] [place], [address]  Tel.  [phone]  Fax. [address] [facility] [address]  [place], [address] [facility]  Tel.  [phone]  Fax. [phone] [address] [facility] [place]  Tel.  [phone]  Fax. [phone]     PROPER SLEEP HYGIENE    What to avoid  · Do not have drinks with caffeine, such as coffee or black tea, for 8 hours before bed. · Do not smoke or use other types of tobacco near bedtime. Nicotine is a stimulant and can keep you awake. · Avoid drinking alcohol late in the evening, because it can cause you to wake in the middle of the night. · Do not eat a big meal close to bedtime. If you are hungry, eat a light snack. · Do not drink a lot of water close to bedtime, because the need to urinate may wake you up during the night. · Do not read or watch TV in bed. Use the bed only for sleeping and sexual activity. What to try  · Go to bed at the same time every night, and wake up at the same time every morning. Do not take naps during the day. · Keep your bedroom quiet, dark, and cool. · Get regular exercise, but not within 3 to 4 hours of your bedtime. .  · Sleep on a comfortable pillow and mattress. · If watching the clock makes you anxious, turn it facing away from you so you cannot see the time. · If you worry when you lie down, start a worry book. Well before bedtime, write down your worries, and then set the book and your concerns aside. · Try meditation or other relaxation techniques before you go to bed. · If you cannot fall asleep, get up and go to another room until you feel sleepy. Do something relaxing. Repeat your bedtime routine before you go to bed again. · Make your house quiet and calm about an hour before bedtime. Turn down the lights, turn off the TV, log off the computer, and turn down the volume on music. This can help you relax after a busy day.     Drowsy Driving  The [address] [place] [address] Road Traffic Safety Administration cites drowsiness as a causing factor in more than 636,205 police reported crashes annually, resulting in 76,000 injuries and 1,500 deaths. Other surveys suggest 55% of people polled have driven while drowsy in the past year, 23% had fallen asleep but not crashed, 3% crashed, and 2% had and accident due to drowsy driving. Who is at risk? Young Drivers: One study of drowsy driving accidents states that 55% of the drivers were under 25 years. Of those, 75% were male. Shift Workers and Travelers: People who work overnight or travel across time zones frequently are at higher risk of experiencing Circadian Rhythm Disorders. They are trying to work and function when their body is programed to sleep. Sleep Deprived: Lack of sleep has a serious impact on your ability to pay attention or focus on a task. Consistently getting less than the average of 8 hours your body needs creates partial or cumulative sleep deprivation. Untreated Sleep Disorders: Sleep Apnea, Narcolepsy, R.L.S., and other sleep disorders (untreated) prevent a person from getting enough restful sleep. This leads to excessive daytime sleepiness and increases the risk for drowsy driving accidents by up to 7 times. Medications / Alcohol: Even over the counter medications can cause drowsiness. Medications that impair a drivers attention should have a warning label. Alcohol naturally makes you sleepy and on its own can cause accidents. Combined with excessive drowsiness its effects are amplified. Signs of Drowsy Driving:   * You don't remember driving the last few miles   * You may drift out of your bowen   * You are unable to focus and your thoughts wander   * You may yawn more often than normal   * You have difficulty keeping your eyes open / nodding off   * Missing traffic signs, speeding, or tailgating  Prevention-   Good sleep hygiene, lifestyle and behavioral choices have the most impact on drowsy driving.  There is no substitute for sleep and the average person requires 8 hours nightly. If you find yourself driving drowsy, stop and sleep. Consider the sleep hygiene tips provided during your visit as well. Medication Refill Policy: Refills for all medications require 1 week advance notice. Please have your pharmacy fax a refill request. We are unable to fax, or call in \"controled substance\" medications and you will need to pick these prescriptions up from our office. [employer] Activation    Thank you for requesting access to [employer]. Please follow the instructions below to securely access and download your online medical record. Examify allows you to send messages to your doctor, view your test results, renew your prescriptions, schedule appointments, and more. How Do I Sign Up? 1. In your internet browser, go to [email or web address]. 2. Click on the First Time User? Click Here link in the Sign In box. You will see the New Member Sign Up page. 3. Enter your Examify Access Code exactly as it appears below. You will not need to use this code after youve completed the sign-up process. If you do not sign up before the expiration date, you must request a new code. Examify Access Code: Activation code not generated  Current Examify Status: Active (This is the date your Examify access code will )    4. Enter the last four digits of your Social Security Number (xxxx) and Date of Birth (mm/dd/yyyy) as indicated and click Submit. You will be taken to the next sign-up page. 5. Create a Examify ID. This will be your Examify login ID and cannot be changed, so think of one that is secure and easy to remember. 6. Create a Examify password. You can change your password at any time. 7. Enter your Password Reset Question and Answer. This can be used at a later time if you forget your password. 8. Enter your e-mail address. You will receive e-mail notification when new information is available in [address]   9. Click Sign Up. You can now view and download portions of your medical record. 10. Click the Download Summary menu link to download a portable copy of your medical information. Additional Information    If you have questions, please call [phone]. Remember, [employer] is NOT to be used for urgent needs. For medical emergencies, dial 911.

## 2019-05-30 NOTE — PROGRESS NOTES
[address] [facility] [place], [address]  Tel.  [phone]  Fax. [address] [facility] [address]  [place], [address] [facility]  Tel.  [phone]  Fax. [phone] [address] [place]   Tel.  [phone]  Fax. [phone]     S>Smitha Tsang is a 46 y.o. female seen for a positive airway pressure follow-up. She reports no problems using the device. The following problems are identified:    Drowsiness no Problems exhaling no   Snoring no Forget to put on no   Mask Comfortable No headgear hurting neck because its tight Can't fall asleep no   Dry Mouth yes Mask falls off no   Air Leaking Yes if headgear not tight Frequent awakenings no     Download reviewed. She admits that her sleep has improved. Therapy Apnea Index averaged over PAP use: 3 /hr which reflects improved sleep breathing condition. Allergies   Allergen Reactions    Codeine Hives    Pcn [Penicillins] Hives       She has a current medication list which includes the following prescription(s): trazodone, tramadol, pantoprazole, doxycycline, albuterol, nebulizer accessories, methocarbamol, fluticasone furoate, gabapentin, acetaminophen, warfarin, lovastatin, levothyroxine, albuterol, escitalopram oxalate, warfarin, cromolyn, and montelukast..      She  has a past medical history of Arthritis, Asthma, Breast cyst (1996), Chronic pain, Colon polyps, Diverticulitis, DVT (deep venous thrombosis) ([facility] [address] (10/18/2018), Fatty liver, GERD (gastroesophageal reflux disease), Hypercholesteremia, Nicotine vapor product user, Obesity, SUN (obstructive sleep apnea), Psychiatric disorder, and Thyroid disease.     [place] Sleepiness Score: 6   and Modified F.O.S.Q. Score Total / 2: 11.5      O>    Visit Vitals  /83 (BP 1 Location: Left arm, BP Patient Position: Sitting)   Pulse 72   Resp 20   Ht 5' 7\" (1.702 m)   Wt 279 lb (126.6 kg)   LMP 06/22/2009   SpO2 100%   BMI 43.70 kg/m²           General:   Alert, oriented, not in distress   Neck:   No JVD    Chest/Lungs:  symetrical lung expansion , no accessory muscle use    Extremities:  no obvious rashes , negative edema    Neuro:  No focal deficits ; No obvious tremor    Psych:  Normal affect ,  Normal countenance ;         A>    ICD-10-CM ICD-9-CM    1. SUN (obstructive sleep apnea) G47.33 327.23 AMB SUPPLY ORDER   2. BMI 40.0-44.9, adult ([facility] [address] Z68.41 V85.41      . On CPAP :  13-15 cmH2O. Compliant:      yes    Therapeutic Response:  Positive    P>      *   Follow-up and Dispositions    · Return in about 1 year (around 5/30/2020). change settings to 10-15 cm  dme to fix or replace machine   I have reviewed medicare requirements regarding PAP usage  she is compliant with PAP therapy and PAP continues to benefit patient and remains necessary for control of her sleep apnea. * She was asked to contact our office for any problems regarding PAP therapy. * Counseling was provided regarding the importance of regular PAP use and on proper sleep hygiene and safe driving. * Re-enforced proper and regular cleaning for the device. 2. Obesity - I have counseled the patient regarding the benefits of weight loss.     Electronically signed by    Cony Dash MD  Diplomate in Sleep Medicine  [facility]

## 2019-06-06 RX ORDER — MONTELUKAST SODIUM 10 MG/1
10 TABLET ORAL DAILY
Qty: 30 TAB | Refills: 4 | Status: SHIPPED | OUTPATIENT
Start: 2019-06-06 | End: 2019-11-22 | Stop reason: SDUPTHER

## 2019-06-11 ENCOUNTER — ANTI-COAG VISIT (OUTPATIENT)
Dept: INTERNAL MEDICINE CLINIC | Age: 54
End: 2019-06-11

## 2019-06-11 DIAGNOSIS — I82.4Y2 ACUTE DEEP VEIN THROMBOSIS (DVT) OF PROXIMAL VEIN OF LEFT LOWER EXTREMITY (HCC): Primary | ICD-10-CM

## 2019-06-11 LAB
INR BLD: 3.2 (ref 1–1.5)
PT POC: 38.3 SECONDS (ref 9.1–12)
VALID INTERNAL CONTROL?: YES

## 2019-06-11 NOTE — PATIENT INSTRUCTIONS
Today your INR was 3.2. Your goal INR is  2.0-3.0 . You have a 2.5 mg and 5 mg tablet of Coumadin (warfarin). Take Coumadin as follows:    Continue warfarin 2.5 mg on Tuesdays and Thursdays; 5 mg daily the rest of the week. Therapy should complete 6/21/19. Come back on 6/21/19 for your next finger stick/INR blood test.  Will be check with your appointment with Dr. Shelby Mei. Avoid any over the counter items containing aspirin or ibuprofen, and avoid great swings in general diet. Avoid alcohol consumption. Please notify the INR nurse if you are started on any new medication including over the counter or herbal supplements. Also, please notify your INR nurse if any of your other prescription or over the counter medications have been discontinued. Call [facility] at [phone] if you have any signs of abnormal bleeding/blood clot.  ------------------------------------------------------------------------------------------------------------------  Taking Warfarin Safely: Care Instructions    Your Care Instructions  Warfarin is a medicine that you take to prevent blood clots. It is often called a blood thinner. Doctors give warfarin (such as Coumadin) to reduce the risk of blood clots. You may be at risk for blood clots if you have atrial fibrillation or deep vein thrombosis. Some other health problems may also put you at risk. Warfarin slows the amount of time it takes for your blood to clot. It can cause bleeding problems. Even if you've been taking warfarin for a while, it's important to know how to take it safely. Foods and other medicines can affect the way warfarin works. Some can make warfarin work too well. This can cause bleeding problems. And some can make it work poorly, so that it does not prevent blood clots very well. You will need regular blood tests to check how long it takes for your blood to form a clot.  This test is called a PT or prothrombin time test. The result of the test is called an INR level. Depending on the test results, your doctor or anticoagulation clinic may adjust your dose of warfarin. Follow-up care is a key part of your treatment and safety. Be sure to make and go to all appointments, and call your doctor if you are having problems. It's also a good idea to know your test results and keep a list of the medicines you take. How can you care for yourself at home? Take warfarin safely  · Take your warfarin at the same time each day. · If you miss a dose of warfarin, don't take an extra dose to make up for it. Your doctor can tell you exactly what to do so you don't take too much or too little. · Wear medical alert jewelry that lets others know that you take warfarin. You can buy this at most drugstores. · Don't take warfarin if you are pregnant or planning to get pregnant. Talk to your doctor about how you can prevent getting pregnant while you are taking it. · Don't change your dose or stop taking warfarin unless your doctor tells you to. Effects of medicines and food on warfarin  · Don't start or stop taking any medicines, vitamins, or natural remedies unless you first talk to your doctor. Many medicines can affect how warfarin works. These include aspirin and other pain relievers, over-the-counter medicines, multivitamins, dietary supplements, and herbal products. · Tell all of your doctors and pharmacists that you take warfarin. Some prescription medicines can affect how warfarin works. · Keep the amount of vitamin K in your diet about the same from day to day. Do not suddenly eat a lot more or a lot less food that is rich in vitamin K than you usually do. Vitamin K affects how warfarin works and how your blood clots. Talk with your doctor before making big changes in your diet. Vitamin K is in many foods, such as:  ¨ Leafy greens, such as kale, cabbage, spinach, Swiss chard, and lettuce. ¨ Canola and soybean oils.   ¨ Green vegetables, such as asparagus, broccoli, and [place] sprouts. ¨ Vegetable drinks, green tea leaves, and some dietary supplement drinks. · Avoid cranberry juice and other cranberry products. They can increase the effects of warfarin. · Limit your use of alcohol. Avoid bleeding by preventing falls and injuries  · Wear slippers or shoes with nonskid soles. · Remove throw rugs and clutter. · Rearrange furniture and electrical cords to keep them out of walking paths. · Keep stairways, porches, and outside walkways well lit. Use night-lights in hallways and bathrooms. · Be extra careful when you work with sharp tools or knives. When should you call for help? Call 911 anytime you think you may need emergency care. For example, call if:  · You have a sudden, severe headache that is different from past headaches. Call your doctor now or seek immediate medical care if:  · You have any abnormal bleeding, such as:  ¨ Nosebleeds. ¨ Vaginal bleeding that is different (heavier, more frequent, at a different time of the month) than what you are used to. ¨ Bloody or black stools, or rectal bleeding. ¨ Bloody or pink urine. Watch closely for changes in your health, and be sure to contact your doctor if you have any problems. Where can you learn more? Go to http://olivier-akil.info/. Enter J817 in the search box to learn more about \"Taking Warfarin Safely: Care Instructions. \"  Current as of: January 27, 2016  Content Version: 11.1  © 4119-3993 [employer]. Care instructions adapted under license by [employer] (which disclaims liability or warranty for this information). If you have questions about a medical condition or this instruction, always ask your healthcare professional. [place] [address] any warranty or liability for your use of this information.

## 2019-06-11 NOTE — PROGRESS NOTES
Pharmacy Progress Note  - Anticoagulation Management    Ms. Smitha Thomas  is [id number] y. o. female seen for anticoagulation management for the diagnosis of Provoked, LE Deep Vein Thrombosis.  She ambulates w/ a cane. Was accompanied by her granddtr. · Warfarin start date:  ~10/25/18  · INR Goal:  2.0-3.0    · Current warfarin regimen:  2.5 mg Tues, Thurs, and 5 mg daily ROW                     · Warfarin tablet strength:   5 mg and 2.5 mg  · Duration of therapy: End of June-July 2019 per Dr. Janae Vergara pertinent positives includes:  Antibiotic    - Completed course of doxycycline x 7 days (5/17/19) for bronchitis. No longer taking mucinex. - Denies any bleeding/bruises at this time    Results for orders placed or performed in visit on 06/11/19   AMB POC PT/INR   Result Value Ref Range    VALID INTERNAL CONTROL POC Yes     Prothrombin time (POC) 38.3 (A) 9.1 - 12 seconds    INR POC 3.2 (A) 1 - 1.5     · Adherence:   · Able to recall regimen? YES  · Miss/extra dose? NO  · Need refill? NO    Upcoming procedure(s):  NO    Past Medical History:   Diagnosis Date    Arthritis     Asthma     Breast cyst 1996    left, removed    Chronic pain     LOWER BACK    Colon polyps     Diverticulitis     [facility]    provoked after back sx    Fatty liver     GERD (gastroesophageal reflux disease)     Hypercholesteremia     Nicotine vapor product user     Obesity     SUN (obstructive sleep apnea)     uses CPAP    Psychiatric disorder     depression    Thyroid disease     hypothyroid       Allergies   Allergen Reactions    Codeine Hives    Pcn [Penicillins] Hives       Current Outpatient Medications   Medication Sig    montelukast (SINGULAIR) 10 mg tablet Take 1 Tab by mouth daily.  traZODone (DESYREL) 50 mg tablet TAKE 1 TABLET BY MOUTH EVERY DAY AT NIGHT    traMADol (ULTRAM) 50 mg tablet Take 1 Tab by mouth every eight (8) hours as needed for Pain for up to 30 days. Max Daily Amount: 150 mg.  pantoprazole (PROTONIX) 40 mg tablet TAKE 1 TABLET BY MOUTH DAILY. INDICATIONS: GASTROESOPHAGEAL REFLUX DISEASE    doxycycline (MONODOX) 100 mg capsule Take 1 Cap by mouth two (2) times a day.  albuterol (PROVENTIL VENTOLIN) 2.5 mg /3 mL (0.083 %) nebulizer solution 3 mL by Nebulization route every four (4) hours as needed for Wheezing.  Nebulizer Accessories kit Use nebulizer machine as needed for dyspnea    methocarbamol (ROBAXIN) 500 mg tablet TAKE 1 TABLET BY MOUTH FOUR (4) TIMES DAILY.  fluticasone furoate (ARNUITY ELLIPTA) 100 mcg/actuation dsdv inhaler Take 1 Puff by inhalation daily.  gabapentin (NEURONTIN) 300 mg capsule Take 1 Cap by mouth three (3) times daily.  acetaminophen (TYLENOL) 325 mg tablet Take 325 mg by mouth two (2) times daily as needed for Pain.  warfarin (COUMADIN) 2.5 mg tablet TAKE 1 TABLET BY MOUTH DAILY ON TUESDAY AND THURSDAY    lovastatin (MEVACOR) 20 mg tablet TAKE 1 TABLET BY MOUTH NIGHTLY    levothyroxine (SYNTHROID) 88 mcg tablet Take 1 Tab by mouth Daily (before breakfast).  albuterol (PROVENTIL HFA, VENTOLIN HFA, PROAIR HFA) 90 mcg/actuation inhaler Take 1 Puff by inhalation every six (6) hours as needed for Wheezing.  escitalopram oxalate (LEXAPRO) 20 mg tablet TAKE 1 TABLET BY MOUTH EVERY DAY    warfarin (COUMADIN) 5 mg tablet Take 1 Tab by mouth daily. Take 1 tablet by mouth daily on Mon, Wed, Fri, Sat, and Sunday. Take 2.5 mg strength on Tuesday and Thursday.  cromolyn (OPTICROM) 4 % ophthalmic solution Administer 1 Drop to both eyes as needed. Use in affected eye(s)     No current facility-administered medications for this visit.         Wt Readings from Last 3 Encounters:   05/30/19 279 lb (126.6 kg)   05/08/19 275 lb 2.2 oz (124.8 kg)   03/21/19 263 lb (119.3 kg)       BP Readings from Last 3 Encounters:   05/30/19 126/83   05/08/19 138/90   03/21/19 125/84       Lab Results   Component Value Date/Time Sodium 139 05/08/2019 02:39 PM    Potassium 3.9 05/08/2019 02:39 PM    Chloride 105 05/08/2019 02:39 PM    CO2 29 05/08/2019 02:39 PM    Anion gap 5 05/08/2019 02:39 PM    Glucose 86 05/08/2019 02:39 PM    BUN 15 05/08/2019 02:39 PM    Creatinine 0.87 05/08/2019 02:39 PM    BUN/Creatinine ratio 17 05/08/2019 02:39 PM    GFR est AA >60 05/08/2019 02:39 PM    GFR est non-AA >60 05/08/2019 02:39 PM    Calcium 8.8 05/08/2019 02:39 PM       Lab Results   Component Value Date/Time    WBC 3.6 05/08/2019 02:39 PM    Hemoglobin (POC) 12.9 10/28/2013 03:40 PM    HGB 12.8 05/08/2019 02:39 PM    Hematocrit (POC) 38 10/28/2013 03:40 PM    HCT 40.8 05/08/2019 02:39 PM    PLATELET 524 52/93/1570 02:39 PM    MCV 98.6 05/08/2019 02:39 PM       Lab Results   Component Value Date/Time    Protein, total 7.7 05/08/2019 02:39 PM    Albumin 3.7 05/08/2019 02:39 PM       INR History:   (normal INR range 0.8-1.2)     Date   INR   PT   Dose/Comments  06/11/19 3.2  38.3 2.5 mg Tues, Thurs and 5 mg daily ROW ; doxycyline x 7 days (5/17 - 5/24)  05/15/19          2.9                   34.9     2.5 mg Tues, Thurs and 5 mg daily ROW; prednisone + azithromycin   05/08/19          1.8                   17.9     [facility] ED visit  05/01/19          2.4                   28. 7     2.5 mg Tues, Thurs and 5 mg daily ROW  04/04/19          2.5                   30. 2     2.5 mg Tues, Thurs and 5 mg daily ROW  03/21/19          2.2                   26.5     5 mg x 1, then  2.5 mg Tues, Thurs, and 5 mg daily ROW      03/05/19          1.5                   17.4     2.5 mg T, Th and 5 mg daily ROW; started forskolin supplement  12/03/18          2.5                   29.5          2.5 mg T,Th and 5 mg daily ROW  11/01/18          3.0                   36.0    A/P:       Anticoagulation:  Considering Ms. Thomas's past history, todays findings, and per Anticoagulation Collaborative Practice Agreement/Protocol:    1.  POC INR (3.2) is slightly supratherapeutic for INR goal today. 2.  Continue warfarin 2.5 mg Tuesday, Thursday and 5 mg daily ROW. Therapy should complete on 6/21/19. Patient was instructed to schedule an appointment in 1 week(s) prior to leaving the clinic. Pt has PCP follow up appt on 6/21/19. Medication reconciliation was completed during the visit. Medications Discontinued During This Encounter   Medication Reason    doxycycline (MONODOX) 100 mg capsule Therapy Completed       A full discussion of the nature of anticoagulants has been carried out. A full discussion of the need for frequent and regular monitoring, precise dosage adjustment and compliance was stressed. Side effects of potential bleeding were discussed and Ms. Pamela Burgos was instructed to call [phone] if there are any signs of abnormal bleeding. Ms. Pamela Burgos was instructed to avoid any OTC items containing aspirin or ibuprofen and prior to starting any new OTC products to consult with her physician or pharmacist to ensure no drug interactions are present. Ms. Pamela Burgos was instructed to avoid any major changes in her general diet and to avoid alcohol consumption. Ms. Pamela Burgos was provided information in the AVS that includes topics on understanding coumadin therapy, drug interaction considerations, vitamin K and coumadin use, interactions with foods and supplements containing vitamin K, and the use of herbal products. Ms. Pamela Burgos verbalized her understanding of all instructions and will call the office with any questions, concerns, or signs of abnormal bleeding or blood clot. Notifications of recommendations will be sent to Dr. Eleazar Alexander MD for review.     Thank you,  Leighann Maya

## 2019-06-17 RX ORDER — METHOCARBAMOL 500 MG/1
TABLET, FILM COATED ORAL
Qty: 90 TAB | Refills: 0 | Status: SHIPPED | OUTPATIENT
Start: 2019-06-17 | End: 2019-07-14 | Stop reason: SDUPTHER

## 2019-06-21 ENCOUNTER — OFFICE VISIT (OUTPATIENT)
Dept: INTERNAL MEDICINE CLINIC | Age: 54
End: 2019-06-21

## 2019-06-21 ENCOUNTER — HOSPITAL ENCOUNTER (OUTPATIENT)
Dept: LAB | Age: 54
Discharge: HOME | End: 2019-06-21
Payer: MEDICARE

## 2019-06-21 VITALS
TEMPERATURE: 97.8 F | HEIGHT: 67 IN | WEIGHT: 268 LBS | SYSTOLIC BLOOD PRESSURE: 151 MMHG | DIASTOLIC BLOOD PRESSURE: 71 MMHG | BODY MASS INDEX: 42.06 KG/M2 | HEART RATE: 60 BPM | OXYGEN SATURATION: 99 % | RESPIRATION RATE: 18 BRPM

## 2019-06-21 DIAGNOSIS — E78.00 HIGH CHOLESTEROL: ICD-10-CM

## 2019-06-21 DIAGNOSIS — M48.062 SPINAL STENOSIS OF LUMBAR REGION WITH NEUROGENIC CLAUDICATION: ICD-10-CM

## 2019-06-21 DIAGNOSIS — I82.493 DEEP VEIN THROMBOSIS (DVT) OF OTHER VEIN OF BOTH LOWER EXTREMITIES, UNSPECIFIED CHRONICITY (HCC): Primary | ICD-10-CM

## 2019-06-21 DIAGNOSIS — E03.9 ACQUIRED HYPOTHYROIDISM: ICD-10-CM

## 2019-06-21 DIAGNOSIS — G89.4 CHRONIC PAIN SYNDROME: ICD-10-CM

## 2019-06-21 PROCEDURE — 80061 LIPID PANEL: CPT

## 2019-06-21 PROCEDURE — 82570 ASSAY OF URINE CREATININE: CPT

## 2019-06-21 PROCEDURE — 36415 COLL VENOUS BLD VENIPUNCTURE: CPT

## 2019-06-21 PROCEDURE — 84443 ASSAY THYROID STIM HORMONE: CPT

## 2019-06-21 RX ORDER — TRAMADOL HYDROCHLORIDE 50 MG/1
50 TABLET ORAL EVERY 12 HOURS
Qty: 60 TAB | Refills: 0 | Status: SHIPPED | OUTPATIENT
Start: 2019-06-21 | End: 2019-07-15 | Stop reason: SDUPTHER

## 2019-06-21 NOTE — PROGRESS NOTES
CC: Pain (Chronic)      HPI:    She is a 46 y.o. female who presents for evaluation of     The follow issues were discussed       1. Provoked/ after sx/  Acute deep vein thrombosis (DVT) of proximal vein of left lower extremity (HCC)  - pain improved  - has been on coumadin for close to 8 months   - recent US of leg showed resolved DVT  - stop coumadin      2. Chronic back pain: patient had extensive back surgery - working with PT - on gabapentin, continues to have significant debilitating pain. Patient is taking tramadol BID  - refilled muscle relaxant   - continue tramadol  -  reviewed and UDS ordered     3. Hypothyroidism: TSH has been at goal, on synthroid 88 mcg     4. GERD: stable on PPI     5. Depression: stable on lexapro                ROS:  Constitutional: negative for fevers, chills, anorexia and weight loss  10 systems reviewed and negative other than HPI    Past Medical History:   Diagnosis Date    Arthritis     Asthma     Breast cyst 1996    left, removed    Chronic pain     LOWER BACK    Colon polyps     Diverticulitis     [facility] 10/18/2018    provoked after back sx    Fatty liver     GERD (gastroesophageal reflux disease)     Hypercholesteremia     Nicotine vapor product user     Obesity     SUN (obstructive sleep apnea)     uses CPAP    Psychiatric disorder     depression    Thyroid disease     hypothyroid       Current Outpatient Medications on File Prior to Visit   Medication Sig Dispense Refill    methocarbamol (ROBAXIN) 500 mg tablet TAKE 1 TABLET BY MOUTH FOUR (4) TIMES DAILY. 90 Tab 0    montelukast (SINGULAIR) 10 mg tablet Take 1 Tab by mouth daily. 30 Tab 4    traZODone (DESYREL) 50 mg tablet TAKE 1 TABLET BY MOUTH EVERY DAY AT NIGHT 90 Tab 2    pantoprazole (PROTONIX) 40 mg tablet TAKE 1 TABLET BY MOUTH DAILY.  INDICATIONS: GASTROESOPHAGEAL REFLUX DISEASE 90 Tab 1    albuterol (PROVENTIL VENTOLIN) 2.5 mg /3 mL (0.083 %) nebulizer solution 3 mL by Nebulization route every four (4) hours as needed for Wheezing. 24 Each 0    Nebulizer Accessories kit Use nebulizer machine as needed for dyspnea 1 Kit 0    fluticasone furoate (ARNUITY ELLIPTA) 100 mcg/actuation dsdv inhaler Take 1 Puff by inhalation daily. 2 Inhaler 4    gabapentin (NEURONTIN) 300 mg capsule Take 1 Cap by mouth three (3) times daily. 90 Cap 2    acetaminophen (TYLENOL) 325 mg tablet Take 325 mg by mouth two (2) times daily as needed for Pain.  lovastatin (MEVACOR) 20 mg tablet TAKE 1 TABLET BY MOUTH NIGHTLY 90 Tab 1    levothyroxine (SYNTHROID) 88 mcg tablet Take 1 Tab by mouth Daily (before breakfast). 90 Tab 5    albuterol (PROVENTIL HFA, VENTOLIN HFA, PROAIR HFA) 90 mcg/actuation inhaler Take 1 Puff by inhalation every six (6) hours as needed for Wheezing. 1 Inhaler 2    escitalopram oxalate (LEXAPRO) 20 mg tablet TAKE 1 TABLET BY MOUTH EVERY DAY 90 Tab 1    warfarin (COUMADIN) 5 mg tablet Take 1 Tab by mouth daily. Take 1 tablet by mouth daily on Mon, Wed, Fri, Sat, and Sunday. Take 2.5 mg strength on Tuesday and Thursday. 30 Tab 5    cromolyn (OPTICROM) 4 % ophthalmic solution Administer 1 Drop to both eyes as needed. Use in affected eye(s)       No current facility-administered medications on file prior to visit. Past Surgical History:   Procedure Laterality Date    COLONOSCOPY N/A 11/14/2017    COLONOSCOPY performed by George Mason MD at [facility]OSCOPY   [facility] [place] SURGERY  2005, 2006    L5 & S 1    HX BACK SURGERY  08/30/2018    HX BREAST BIOPSY      Left    HX HYSTERECTOMY  6/22/09    [facility]    HX OOPHORECTOMY Left     One ovary removed.     HX ORTHOPAEDIC Left 1972    thumb surgery    HX ORTHOPAEDIC Left 2000    left knee surgery    HX ORTHOPAEDIC Left 2013    ankle    HX TUBAL LIGATION      REMOVAL OF KIDNEY STONE      RENAL STENT         Family History   Problem Relation Age of Onset    Cancer Mother 54        Lung cancer    Heart Disease Father     Hypertension Father     Elevated Lipids Father     Heart Attack Father     Stroke Father         x 3    Elevated Lipids Sister     Heart Disease Brother     Hypertension Brother     Elevated Lipids Brother     Elevated Lipids Sister     Elevated Lipids Sister     Heart Disease Sister     Cancer Sister         uterine    Heart Disease Sister     Cancer Sister         uterine    Heart Disease Sister    Kamar Holms Cancer Sister         colon cancer with liver mets    Bleeding Prob Brother     Heart Attack Brother     Anesth Problems Neg Hx      Reviewed and no changes     Social History     Socioeconomic History    Marital status:      Spouse name: Not on file    Number of children: Not on file    Years of education: Not on file    Highest education level: Not on file   Occupational History    Not on file   Social Needs    Financial resource strain: Not on file    Food insecurity:     Worry: Not on file     Inability: Not on file    Transportation needs:     Medical: Not on file     Non-medical: Not on file   Tobacco Use    Smoking status: Former Smoker     Packs/day: 1.50     Years: 25.00     Pack years: 37.50     Last attempt to quit: 2015     Years since quittin.4    Smokeless tobacco: Never Used   Substance and Sexual Activity    Alcohol use: No    Drug use: No    Sexual activity: Yes     Partners: Male     Birth control/protection: Surgical   Lifestyle    Physical activity:     Days per week: Not on file     Minutes per session: Not on file    Stress: Not on file   Relationships    Social connections:     Talks on phone: Not on file     Gets together: Not on file     Attends Cheondoism service: Not on file     Active member of club or organization: Not on file     Attends meetings of clubs or organizations: Not on file     Relationship status: Not on file    Intimate partner violence:     Fear of current or ex partner: Not on file     Emotionally abused: Not on file     Physically abused: Not on file     Forced sexual activity: Not on file   Other Topics Concern    Not on file   Social History Narrative    Not on file            Visit Vitals  /71 (BP 1 Location: Right arm, BP Patient Position: Sitting)   Pulse 60   Temp 97.8 °F (36.6 °C) (Oral)   Resp 18   Ht 5' 7\" (1.702 m)   Wt 268 lb (121.6 kg)   LMP 06/22/2009   SpO2 99%   BMI 41.97 kg/m²       Physical Examination:   General - Well appearing female  HEENT - PERRL, TM no erythema/opacification, normal nasal turbinates, oropharynx no erythema or exudate, MMM  Neck - supple, no bruits, no TMG, no LAD  Pulm - clear to auscultation bilaterally  Cardio - RRR, normal S1 S2, no murmur gallops or rubs  Abd - soft, nontender, no masses, no HSM  Extrem - no edema, +2 distal pulses  Psych - normal affect, appropriate mood    Lab Results   Component Value Date/Time    WBC 3.6 05/08/2019 02:39 PM    Hemoglobin (POC) 12.9 10/28/2013 03:40 PM    HGB 12.8 05/08/2019 02:39 PM    Hematocrit (POC) 38 10/28/2013 03:40 PM    HCT 40.8 05/08/2019 02:39 PM    PLATELET 345 06/88/1209 02:39 PM    MCV 98.6 05/08/2019 02:39 PM     Lab Results   Component Value Date/Time    Sodium 139 05/08/2019 02:39 PM    Potassium 3.9 05/08/2019 02:39 PM    Chloride 105 05/08/2019 02:39 PM    CO2 29 05/08/2019 02:39 PM    Anion gap 5 05/08/2019 02:39 PM    Glucose 86 05/08/2019 02:39 PM    BUN 15 05/08/2019 02:39 PM    Creatinine 0.87 05/08/2019 02:39 PM    BUN/Creatinine ratio 17 05/08/2019 02:39 PM    GFR est AA >60 05/08/2019 02:39 PM    GFR est non-AA >60 05/08/2019 02:39 PM    Calcium 8.8 05/08/2019 02:39 PM     Lab Results   Component Value Date/Time    Cholesterol, total 214 (H) 06/21/2019 03:37 PM    HDL Cholesterol 74 06/21/2019 03:37 PM    LDL, calculated 123 (H) 06/21/2019 03:37 PM    VLDL, calculated 17 06/21/2019 03:37 PM    Triglyceride 85 06/21/2019 03:37 PM     Lab Results   Component Value Date/Time    TSH 2.880 06/21/2019 03:37 PM No results found for: PSA, PSA2, PSAR1, Dary Martinezk, PSAR3, [id number], [id number], PSALT  Lab Results   Component Value Date/Time    Hemoglobin A1c 5.4 08/02/2018 08:19 AM     Lab Results   Component Value Date/Time    VITAMIN D, 25-HYDROXY 28.5 (L) 05/09/2017 08:54 AM       Lab Results   Component Value Date/Time    ALT (SGPT) 18 05/08/2019 02:39 PM    AST (SGOT) 15 05/08/2019 02:39 PM    Alk. phosphatase 88 05/08/2019 02:39 PM    Bilirubin, total 0.4 05/08/2019 02:39 PM           Assessment/Plan:    1. Chronic pain syndrome  - due to spinal stenosis, continue tramadol  -  appropiate  - tramaADol (ULTRAM) 50 mg tablet; Take 1 Tab by mouth every twelve (12) hours for 30 days. Max Daily Amount: 100 mg. Dispense: 60 Tab; Refill: 0  - COMPLIANCE DRUG SCREEN/PRESCRIPTION MONITORING      2. Provoked/ after sx/  Acute deep vein thrombosis (DVT) of proximal vein of left lower extremity (HCC)  - pain improved  - has been on coumadin for close to 8 months   - recent US of leg showed resolved DVT  - stop coumadin   3. Hypothyroidism: TSH has been at goal, on synthroid 88 mcg     4. GERD: stable on PPI     5.  Depression: stable on Dillon Aguilar MD

## 2019-06-21 NOTE — PATIENT INSTRUCTIONS
Office Policies    Phone calls/patient messages:            Please allow up to 24 hours for someone in the office to contact you about your call or message. Be mindful your provider may be out of the office or your message may require further review. We encourage you to use [employer] for your messages as this is a faster, more efficient way to communicate with our office                         Medication Refills:            Prescription medications require 48-72 business hours to process. We encourage you to use [employer] for your refills. For controlled medications: Please allow 72 business hours to process. Certain medications may require you to  a written prescription at our office. NO narcotic/controlled medications will be prescribed after 4pm Monday through Friday or on weekends              Form/Paperwork Completion:            Please note a $25 fee may incur for all paperwork for completed by our providers. We ask that you allow 7-10 business days. Pre-payment is due prior to picking up/faxing the completed form. You may also download your forms to [employer] to have your doctor print off.

## 2019-06-21 NOTE — PROGRESS NOTES
Reviewed record in preparation for visit and have obtained necessary documentation. Identified pt with two pt identifiers(name and ). Chief Complaint   Patient presents with    Pain (Chronic)       Health Maintenance Due   Topic Date Due    Shingles Vaccine (1 of 2) 2014       Ms. Maria Dolores Cervantes has a reminder for a \"due or due soon\" health maintenance. I have asked that she discuss this further with her primary care provider for follow-up on this health maintenance. Coordination of Care Questionnaire:  :     1) Have you been to an emergency room, urgent care clinic since your last visit? no   Hospitalized since your last visit? no             2) Have you seen or consulted any other health care providers outside of [address] [place] [address] since your last visit? no  (Include any pap smears or colon screenings in this section.)    3) In the event something were to happen to you and you were unable to speak on your behalf, do you have an Advance Directive/ Living Will in place stating your wishes? YES    Do you have an Advance Directive on file? no    4) Are you interested in receiving information on Advance Directives? NO    Patient is accompanied by daughter I have received verbal consent from Rudean Simmonds to discuss any/all medical information while they are present in the room.

## 2019-06-28 LAB
CHOLEST SERPL-MCNC: 214 MG/DL (ref 100–199)
DRUGS UR: NORMAL
HDLC SERPL-MCNC: 74 MG/DL
LDLC SERPL CALC-MCNC: 123 MG/DL (ref 0–99)
T4 FREE SERPL-MCNC: 1.59 NG/DL (ref 0.82–1.77)
TRIGL SERPL-MCNC: 85 MG/DL (ref 0–149)
TSH SERPL DL<=0.005 MIU/L-ACNC: 2.88 UIU/ML (ref 0.45–4.5)
VLDLC SERPL CALC-MCNC: 17 MG/DL (ref 5–40)

## 2019-07-01 ENCOUNTER — PATIENT MESSAGE (OUTPATIENT)
Dept: INTERNAL MEDICINE CLINIC | Age: 54
End: 2019-07-01

## 2019-07-01 ENCOUNTER — TELEPHONE (OUTPATIENT)
Dept: INTERNAL MEDICINE CLINIC | Age: 54
End: 2019-07-01

## 2019-07-01 DIAGNOSIS — B37.9 YEAST INFECTION: Primary | ICD-10-CM

## 2019-07-01 NOTE — PROGRESS NOTES
Lab look good. Mild elevation in LDL bad cholesterol - is patient taking lovastatin?    Work on [employer]

## 2019-07-01 NOTE — TELEPHONE ENCOUNTER
Pt is requesting a call back regarding a white bump on her uvula, she stated that it is causing her pain.  Pt's Best Contact Number:  [phone]       Message received & copied from [facility]

## 2019-07-02 NOTE — TELEPHONE ENCOUNTER
Patient also sent [employer] message that has been routed to Dr. Daniel Brock for advise/recommendations

## 2019-07-03 ENCOUNTER — OFFICE VISIT (OUTPATIENT)
Dept: INTERNAL MEDICINE CLINIC | Age: 54
End: 2019-07-03

## 2019-07-03 ENCOUNTER — TELEPHONE (OUTPATIENT)
Dept: INTERNAL MEDICINE CLINIC | Age: 54
End: 2019-07-03

## 2019-07-03 VITALS
TEMPERATURE: 98 F | WEIGHT: 278 LBS | OXYGEN SATURATION: 96 % | DIASTOLIC BLOOD PRESSURE: 84 MMHG | RESPIRATION RATE: 16 BRPM | BODY MASS INDEX: 43.63 KG/M2 | HEIGHT: 67 IN | HEART RATE: 56 BPM | SYSTOLIC BLOOD PRESSURE: 134 MMHG

## 2019-07-03 DIAGNOSIS — J02.9 SORE THROAT: Primary | ICD-10-CM

## 2019-07-03 DIAGNOSIS — B37.0 ORAL THRUSH: ICD-10-CM

## 2019-07-03 LAB
S PYO AG THROAT QL: NEGATIVE
VALID INTERNAL CONTROL?: YES

## 2019-07-03 RX ORDER — FLUCONAZOLE 150 MG/1
150 TABLET ORAL DAILY
Qty: 2 TAB | Refills: 0 | Status: SHIPPED | OUTPATIENT
Start: 2019-07-03 | End: 2019-07-03

## 2019-07-03 RX ORDER — NYSTATIN 100000 [USP'U]/ML
1 SUSPENSION ORAL 4 TIMES DAILY
Qty: 120 ML | Refills: 1 | Status: SHIPPED | OUTPATIENT
Start: 2019-07-03 | End: 2019-09-26 | Stop reason: ALTCHOICE

## 2019-07-03 NOTE — TELEPHONE ENCOUNTER
----- Message from Leyla Huynh sent at 7/3/2019  4:33 PM EDT -----  Regarding: Dr. Jim Carpenter  Pt requesting a f/u appt within the next week. Best contact [phone].      Copy/paste [employer]

## 2019-07-03 NOTE — TELEPHONE ENCOUNTER
From: Moris Fountain  To: Dru Agustin MD  Sent: 7/1/2019 10:55 AM EDT  Subject: Non-Urgent Medical Question    I think I have thresh throat , can you call in a antibiotic for me ? Or do you need to see me? Thank you . Last refill adderall 11/8/2018 # 60.  Last appointment with MD 10/16/18.  Prescription set up to approve/deny.  Out of [place] protocol.    PDMP reviewed; no aberrant behavior identified, prescription set to be authorized.

## 2019-07-03 NOTE — PATIENT INSTRUCTIONS
· Anoro Ellipta inhaler - inhale 1 puff by mouth once daily. This will replace your Arnuity inhaler while we are treating current thrush.

## 2019-07-03 NOTE — PROGRESS NOTES
Identified pt with two pt identifiers(name and ). Reviewed record in preparation for visit and have obtained necessary documentation. Chief Complaint   Patient presents with    Sore Throat     pt believes she may have thrush due to inhaler        Health Maintenance Due   Topic    Shingrix Vaccine Age 50> (1 of 2)    MEDICARE YEARLY EXAM        Coordination of Care Questionnaire:   1) Have you been to an emergency room, urgent care, or hospitalized since your last visit? If yes, where when, and reason for visit? no       2. Have seen or consulted any other health care provider since your last visit? If yes, where when, and reason for visit? YES, Dr. More Hearn       3) Do you have an Advanced Directive/ Living Will in place? NO  If yes, do we have a copy on file NO  If no, would you like information NO    Patient is accompanied by self I have received verbal consent from Kristel Flood to discuss any/all medical information while they are present in the room.     Visit Vitals  /84 (BP 1 Location: Right arm, BP Patient Position: Sitting)   Pulse (!) 56   Temp 98 °F (36.7 °C) (Oral)   Resp 16   Ht 5' 7\" (1.702 m)   Wt 278 lb (126.1 kg)   SpO2 96%   BMI 43.54 kg/m²

## 2019-07-03 NOTE — PROGRESS NOTES
SUBJECTIVE:   Ms. Ailyn Feliz is a 46 y.o. female who is here c/o sore throat and tongue. She also notes white spots on her tongue, and one on her uvula which resolved. She reports that she has had the sore throat for 1.5 weeks. She initially attributed it to allergies, but sx have worsened. She also reports frontal HA, denies sinus congestion. Pt is concerned that she may have thrush r/t her Arnuity Ellipta inhaler, which she started using after she d/c Qvar. She denies hx thrush in the past. She does feel that she is using it properly and tries to rinse her mouth out after use, but does sometimes forget. She denies fever, ear pain, or other sx. She denies sick contacts. She did take PO steroids 1- 2 months ago. Her PCP is Dr. Ajay Julian. At this time, she is otherwise doing well and has brought no other complaints to my attention today. PMH:   Past Medical History:   Diagnosis Date    Arthritis     Asthma     Breast cyst 1996    left, removed    Chronic pain     LOWER BACK    Colon polyps     Diverticulitis     DVT (deep venous thrombosis) ([facility] [address] 10/18/2018    provoked after back sx    Fatty liver     GERD (gastroesophageal reflux disease)     Hypercholesteremia     Nicotine vapor product user     Obesity     SNU (obstructive sleep apnea)     uses CPAP    Psychiatric disorder     depression    Thyroid disease     hypothyroid     PSH:  has a past surgical history that includes renal stent; hx hysterectomy (6/22/09); hx tubal ligation; pr removal of kidney stone; hx breast biopsy; hx orthopaedic (Left, 1972); hx orthopaedic (Left, 2000); hx back surgery (2005, 2006); hx orthopaedic (Left, 2013); colonoscopy (N/A, 11/14/2017); hx oophorectomy (Left); and hx back surgery (08/30/2018). All: is allergic to codeine and pcn [penicillins]. MEDS:   Current Outpatient Medications   Medication Sig    nystatin (MYCOSTATIN) 100,000 unit/mL suspension Take 5 mL by mouth four (4) times daily. swish and spit    traMADol (ULTRAM) 50 mg tablet Take 1 Tab by mouth every twelve (12) hours for 30 days. Max Daily Amount: 100 mg.    methocarbamol (ROBAXIN) 500 mg tablet TAKE 1 TABLET BY MOUTH FOUR (4) TIMES DAILY.  montelukast (SINGULAIR) 10 mg tablet Take 1 Tab by mouth daily.  traZODone (DESYREL) 50 mg tablet TAKE 1 TABLET BY MOUTH EVERY DAY AT NIGHT    pantoprazole (PROTONIX) 40 mg tablet TAKE 1 TABLET BY MOUTH DAILY. INDICATIONS: GASTROESOPHAGEAL REFLUX DISEASE    albuterol (PROVENTIL VENTOLIN) 2.5 mg /3 mL (0.083 %) nebulizer solution 3 mL by Nebulization route every four (4) hours as needed for Wheezing.  Nebulizer Accessories kit Use nebulizer machine as needed for dyspnea    fluticasone furoate (ARNUITY ELLIPTA) 100 mcg/actuation dsdv inhaler Take 1 Puff by inhalation daily.  gabapentin (NEURONTIN) 300 mg capsule Take 1 Cap by mouth three (3) times daily.  acetaminophen (TYLENOL) 325 mg tablet Take 325 mg by mouth two (2) times daily as needed for Pain.  lovastatin (MEVACOR) 20 mg tablet TAKE 1 TABLET BY MOUTH NIGHTLY    levothyroxine (SYNTHROID) 88 mcg tablet Take 1 Tab by mouth Daily (before breakfast).  albuterol (PROVENTIL HFA, VENTOLIN HFA, PROAIR HFA) 90 mcg/actuation inhaler Take 1 Puff by inhalation every six (6) hours as needed for Wheezing.  escitalopram oxalate (LEXAPRO) 20 mg tablet TAKE 1 TABLET BY MOUTH EVERY DAY    cromolyn (OPTICROM) 4 % ophthalmic solution Administer 1 Drop to both eyes as needed. Use in affected eye(s)    warfarin (COUMADIN) 5 mg tablet Take 1 Tab by mouth daily. Take 1 tablet by mouth daily on Mon, Wed, Fri, Sat, and Sunday. Take 2.5 mg strength on Tuesday and Thursday. No current facility-administered medications for this visit.         FH: family history includes Bleeding Prob in her brother; Cancer in her sister, sister, and sister; Cancer (age of onset: 54) in her mother; Elevated Lipids in her brother, father, sister, sister, and sister; Heart Attack in her brother and father; Heart Disease in her brother, father, sister, sister, and sister; Hypertension in her brother and father; Stroke in her father. SH:  reports that she quit smoking about 4 years ago. She has a 37.50 pack-year smoking history. She has never used smokeless tobacco. She reports that she does not drink alcohol or use drugs. Review of Systems - History obtained from the patient  General ROS: negative  Psychological ROS: negative  Ophthalmic ROS: negative  ENT ROS: +sore throat, white spots on tongue  Respiratory ROS: no cough, shortness of breath, or wheezing  Cardiovascular ROS: no chest pain or dyspnea on exertion  Gastrointestinal ROS: no abdominal pain, change in bowel habits, or black or bloody stools  Genito-Urinary ROS: negative  Musculoskeletal ROS: negative  Neurological ROS: +HA  Dermatological ROS: negative    OBJECTIVE:   Vitals:   Visit Vitals  /84 (BP 1 Location: Right arm, BP Patient Position: Sitting)   Pulse (!) 56   Temp 98 °F (36.7 °C) (Oral)   Resp 16   Ht 5' 7\" (1.702 m)   Wt 278 lb (126.1 kg)   LMP 06/22/2009   SpO2 96%   BMI 43.54 kg/m²      Gen: Pleasant 46 y.o.  female in NAD. HEENT: NC/AT. +White film to tongue, unable to remove with tongue blade. Oral mucosa erythematous. Posterior pharynx and tonsils NL, no exudates. Uvula midline. HEART: RRR, No M/G/R.   LUNGS: CTAB No W/R. EXTREMITIES: Warm. No C/C/E.   NEURO: Alert and oriented x 3. Cranial nerves grossly intact. No focal sensory or motor deficits noted. SKIN: Warm. Dry. No rashes or other lesions noted. ASSESSMENT/ PLAN:     Diagnoses and all orders for this visit:    1. Sore throat  -     AMB POC RAPID STREP A    2. Oral thrush  -     nystatin (MYCOSTATIN) 100,000 unit/mL suspension; Take 5 mL by mouth four (4) times daily. swish and spit      1. Sore throat   POC rapid strep was negative.      2. Oral thrush   Advised her to use nystatin mouthwash swish and spit QID for at least one week. Pharmacist will review use of her inhalers with her. I have reviewed the patient's medications and risks/side effects/benefits were discussed. Diagnosis(-es) explained to patient and questions answered. Literature provided where appropriate.      Written by July Peralta, as dictated by Wander Mccauley MD.

## 2019-07-03 NOTE — PROGRESS NOTES
Pharmacy Progress Note - Medication/Device Education     S/O: Ms. Medina Roland is a 46 y.o., with a PMH of Asthma, was referred by Adolfo Patino MD for medication/device teaching today. Pt was seen following her appointment with Dr. Ellie Ortiz d/t c/o thrush from [id number] [address] inhaler. She was not rinsing her mouth after dose. Not sure if she is correctly using inhaler. Medication/Product:  Jose Dross  (Umeclidinium / Vilanterol) - 1 puff by mouth daily      Past Medical History:   Diagnosis Date    Arthritis     Asthma     Breast cyst 1996    left, removed    Chronic pain     LOWER BACK    Colon polyps     Diverticulitis     DVT (deep venous thrombosis) ([address] 10/18/2018    provoked after back sx    Fatty liver     GERD (gastroesophageal reflux disease)     Hypercholesteremia     Nicotine vapor product user     Obesity     SUN (obstructive sleep apnea)     uses CPAP    Psychiatric disorder     depression    Thyroid disease     hypothyroid     Allergies   Allergen Reactions    Codeine Hives    Pcn [Penicillins] Hives       Current Outpatient Medications   Medication Sig    nystatin (MYCOSTATIN) 100,000 unit/mL suspension Take 5 mL by mouth four (4) times daily. swish and spit    umeclidinium-vilanterol (ANORO ELLIPTA) 62.5-25 mcg/actuation inhaler Take 1 Puff by inhalation daily.  traMADol (ULTRAM) 50 mg tablet Take 1 Tab by mouth every twelve (12) hours for 30 days. Max Daily Amount: 100 mg.    methocarbamol (ROBAXIN) 500 mg tablet TAKE 1 TABLET BY MOUTH FOUR (4) TIMES DAILY.  montelukast (SINGULAIR) 10 mg tablet Take 1 Tab by mouth daily.  traZODone (DESYREL) 50 mg tablet TAKE 1 TABLET BY MOUTH EVERY DAY AT NIGHT    pantoprazole (PROTONIX) 40 mg tablet TAKE 1 TABLET BY MOUTH DAILY.  INDICATIONS: GASTROESOPHAGEAL REFLUX DISEASE    albuterol (PROVENTIL VENTOLIN) 2.5 mg /3 mL (0.083 %) nebulizer solution 3 mL by Nebulization route every four (4) hours as needed for Wheezing.  Nebulizer Accessories kit Use nebulizer machine as needed for dyspnea    fluticasone furoate (ARNUITY ELLIPTA) 100 mcg/actuation dsdv inhaler Take 1 Puff by inhalation daily.  gabapentin (NEURONTIN) 300 mg capsule Take 1 Cap by mouth three (3) times daily.  acetaminophen (TYLENOL) 325 mg tablet Take 325 mg by mouth two (2) times daily as needed for Pain.  lovastatin (MEVACOR) 20 mg tablet TAKE 1 TABLET BY MOUTH NIGHTLY    levothyroxine (SYNTHROID) 88 mcg tablet Take 1 Tab by mouth Daily (before breakfast).  albuterol (PROVENTIL HFA, VENTOLIN HFA, PROAIR HFA) 90 mcg/actuation inhaler Take 1 Puff by inhalation every six (6) hours as needed for Wheezing.  escitalopram oxalate (LEXAPRO) 20 mg tablet TAKE 1 TABLET BY MOUTH EVERY DAY    cromolyn (OPTICROM) 4 % ophthalmic solution Administer 1 Drop to both eyes as needed. Use in affected eye(s)    warfarin (COUMADIN) 5 mg tablet Take 1 Tab by mouth daily. Take 1 tablet by mouth daily on Mon, Wed, Fri, Sat, and Sunday. Take 2.5 mg strength on Tuesday and Thursday. No current facility-administered medications for this visit. A/P:  - Recommend Pt stop Arnuity Ellipta (fluticasone) until thrush resolves. Dr. Xochilt Rowe is starting nystatin suspension today. - Discussed difference between [employer] [employer] product's side effects.   - Discussed medication's priming, administration techniques,storage, and disposal.   - Patient confirmed understanding using the teach back method. Resources/samples provided:   -  Arnuity Ellipta x 1     Patient verbalized understanding of the information presented and all of the patients questions were answered. AVS was handed to the patient. Patient advised to call the office with any additional questions or concerns.     Thank you for the consult,  Leighann Galo, PharmD, CDE

## 2019-07-08 RX ORDER — ESCITALOPRAM OXALATE 20 MG/1
TABLET ORAL
Qty: 90 TAB | Refills: 1 | Status: SHIPPED | OUTPATIENT
Start: 2019-07-08 | End: 2020-01-29

## 2019-07-12 ENCOUNTER — TELEPHONE (OUTPATIENT)
Dept: INTERNAL MEDICINE CLINIC | Age: 54
End: 2019-07-12

## 2019-07-12 DIAGNOSIS — M48.062 SPINAL STENOSIS OF LUMBAR REGION WITH NEUROGENIC CLAUDICATION: ICD-10-CM

## 2019-07-12 RX ORDER — GABAPENTIN 300 MG/1
CAPSULE ORAL
Qty: 90 CAP | Refills: 2 | Status: SHIPPED | OUTPATIENT
Start: 2019-07-12 | End: 2019-10-05 | Stop reason: SDUPTHER

## 2019-07-13 DIAGNOSIS — E78.5 HYPERLIPIDEMIA, UNSPECIFIED HYPERLIPIDEMIA TYPE: ICD-10-CM

## 2019-07-15 ENCOUNTER — OFFICE VISIT (OUTPATIENT)
Dept: INTERNAL MEDICINE CLINIC | Age: 54
End: 2019-07-15

## 2019-07-15 VITALS
TEMPERATURE: 97.8 F | RESPIRATION RATE: 18 BRPM | DIASTOLIC BLOOD PRESSURE: 83 MMHG | WEIGHT: 276 LBS | BODY MASS INDEX: 43.32 KG/M2 | HEIGHT: 67 IN | SYSTOLIC BLOOD PRESSURE: 139 MMHG | OXYGEN SATURATION: 99 % | HEART RATE: 75 BPM

## 2019-07-15 DIAGNOSIS — M48.062 SPINAL STENOSIS OF LUMBAR REGION WITH NEUROGENIC CLAUDICATION: Primary | ICD-10-CM

## 2019-07-15 DIAGNOSIS — G89.4 CHRONIC PAIN SYNDROME: ICD-10-CM

## 2019-07-15 DIAGNOSIS — E03.9 ACQUIRED HYPOTHYROIDISM: ICD-10-CM

## 2019-07-15 DIAGNOSIS — Z00.00 MEDICARE ANNUAL WELLNESS VISIT, SUBSEQUENT: ICD-10-CM

## 2019-07-15 RX ORDER — TRAMADOL HYDROCHLORIDE 50 MG/1
50 TABLET ORAL
Qty: 90 TAB | Refills: 1 | Status: SHIPPED | OUTPATIENT
Start: 2019-07-15 | End: 2019-08-14

## 2019-07-15 RX ORDER — METHOCARBAMOL 500 MG/1
TABLET, FILM COATED ORAL
Qty: 90 TAB | Refills: 0 | Status: SHIPPED | OUTPATIENT
Start: 2019-07-15 | End: 2019-08-04 | Stop reason: SDUPTHER

## 2019-07-15 RX ORDER — LOVASTATIN 20 MG/1
TABLET ORAL
Qty: 90 TAB | Refills: 1 | Status: SHIPPED | OUTPATIENT
Start: 2019-07-15 | End: 2019-12-22

## 2019-07-15 NOTE — PROGRESS NOTES
Reviewed record in preparation for visit and have obtained necessary documentation. Identified pt with two pt identifiers(name and ). Chief Complaint   Patient presents with    Pain (Chronic)       Health Maintenance Due   Topic Date Due    Shingles Vaccine (1 of 2) 2014    Annual Well Visit  2019       Ms. Con Salgado has a reminder for a \"due or due soon\" health maintenance. I have asked that she discuss this further with her primary care provider for follow-up on this health maintenance. Coordination of Care Questionnaire:  :     1) Have you been to an emergency room, urgent care clinic since your last visit? no   Hospitalized since your last visit? no             2) Have you seen or consulted any other health care providers outside of [address] [place] [address] since your last visit? no  (Include any pap smears or colon screenings in this section.)    3) In the event something were to happen to you and you were unable to speak on your behalf, do you have an Advance Directive/ Living Will in place stating your wishes? NO    Do you have an Advance Directive on file? no    4) Are you interested in receiving information on Advance Directives? NO    Patient is accompanied by self I have received verbal consent from Medina Roland to discuss any/all medical information while they are present in the room.

## 2019-07-15 NOTE — PATIENT INSTRUCTIONS
Office Policies    Phone calls/patient messages:            Please allow up to 24 hours for someone in the office to contact you about your call or message. Be mindful your provider may be out of the office or your message may require further review. We encourage you to use [employer] for your messages as this is a faster, more efficient way to communicate with our office                         Medication Refills:            Prescription medications require 48-72 business hours to process. We encourage you to use [employer] for your refills. For controlled medications: Please allow 72 business hours to process. Certain medications may require you to  a written prescription at our office. NO narcotic/controlled medications will be prescribed after 4pm Monday through Friday or on weekends              Form/Paperwork Completion:            Please note a $25 fee may incur for all paperwork for completed by our providers. We ask that you allow 7-10 business days. Pre-payment is due prior to picking up/faxing the completed form. You may also download your forms to [employer] to have your doctor print off. Medicare Wellness Visit, Female     The best way to live healthy is to have a lifestyle where you eat a well-balanced diet, exercise regularly, limit alcohol use, and quit all forms of tobacco/nicotine, if applicable. Regular preventive services are another way to keep healthy. Preventive services (vaccines, screening tests, monitoring & exams) can help personalize your care plan, which helps you manage your own care. Screening tests can find health problems at the earliest stages, when they are easiest to treat. [facility] follows the current, evidence-based guidelines published by the [facility] [employer] Chakrabort[employer] (USPSTF) when recommending preventive services for our patients.  Because we follow these guidelines, sometimes recommendations change over time as research supports it. (For example, mammograms used to be recommended annually. Even though Medicare will still pay for an annual mammogram, the newer guidelines recommend a mammogram every two years for women of average risk.)  Of course, you and your doctor may decide to screen more often for some diseases, based on your risk and your health status. Preventive services for you include:  - Medicare offers their members a free annual wellness visit, which is time for you and your primary care provider to discuss and plan for your preventive service needs. Take advantage of this benefit every year!  -All adults over the age of 72 should receive the recommended pneumonia vaccines. Current USPSTF guidelines recommend a series of two vaccines for the best pneumonia protection.   -All adults should have a flu vaccine yearly and a tetanus vaccine every 10 years. All adults age 61 and older should receive a shingles vaccine once in their lifetime.    -A bone mass density test is recommended when a woman turns 65 to screen for osteoporosis. This test is only recommended one time, as a screening. Some providers will use this same test as a disease monitoring tool if you already have osteoporosis. -All adults age 38-68 who are overweight should have a diabetes screening test once every three years.   -Other screening tests and preventive services for persons with diabetes include: an eye exam to screen for diabetic retinopathy, a kidney function test, a foot exam, and stricter control over your cholesterol.   -Cardiovascular screening for adults with routine risk involves an electrocardiogram (ECG) at intervals determined by your doctor.   -Colorectal cancer screenings should be done for adults age 54-65 with no increased risk factors for colorectal cancer. There are a number of acceptable methods of screening for this type of cancer. Each test has its own benefits and drawbacks.  Discuss with your doctor what is most appropriate for you during your annual wellness visit. The different tests include: colonoscopy (considered the best screening method), a fecal occult blood test, a fecal DNA test, and sigmoidoscopy. -Breast cancer screenings are recommended every other year for women of normal risk, age 54-69.  -Cervical cancer screenings for women over age 72 are only recommended with certain risk factors.   -All adults born between [facility] should be screened once for Hepatitis C. Here is a list of your current Health Maintenance items (your personalized list of preventive services) with a due date:  Health Maintenance Due   Topic Date Due    Shingles Vaccine (1 of 2) Given prescription    Annual Well Visit  Done today       Prescription for tramadol may take up to 3 pills a day for pain.

## 2019-07-15 NOTE — PROGRESS NOTES
CC: Pain (Chronic)      HPI:    She is a 46 y.o. female who presents for evaluation of     Chronic pain     Patient has had chronic back pain and had 3 back surgeries, she takes 2-3 tramadol 50mg a day to help control the pain to a tolerable level. The tramadol is well tolerated. Pain radiates down the legs        1. Provoked/ after sx/  Acute deep vein thrombosis (DVT) of proximal vein of left lower extremity (HCC)  - pain improved  - has been on coumadin for close to 8 months   - recent US of leg showed resolved DVT  - stop coumadin      2. Chronic back pain: patient had extensive back surgery - working with PT - on gabapentin, continues to have significant debilitating pain. Patient is taking tramadol BID to TID with good control of pain  - refilled muscle relaxant   - continue tramadol  -  reviewed UDS reviewed      3. Hypothyroidism: TSH has been at goal, on synthroid 88 mcg     4. GERD: stable on PPI     5.  Depression: stable on lexapro                ROS:  Constitutional: negative for fevers, chills, anorexia and weight loss  10 systems reviewed and negative other than HPI    Past Medical History:   Diagnosis Date    Arthritis     Asthma     Breast cyst 1996    left, removed    Chronic pain     LOWER BACK    Colon polyps     Diverticulitis     DVT (deep venous thrombosis) ([facility] [address] 10/18/2018    provoked after back sx    Fatty liver     GERD (gastroesophageal reflux disease)     Hypercholesteremia     Nicotine vapor product user     Obesity     SUN (obstructive sleep apnea)     uses CPAP    Psychiatric disorder     depression    Thyroid disease     hypothyroid       Current Outpatient Medications on File Prior to Visit   Medication Sig Dispense Refill    gabapentin (NEURONTIN) 300 mg capsule TAKE 1 CAPSULE BY MOUTH THREE TIMES A DAY 90 Cap 2    escitalopram oxalate (LEXAPRO) 20 mg tablet TAKE 1 TABLET BY MOUTH EVERY DAY 90 Tab 1    nystatin (MYCOSTATIN) 100,000 unit/mL suspension Take 5 mL by mouth four (4) times daily. swish and spit 120 mL 1    umeclidinium-vilanterol (ANORO ELLIPTA) 62.5-25 mcg/actuation inhaler Take 1 Puff by inhalation daily. 1 Inhaler 0    traMADol (ULTRAM) 50 mg tablet Take 1 Tab by mouth every twelve (12) hours for 30 days. Max Daily Amount: 100 mg. 60 Tab 0    methocarbamol (ROBAXIN) 500 mg tablet TAKE 1 TABLET BY MOUTH FOUR (4) TIMES DAILY. 90 Tab 0    montelukast (SINGULAIR) 10 mg tablet Take 1 Tab by mouth daily. 30 Tab 4    traZODone (DESYREL) 50 mg tablet TAKE 1 TABLET BY MOUTH EVERY DAY AT NIGHT 90 Tab 2    pantoprazole (PROTONIX) 40 mg tablet TAKE 1 TABLET BY MOUTH DAILY. INDICATIONS: GASTROESOPHAGEAL REFLUX DISEASE 90 Tab 1    albuterol (PROVENTIL VENTOLIN) 2.5 mg /3 mL (0.083 %) nebulizer solution 3 mL by Nebulization route every four (4) hours as needed for Wheezing. 24 Each 0    Nebulizer Accessories kit Use nebulizer machine as needed for dyspnea 1 Kit 0    fluticasone furoate (ARNUITY ELLIPTA) 100 mcg/actuation dsdv inhaler Take 1 Puff by inhalation daily. 2 Inhaler 4    acetaminophen (TYLENOL) 325 mg tablet Take 325 mg by mouth two (2) times daily as needed for Pain.  lovastatin (MEVACOR) 20 mg tablet TAKE 1 TABLET BY MOUTH NIGHTLY 90 Tab 1    levothyroxine (SYNTHROID) 88 mcg tablet Take 1 Tab by mouth Daily (before breakfast). 90 Tab 5    albuterol (PROVENTIL HFA, VENTOLIN HFA, PROAIR HFA) 90 mcg/actuation inhaler Take 1 Puff by inhalation every six (6) hours as needed for Wheezing. 1 Inhaler 2    warfarin (COUMADIN) 5 mg tablet Take 1 Tab by mouth daily. Take 1 tablet by mouth daily on Mon, Wed, Fri, Sat, and Sunday. Take 2.5 mg strength on Tuesday and Thursday. 30 Tab 5    cromolyn (OPTICROM) 4 % ophthalmic solution Administer 1 Drop to both eyes as needed. Use in affected eye(s)       No current facility-administered medications on file prior to visit.         Past Surgical History:   Procedure Laterality Date    COLONOSCOPY N/A 2017    COLONOSCOPY performed by Ayana Bernal MD at [facility] ENDOSCOPY   [facility] SERVICES SURGERY  ,     L5 & S 1    HX BACK SURGERY  2018    HX BREAST BIOPSY      Left    HX HYSTERECTOMY  09    [facility] LSO    HX OOPHORECTOMY Left     One ovary removed.     HX ORTHOPAEDIC Left     thumb surgery    HX ORTHOPAEDIC Left     left knee surgery    HX ORTHOPAEDIC Left     ankle    HX TUBAL LIGATION      REMOVAL OF KIDNEY STONE      RENAL STENT         Family History   Problem Relation Age of Onset    Cancer Mother 54        Lung cancer    Heart Disease Father     Hypertension Father     Elevated Lipids Father     Heart Attack Father     Stroke Father         x 3    Elevated Lipids Sister     Heart Disease Brother     Hypertension Brother     Elevated Lipids Brother     Elevated Lipids Sister     Elevated Lipids Sister     Heart Disease Sister     Cancer Sister         uterine    Heart Disease Sister     Cancer Sister         uterine    Heart Disease Sister    Jenelle Kinmundy Cancer Sister         colon cancer with liver mets    Bleeding Prob Brother     Heart Attack Brother     Anesth Problems Neg Hx      Reviewed and no changes     Social History     Socioeconomic History    Marital status:      Spouse name: Not on file    Number of children: Not on file    Years of education: Not on file    Highest education level: Not on file   Occupational History    Not on file   Social Needs    Financial resource strain: Not on file    Food insecurity:     Worry: Not on file     Inability: Not on file    Transportation needs:     Medical: Not on file     Non-medical: Not on file   Tobacco Use    Smoking status: Former Smoker     Packs/day: 1.50     Years: 25.00     Pack years: 37.50     Last attempt to quit: 2015     Years since quittin.4    Smokeless tobacco: Never Used   Substance and Sexual Activity    Alcohol use: No    Drug use: No    Sexual activity: Yes Partners: Male     Birth control/protection: Surgical   Lifestyle    Physical activity:     Days per week: Not on file     Minutes per session: Not on file    Stress: Not on file   Relationships    Social connections:     Talks on phone: Not on file     Gets together: Not on file     Attends Shinto service: Not on file     Active member of club or organization: Not on file     Attends meetings of clubs or organizations: Not on file     Relationship status: Not on file    Intimate partner violence:     Fear of current or ex partner: Not on file     Emotionally abused: Not on file     Physically abused: Not on file     Forced sexual activity: Not on file   Other Topics Concern    Not on file   Social History Narrative    Not on file            Visit Vitals  /83 (BP 1 Location: Right arm, BP Patient Position: Sitting)   Pulse 75   Temp 97.8 °F (36.6 °C) (Oral)   Resp 18   Ht 5' 7\" (1.702 m)   Wt 276 lb (125.2 kg)   LMP 06/22/2009   SpO2 99%   BMI 43.23 kg/m²       Physical Examination:   General - Well appearing female  HEENT - PERRL, TM no erythema/opacification, normal nasal turbinates, oropharynx no erythema or exudate, MMM  Neck - supple, no bruits, no TMG, no LAD  Pulm - clear to auscultation bilaterally  Cardio - RRR, normal S1 S2, no murmur gallops or rubs  Abd - soft, nontender, no masses, no HSM  Extrem - no edema, +2 distal pulses  Psych - normal affect, appropriate mood    Lab Results   Component Value Date/Time    WBC 3.6 05/08/2019 02:39 PM    Hemoglobin (POC) 12.9 10/28/2013 03:40 PM    HGB 12.8 05/08/2019 02:39 PM    Hematocrit (POC) 38 10/28/2013 03:40 PM    HCT 40.8 05/08/2019 02:39 PM    PLATELET 494 60/18/8091 02:39 PM    MCV 98.6 05/08/2019 02:39 PM     Lab Results   Component Value Date/Time    Sodium 139 05/08/2019 02:39 PM    Potassium 3.9 05/08/2019 02:39 PM    Chloride 105 05/08/2019 02:39 PM    CO2 29 05/08/2019 02:39 PM    Anion gap 5 05/08/2019 02:39 PM    Glucose 86 05/08/2019 02:39 PM    BUN 15 05/08/2019 02:39 PM    Creatinine 0.87 05/08/2019 02:39 PM    BUN/Creatinine ratio 17 05/08/2019 02:39 PM    GFR est AA >60 05/08/2019 02:39 PM    GFR est non-AA >60 05/08/2019 02:39 PM    Calcium 8.8 05/08/2019 02:39 PM     Lab Results   Component Value Date/Time    Cholesterol, total 214 (H) 06/21/2019 03:37 PM    HDL Cholesterol 74 06/21/2019 03:37 PM    LDL, calculated 123 (H) 06/21/2019 03:37 PM    VLDL, calculated 17 06/21/2019 03:37 PM    Triglyceride 85 06/21/2019 03:37 PM     Lab Results   Component Value Date/Time    TSH 2.880 06/21/2019 03:37 PM     No results found for: PSA, PSA2, Genevive Damme, [id number], [id number], PSALT  Lab Results   Component Value Date/Time    Hemoglobin A1c 5.4 08/02/2018 08:19 AM     Lab Results   Component Value Date/Time    VITAMIN D, 25-HYDROXY 28.5 (L) 05/09/2017 08:54 AM       Lab Results   Component Value Date/Time    ALT (SGPT) 18 05/08/2019 02:39 PM    AST (SGOT) 15 05/08/2019 02:39 PM    Alk. phosphatase 88 05/08/2019 02:39 PM    Bilirubin, total 0.4 05/08/2019 02:39 PM           Assessment/Plan:    1. Chronic pain syndrome  - due to spinal stenosis, continue tramadol  -  appropiate  . pain  - tramaADol (ULTRAM) 50 mg tablet; Take 1 Tab by mouth every twelve 8  hours for 30 days. Max Daily Amount: 100 mg. Dispense: 90 Tab; Refill: 0        2. Provoked/ after sx/  Acute deep vein thrombosis (DVT) of proximal vein of left lower extremity (HCC)  - pain improved  - has been on coumadin for close to 8 months   - recent US of leg showed resolved DVT  - coumadin stopped    3. Hypothyroidism: TSH has been at goal, on synthroid 88 mcg     4. GERD: stable on PPI     5.  Depression: stable on Farhan Green MD  This is the Subsequent Medicare Annual Wellness Exam, performed 12 months or more after the Initial AWV or the last Subsequent AWV    I have reviewed the patient's medical history in detail and updated the computerized patient record. Depression Risk Factor Screening:     3 most recent PHQ Screens 7/15/2019   PHQ Not Done -   Little interest or pleasure in doing things Not at all   Feeling down, depressed, irritable, or hopeless Not at all   Total Score PHQ 2 0     Alcohol Risk Factor Screening: You do not drink alcohol or very rarely. Functional Ability and Level of Safety:   Hearing Loss  Hearing is good. Activities of Daily Living  The home contains: no safety equipment. Patient does total self care    Fall Risk  No flowsheet data found. Abuse Screen  Patient is not abused    Cognitive Screening   Evaluation of Cognitive Function:  Has your family/caregiver stated any concerns about your memory: no  Normal    Patient Care Team   Patient Care Team:  Valencia Seip, MD as PCP - General (Internal Medicine)  Francois Garcia MD as Physician (Sleep Medicine)  Verenice Barton MD as Consulting Provider (Gynecology)  Gregoria Velazquez MD (Orthopedic Surgery)    Assessment/Plan   Education and counseling provided:  Are appropriate based on today's review and evaluation    Diagnoses and all orders for this visit:    1. Spinal stenosis of lumbar region with neurogenic claudication    2. Chronic pain syndrome  -     traMADol (ULTRAM) 50 mg tablet; Take 1 Tab by mouth every eight (8) hours as needed for Pain for up to 30 days. Max Daily Amount: 150 mg.    3. Acquired hypothyroidism    4.  Medicare annual wellness visit, subsequent    Other orders  -     varicella-zoster recombinant, PF, (SHINGRIX, PF,) 50 mcg/0.5 mL susr injection; 0.5mL by IntraMUSCular route once now and then repeat in 2-6 months        Health Maintenance Due   Topic Date Due    Shingrix Vaccine Age 50> (1 of 2) 08/13/2014    MEDICARE YEARLY EXAM  08/03/2019

## 2019-07-16 ENCOUNTER — TELEPHONE (OUTPATIENT)
Dept: INTERNAL MEDICINE CLINIC | Age: 54
End: 2019-07-16

## 2019-07-16 NOTE — TELEPHONE ENCOUNTER
General Message/Vendor Calls     Caller's first and last name:       Reason for call:   Needs to know when to begin taking medication     Callback required yes/no and why:   Yes so she knows when to start medication     Best contact number(s):   [phone]     Details to clarify the request:   Pt is requesting to know if she needs to start taking her medication \"Anoro\" on 7/15/2019 or 7/16/2019.      Ebenezer Deng       Message received & copied from [facility] after closing on 7/15/19

## 2019-07-16 NOTE — TELEPHONE ENCOUNTER
Pablo Telles MD  You 25 minutes ago (1:05 PM)      anoro is for COPD, wheezing. Has she been taking this medication? Has she had any recent flair ups of COPD? If currently not on medication and breathing is well, no need to resume. Spoke with patient. Two pt identifiers confirmed. Patient advised of the above message from Dr. Brii Urias. Patient states that this is something that Dr. Terri Spangler gave her a sample of at her last visit. Patient states that she does not have COPD and her breathing in fine. Patient advised per Dr. Brii Urias, no need to take Anoro. Pt verbalized understanding of information discussed w/ no further questions at this time.

## 2019-08-05 RX ORDER — METHOCARBAMOL 500 MG/1
TABLET, FILM COATED ORAL
Qty: 90 TAB | Refills: 0 | Status: SHIPPED | OUTPATIENT
Start: 2019-08-05 | End: 2019-09-03 | Stop reason: SDUPTHER

## 2019-08-13 ENCOUNTER — PATIENT MESSAGE (OUTPATIENT)
Dept: INTERNAL MEDICINE CLINIC | Age: 54
End: 2019-08-13

## 2019-08-13 DIAGNOSIS — K57.92 DIVERTICULITIS: Primary | ICD-10-CM

## 2019-08-15 ENCOUNTER — TELEPHONE (OUTPATIENT)
Dept: INTERNAL MEDICINE CLINIC | Age: 54
End: 2019-08-15

## 2019-08-15 DIAGNOSIS — K57.92 DIVERTICULITIS: ICD-10-CM

## 2019-08-15 RX ORDER — METRONIDAZOLE 500 MG/1
500 TABLET ORAL 3 TIMES DAILY
Qty: 42 TAB | Refills: 0
Start: 2019-08-15 | End: 2019-08-15 | Stop reason: SDUPTHER

## 2019-08-15 RX ORDER — METRONIDAZOLE 500 MG/1
500 TABLET ORAL 3 TIMES DAILY
Qty: 42 TAB | Refills: 0 | Status: SHIPPED | OUTPATIENT
Start: 2019-08-15 | End: 2019-10-23 | Stop reason: SDUPTHER

## 2019-08-15 RX ORDER — CIPROFLOXACIN 500 MG/1
500 TABLET ORAL 2 TIMES DAILY
Qty: 28 TAB | Refills: 0
Start: 2019-08-15 | End: 2019-08-15 | Stop reason: SDUPTHER

## 2019-08-15 RX ORDER — CIPROFLOXACIN 500 MG/1
500 TABLET ORAL 2 TIMES DAILY
Qty: 28 TAB | Refills: 0 | Status: SHIPPED | OUTPATIENT
Start: 2019-08-15 | End: 2019-09-26 | Stop reason: ALTCHOICE

## 2019-08-15 NOTE — TELEPHONE ENCOUNTER
Pt states that [facility] has not received scripts yet.   Pt is waiting on these to be sent to the pharmacy as they are antibiotics

## 2019-08-15 NOTE — TELEPHONE ENCOUNTER
From: Gerri Tello  To: Kayla Dietz MD  Sent: 8/13/2019 7:07 PM EDT  Subject: Non-Urgent Medical Question    I need antibiotics for diverticulitis , can you call one in please ,cause it is inflamed n hurts , when I touch my side . Thank you.

## 2019-09-03 ENCOUNTER — PATIENT MESSAGE (OUTPATIENT)
Dept: INTERNAL MEDICINE CLINIC | Age: 54
End: 2019-09-03

## 2019-09-03 DIAGNOSIS — R60.9 SWELLING: Primary | ICD-10-CM

## 2019-09-03 RX ORDER — METHOCARBAMOL 500 MG/1
TABLET, FILM COATED ORAL
Qty: 90 TAB | Refills: 0 | Status: SHIPPED | OUTPATIENT
Start: 2019-09-03 | End: 2019-09-10 | Stop reason: SDUPTHER

## 2019-09-04 RX ORDER — FUROSEMIDE 20 MG/1
20 TABLET ORAL
Qty: 20 TAB | Refills: 0 | Status: SHIPPED | OUTPATIENT
Start: 2019-09-04 | End: 2019-09-26 | Stop reason: SDUPTHER

## 2019-09-04 NOTE — TELEPHONE ENCOUNTER
From: Dudley Duenas  To: Ana Paula Rodriguez MD  Sent: 9/3/2019 3:12 PM EDT  Subject: Non-Urgent Medical Question    Can you call in a water pill . Please I'm holding fluid in my feet . Just give me a few so my feet can go down n I will stop taking them . Thank u so much.

## 2019-09-10 RX ORDER — TRAMADOL HYDROCHLORIDE 50 MG/1
50 TABLET ORAL
Qty: 30 TAB | Refills: 0 | OUTPATIENT
Start: 2019-09-10 | End: 2019-10-10

## 2019-09-10 RX ORDER — TRAMADOL HYDROCHLORIDE 50 MG/1
TABLET ORAL
Refills: 1 | COMMUNITY
Start: 2019-09-03 | End: 2019-09-26 | Stop reason: SDUPTHER

## 2019-09-10 RX ORDER — METHOCARBAMOL 500 MG/1
500 TABLET, FILM COATED ORAL 3 TIMES DAILY
Qty: 90 TAB | Refills: 0 | Status: SHIPPED | OUTPATIENT
Start: 2019-09-10 | End: 2019-10-18 | Stop reason: SDUPTHER

## 2019-09-10 NOTE — TELEPHONE ENCOUNTER
----- Message from Beacher Kocher. Michael Archibald sent at 9/10/2019  3:18 AM EDT -----  Regarding: Non-Urgent Medical Question  Contact: [phone]  I need a refill for METHOCARBAMOL 500 MG TABLET . Thank you. Can you call [facility] for a refill for Trumadol please .

## 2019-09-10 NOTE — TELEPHONE ENCOUNTER
PCP: Andrea Osullivan MD    Last appt: 7/15/2019  Future Appointments   Date Time Provider Rozina Arizmendi   9/10/2019  3:00 PM ED [facility]   9/26/2019  2:30 PM Appa Audelia Mendez MD [address]   4/27/2020  2:00 PM Valery Lyle MD [facility]   6/11/2020  2:40 PM Evaristo Felty,  [address]       Requested Prescriptions     Pending Prescriptions Disp Refills    methocarbamol (ROBAXIN) 500 mg tablet 90 Tab 0     Sig: Take 1 Tab by mouth.  traMADol (ULTRAM) 50 mg tablet 30 Tab 0     Sig: Take 1 Tab by mouth every six (6) hours as needed for Pain for up to 30 days. Max Daily Amount: 200 mg.

## 2019-09-18 ENCOUNTER — TELEPHONE (OUTPATIENT)
Dept: INTERNAL MEDICINE CLINIC | Age: 54
End: 2019-09-18

## 2019-09-18 ENCOUNTER — APPOINTMENT (OUTPATIENT)
Dept: ULTRASOUND IMAGING | Age: 54
End: 2019-09-18
Payer: MEDICARE

## 2019-09-18 ENCOUNTER — HOSPITAL ENCOUNTER (EMERGENCY)
Age: 54
Discharge: HOME | End: 2019-09-18
Attending: EMERGENCY MEDICINE
Payer: MEDICARE

## 2019-09-18 VITALS
DIASTOLIC BLOOD PRESSURE: 87 MMHG | TEMPERATURE: 98.2 F | BODY MASS INDEX: 27.94 KG/M2 | SYSTOLIC BLOOD PRESSURE: 138 MMHG | RESPIRATION RATE: 18 BRPM | OXYGEN SATURATION: 100 % | HEART RATE: 54 BPM | HEIGHT: 67 IN | WEIGHT: 178 LBS

## 2019-09-18 DIAGNOSIS — R60.0 LEG EDEMA, LEFT: ICD-10-CM

## 2019-09-18 DIAGNOSIS — M25.562 ARTHRALGIA OF LEFT LOWER LEG: Primary | ICD-10-CM

## 2019-09-18 PROCEDURE — 99283 EMERGENCY DEPT VISIT LOW MDM: CPT

## 2019-09-18 PROCEDURE — 93971 EXTREMITY STUDY: CPT

## 2019-09-18 NOTE — ED NOTES
Pt arrives to ED with complaints of left lower leg pain. Pt reports that this pain began last week and has worsened this week. Pt states that her leg is \"tender\" where the hot spots are located (calf and popliteal area). Pt reports hx of blood clots and states that her PCP took her off of Coumadin in August, 2019 after she had been on it since October of 2018. Pt is a/o x4. Pt appears in no apparent distress at this time. Family at bedside. Monitor x2.

## 2019-09-18 NOTE — TELEPHONE ENCOUNTER
Called, spoke with Pt. Two pt identifiers confirmed. Pt states she need a f/u ultrasound to check for blood clots. Pt states she went to the ER for blood clots on 09/18/19 for leg swelling. Pt offered and accepted offer for 09/23/19 at 2:15pm to see Dr. Mera Kerns as an Acute visit only. Pt verbalized understanding of information discussed w/ no further questions at this time.

## 2019-09-18 NOTE — TELEPHONE ENCOUNTER
Patient states she needs a call back in reference to ER visit today, 9/18/19 for leg swelling & check for blood clot. Patient states she was advised that an order needs to be done for her to have a re-check Ultrasound done within 5/7/business days at [facility]. Please call to discuss & advise.  Thank you

## 2019-09-18 NOTE — DISCHARGE INSTRUCTIONS
Emergency room for recheck of possible DVT. Your ultrasound does not appear to have changed since March, but should be repeated in 5 to 7 days. I would recommend calling your doctor's office today to let her know the results and to request outpatient order for ultrasound. Patient Education        Leg and Ankle Edema: Care Instructions  Your Care Instructions  Swelling in the legs, ankles, and feet is called edema. It is common after you sit or stand for a while. Long plane flights or car rides often cause swelling in the legs and feet. You may also have swelling if you have to stand for long periods of time at your job. Problems with the veins in the legs (varicose veins) and changes in hormones can also cause swelling. Sometimes the swelling in the ankles and feet is caused by a more serious problem, such as heart failure, infection, blood clots, or liver or kidney disease. Follow-up care is a key part of your treatment and safety. Be sure to make and go to all appointments, and call your doctor if you are having problems. It's also a good idea to know your test results and keep a list of the medicines you take. How can you care for yourself at home? · If your doctor gave you medicine, take it as prescribed. Call your doctor if you think you are having a problem with your medicine. · Whenever you are resting, raise your legs up. Try to keep the swollen area higher than the level of your heart. · Take breaks from standing or sitting in one position. ? Walk around to increase the blood flow in your lower legs. ? Move your feet and ankles often while you stand, or tighten and relax your leg muscles. · Wear support stockings. Put them on in the morning, before swelling gets worse. · Eat a balanced diet. Lose weight if you need to. · Limit the amount of salt (sodium) in your diet. Salt holds fluid in the body and may increase swelling. When should you call for help?   Call 911 anytime you think you may need emergency care. For example, call if:    · You have symptoms of a blood clot in your lung (called a pulmonary embolism). These may include:  ? Sudden chest pain. ? Trouble breathing. ? Coughing up blood.    Call your doctor now or seek immediate medical care if:    · You have signs of a blood clot, such as:  ? Pain in your calf, back of the knee, thigh, or groin. ? Redness and swelling in your leg or groin.     · You have symptoms of infection, such as:  ? Increased pain, swelling, warmth, or redness. ? Red streaks or pus. ? A fever.    Watch closely for changes in your health, and be sure to contact your doctor if:    · Your swelling is getting worse.     · You have new or worsening pain in your legs.     · You do not get better as expected. Where can you learn more? Go to http://olivier-akil.info/. Enter I447 in the search box to learn more about \"Leg and Ankle Edema: Care Instructions. \"  Current as of: September 23, 2018  Content Version: 12.1  © 6885-0845 [employer]. Care instructions adapted under license by [employer] (which disclaims liability or warranty for this information). If you have questions about a medical condition or this instruction, always ask your healthcare professional. [address] any warranty or liability for your use of this information.

## 2019-09-18 NOTE — ED PROVIDER NOTES
EMERGENCY DEPARTMENT HISTORY AND PHYSICAL EXAM          Date: 9/18/2019  Patient Name: Rashmi Lau    History of Presenting Illness     Chief Complaint   Patient presents with    Leg Swelling     Ambulatory into the ED with c/o swelling in LLE x several days. History Provided By: Patient    HPI: Rashmi Lau is a 53 y.o. female, pmhx hypertension, high cholesterol, SUN, asthma, DVT post surgery, who presents ambulatory to the ED c/o left lower extremity edema    Patient notes that she was recently taken off of her Coumadin in August after taking it for 10 months. She notes that over the past week she started to develop some pain in her left leg especially in her calf. She notes that the pain is now radiating up her leg into her popliteal region and she is began to develop some swelling which made her concerned that her DVT had recurred. Patient denied any chest pain, shortness of breath, fevers or chills but did have a recent short travel. PCP: Jessie Byers MD    Allergies: Codeine and penicillin  Social Hx: Former cigarettes and now vapes tobacco, +EtOH, +Illicit Drugs    There are no other complaints, changes, or physical findings at this time. Current Outpatient Medications   Medication Sig Dispense Refill    methocarbamol (ROBAXIN) 500 mg tablet Take 1 Tab by mouth three (3) times daily. 90 Tab 0    lovastatin (MEVACOR) 20 mg tablet TAKE 1 TABLET BY MOUTH EVERY DAY AT NIGHT 90 Tab 1    varicella-zoster recombinant, PF, (SHINGRIX, PF,) 50 mcg/0.5 mL susr injection 0.5mL by IntraMUSCular route once now and then repeat in 2-6 months 0.5 mL 1    gabapentin (NEURONTIN) 300 mg capsule TAKE 1 CAPSULE BY MOUTH THREE TIMES A DAY 90 Cap 2    escitalopram oxalate (LEXAPRO) 20 mg tablet TAKE 1 TABLET BY MOUTH EVERY DAY 90 Tab 1    umeclidinium-vilanterol (ANORO ELLIPTA) 62.5-25 mcg/actuation inhaler Take 1 Puff by inhalation daily.  1 Inhaler 0    montelukast (SINGULAIR) 10 mg tablet Take 1 Tab by mouth daily. 30 Tab 4    traZODone (DESYREL) 50 mg tablet TAKE 1 TABLET BY MOUTH EVERY DAY AT NIGHT 90 Tab 2    pantoprazole (PROTONIX) 40 mg tablet TAKE 1 TABLET BY MOUTH DAILY. INDICATIONS: GASTROESOPHAGEAL REFLUX DISEASE 90 Tab 1    albuterol (PROVENTIL VENTOLIN) 2.5 mg /3 mL (0.083 %) nebulizer solution 3 mL by Nebulization route every four (4) hours as needed for Wheezing. 24 Each 0    Nebulizer Accessories kit Use nebulizer machine as needed for dyspnea 1 Kit 0    fluticasone furoate (ARNUITY ELLIPTA) 100 mcg/actuation dsdv inhaler Take 1 Puff by inhalation daily. 2 Inhaler 4    levothyroxine (SYNTHROID) 88 mcg tablet Take 1 Tab by mouth Daily (before breakfast). 90 Tab 5    albuterol (PROVENTIL HFA, VENTOLIN HFA, PROAIR HFA) 90 mcg/actuation inhaler Take 1 Puff by inhalation every six (6) hours as needed for Wheezing. 1 Inhaler 2    methocarbamol (ROBAXIN) 500 mg tablet TAKE 1 TABLET BY MOUTH FOUR (4) TIMES DAILY. 90 Tab 0    traMADol (ULTRAM) 50 mg tablet TAKE 1 TABLET BY MOUTH EVERY 8 HOURS AS NEEDED FOR PAIN FOR UP TO 30 DAYS. MAX DAILY AMOUNT 150MG  1    furosemide (LASIX) 20 mg tablet Take 1 Tab by mouth daily as needed (swelling). 20 Tab 0    metroNIDAZOLE (FLAGYL) 500 mg tablet Take 1 Tab by mouth three (3) times daily. 42 Tab 0    ciprofloxacin HCl (CIPRO) 500 mg tablet Take 1 Tab by mouth two (2) times a day. 28 Tab 0    nystatin (MYCOSTATIN) 100,000 unit/mL suspension Take 5 mL by mouth four (4) times daily. swish and spit 120 mL 1    acetaminophen (TYLENOL) 325 mg tablet Take 325 mg by mouth two (2) times daily as needed for Pain.  cromolyn (OPTICROM) 4 % ophthalmic solution Administer 1 Drop to both eyes as needed.  Use in affected eye(s)         Past History     Past Medical History:  Past Medical History:   Diagnosis Date    Arthritis     Asthma     Breast cyst 1996    left, removed    Chronic pain     LOWER BACK    Colon polyps     Diverticulitis     DVT (deep venous thrombosis) ([facility] [address] 10/18/2018    provoked after back sx    Fatty liver     GERD (gastroesophageal reflux disease)     Hypercholesteremia     Hypothyroidism     Nicotine vapor product user     Obesity     SUN (obstructive sleep apnea)     uses CPAP    Psychiatric disorder     depression    Thyroid disease     hypothyroid       Past Surgical History:  Past Surgical History:   Procedure Laterality Date    COLONOSCOPY N/A 2017    COLONOS[facility]un [facility]   [facility] SERVICES SURGERY  ,     L5 & S 1    HX BACK SURGERY  2018    HX BACK SURGERY      HX BREAST BIOPSY      Left    HX HYSTERECTOMY  09    [facility]    HX OOPHORECTOMY Left     One ovary removed.     HX ORTHOPAEDIC Left     thumb surgery    HX ORTHOPAEDIC Left     left knee surgery    HX ORTHOPAEDIC Left     ankle    HX TUBAL LIGATION      REMOVAL OF KIDNEY STONE      RENAL STENT         Family History:  Family History   Problem Relation Age of Onset    Cancer Mother 54        Lung cancer    Heart Disease Father     Hypertension Father     Elevated Lipids Father     Heart Attack Father     Stroke Father         x 3    Elevated Lipids Sister     Heart Disease Brother     Hypertension Brother     Elevated Lipids Brother     Elevated Lipids Sister     Elevated Lipids Sister     Heart Disease Sister     Cancer Sister         uterine    Heart Disease Sister     Cancer Sister         uterine    Heart Disease Sister    [facility] Cancer Sister         colon cancer with liver mets    Bleeding Prob Brother     Heart Attack Brother     Anesth Problems Neg Hx        Social History:  Social History     Tobacco Use    Smoking status: Former Smoker     Packs/day: 1.50     Years: 25.00     Pack years: 37.50     Last attempt to quit: 2015     Years since quittin.6    Smokeless tobacco: Never Used    Tobacco comment: vape   Substance Use Topics    Alcohol use: Yes     Comment: occasional drinker    Drug use: No       Allergies: Allergies   Allergen Reactions    Codeine Hives    Pcn [Penicillins] Hives         Review of Systems   Review of Systems   Constitutional: Negative for activity change, appetite change, chills, fever and unexpected weight change. HENT: Negative for congestion. Eyes: Negative for pain and visual disturbance. Respiratory: Negative for cough, choking and shortness of breath. Cardiovascular: Positive for leg swelling. Negative for chest pain and palpitations. Gastrointestinal: Negative for abdominal pain, diarrhea, nausea and vomiting. Genitourinary: Negative for dysuria. Musculoskeletal: Negative for back pain. Skin: Negative for rash. Neurological: Negative for headaches. Physical Exam   Physical Exam   Constitutional: She is oriented to person, place, and time. She appears well-developed and well-nourished. HENT:   Head: Normocephalic and atraumatic. Mouth/Throat: Oropharynx is clear and moist.   Eyes: Pupils are equal, round, and reactive to light. Conjunctivae and EOM are normal. Right eye exhibits no discharge. Left eye exhibits no discharge. Neck: Normal range of motion. Neck supple. Cardiovascular: Normal rate, regular rhythm and normal heart sounds. No murmur heard. Pulmonary/Chest: Effort normal and breath sounds normal. No respiratory distress. She has no wheezes. She has no rales. Abdominal: Soft. Bowel sounds are normal. She exhibits no distension. There is no tenderness. Musculoskeletal: Normal range of motion. She exhibits edema (Notable asymmetric edema left lower extremity with normal temperature and sensation). She exhibits no tenderness. Neurological: She is alert and oriented to person, place, and time. No cranial nerve deficit. She exhibits normal muscle tone. Skin: Skin is warm and dry. No rash noted. She is not diaphoretic. Nursing note and vitals reviewed.     Diagnostic Study Results     Labs -   No results found for this or any previous visit (from the past 12 hour(s)). Radiologic Studies -   No orders to display     CT Results  (Last 48 hours)    None        CXR Results  (Last 48 hours)    None            Medical Decision Making   I am the first provider for this patient. I reviewed the vital signs, available nursing notes, past medical history, past surgical history, family history and social history. Vital Signs-Reviewed the patient's vital signs. No data found. Pulse Oximetry Analysis - 100% on RA    Cardiac Monitor:   Rate: 54bpm  Rhythm: Normal Sinus Rhythm      Records Reviewed: Nursing Notes, Old Medical Records, Previous Radiology Studies and Previous Laboratory Studies    Provider Notes (Medical Decision Making):   MDM: Middle-aged female with prior history of DVT presenting with left leg swelling that is clearly asymmetric compared to the right. The ultrasound ordered in triage does not clearly show a new DVT. Her prior ultrasound on last check 6 months ago showed what appeared to be some wall thickening with resolution of her prior clots. The read of her ultrasound today is a little confusing appears to be the same but will discuss with the radiologist for further recommendations. ED Course:   Initial assessment performed. The patients presenting problems have been discussed, and they are in agreement with the care plan formulated and outlined with them. I have encouraged them to ask questions as they arise throughout their visit. PROGRESS NOTE:  3 PM  I have discussed case with the reading radiologist.  She agrees that the current study is similar to the prior and it does not appear that she is reaccumulating new clot in this left lower extremity. Discussed these results with the patient and recommend given the asymmetric edema that she does get a recheck for ultrasound in 5 days for comparison.   Recommend her to follow-up with her primary care physician or return to the emergency room if she develops any chest pain or shortness of breath. Critical Care Time:   0      Diagnosis     Clinical Impression:   1. Arthralgia of left lower leg    2. Leg edema, left        PLAN:  1. Discharge Medication List as of 9/18/2019  2:52 PM        2. Follow-up Information     Follow up With Specialties Details Why [facility]  [address] [place] [address]  [phone]      [facility] EM[facility] Emergency Medicine  If symptoms worsen with chest pain or shortness of breath [address] [place], [place] [address] [facility]  [phone]        Return to ED if worse     Disposition:  home      Please note, this dictation was completed with [employer], the computer voice recognition software. Quite often unanticipated grammatical, syntax, homophones, and other interpretive errors are inadvertently transcribed by the computer software. Please disregard these errors. Please excuse any errors that have escaped final proof reading.

## 2019-09-18 NOTE — ED NOTES
Dr. Diana Mckeon has reviewed discharge instructions with the patient. The patient verbalized understanding. Pt ambulatory out of ED with friend.

## 2019-09-18 NOTE — TELEPHONE ENCOUNTER
Received triage call from pt. Two pt identifiers confirmed. Pt states feeling a spot behind L knee above calf. Has H/O DVT's. Came blood thinner this past august.  Pt states burning feeling and has warmness to touch and TTP. Pt states noticing spot last week. Sx have gotten worse. pt advised to go to ED for further evaluation and contact the office post ED visit for f/u. Pt verbalized understanding of information discussed w/ no further questions at this time.

## 2019-09-18 NOTE — TELEPHONE ENCOUNTER
#[phone] pt states she had a blood clot in her leg and thinks she has another. It is hot behind her left calf.   Please call to advice/schedule

## 2019-09-23 RX ORDER — METHOCARBAMOL 500 MG/1
TABLET, FILM COATED ORAL
Qty: 90 TAB | Refills: 0 | Status: SHIPPED | OUTPATIENT
Start: 2019-09-23 | End: 2019-10-17 | Stop reason: SDUPTHER

## 2019-09-23 NOTE — TELEPHONE ENCOUNTER
PCP: Angella Mckee MD    Last appt: 7/15/2019  Future Appointments   Date Time Provider Rozina Arizmendi   9/23/2019  2:15 PM Franck Arrington MD [address]   9/26/2019  2:30 PM Appa Graciela Rushing MD [address]   4/27/2020  2:00 PM Katlin Roth MD [place] [id number]   6/11/2020  2:40 PM Priyanka Palomo  [address]       Requested Prescriptions     Pending Prescriptions Disp Refills    methocarbamol (ROBAXIN) 500 mg tablet [Pharmacy Med Name: METHOCARBAMOL 500 MG TABLET] 90 Tab 0     Sig: TAKE 1 TABLET BY MOUTH FOUR (4) TIMES DAILY.

## 2019-09-26 ENCOUNTER — HOSPITAL ENCOUNTER (OUTPATIENT)
Dept: LAB | Age: 54
Discharge: HOME | End: 2019-09-26
Payer: MEDICARE

## 2019-09-26 ENCOUNTER — OFFICE VISIT (OUTPATIENT)
Dept: INTERNAL MEDICINE CLINIC | Age: 54
End: 2019-09-26

## 2019-09-26 VITALS
OXYGEN SATURATION: 98 % | DIASTOLIC BLOOD PRESSURE: 74 MMHG | WEIGHT: 273 LBS | HEIGHT: 67 IN | BODY MASS INDEX: 42.85 KG/M2 | SYSTOLIC BLOOD PRESSURE: 116 MMHG | TEMPERATURE: 98 F | HEART RATE: 57 BPM | RESPIRATION RATE: 16 BRPM

## 2019-09-26 DIAGNOSIS — E78.5 HYPERLIPIDEMIA, UNSPECIFIED HYPERLIPIDEMIA TYPE: ICD-10-CM

## 2019-09-26 DIAGNOSIS — R60.9 SWELLING: ICD-10-CM

## 2019-09-26 DIAGNOSIS — G89.4 CHRONIC PAIN SYNDROME: Primary | ICD-10-CM

## 2019-09-26 DIAGNOSIS — R60.9 FLUID RETENTION: ICD-10-CM

## 2019-09-26 DIAGNOSIS — M48.062 SPINAL STENOSIS OF LUMBAR REGION WITH NEUROGENIC CLAUDICATION: ICD-10-CM

## 2019-09-26 PROCEDURE — 80048 BASIC METABOLIC PNL TOTAL CA: CPT

## 2019-09-26 PROCEDURE — 82570 ASSAY OF URINE CREATININE: CPT

## 2019-09-26 PROCEDURE — 36415 COLL VENOUS BLD VENIPUNCTURE: CPT

## 2019-09-26 RX ORDER — TRAMADOL HYDROCHLORIDE 50 MG/1
50 TABLET ORAL
Qty: 90 TAB | Refills: 1 | Status: SHIPPED | OUTPATIENT
Start: 2019-09-26 | End: 2019-10-26

## 2019-09-26 RX ORDER — FLUTICASONE PROPIONATE 50 MCG
2 SPRAY, SUSPENSION (ML) NASAL DAILY
Qty: 1 BOTTLE | Refills: 1 | Status: SHIPPED | OUTPATIENT
Start: 2019-09-26 | End: 2019-10-27 | Stop reason: SDUPTHER

## 2019-09-26 RX ORDER — FUROSEMIDE 20 MG/1
20 TABLET ORAL
Qty: 20 TAB | Refills: 0 | Status: SHIPPED | OUTPATIENT
Start: 2019-09-26 | End: 2019-11-01 | Stop reason: SDUPTHER

## 2019-09-26 NOTE — PROGRESS NOTES
Identified pt with two pt identifiers(name and ). Reviewed record in preparation for visit and have obtained necessary documentation. Chief Complaint   Patient presents with    Thyroid Problem     3 month f/u         Visit Vitals  /74 (BP 1 Location: Right arm, BP Patient Position: Sitting)   Pulse (!) 57   Temp 98 °F (36.7 °C) (Oral)   Resp 16   Ht 5' 7\" (1.702 m)   Wt 173 lb (78.5 kg)   SpO2 98%   BMI 27.10 kg/m²       Health Maintenance Due   Topic    Shingrix Vaccine Age 49> (1 of 2)    Influenza Age 5 to Adult        Med Reconciliation: Completed    Coordination of Care Questionnaire:  :   1) Have you been to an emergency room, urgent care, or hospitalized since your last visit? If yes, where when, and reason for visit? Yes, [facility] ER for left leg pain on 2019, see encounters       2. Have seen or consulted any other health care provider since your last visit? If yes, where when, and reason for visit? No       3) Do you have an Advanced Directive/ Living Will in place? No  If yes, do we have a copy on file   If no, would you like information       Patient is accompanied by self I have received verbal consent from Kristel Flood to discuss any/all medical information while they are present in the room.

## 2019-09-26 NOTE — PROGRESS NOTES
CC: Thyroid Problem (3 month f/u )      HPI:    She is a 53 y.o. female who presents for evaluation of chronic medical issues     Chronic pain     Patient has had chronic back pain and had 3 back surgeries, she takes 2-3 tramadol 50mg a day to help control the pain to a tolerable level. The tramadol is well tolerated. Pain radiates down the legs  Checked      Patient reports pressure in her sinuses - reports 7 days of headaches and pressure, has taken pseudoephedrine with some improvement    ROS:    10 systems reviewed and negative other than HPI    Past Medical History:   Diagnosis Date    Arthritis     Asthma     Breast cyst 1996    left, removed    Chronic pain     LOWER BACK    Colon polyps     Diverticulitis     DVT (deep venous thrombosis) ([facility] [address] 10/18/2018    provoked after back sx    Fatty liver     GERD (gastroesophageal reflux disease)     Hypercholesteremia     Hypothyroidism     Nicotine vapor product user     Obesity     SUN (obstructive sleep apnea)     uses CPAP    Psychiatric disorder     depression    Thyroid disease     hypothyroid       Current Outpatient Medications on File Prior to Visit   Medication Sig Dispense Refill    methocarbamol (ROBAXIN) 500 mg tablet TAKE 1 TABLET BY MOUTH FOUR (4) TIMES DAILY. 90 Tab 0    traMADol (ULTRAM) 50 mg tablet TAKE 1 TABLET BY MOUTH EVERY 8 HOURS AS NEEDED FOR PAIN FOR UP TO 30 DAYS. MAX DAILY AMOUNT 150MG  1    furosemide (LASIX) 20 mg tablet Take 1 Tab by mouth daily as needed (swelling). 20 Tab 0    metroNIDAZOLE (FLAGYL) 500 mg tablet Take 1 Tab by mouth three (3) times daily. 42 Tab 0    lovastatin (MEVACOR) 20 mg tablet TAKE 1 TABLET BY MOUTH EVERY DAY AT NIGHT 90 Tab 1    gabapentin (NEURONTIN) 300 mg capsule TAKE 1 CAPSULE BY MOUTH THREE TIMES A DAY 90 Cap 2    montelukast (SINGULAIR) 10 mg tablet Take 1 Tab by mouth daily.  30 Tab 4    traZODone (DESYREL) 50 mg tablet TAKE 1 TABLET BY MOUTH EVERY DAY AT NIGHT 90 Tab 2    pantoprazole (PROTONIX) 40 mg tablet TAKE 1 TABLET BY MOUTH DAILY. INDICATIONS: GASTROESOPHAGEAL REFLUX DISEASE 90 Tab 1    albuterol (PROVENTIL VENTOLIN) 2.5 mg /3 mL (0.083 %) nebulizer solution 3 mL by Nebulization route every four (4) hours as needed for Wheezing. 24 Each 0    Nebulizer Accessories kit Use nebulizer machine as needed for dyspnea 1 Kit 0    fluticasone furoate (ARNUITY ELLIPTA) 100 mcg/actuation dsdv inhaler Take 1 Puff by inhalation daily. 2 Inhaler 4    acetaminophen (TYLENOL) 325 mg tablet Take 325 mg by mouth two (2) times daily as needed for Pain.  levothyroxine (SYNTHROID) 88 mcg tablet Take 1 Tab by mouth Daily (before breakfast). 90 Tab 5    albuterol (PROVENTIL HFA, VENTOLIN HFA, PROAIR HFA) 90 mcg/actuation inhaler Take 1 Puff by inhalation every six (6) hours as needed for Wheezing. 1 Inhaler 2    methocarbamol (ROBAXIN) 500 mg tablet Take 1 Tab by mouth three (3) times daily. (Patient not taking: Reported on 9/26/2019) 90 Tab 0    escitalopram oxalate (LEXAPRO) 20 mg tablet TAKE 1 TABLET BY MOUTH EVERY DAY 90 Tab 1    umeclidinium-vilanterol (ANORO ELLIPTA) 62.5-25 mcg/actuation inhaler Take 1 Puff by inhalation daily. (Patient not taking: Reported on 9/26/2019) 1 Inhaler 0    cromolyn (OPTICROM) 4 % ophthalmic solution Administer 1 Drop to both eyes as needed. Use in affected eye(s)       No current facility-administered medications on file prior to visit. Past Surgical History:   Procedure Laterality Date    COLONOSCOPY N/A 11/14/2017    COLONOSCOPY performed by Elvira Boothe MD at [facility] ENDOSCOPY   [facility] SERVICES SURGERY  2005, 2006    L5 & S 1    HX BACK SURGERY  08/30/2018    HX BACK SURGERY      HX BREAST BIOPSY      Left    HX HYST[facility]CTOMY  6/22/09    [facility]    HX OOPHORECTOMY Left     One ovary removed.     HX ORTHOPAEDIC Left 1972    thumb surgery    HX ORTHOPAEDIC Left 2000    left knee surgery    HX ORTHOPAEDIC Left 2013    ankle    HX TUBAL LIGATION      REMOVAL OF KIDNEY STONE      RENAL STENT         Family History   Problem Relation Age of Onset    Cancer Mother 54        Lung cancer    Heart Disease Father     Hypertension Father     Elevated Lipids Father     Heart Attack Father     Stroke Father         x 3    Elevated Lipids Sister     Heart Disease Brother     Hypertension Brother     Elevated Lipids Brother     Elevated Lipids Sister     Elevated Lipids Sister     Heart Disease Sister     Cancer Sister         uterine    Heart Disease Sister     Cancer Sister         uterine    Heart Disease Sister     Cancer Sister         colon cancer with liver mets    Bleeding Prob Brother     Heart Attack Brother     Anesth Problems Neg Hx      Reviewed and no changes     Social History     Socioeconomic History    Marital status:      Spouse name: Not on file    Number of children: Not on file    Years of education: Not on file    Highest education level: Not on file   Occupational History    Not on file   Social Needs    Financial resource strain: Not on file    Food insecurity:     Worry: Not on file     Inability: Not on file    Transportation needs:     Medical: Not on file     Non-medical: Not on file   Tobacco Use    Smoking status: Former Smoker     Packs/day: 1.50     Years: 25.00     Pack years: 37.50     Last attempt to quit: 2015     Years since quittin.6    Smokeless tobacco: Never Used    Tobacco comment: vape   Substance and Sexual Activity    Alcohol use: Yes     Comment: occasional drinker    Drug use: No    Sexual activity: Yes     Partners: Male     Birth control/protection: Surgical   Lifestyle    Physical activity:     Days per week: Not on file     Minutes per session: Not on file    Stress: Not on file   Relationships    Social connections:     Talks on phone: Not on file     Gets together: Not on file     Attends Congregation service: Not on file     Active member of club or organization: Not on file     Attends meetings of clubs or organizations: Not on file     Relationship status: Not on file    Intimate partner violence:     Fear of current or ex partner: Not on file     Emotionally abused: Not on file     Physically abused: Not on file     Forced sexual activity: Not on file   Other Topics Concern    Not on file   Social History Narrative    Not on file            Visit Vitals  /74 (BP 1 Location: Right arm, BP Patient Position: Sitting)   Pulse (!) 57   Temp 98 °F (36.7 °C) (Oral)   Resp 16   Ht 5' 7\" (1.702 m)   Wt 173 lb (78.5 kg)   LMP 06/22/2009   SpO2 98%   BMI 27.10 kg/m²       Physical Examination:   General - Well appearing female  HEENT - PERRL, TM no erythema/opacification, normal nasal turbinates, oropharynx no erythema or exudate, MMM  Neck - supple, no bruits, no TMG, no LAD  Pulm - clear to auscultation bilaterally  Cardio - RRR, normal S1 S2, no murmur gallops or rubs  Abd - soft, nontender, no masses, no HSM  Extrem - no edema, +2 distal pulses  Psych - normal affect, appropriate mood    Lab Results   Component Value Date/Time    WBC 3.6 05/08/2019 02:39 PM    Hemoglobin (POC) 12.9 10/28/2013 03:40 PM    HGB 12.8 05/08/2019 02:39 PM    Hematocrit (POC) 38 10/28/2013 03:40 PM    HCT 40.8 05/08/2019 02:39 PM    PLATELET 776 37/22/1229 02:39 PM    MCV 98.6 05/08/2019 02:39 PM     Lab Results   Component Value Date/Time    Sodium 139 05/08/2019 02:39 PM    Potassium 3.9 05/08/2019 02:39 PM    Chloride 105 05/08/2019 02:39 PM    CO2 29 05/08/2019 02:39 PM    Anion gap 5 05/08/2019 02:39 PM    Glucose 86 05/08/2019 02:39 PM    BUN 15 05/08/2019 02:39 PM    Creatinine 0.87 05/08/2019 02:39 PM    BUN/Creatinine ratio 17 05/08/2019 02:39 PM    GFR est AA >60 05/08/2019 02:39 PM    GFR est non-AA >60 05/08/2019 02:39 PM    Calcium 8.8 05/08/2019 02:39 PM     Lab Results   Component Value Date/Time    Cholesterol, total 214 (H) 06/21/2019 03:37 PM    HDL Cholesterol 74 06/21/2019 03:37 PM    LDL, calculated 123 (H) 06/21/2019 03:37 PM    VLDL, calculated 17 06/21/2019 03:37 PM    Triglyceride 85 06/21/2019 03:37 PM     Lab Results   Component Value Date/Time    TSH 2.880 06/21/2019 03:37 PM     No results found for: PSA, PSA2, PSAR1, Gregorio Gabrielle, [id number], [id number], PSALT  Lab Results   Component Value Date/Time    Hemoglobin A1c 5.4 08/02/2018 08:19 AM     Lab Results   Component Value Date/Time    VITAMIN D, 25-HYDROXY 28.5 (L) 05/09/2017 08:54 AM       Lab Results   Component Value Date/Time    ALT (SGPT) 18 05/08/2019 02:39 PM    AST (SGOT) 15 05/08/2019 02:39 PM    Alk. phosphatase 88 05/08/2019 02:39 PM    Bilirubin, total 0.4 05/08/2019 02:39 PM           Assessment/Plan:    1. Chronic pain syndrome  - due to spinal stenosis, continue tramadol  -  appropiate  - tramaADol (ULTRAM) 50 mg tablet; Take 1 Tab by mouth every twelve 8  hours for 30 days. Max Daily Amount: 100 mg. Dispense: 90 Tab; Refill: 0        Provoked/ after sx/  Acute deep vein thrombosis (DVT) of proximal vein of left lower extremity (HCC)  - pain improved  - completed coumadin, had increased swelling went to ER and had negative US   - recent US of leg showed resolved DVT  - stop coumadin       Chronic back pain: patient had extensive back surgery - working with PT - on gabapentin, continues to have significant debilitating pain. Patient is taking tramadol BID to TID with good control of pain  - refilled muscle relaxant   - continue tramadol -refilled today  -  reviewed UDS reviewed      Hypothyroidism: TSH has been at goal, on synthroid 88 mcg      GERD: stable on PPI      Depression: stable on lexapro                  2. Provoked/ after sx/  Acute deep vein thrombosis (DVT) of proximal vein of left lower extremity (HCC)  - pain improved  - has been on coumadin for close to 8 months   - recent US of leg showed resolved DVT  - coumadin stopped    3.  Hypothyroidism: TSH has been at goal, on synthroid 88 mcg     4. GERD: stable on PPI     5. Depression: stable on lexapro     6.  Fluid retention: taking as needed lasix   20mg will check kidney function today  Mehrdad Capone MD    Follow up in 3 months

## 2019-10-01 LAB
BUN SERPL-MCNC: 9 MG/DL (ref 6–24)
BUN/CREAT SERPL: 9 (ref 9–23)
CALCIUM SERPL-MCNC: 9.3 MG/DL (ref 8.7–10.2)
CHLORIDE SERPL-SCNC: 99 MMOL/L (ref 96–106)
CO2 SERPL-SCNC: 25 MMOL/L (ref 20–29)
CREAT SERPL-MCNC: 0.95 MG/DL (ref 0.57–1)
DRUGS UR: NORMAL
GLUCOSE SERPL-MCNC: 81 MG/DL (ref 65–99)
POTASSIUM SERPL-SCNC: 4.4 MMOL/L (ref 3.5–5.2)
SODIUM SERPL-SCNC: 142 MMOL/L (ref 134–144)

## 2019-10-02 NOTE — PROGRESS NOTES
UDS is appropriated, patient disclosed taking cough medication in the office for cough   Normal kidney function

## 2019-10-05 DIAGNOSIS — M48.062 SPINAL STENOSIS OF LUMBAR REGION WITH NEUROGENIC CLAUDICATION: ICD-10-CM

## 2019-10-07 RX ORDER — GABAPENTIN 300 MG/1
CAPSULE ORAL
Qty: 90 CAP | Refills: 2 | Status: SHIPPED | OUTPATIENT
Start: 2019-10-07 | End: 2020-01-09

## 2019-10-09 DIAGNOSIS — K21.9 GASTROESOPHAGEAL REFLUX DISEASE WITHOUT ESOPHAGITIS: ICD-10-CM

## 2019-10-10 RX ORDER — PANTOPRAZOLE SODIUM 40 MG/1
TABLET, DELAYED RELEASE ORAL
Qty: 90 TAB | Refills: 1 | Status: SHIPPED | OUTPATIENT
Start: 2019-10-10 | End: 2020-04-24

## 2019-10-11 ENCOUNTER — TELEPHONE (OUTPATIENT)
Dept: INTERNAL MEDICINE CLINIC | Age: 54
End: 2019-10-11

## 2019-10-11 NOTE — TELEPHONE ENCOUNTER
Received triage call from pt. Two pt identifiers confirmed. Pt c/o abd pain-intermittently, nausea, green-slimy stool, HA. No vomiting. Diarrhea on Tuesday. x5-6 BM's (loose) daily. Pt's sister Liberty Garcia, had an intestinal infection--pt was around sister when sick. Pt informed per Dr. Jessy Mcnally that eval needed. Pt offered and accepted appt for 10/14/19 1500. Pt informed per Dr. Jessy Mcnally that if sx become severe over the weekend, go to ED for eval.  Pt verbalized understanding of information discussed w/ no further questions at this time.

## 2019-10-18 RX ORDER — METHOCARBAMOL 500 MG/1
TABLET, FILM COATED ORAL
Qty: 90 TAB | Refills: 0 | Status: SHIPPED | OUTPATIENT
Start: 2019-10-18 | End: 2019-11-09 | Stop reason: SDUPTHER

## 2019-10-23 DIAGNOSIS — K57.92 DIVERTICULITIS: ICD-10-CM

## 2019-10-23 RX ORDER — METRONIDAZOLE 500 MG/1
TABLET ORAL
Qty: 42 TAB | Refills: 0 | Status: SHIPPED | OUTPATIENT
Start: 2019-10-23 | End: 2020-02-19

## 2019-10-28 RX ORDER — FLUTICASONE PROPIONATE 50 MCG
SPRAY, SUSPENSION (ML) NASAL
Qty: 1 BOTTLE | Refills: 1 | Status: SHIPPED | OUTPATIENT
Start: 2019-10-28 | End: 2019-12-26 | Stop reason: SDUPTHER

## 2019-11-01 DIAGNOSIS — R60.9 SWELLING: ICD-10-CM

## 2019-11-04 RX ORDER — FUROSEMIDE 20 MG/1
20 TABLET ORAL
Qty: 20 TAB | Refills: 0 | Status: SHIPPED | OUTPATIENT
Start: 2019-11-04 | End: 2019-12-01 | Stop reason: SDUPTHER

## 2019-11-10 RX ORDER — METHOCARBAMOL 500 MG/1
TABLET, FILM COATED ORAL
Qty: 90 TAB | Refills: 0 | Status: SHIPPED | OUTPATIENT
Start: 2019-11-10 | End: 2019-12-01 | Stop reason: SDUPTHER

## 2019-11-26 RX ORDER — FLUTICASONE PROPIONATE 50 MCG
SPRAY, SUSPENSION (ML) NASAL
Qty: 16 G | Refills: 1 | Status: SHIPPED | OUTPATIENT
Start: 2019-11-26 | End: 2019-12-23

## 2019-12-01 DIAGNOSIS — R60.9 SWELLING: ICD-10-CM

## 2019-12-02 ENCOUNTER — TELEPHONE (OUTPATIENT)
Dept: INTERNAL MEDICINE CLINIC | Age: 54
End: 2019-12-02

## 2019-12-02 DIAGNOSIS — G89.4 CHRONIC PAIN SYNDROME: Primary | ICD-10-CM

## 2019-12-02 RX ORDER — FUROSEMIDE 20 MG/1
TABLET ORAL
Qty: 20 TAB | Refills: 0 | Status: SHIPPED | OUTPATIENT
Start: 2019-12-02 | End: 2020-02-19

## 2019-12-02 RX ORDER — METHOCARBAMOL 500 MG/1
TABLET, FILM COATED ORAL
Qty: 90 TAB | Refills: 0 | Status: SHIPPED | OUTPATIENT
Start: 2019-12-02 | End: 2019-12-22

## 2019-12-02 RX ORDER — TRAMADOL HYDROCHLORIDE 50 MG/1
50 TABLET ORAL
Qty: 60 TAB | Refills: 0 | Status: SHIPPED | OUTPATIENT
Start: 2019-12-02 | End: 2019-12-31 | Stop reason: SDUPTHER

## 2019-12-02 RX ORDER — TRAMADOL HYDROCHLORIDE 50 MG/1
50 TABLET ORAL
Refills: 1 | OUTPATIENT
Start: 2019-12-02

## 2019-12-02 RX ORDER — TRAMADOL HYDROCHLORIDE 50 MG/1
TABLET ORAL
Refills: 1 | COMMUNITY
Start: 2019-11-01 | End: 2019-12-02 | Stop reason: SDUPTHER

## 2019-12-02 RX ORDER — ALBUTEROL SULFATE 90 UG/1
AEROSOL, METERED RESPIRATORY (INHALATION)
Qty: 18 INHALER | Refills: 2 | Status: SHIPPED | OUTPATIENT
Start: 2019-12-02 | End: 2020-07-08

## 2019-12-02 NOTE — TELEPHONE ENCOUNTER
----- Message from Demetrice Chery. Torie Mccord sent at 12/1/2019 12:28 PM EST -----  Regarding: Non-Urgent Medical Question  Contact: [phone]  My last fill on tramadol was Nov 1st. I needs some more before I see u . Can u call [facility]  and refill it please, Thank you .

## 2019-12-02 NOTE — TELEPHONE ENCOUNTER
PCP: Bobby Agudelo MD    Last appt: 10/14/2019  Future Appointments   Date Time Provider Rozina Arizmendi   12/19/2019  2:30 PM [facility]ia [facility]   4/27/2020  2:00 PM [facility]   6/11/2020  2:40 PM Robby Last  [address]       Requested Prescriptions     Pending Prescriptions Disp Refills    traMADol (ULTRAM) 50 mg tablet  1     Sig: Take 1 Tab by mouth.

## 2019-12-02 NOTE — TELEPHONE ENCOUNTER
----- Message from Mara White sent at 12/1/2019 12:28 PM EST -----  Regarding: Non-Urgent Medical Question  Contact: [phone]  My last fill on tramadol was Nov 1st. I needs some more before I see u . [facility]  and [facility]ill it please, Thank you .

## 2019-12-22 ENCOUNTER — APPOINTMENT (OUTPATIENT)
Dept: ULTRASOUND IMAGING | Age: 54
End: 2019-12-22
Attending: EMERGENCY MEDICINE
Payer: MEDICARE

## 2019-12-22 ENCOUNTER — HOSPITAL ENCOUNTER (EMERGENCY)
Age: 54
Discharge: HOME | End: 2019-12-22
Attending: EMERGENCY MEDICINE
Payer: MEDICARE

## 2019-12-22 VITALS
BODY MASS INDEX: 44.29 KG/M2 | OXYGEN SATURATION: 98 % | RESPIRATION RATE: 16 BRPM | SYSTOLIC BLOOD PRESSURE: 149 MMHG | WEIGHT: 282.19 LBS | HEIGHT: 67 IN | HEART RATE: 68 BPM | DIASTOLIC BLOOD PRESSURE: 71 MMHG | TEMPERATURE: 98.1 F

## 2019-12-22 DIAGNOSIS — E78.5 HYPERLIPIDEMIA, UNSPECIFIED HYPERLIPIDEMIA TYPE: ICD-10-CM

## 2019-12-22 DIAGNOSIS — M79.605 LEFT LEG PAIN: ICD-10-CM

## 2019-12-22 DIAGNOSIS — M79.89 LEFT LEG SWELLING: ICD-10-CM

## 2019-12-22 DIAGNOSIS — E66.01 OBESITY, MORBID (HCC): ICD-10-CM

## 2019-12-22 DIAGNOSIS — I82.5Y2 CHRONIC DEEP VEIN THROMBOSIS (DVT) OF PROXIMAL VEIN OF LEFT LOWER EXTREMITY (HCC): Primary | ICD-10-CM

## 2019-12-22 PROCEDURE — 99282 EMERGENCY DEPT VISIT SF MDM: CPT

## 2019-12-22 PROCEDURE — 93971 EXTREMITY STUDY: CPT

## 2019-12-22 RX ORDER — LOVASTATIN 20 MG/1
TABLET ORAL
Qty: 90 TAB | Refills: 1 | Status: SHIPPED | OUTPATIENT
Start: 2019-12-22 | End: 2020-05-29

## 2019-12-22 RX ORDER — METHOCARBAMOL 500 MG/1
TABLET, FILM COATED ORAL
Qty: 90 TAB | Refills: 0 | Status: SHIPPED | OUTPATIENT
Start: 2019-12-22 | End: 2020-01-22

## 2019-12-22 NOTE — DISCHARGE INSTRUCTIONS
Thank you for allowing us to take care of you today! We hope we addressed all of your concerns and needs. We strive to provide excellent quality care in the Emergency Department. You will receive a survey after your visit to evaluate the care you were provided. Should you receive a survey from us, we invite you to share your experience and tell us what made it excellent. It was a pleasure serving you, we invite you to share your experience with us, in our pursuit for excellence, should you be selected to receive a survey. The exam and treatment you received in the Emergency Department were for an urgent problem and are not intended as complete care. It is important that you follow up with a doctor, nurse practitioner, or physician assistant for ongoing care. If your symptoms become worse or you do not improve as expected and you are unable to reach your usual health care provider, you should return to the Emergency Department. We are available 24 hours a day. Please take your discharge instructions with you when you go to your follow-up appointment. If you have any problem arranging a follow-up appointment, contact the Emergency Department immediately. If a prescription has been provided, please have it filled as soon as possible to prevent a delay in treatment. Read the entire medication instruction sheet provided to you by the pharmacy. If you have any questions or reservations about taking the medication due to side effects or interactions with other medications, please call your primary care physician or contact the ER to speak with the charge nurse. Make an appointment with your family doctor or the physician you were referred to for follow-up of this visit as instructed on your discharge paperwork, as this is mandatory follow-up. Return to the ER if you are unable to be seen or if you are unable to be seen in a timely manner.     If you have any problem arranging the follow-up visit, contact the Emergency Department immediately. I hope you feel better and thank you again for allow us to provide you with excellent care today at [facility]! Warmest regards,    Bao Smart MD  Emergency Medicine Physician  [facility]      _____________________________________________________________________________________________________________    Vitals:    12/22/19 1304   BP: 149/71   Pulse: 68   Resp: 16   Temp: 98.1 °F (36.7 °C)   SpO2: 98%   Weight: 128 kg (282 lb 3 oz)   Height: 5' 7\" (1.702 m)       No results found for this or any previous visit (from the past 12 hour(s)).     No orders to display     CT Results  (Last 48 hours)    None          Local Primary Care Physicians   [facility] [phone]  MD Medhat Herman MD Therese Kyle, MD [facility] [phone]  [facility], [facility]  MD Virgen Chávez MD Dawayne Ness, MD [address] [address] [phone]  [place] [address] [facility] [phone]  Lety Desir, MD Dariana Townsend MD Claudius Flies, MD   [facility] [phone]  [facility] KRYSTEN SANDERSON, MD Vasu Valdivia, MD Abi Jarquin, NP [address] [facility] [address] [phone]  Jere Fines, MD Jearld Lesches, MD Cloria Slain, MD Katie Boo, MD Branden Reddy, MD Shira Gonzalez MD   [address] [facility]  Randa Lima MD [address] [facility] [phone]  Pascual Soulier, MD Suzzanne Larger, NP  Baltazar Hubbard, MD Veronica Short, MD Krzysztof Bedoya, MD Marcie Santos, MD Emory Arndt MD   [address] [facility] [phone]  Jesika Jean, MD Kell Newby, P  Kota Ford, JOSE Chapman, MD Elver Llamas, MD Rob Gil MD [address] [facility] [phone]  MD Stephy Muñiz MD Pauleen Oas, MD Thom Paterson, MD Ramey Bakes, MD   [facility] [phone]  MD Dedra Ramirez MD [place] [phone]  MD Braden Burrows Cea, MD Coleen Render, MD   [address] [facility] [phone]  Haim Yañez, MD Darryn Concepcion, MD Yancy Bowden MD Elnita Raring, MD Jeremiah Mould, NP  Ladi Ward MD [address] [place] [address]   [phone]  MD Ludy Carter MD Elenore Oakland, MD     [address] [facility] [phone]  Bam Ro, MD Haris Doe, FNP  [address], PA-C  [address], FNP  Devaughn Aguilar, PA-C  MD Shanti Arreaga, NP   Armando Maldonado,    Miscellaneous:  Nando Medellin MD [facility] Departments   For adult and child immunizations, family planning, TB screening, STD testing and women's health services. [facility]: [place] [phone]     [address]: [address] [phone]     [address] [place] [address]        [address] [address]  For primary care services, woman and child wellness, and some clinics providing specialty care. [facility] -- [address] [facility] [address] [place] [address] [phone]   [address] [place] [address] [facility] [place] [facility] 200 [facility] 3619 [facility] [phone]   [address] [facility] [address] [place] [address] [phone]   [address]  [facility] [place] [address]4 [facility] [address] [place] [address]  [phone] 77[address] [facility]  [address] [address] [phone]   [address] [phone]   Santana Arnett [facility] [address], 809 [facility]: [address], [address] [place] [address] [place] [address] [phone]   [employer]  [address] ([email or web address]         Sexual Health/Woman Wellness Clinics   For STD/HIV testing and treatment, pregnancy testing and services, men's health, birth control services, LGBT services, and hepatitis/HPV vaccine services. [facility] for [place] [employer] [address] [facility] 75 [facility] 1579 [address] [phone]   [facility] [address], 5th floor [phone]   Pregnancy [address] [facility] [address]  Monique Michelle [phone]        [place] [address] 454 [facility]   [phone]   [place]   [phone]   Women, Infant and Children's Services: [address] [phone]       7[address] [facility] 121 [facility]   [phone] 4800 [facility] 16.   1212 [facility] [place]

## 2019-12-22 NOTE — ED PROVIDER NOTES
EMERGENCY DEPARTMENT HISTORY AND PHYSICAL EXAM      Please note that this dictation was completed with [employer], the computer voice recognition software. Quite often unanticipated grammatical, syntax, homophones, and other interpretive errors are inadvertently transcribed by the computer software. Please disregard these errors and any errors that have escaped final proofreading. Thank you. Date: 12/22/2019  Patient Name: Alie Puckett  Patient Age and Sex: 53 y.o. female    History of Presenting Illness     Chief Complaint   Patient presents with    Leg Swelling     Swelling at left upper leg since this am.  Hx of blood clots, coumadin d/c'd in august       History Provided By: Patient    HPI: Alie Puckett, 53 y.o. female with past medical history as documented below presents to the ED with c/o of acute onset of mild to moderate upper left leg pain and swelling onset this morning. Pt denies injury or trauma. Pt states she had a provoked DVT on the same side and has been off coumadin since August as instructed by her PCP. She denies any associated numbness, weakness, paresthesias. Pt denies any other alleviating or exacerbating factors. Additionally, pt specifically denies any recent fever, chills, headache, nausea, vomiting, abdominal pain, CP, SOB, lightheadedness, dizziness, numbness, weakness, heart palpitations, urinary sxs, diarrhea, constipation, melena, hematochezia, cough, or congestion. There are no other complaints, changes or physical findings at this time.      PCP: Arely Call MD    Past History   Past Medical History:  Past Medical History:   Diagnosis Date    Arthritis     Asthma     Breast cyst 1996    left, removed    Chronic pain     LOWER BACK    Colon polyps     Diverticulitis     DVT (deep venous thrombosis) ([facility] [address] 10/18/2018    provoked after back sx    Fatty liver     GERD (gastroesophageal reflux disease)     Hypercholesteremia     Hypothyroidism     Nicotine vapor product user     Obesity     SUN (obstructive sleep apnea)     uses CPAP    Psychiatric disorder     depression    Thyroid disease     hypothyroid       Past Surgical History:  Past Surgical History:   Procedure Laterality Date    COLONOSCOPY N/A 2017    COLONOSCOPY performed by Kendal Adams MD at hospitals ENDOSCOPY   [facility] SERVICES SURGERY  ,     L5 & S 1    HX BACK SURGERY  2018    HX BACK SURGERY      HX BREAST BIOPSY      Left    HX HYSTERECTOMY  09    [facility] LSO    HX OOPHORECTOMY Left     One ovary removed.  HX ORTHOPAEDIC Left     thumb surgery    HX ORTHOPAEDIC Left     left knee surgery    HX ORTHOPAEDIC Left     ankle    HX TUBAL LIGATION      REMOVAL OF KIDNEY STONE      RENAL STENT         Family History:  Family History   Problem Relation Age of Onset    Cancer Mother 54        Lung cancer    Heart Disease Father     Hypertension Father     Elevated Lipids Father     Heart Attack Father     Stroke Father         x 3    Elevated Lipids Sister     Heart Disease Brother     Hypertension Brother     Elevated Lipids Brother     Elevated Lipids Sister     Elevated Lipids Sister     Heart Disease Sister     Cancer Sister         uterine    Heart Disease Sister     Cancer Sister         uterine    Heart Disease Sister    [place] Cancer Sister         colon cancer with liver mets    Bleeding Prob Brother     Heart Attack Brother     Anesth Problems Neg Hx        Social History:  Social History     Tobacco Use    Smoking status: Former Smoker     Packs/day: 1.50     Years: 25.00     Pack years: 37.50     Last attempt to quit: 2015     Years since quittin.8    Smokeless tobacco: Never Used    Tobacco comment: vape   Substance Use Topics    Alcohol use: Yes     Comment: occasional drinker    Drug use: No       Allergies:   Allergies   Allergen Reactions    Codeine Hives    Pcn [Penicillins] Hives       Current Medications:  No current facility-administered medications on file prior to encounter. Current Outpatient Medications on File Prior to Encounter   Medication Sig Dispense Refill    VENTOLIN HFA 90 mcg/actuation inhaler TAKE 1 PUFF BY INHALATION EVERY SIX (6) HOURS AS NEEDED FOR WHEEZING. 18 Inhaler 2    furosemide (LASIX) 20 mg tablet TAKE 1 TABLET BY MOUTH DAILY AS NEEDED (SWELLING). 20 Tab 0    traMADol (ULTRAM) 50 mg tablet Take 1 Tab by mouth every six (6) hours as needed for Pain for up to 30 days. Max Daily Amount: 200 mg. 60 Tab 0    fluticasone propionate (FLONASE) 50 mcg/actuation nasal spray SPRAY 2 SPRAYS INTO EACH NOSTRIL EVERY DAY 16 g 1    montelukast (SINGULAIR) 10 mg tablet TAKE 1 TABLET BY MOUTH EVERY DAY 90 Tab 0    fluticasone propionate (FLONASE) 50 mcg/actuation nasal spray SPRAY 2 SPRAYS INTO EACH NOSTRIL EVERY DAY 1 Bottle 1    metroNIDAZOLE (FLAGYL) 500 mg tablet TAKE 1 TABLET BY MOUTH THREE TIMES A DAY 42 Tab 0    pantoprazole (PROTONIX) 40 mg tablet TAKE 1 TABLET BY MOUTH EVERY DAY FOR GERD 90 Tab 1    gabapentin (NEURONTIN) 300 mg capsule TAKE 1 CAPSULE BY MOUTH THREE TIMES A DAY 90 Cap 2    escitalopram oxalate (LEXAPRO) 20 mg tablet TAKE 1 TABLET BY MOUTH EVERY DAY 90 Tab 1    umeclidinium-vilanterol (ANORO ELLIPTA) 62.5-25 mcg/actuation inhaler Take 1 Puff by inhalation daily. (Patient not taking: Reported on 9/26/2019) 1 Inhaler 0    traZODone (DESYREL) 50 mg tablet TAKE 1 TABLET BY MOUTH EVERY DAY AT NIGHT 90 Tab 2    albuterol (PROVENTIL VENTOLIN) 2.5 mg /3 mL (0.083 %) nebulizer solution 3 mL by Nebulization route every four (4) hours as needed for Wheezing. 24 Each 0    Nebulizer Accessories kit Use nebulizer machine as needed for dyspnea 1 Kit 0    fluticasone furoate (ARNUITY ELLIPTA) 100 mcg/actuation dsdv inhaler Take 1 Puff by inhalation daily. 2 Inhaler 4    acetaminophen (TYLENOL) 325 mg tablet Take 325 mg by mouth two (2) times daily as needed for Pain.       levothyroxine (SYNTHROID) 88 mcg tablet Take 1 Tab by mouth Daily (before breakfast). 90 Tab 5    albuterol (PROVENTIL HFA, VENTOLIN HFA, PROAIR HFA) 90 mcg/actuation inhaler Take 1 Puff by inhalation every six (6) hours as needed for Wheezing. 1 Inhaler 2    cromolyn (OPTICROM) 4 % ophthalmic solution Administer 1 Drop to both eyes as needed. Use in affected eye(s)         Review of Systems   Review of Systems   Constitutional: Negative. Negative for chills and fever. HENT: Negative. Negative for congestion, facial swelling, rhinorrhea, sore throat, trouble swallowing and voice change. Eyes: Negative. Respiratory: Negative. Negative for apnea, cough, chest tightness, shortness of breath and wheezing. Cardiovascular: Positive for leg swelling. Negative for chest pain and palpitations. Gastrointestinal: Negative. Negative for abdominal distention, abdominal pain, blood in stool, constipation, diarrhea, nausea and vomiting. Endocrine: Negative. Negative for cold intolerance, heat intolerance and polyuria. Genitourinary: Negative. Negative for difficulty urinating, dysuria, flank pain, frequency, hematuria and urgency. Musculoskeletal: Positive for myalgias. Negative for arthralgias, back pain, neck pain and neck stiffness. Skin: Negative. Negative for color change and rash. Neurological: Negative. Negative for dizziness, syncope, facial asymmetry, speech difficulty, weakness, light-headedness, numbness and headaches. Hematological: Negative. Does not bruise/bleed easily. Psychiatric/Behavioral: Negative. Negative for confusion and self-injury. The patient is not nervous/anxious. Physical Exam   Physical Exam  Vitals signs and nursing note reviewed. Constitutional:       General: She is not in acute distress. Appearance: She is well-developed. She is not diaphoretic. HENT:      Head: Normocephalic and atraumatic. Mouth/Throat:      Pharynx: No oropharyngeal exudate.    Eyes: Conjunctiva/sclera: Conjunctivae normal.      Pupils: Pupils are equal, round, and reactive to light. Neck:      Musculoskeletal: Normal range of motion. Cardiovascular:      Rate and Rhythm: Normal rate and regular rhythm. Heart sounds: Normal heart sounds. No murmur. No friction rub. No gallop. Pulmonary:      Effort: Pulmonary effort is normal. No respiratory distress. Breath sounds: Normal breath sounds. No wheezing or rales. Chest:      Chest wall: No tenderness. Abdominal:      General: Bowel sounds are normal. There is no distension. Palpations: Abdomen is soft. There is no mass. Tenderness: There is no tenderness. There is no guarding or rebound. Musculoskeletal: Normal range of motion. General: No tenderness or deformity. Skin:     General: Skin is warm. Findings: No rash. Neurological:      Mental Status: She is alert and oriented to person, place, and time. Cranial Nerves: No cranial nerve deficit. Motor: No abnormal muscle tone. Coordination: Coordination normal.      Deep Tendon Reflexes: Reflexes normal.         Diagnostic Study Results     Labs -  No results found for this or any previous visit (from the past 24 hour(s)). Radiologic Studies -   No orders to display     CT Results  (Last 48 hours)    None        CXR Results  (Last 48 hours)    None        Left Lower Venous     Chronic non-occlusive thrombus present in the left common femoral vein. The greater saphenous, proximal femoral, mid femoral, distal femoral, popliteal, gastrocnemius and posterior tibial vein(s) were imaged in the transverse and longitudinal planes. The vessels showed normal color filling and compressibility. Doppler interrogation of the veins showed phasic and spontaneous flow. Medical Decision Making   I am the first provider for this patient.     I reviewed the vital signs, available nursing notes, past medical history, past surgical history, family history and social history. Vital Signs-Reviewed the patient's vital signs. Patient Vitals for the past 24 hrs:   Temp Pulse Resp BP SpO2   12/22/19 1304 98.1 °F (36.7 °C) 68 16 149/71 98 %     Pulse Oximetry Analysis - 98% on RA    Cardiac Monitor:   Rate: 68 bpm  Rhythm: Normal Sinus Rhythm      Records Reviewed: Nursing Notes, Old Medical Records, Previous electrocardiograms, Previous Radiology Studies and Previous Laboratory Studies    Provider Notes (Medical Decision Making):   Pt presents with acute leg swelling; stable vitals; PMS intact in affected limb. DDx: lymphedema, muscle strain vs sprain. H/o DVT in past, will check duplex U/S. There is no trauma, deformity to warrant x-ray. The leg is not cold to touch, there is strong peripheral pulse, no paralysis or parathesia, no pallor to suggest compartment syndrome. There are no rashes, excessive warmth, fever, induration of skin to suggest cellulitis/osteo. The pt is motor and sensory intact. Unlikely to be strong or compressed nerve. Will provide symptomatic treatment and reassess. Anticipate discharge home with close PCP follow-up. ED Course:   Initial assessment performed. The patients presenting problems have been discussed, and they are in agreement with the care plan formulated and outlined with them. I have encouraged them to ask questions as they arise throughout their visit. HYPERTENSION COUNSELING   Education was provided to the patient today regarding their hypertension. Patient is made aware of their elevated blood pressure and is instructed to follow up this week with their Primary Care for a recheck. Patient is counseled regarding consequences of chronic, uncontrolled hypertension including kidney disease, heart disease, stroke or even death. Patient states their understanding and agrees to follow up this week.  Additionally, during their visit, I discussed sodium restriction, maintaining ideal body weight and regular exercise program as physiologic means to achieve blood pressure control. The patient will strive towards this. I reviewed our electronic medical record system for any past medical records that were available that may contribute to the patient's current condition, the nursing notes and vital signs from today's visit. Tamela Evangelista MD    ED Orders Placed :  Orders Placed This Encounter    DISCONTD: rivaroxaban (XARELTO STARTER DIONICIO) 15 mg (42)- 20 mg (9) DsPk    DISCONTD: rivaroxaban (XARELTO STARTER DIONICIO) 15 mg (42)- 20 mg (9) DsPk    rivaroxaban (XARELTO STARTER DIONICIO) 15 mg (42)- 20 mg (9) DsPk     Progress Note:  Patient has been reassessed and reports feeling better and symptoms have improved significantly after ED treatment. Patient feels comfortable going home with close follow-up. Charles Thomas's final labs and imaging have been reviewed with her and available family and/or caregiver. They have been counseled regarding her diagnosis. She verbally conveys understanding and agreement of the signs, symptoms, diagnosis, treatment and prognosis and additionally agrees to follow up as recommended with Dr. Bertina Homans, MD and/or specialist in 24 - 48 hours. She also agrees with the care-plan we created together and conveys that all of her questions have been answered. I have also put together some discharge instructions for her that include: 1) educational information regarding their diagnosis, 2) how to care for their diagnosis at home, as well a 3) list of reasons why they would want to return to the ED prior to their follow-up appointment should the patient's condition change or symptoms worsen. I have answered all questions to the patient's satisfaction. Strict return precautions given. She both understood and agreed with plan as discussed. Vital signs stable for discharge. Disposition: DISCHARGE  The pt is ready for discharge.  The pt's signs, symptoms, diagnosis, and discharge instructions have been discussed and pt has conveyed their understanding. The pt is to follow up as recommended or return to ER should their symptoms worsen. Plan has been discussed and pt is in agreement. PLAN:  1. Return precautions as discussed. 2.   Discharge Medication List as of 12/22/2019  4:11 PM      START taking these medications    Details   rivaroxaban (XARELTO STARTER DIONICIO) 15 mg (42)- 20 mg (9) DsPk Take one 15 mg tablet twice a day with food for the first 21 days. Then, take one 20 mg tablet once a day with food for 9 days. , Normal, Disp-1 Dose Pack, R-0         CONTINUE these medications which have NOT CHANGED    Details   !! VENTOLIN HFA 90 mcg/actuation inhaler TAKE 1 PUFF BY INHALATION EVERY SIX (6) HOURS AS NEEDED FOR WHEEZING., Normal, Disp-18 Inhaler, R-2      furosemide (LASIX) 20 mg tablet TAKE 1 TABLET BY MOUTH DAILY AS NEEDED (SWELLING). , Normal, Disp-20 Tab, R-0      traMADol (ULTRAM) 50 mg tablet Take 1 Tab by mouth every six (6) hours as needed for Pain for up to 30 days.  Max Daily Amount: 200 mg., Normal, Disp-60 Tab, R-0      methocarbamol (ROBAXIN) 500 mg tablet TAKE 1 TABLET BY MOUTH FOUR TIMES A DAY, Normal, Disp-90 Tab, R-0      !! fluticasone propionate (FLONASE) 50 mcg/actuation nasal spray SPRAY 2 SPRAYS INTO EACH NOSTRIL EVERY DAY, Normal, Disp-16 g, R-1      montelukast (SINGULAIR) 10 mg tablet TAKE 1 TABLET BY MOUTH EVERY DAY, Normal, Disp-90 Tab, R-0      !! fluticasone propionate (FLONASE) 50 mcg/actuation nasal spray SPRAY 2 SPRAYS INTO EACH NOSTRIL EVERY DAY, Normal, Disp-1 Bottle, R-1      metroNIDAZOLE (FLAGYL) 500 mg tablet TAKE 1 TABLET BY MOUTH THREE TIMES A DAY, Normal, Disp-42 Tab, R-0      pantoprazole (PROTONIX) 40 mg tablet TAKE 1 TABLET BY MOUTH EVERY DAY FOR GERD, Normal, Disp-90 Tab, R-1      gabapentin (NEURONTIN) 300 mg capsule TAKE 1 CAPSULE BY MOUTH THREE TIMES A DAY, PrintNot to exceed 3 additional fills before 01/08/2020Disp-90 Cap, R-2      lovastatin (MEVACOR) 20 mg tablet TAKE 1 TABLET BY MOUTH EVERY DAY AT NIGHT, Normal, Disp-90 Tab, R-1      escitalopram oxalate (LEXAPRO) 20 mg tablet TAKE 1 TABLET BY MOUTH EVERY DAY, Normal, Disp-90 Tab, R-1      umeclidinium-vilanterol (ANORO ELLIPTA) 62.5-25 mcg/actuation inhaler Take 1 Puff by inhalation daily. , Sample, Disp-1 Inhaler, R-0      traZODone (DESYREL) 50 mg tablet TAKE 1 TABLET BY MOUTH EVERY DAY AT NIGHT, Normal, Disp-90 Tab, R-2      albuterol (PROVENTIL VENTOLIN) 2.5 mg /3 mL (0.083 %) nebulizer solution 3 mL by Nebulization route every four (4) hours as needed for Wheezing., Normal, Disp-24 Each, R-0      Nebulizer Accessories kit Use nebulizer machine as needed for dyspnea, Normal, Disp-1 Kit, R-0      fluticasone furoate (ARNUITY ELLIPTA) 100 mcg/actuation dsdv inhaler Take 1 Puff by inhalation daily. , Normal, Disp-2 Inhaler, R-4      acetaminophen (TYLENOL) 325 mg tablet Take 325 mg by mouth two (2) times daily as needed for Pain., Historical Med      levothyroxine (SYNTHROID) 88 mcg tablet Take 1 Tab by mouth Daily (before breakfast). , Normal, Disp-90 Tab, R-5      !! albuterol (PROVENTIL HFA, VENTOLIN HFA, PROAIR HFA) 90 mcg/actuation inhaler Take 1 Puff by inhalation every six (6) hours as needed for Wheezing., Normal, Disp-1 Inhaler, R-2      cromolyn (OPTICROM) 4 % ophthalmic solution Administer 1 Drop to both eyes as needed. Use in affected eye(s), Historical Med       !! - Potential duplicate medications found. Please discuss with provider.         3.   Follow-up Information     Follow up With Specialties Details Why MD [facility]   [address]  [place] [phone]      [facility] EMERGENCY DEPT Emergency Medicine  As needed, If symptoms worsen [address] [place] [address]  [phone]    Arely Call MD Internal Medicine   [address]  [place] [phone]      [facility] EMERGENCY DEPT Emergency Medicine   [address] [place] [address]  [address] [facility]  [phone]          Return to ED if worse  Diagnosis     Clinical Impression:   1. Chronic deep vein thrombosis (DVT) of proximal vein of left lower extremity ([address]    2. Left leg pain    3. Left leg swelling    4. Obesity, morbid ([address]        Attestation:  I personally performed the services described in this documentation on this date 12/22/2019 for patient Maryjane Monroy. I have reviewed and verified that the information is accurate and complete. Ladarius Lee MD      This note will not be viewable in [address]

## 2019-12-23 RX ORDER — FLUTICASONE PROPIONATE 50 MCG
SPRAY, SUSPENSION (ML) NASAL
Qty: 1 BOTTLE | Refills: 1 | Status: SHIPPED | OUTPATIENT
Start: 2019-12-23 | End: 2020-01-20

## 2019-12-24 ENCOUNTER — TELEPHONE (OUTPATIENT)
Dept: INTERNAL MEDICINE CLINIC | Age: 54
End: 2019-12-24

## 2019-12-24 NOTE — TELEPHONE ENCOUNTER
Pt states that she needs a call back to advise if she should still take Xarelto since she has a blood clot in her leg per hospital. Pt also states that she needs an appt.

## 2019-12-24 NOTE — TELEPHONE ENCOUNTER
MD Latonya Cortez, JADE   Caller: Unspecified (Today, 10:31 AM)             She should resume the xarelto and see me in the office      MyChart message sent to patient with the above message from Dr. Jaky Frias

## 2019-12-26 ENCOUNTER — TELEPHONE (OUTPATIENT)
Dept: INTERNAL MEDICINE CLINIC | Age: 54
End: 2019-12-26

## 2019-12-26 NOTE — TELEPHONE ENCOUNTER
Patient called and spoke with Dr Eloy Reynolds on 12/25/19 about having blood in her urine. Per DR. Sylvester if patient is not bleeding any more she can resume her medication . If she is still bleeding she can not start medications until she speaks with Doctor.

## 2019-12-31 ENCOUNTER — OFFICE VISIT (OUTPATIENT)
Dept: INTERNAL MEDICINE CLINIC | Age: 54
End: 2019-12-31

## 2019-12-31 VITALS
WEIGHT: 282 LBS | DIASTOLIC BLOOD PRESSURE: 84 MMHG | TEMPERATURE: 97.8 F | RESPIRATION RATE: 18 BRPM | SYSTOLIC BLOOD PRESSURE: 131 MMHG | HEIGHT: 67 IN | HEART RATE: 85 BPM | OXYGEN SATURATION: 99 % | BODY MASS INDEX: 44.26 KG/M2

## 2019-12-31 DIAGNOSIS — I82.512 CHRONIC DEEP VEIN THROMBOSIS (DVT) OF FEMORAL VEIN OF LEFT LOWER EXTREMITY (HCC): Primary | ICD-10-CM

## 2019-12-31 DIAGNOSIS — G89.4 CHRONIC PAIN SYNDROME: ICD-10-CM

## 2019-12-31 RX ORDER — TRAMADOL HYDROCHLORIDE 50 MG/1
50 TABLET ORAL
Qty: 60 TAB | Refills: 0 | Status: SHIPPED | OUTPATIENT
Start: 2019-12-31 | End: 2020-01-30

## 2019-12-31 NOTE — PATIENT INSTRUCTIONS
Office Policies    Phone calls/patient messages:            Please allow up to 24 hours for someone in the office to contact you about your call or message. Be mindful your provider may be out of the office or your message may require further review. We encourage you to use [employer] for your messages as this is a faster, more efficient way to communicate with our office                         Medication Refills:            Prescription medications require 48-72 business hours to process. We encourage you to use [employer] for your refills. For controlled medications: Please allow 72 business hours to process. Certain medications may require you to  a written prescription at our office. NO narcotic/controlled medications will be prescribed after 4pm Monday through Friday or on weekends              Form/Paperwork Completion:            Please note a $25 fee may incur for all paperwork for completed by our providers. We ask that you allow 7-10 business days. Pre-payment is due prior to picking up/faxing the completed form. You may also download your forms to [employer] to have your doctor print off.

## 2019-12-31 NOTE — PROGRESS NOTES
CC: Leg Pain      HPI:    She is a 53 y.o. female who presents for evaluation of leg pain on the left   Recall Ms Anastacio Fay had a provoked DVT  10/17/2018  Provoked/ after sx/  Acute deep vein thrombosis (DVT) of proximal vein of left lower extremity ([address]  She was on anticoagulation initially coumadin then switched to xarelto for 6 months. March 2019 repeated US    · Resolved left leg DVT. Minimal nonocclusive chronic mural thickening remains in the common femoral vein. · Then presented December 2019 with increased swelling and pain in groin area and thigh and had repeat duplex which showed Chronic non-occlusive thrombus present in the left common femoral vein. She was prescribed xarelto 15mg BID and for 21 days with plans to transitioning to 20mg daily ( prescribed at ER)   She called due to bleeding in urine with xarelto 15mg BID - however noted improvement in pain and swelling in leg   I reviewed her chart and decided to treat her with lower dose xarelto 10mg daily - after 3 days of lower dose she noted increased swelling and pain in leg again. Today she complains of swelling and pain.        Blood in urine resolved      Denies fever and chills and dyspnea    She is taking tramadol for chronic back pain and leg pain   ROS:  Constitutional: negative for fevers, chills, anorexia and weight loss  10 systems reviewed and negative other than HPI    Past Medical History:   Diagnosis Date    Arthritis     Asthma     Breast cyst 1996    left, removed    Chronic pain     LOWER BACK    Colon polyps     Diverticulitis     DVT (deep venous thrombosis) ([address] 10/18/2018    provoked after back sx    Fatty liver     GERD (gastroesophageal reflux disease)     Hypercholesteremia     Hypothyroidism     Nicotine vapor product user     Obesity     SUN (obstructive sleep apnea)     uses CPAP    Psychiatric disorder     depression    Thyroid disease     hypothyroid       Current Outpatient Medications on File Prior to Visit   Medication Sig Dispense Refill    fluticasone propionate (FLONASE) 50 mcg/actuation nasal spray SPRAY 2 SPRAYS INTO EACH NOSTRIL EVERY DAY 1 Bottle 1    lovastatin (MEVACOR) 20 mg tablet TAKE 1 TABLET BY MOUTH EVERY DAY AT NIGHT 90 Tab 1    methocarbamol (ROBAXIN) 500 mg tablet TAKE 1 TABLET BY MOUTH FOUR TIMES A DAY 90 Tab 0    VENTOLIN HFA 90 mcg/actuation inhaler TAKE 1 PUFF BY INHALATION EVERY SIX (6) HOURS AS NEEDED FOR WHEEZING. 18 Inhaler 2    furosemide (LASIX) 20 mg tablet TAKE 1 TABLET BY MOUTH DAILY AS NEEDED (SWELLING). 20 Tab 0    montelukast (SINGULAIR) 10 mg tablet TAKE 1 TABLET BY MOUTH EVERY DAY 90 Tab 0    metroNIDAZOLE (FLAGYL) 500 mg tablet TAKE 1 TABLET BY MOUTH THREE TIMES A DAY 42 Tab 0    pantoprazole (PROTONIX) 40 mg tablet TAKE 1 TABLET BY MOUTH EVERY DAY FOR GERD 90 Tab 1    gabapentin (NEURONTIN) 300 mg capsule TAKE 1 CAPSULE BY MOUTH THREE TIMES A DAY 90 Cap 2    escitalopram oxalate (LEXAPRO) 20 mg tablet TAKE 1 TABLET BY MOUTH EVERY DAY 90 Tab 1    umeclidinium-vilanterol (ANORO ELLIPTA) 62.5-25 mcg/actuation inhaler Take 1 Puff by inhalation daily. 1 Inhaler 0    traZODone (DESYREL) 50 mg tablet TAKE 1 TABLET BY MOUTH EVERY DAY AT NIGHT 90 Tab 2    albuterol (PROVENTIL VENTOLIN) 2.5 mg /3 mL (0.083 %) nebulizer solution 3 mL by Nebulization route every four (4) hours as needed for Wheezing. 24 Each 0    Nebulizer Accessories kit Use nebulizer machine as needed for dyspnea 1 Kit 0    fluticasone furoate (ARNUITY ELLIPTA) 100 mcg/actuation dsdv inhaler Take 1 Puff by inhalation daily. 2 Inhaler 4    acetaminophen (TYLENOL) 325 mg tablet Take 325 mg by mouth two (2) times daily as needed for Pain.  levothyroxine (SYNTHROID) 88 mcg tablet Take 1 Tab by mouth Daily (before breakfast). 90 Tab 5    albuterol (PROVENTIL HFA, VENTOLIN HFA, PROAIR HFA) 90 mcg/actuation inhaler Take 1 Puff by inhalation every six (6) hours as needed for Wheezing.  1 Inhaler 2    cromolyn (OPTICROM) 4 % ophthalmic solution Administer 1 Drop to both eyes as needed. Use in affected eye(s)       No current facility-administered medications on file prior to visit. Past Surgical History:   Procedure Laterality Date    COLONOSCOPY N/A 11/14/2017    COLONOSCOPY performed by Crista Alcaraz MD at [facility] ENDOSCOPY   [facility] SERVICES SURGERY  2005, 2006    L5 & S 1    HX BACK SURGERY  08/30/2018    HX BACK SURGERY      HX BREAST BIOPSY      Left    HX HYSTERECTOMY  6/22/09    [facility]    HX OOPHORECTOMY Left     One ovary removed.     HX ORTHOPAEDIC Left 1972    thumb surgery    HX ORTHOPAEDIC Left 2000    left knee surgery    HX ORTHOPAEDIC Left 2013    ankle    HX TUBAL LIGATION      REMOVAL OF KIDNEY STONE      RENAL STENT         Family History   Problem Relation Age of Onset    Cancer Mother 54        Lung cancer    Heart Disease Father     Hypertension Father     Elevated Lipids Father     Heart Attack Father     Stroke Father         x 3    Elevated Lipids Sister     Heart Disease Brother     Hypertension Brother     Elevated Lipids Brother     Elevated Lipids Sister     Elevated Lipids Sister     Heart Disease Sister     Cancer Sister         uterine    Heart Disease Sister     Cancer Sister         uterine    Heart Disease Sister    [place] Cancer Sister         colon cancer with liver mets    Bleeding Prob Brother     Heart Attack Brother     Anesth Problems Neg Hx      Reviewed and no changes     Social History     Socioeconomic History    Marital status:      Spouse name: Not on file    Number of children: Not on file    Years of education: Not on file    Highest education level: Not on file   Occupational History    Not on file   Social Needs    Financial resource strain: Not on file    Food insecurity:     Worry: Not on file     Inability: Not on file    Transportation needs:     Medical: Not on file     Non-medical: Not on file Tobacco Use    Smoking status: Former Smoker     Packs/day: 1.50     Years: 25.00     Pack years: 37.50     Last attempt to quit: 2015     Years since quittin.9    Smokeless tobacco: Never Used    Tobacco comment: vape   Substance and Sexual Activity    Alcohol use: Yes     Comment: occasional drinker    Drug use: No    Sexual activity: Yes     Partners: Male     Birth control/protection: Surgical   Lifestyle    Physical activity:     Days per week: Not on file     Minutes per session: Not on file    Stress: Not on file   Relationships    Social connections:     Talks on phone: Not on file     Gets together: Not on file     Attends Yazidi service: Not on file     Active member of club or organization: Not on file     Attends meetings of clubs or organizations: Not on file     Relationship status: Not on file    Intimate partner violence:     Fear of current or ex partner: Not on file     Emotionally abused: Not on file     Physically abused: Not on file     Forced sexual activity: Not on file   Other Topics Concern    Not on file   Social History Narrative    Not on file            Visit Vitals  /84 (BP 1 Location: Right arm, BP Patient Position: Sitting)   Pulse 85   Temp 97.8 °F (36.6 °C) (Oral)   Resp 18   Ht 5' 7\" (1.702 m)   Wt 282 lb (127.9 kg)   LMP 2009   SpO2 99%   BMI 44.17 kg/m²       Physical Examination:   General - Well appearing female  HEENT - PERRL, TM no erythema/opacification, normal nasal turbinates, oropharynx no erythema or exudate, MMM  Neck - supple, no bruits, no TMG, no LAD  Pulm - clear to auscultation bilaterally  Cardio - RRR, normal S1 S2, no murmur gallops or rubs  Abd - soft, nontender, no masses, no HSM  Extrem - swelling in proximal thigh extending to knee on the left side non pitting  Psych - normal affect, appropriate mood    Lab Results   Component Value Date/Time    WBC 3.6 2019 02:39 PM    Hemoglobin (POC) 12.9 10/28/2013 03:40 PM HGB 12.8 05/08/2019 02:39 PM    Hematocrit (POC) 38 10/28/2013 03:40 PM    HCT 40.8 05/08/2019 02:39 PM    PLATELET 198 41/83/0658 02:39 PM    MCV 98.6 05/08/2019 02:39 PM     Lab Results   Component Value Date/Time    Sodium 142 09/26/2019 03:54 PM    Potassium 4.4 09/26/2019 03:54 PM    Chloride 99 09/26/2019 03:54 PM    CO2 25 09/26/2019 03:54 PM    Anion gap 5 05/08/2019 02:39 PM    Glucose 81 09/26/2019 03:54 PM    BUN 9 09/26/2019 03:54 PM    Creatinine 0.95 09/26/2019 03:54 PM    BUN/Creatinine ratio 9 09/26/2019 03:54 PM    GFR est AA 78 09/26/2019 03:54 PM    GFR est non-AA 68 09/26/2019 03:54 PM    Calcium 9.3 09/26/2019 03:54 PM     Lab Results   Component Value Date/Time    Cholesterol, total 214 (H) 06/21/2019 03:37 PM    HDL Cholesterol 74 06/21/2019 03:37 PM    LDL, calculated 123 (H) 06/21/2019 03:37 PM    VLDL, calculated 17 06/21/2019 03:37 PM    Triglyceride 85 06/21/2019 03:37 PM     Lab Results   Component Value Date/Time    TSH 2.880 06/21/2019 03:37 PM     No results found for: PSA, PSA2, PSAR1, [place], [id number], [id number], PSALT  Lab Results   Component Value Date/Time    Hemoglobin A1c 5.4 08/02/2018 08:19 AM     Lab Results   Component Value Date/Time    VITAMIN D, 25-HYDROXY 28.5 (L) 05/09/2017 08:54 AM       Lab Results   Component Value Date/Time    ALT (SGPT) 18 05/08/2019 02:39 PM    AST (SGOT) 15 05/08/2019 02:39 PM    Alk. phosphatase 88 05/08/2019 02:39 PM    Bilirubin, total 0.4 05/08/2019 02:39 PM           Assessment/Plan:    1. Chronic deep vein thrombosis (DVT) of femoral vein of left lower extremity ([address]  She had increased pain and swelling on 10mg dose therefore decision to increase to 20mg daily and referral to hematologist to assist with plan of care. We discussed risk of bleeding with xarelto and counseled not to take NSAIDs  - rivaroxaban (XARELTO) 20 mg tab tablet; Take 1 Tab by mouth daily (with breakfast). Dispense: 30 Tab;  Refill: 2  - REFERRAL TO ONCOLOGY    2. Chronic pain syndrome  Previous UDS appropiate. Reviewed  and appropiate  - also on gabapentin for chronic pain   - traMADol (ULTRAM) 50 mg tablet; Take 1 Tab by mouth every six (6) hours as needed for Pain for up to 30 days. Max Daily Amount: 200 mg. Dispense: 60 Tab; Refill: 0        Follow-up and Dispositions    · Return in about 3 months (around 3/31/2020).          Mann Dunham MD

## 2019-12-31 NOTE — PROGRESS NOTES
Reviewed record in preparation for visit and have obtained necessary documentation. Identified pt with two pt identifiers(name and ). Chief Complaint   Patient presents with    Leg Pain       Health Maintenance Due   Topic Date Due    Shingles Vaccine (1 of 2) 2014    Flu Vaccine  2019       Ms. Vera Crockett has a reminder for a \"due or due soon\" health maintenance. I have asked that she discuss this further with her primary care provider for follow-up on this health maintenance. Coordination of Care Questionnaire:  :     1) Have you been to an emergency room, urgent care clinic since your last visit? no   Hospitalized since your last visit? no             2) Have you seen or consulted any other health care providers outside of [address] [place] [address] since your last visit? no  (Include any pap smears or colon screenings in this section.)    3) In the event something were to happen to you and you were unable to speak on your behalf, do you have an Advance Directive/ Living Will in place stating your wishes? NO    Do you have an Advance Directive on file? no    4) Are you interested in receiving information on Advance Directives? NO    Patient is accompanied by daughter I have received verbal consent from Zeus Gleason to discuss any/all medical information while they are present in the room.

## 2020-01-07 DIAGNOSIS — E03.9 ACQUIRED HYPOTHYROIDISM: ICD-10-CM

## 2020-01-07 RX ORDER — LEVOTHYROXINE SODIUM 88 UG/1
TABLET ORAL
Qty: 90 TAB | Refills: 5 | Status: SHIPPED | OUTPATIENT
Start: 2020-01-07 | End: 2021-02-11

## 2020-01-08 DIAGNOSIS — M48.062 SPINAL STENOSIS OF LUMBAR REGION WITH NEUROGENIC CLAUDICATION: ICD-10-CM

## 2020-01-09 RX ORDER — GABAPENTIN 300 MG/1
CAPSULE ORAL
Qty: 90 CAP | Refills: 2 | Status: SHIPPED | OUTPATIENT
Start: 2020-01-09 | End: 2020-04-07 | Stop reason: DRUGHIGH

## 2020-01-20 RX ORDER — FLUTICASONE PROPIONATE 50 MCG
SPRAY, SUSPENSION (ML) NASAL
Qty: 1 BOTTLE | Refills: 1 | Status: SHIPPED | OUTPATIENT
Start: 2020-01-20 | End: 2020-04-14

## 2020-01-22 RX ORDER — METHOCARBAMOL 500 MG/1
TABLET, FILM COATED ORAL
Qty: 90 TAB | Refills: 0 | Status: SHIPPED | OUTPATIENT
Start: 2020-01-22 | End: 2020-02-19

## 2020-01-29 DIAGNOSIS — G89.4 CHRONIC PAIN SYNDROME: ICD-10-CM

## 2020-01-29 RX ORDER — ESCITALOPRAM OXALATE 20 MG/1
TABLET ORAL
Qty: 90 TAB | Refills: 1 | Status: SHIPPED | OUTPATIENT
Start: 2020-01-29 | End: 2020-07-06 | Stop reason: SDUPTHER

## 2020-01-30 RX ORDER — TRAMADOL HYDROCHLORIDE 50 MG/1
TABLET ORAL
Qty: 60 TAB | Refills: 1 | Status: SHIPPED | OUTPATIENT
Start: 2020-01-30 | End: 2020-02-29

## 2020-02-13 RX ORDER — MONTELUKAST SODIUM 10 MG/1
TABLET ORAL
Qty: 90 TAB | Refills: 0 | Status: SHIPPED | OUTPATIENT
Start: 2020-02-13 | End: 2020-05-12

## 2020-02-18 DIAGNOSIS — R60.9 SWELLING: ICD-10-CM

## 2020-02-18 DIAGNOSIS — K57.92 DIVERTICULITIS: ICD-10-CM

## 2020-02-19 RX ORDER — FUROSEMIDE 20 MG/1
TABLET ORAL
Qty: 20 TAB | Refills: 0 | Status: SHIPPED | OUTPATIENT
Start: 2020-02-19 | End: 2022-02-24 | Stop reason: ALTCHOICE

## 2020-02-19 RX ORDER — METHOCARBAMOL 500 MG/1
TABLET, FILM COATED ORAL
Qty: 90 TAB | Refills: 0 | Status: SHIPPED | OUTPATIENT
Start: 2020-02-19 | End: 2020-03-07

## 2020-02-19 RX ORDER — METRONIDAZOLE 500 MG/1
TABLET ORAL
Qty: 42 TAB | Refills: 0 | Status: SHIPPED | OUTPATIENT
Start: 2020-02-19 | End: 2020-07-06 | Stop reason: ALTCHOICE

## 2020-03-07 RX ORDER — METHOCARBAMOL 500 MG/1
TABLET, FILM COATED ORAL
Qty: 90 TAB | Refills: 0 | Status: SHIPPED | OUTPATIENT
Start: 2020-03-07 | End: 2020-03-25

## 2020-03-25 RX ORDER — METHOCARBAMOL 500 MG/1
TABLET, FILM COATED ORAL
Qty: 90 TAB | Refills: 0 | Status: SHIPPED | OUTPATIENT
Start: 2020-03-25 | End: 2020-04-24

## 2020-03-30 DIAGNOSIS — I82.512 CHRONIC DEEP VEIN THROMBOSIS (DVT) OF FEMORAL VEIN OF LEFT LOWER EXTREMITY (HCC): ICD-10-CM

## 2020-03-30 RX ORDER — RIVAROXABAN 20 MG/1
TABLET, FILM COATED ORAL
Qty: 30 TAB | Refills: 2 | Status: SHIPPED | OUTPATIENT
Start: 2020-03-30 | End: 2020-06-05 | Stop reason: SDUPTHER

## 2020-03-31 DIAGNOSIS — G89.4 CHRONIC PAIN SYNDROME: ICD-10-CM

## 2020-03-31 RX ORDER — TRAMADOL HYDROCHLORIDE 50 MG/1
TABLET ORAL
Qty: 60 TAB | Refills: 1 | OUTPATIENT
Start: 2020-03-31 | End: 2020-04-30

## 2020-03-31 NOTE — TELEPHONE ENCOUNTER
PCP: Rosa Mendez MD    Last appt: 12/31/2019  Future Appointments   Date Time Provider Rozina Arizmendi   6/11/2020  2:40 PM Fallon Stephenson  [address]       Requested Prescriptions     Pending Prescriptions Disp Refills    traMADoL (ULTRAM) 50 mg tablet 30 Tab 0     Sig: Take 1 Tab by mouth every six (6) hours as needed for Pain for up to 3 days. Max Daily Amount: 200 mg.

## 2020-03-31 NOTE — TELEPHONE ENCOUNTER
Caller (if not patient):   Relationship of caller (if not patient):   Best contact number(s): [phone]   Name of medication and dosage if known: \"trumadol\" 50 mg   Is patient out of this medication (yes/no): yes   Pharmacy name: [facility] at [address] [place] [address] listed in chart? (yes/no): yes   Pharmacy phone number: on file   Date of last visit: 12/31/2019   Details to clarify the request:

## 2020-04-01 RX ORDER — TRAMADOL HYDROCHLORIDE 50 MG/1
50 TABLET ORAL
Qty: 30 TAB | Refills: 0 | OUTPATIENT
Start: 2020-04-01 | End: 2020-04-04

## 2020-04-01 NOTE — TELEPHONE ENCOUNTER
Gerardo Lozada, [facility]   Phone Number:  [phone] (Call me)             Patient is returning a missed call from Argie Staff.        Copy/Paste  ENVERA

## 2020-04-01 NOTE — TELEPHONE ENCOUNTER
Needs virtual appointment controlled substance last seen in December once she has appointment I will refill

## 2020-04-06 ENCOUNTER — TELEPHONE (OUTPATIENT)
Dept: INTERNAL MEDICINE CLINIC | Age: 55
End: 2020-04-06

## 2020-04-06 NOTE — TELEPHONE ENCOUNTER
Spoke with patient. Two pt identifiers confirmed. Patient advised that she will need to have a virtual visit with Dr. Michael Simons before she will refill her tramadol. Patient offered an appointment with Dr. Michael Simons on 04/07/2020. Appointment accepted. Patient advised if anything changes or if unable to keep this appointment to call the office  Pt verbalized understanding of information discussed w/ no further questions at this time.

## 2020-04-06 NOTE — TELEPHONE ENCOUNTER
Pt is requesting a call to discuss getting an Rx for a pain pill. Best contact number [phone].      Copy/Paste  ENVERA

## 2020-04-07 ENCOUNTER — VIRTUAL VISIT (OUTPATIENT)
Dept: INTERNAL MEDICINE CLINIC | Age: 55
End: 2020-04-07

## 2020-04-07 DIAGNOSIS — I82.512 CHRONIC DEEP VEIN THROMBOSIS (DVT) OF FEMORAL VEIN OF LEFT LOWER EXTREMITY (HCC): ICD-10-CM

## 2020-04-07 DIAGNOSIS — G89.4 CHRONIC PAIN SYNDROME: Primary | ICD-10-CM

## 2020-04-07 DIAGNOSIS — E03.9 ACQUIRED HYPOTHYROIDISM: ICD-10-CM

## 2020-04-07 DIAGNOSIS — M48.062 SPINAL STENOSIS OF LUMBAR REGION WITH NEUROGENIC CLAUDICATION: ICD-10-CM

## 2020-04-07 RX ORDER — TRAMADOL HYDROCHLORIDE 50 MG/1
50 TABLET ORAL
Qty: 120 TAB | Refills: 0 | Status: SHIPPED | OUTPATIENT
Start: 2020-04-07 | End: 2020-05-07

## 2020-04-07 RX ORDER — GABAPENTIN 400 MG/1
400 CAPSULE ORAL 3 TIMES DAILY
Qty: 90 CAP | Refills: 1 | Status: SHIPPED | OUTPATIENT
Start: 2020-04-07 | End: 2020-05-27 | Stop reason: SDUPTHER

## 2020-04-07 NOTE — PROGRESS NOTES
Reviewed record in preparation for visit and have obtained necessary documentation. Identified pt with two pt identifiers(name and ). Chief Complaint   Patient presents with    Pain (Chronic)       Health Maintenance Due   Topic Date Due    Shingles Vaccine (2 of 2) 01/15/2020       Ms. Bonilla Florez has a reminder for a \"due or due soon\" health maintenance. I have asked that she discuss this further with her primary care provider for follow-up on this health maintenance. Coordination of Care Questionnaire:  :     1) Have you been to an emergency room, urgent care clinic since your last visit? no   Hospitalized since your last visit? no             2) Have you seen or consulted any other health care providers outside of [address] [place] [address] since your last visit? no  (Include any pap smears or colon screenings in this section.)    3) In the event something were to happen to you and you were unable to speak on your behalf, do you have an Advance Directive/ Living Will in place stating your wishes? NO    Do you have an Advance Directive on file? no    4) Are you interested in receiving information on Advance Directives? NO    Patient is accompanied by self I have received verbal consent from Willy Cook to discuss any/all medical information while they are present in the room.

## 2020-04-07 NOTE — PROGRESS NOTES
CC: Pain (Chronic)      HPI:    She is a 53 y.o. female who presents for evaluation of leg pain on the left   Recall Ms Murali Callejas had a provoked DVT  10/17/2018  Provoked/ after sx/  Acute deep vein thrombosis (DVT) of proximal vein of left lower extremity ([facility]  She was on anticoagulation initially coumadin then switched to xarelto for 6 months. March 2019 repeated US    · Resolved left leg DVT. Minimal nonocclusive chronic mural thickening remains in the common femoral vein. · Then presented December 2019 with increased swelling and pain in groin area and thigh and had repeat duplex which showed Chronic non-occlusive thrombus present in the left common femoral vein.        She was prescribed xarelto 15mg BID and for 21 days with plans to transitioning to 20mg daily ( prescribed at ER)   She called due to bleeding in urine with xarelto 15mg BID - however noted improvement in pain and swelling in leg   I reviewed her chart and decided to treat her with lower dose xarelto 10mg daily - after 3 days of lower dose she noted increased swelling and pain in leg again and therefore increased to 20mg daily   She is doing well on that dose  The swelling and pain resolved and patient feels well   No issues with bleeding       Denies fever and chills and dyspnea    She is taking tramadol for chronic back pain and leg pain   Recall she had back surgeries 3 times and unfortunately continues to have pain    Taking tramadol 50mg 5 times a day and methocarbamol 4 times a day     Taking gabapentin  300mg TID   Discussed max dose of tramadol is 200mg /day    ROS:  Constitutional: negative for fevers, chills, anorexia and weight loss  10 systems reviewed and negative other than HPI    Past Medical History:   Diagnosis Date    Arthritis     Asthma     Breast cyst 1996    left, removed    Chronic pain     LOWER BACK    Colon polyps     Diverticulitis     DVT (deep venous thrombosis) ([facility] 10/18/2018    provoked after back sx  Fatty liver     GERD (gastroesophageal reflux disease)     Hypercholesteremia     Hypothyroidism     Nicotine vapor product user     Obesity     SUN (obstructive sleep apnea)     uses CPAP    Psychiatric disorder     depression    Thyroid disease     hypothyroid       Current Outpatient Medications on File Prior to Visit   Medication Sig Dispense Refill    Xarelto 20 mg tab tablet TAKE 1 TABLET BY MOUTH EVERY DAY WITH BREAKFAST 30 Tab 2    methocarbamoL (ROBAXIN) 500 mg tablet TAKE 1 TABLET BY MOUTH FOUR TIMES A DAY 90 Tab 0    metroNIDAZOLE (FLAGYL) 500 mg tablet TAKE 1 TABLET BY MOUTH THREE TIMES A DAY 42 Tab 0    furosemide (LASIX) 20 mg tablet TAKE 1 TABLET BY MOUTH DAILY AS NEEDED (SWELLING). 20 Tab 0    montelukast (SINGULAIR) 10 mg tablet TAKE 1 TABLET BY MOUTH EVERY DAY 90 Tab 0    escitalopram oxalate (LEXAPRO) 20 mg tablet TAKE 1 TABLET BY MOUTH EVERY DAY 90 Tab 1    fluticasone propionate (FLONASE) 50 mcg/actuation nasal spray SPRAY 2 SPRAYS INTO EACH NOSTRIL EVERY DAY 1 Bottle 1    gabapentin (NEURONTIN) 300 mg capsule TAKE 1 CAPSULE BY MOUTH 3 TIMES A DAY 90 Cap 2    levothyroxine (SYNTHROID) 88 mcg tablet TAKE 1 TABLET BY MOUTH EVERY DAY BEFORE BREAKFAST 90 Tab 5    lovastatin (MEVACOR) 20 mg tablet TAKE 1 TABLET BY MOUTH EVERY DAY AT NIGHT 90 Tab 1    VENTOLIN HFA 90 mcg/actuation inhaler TAKE 1 PUFF BY INHALATION EVERY SIX (6) HOURS AS NEEDED FOR WHEEZING. 18 Inhaler 2    pantoprazole (PROTONIX) 40 mg tablet TAKE 1 TABLET BY MOUTH EVERY DAY FOR GERD 90 Tab 1    umeclidinium-vilanterol (ANORO ELLIPTA) 62.5-25 mcg/actuation inhaler Take 1 Puff by inhalation daily. 1 Inhaler 0    traZODone (DESYREL) 50 mg tablet TAKE 1 TABLET BY MOUTH EVERY DAY AT NIGHT 90 Tab 2    albuterol (PROVENTIL VENTOLIN) 2.5 mg /3 mL (0.083 %) nebulizer solution 3 mL by Nebulization route every four (4) hours as needed for Wheezing.  24 Each 0    Nebulizer Accessories kit Use nebulizer machine as needed for dyspnea 1 Kit 0    fluticasone furoate (ARNUITY ELLIPTA) 100 mcg/actuation dsdv inhaler Take 1 Puff by inhalation daily. 2 Inhaler 4    acetaminophen (TYLENOL) 325 mg tablet Take 325 mg by mouth two (2) times daily as needed for Pain.  albuterol (PROVENTIL HFA, VENTOLIN HFA, PROAIR HFA) 90 mcg/actuation inhaler Take 1 Puff by inhalation every six (6) hours as needed for Wheezing. 1 Inhaler 2    cromolyn (OPTICROM) 4 % ophthalmic solution Administer 1 Drop to both eyes as needed. Use in affected eye(s)       No current facility-administered medications on file prior to visit. Past Surgical History:   Procedure Laterality Date    COLONOSCOPY N/A 11/14/2017    COLONOSCOPY performed by Arlene Rodríguez MD at [facility] ENDOSCOPY   [facility] SERVICES SURGERY  2005, 2006    L5 & S 1    HX BACK SURGERY  08/30/2018    HX BACK SURGERY      HX BREAST BIOPSY      Left    HX HYSTERECTOMY  6/22/09    [facility]    HX OOPHORECTOMY Left     One ovary removed.     HX ORTHOPAEDIC Left 1972    thumb surgery    HX ORTHOPAEDIC Left 2000    left knee surgery    HX ORTHOPAEDIC Left 2013    ankle    HX TUBAL LIGATION      REMOVAL OF KIDNEY STONE      RENAL STENT         Family History   Problem Relation Age of Onset    Cancer Mother 54        Lung cancer    Heart Disease Father     Hypertension Father     Elevated Lipids Father     Heart Attack Father     Stroke Father         x 3    Elevated Lipids Sister     Heart Disease Brother     Hypertension Brother     Elevated Lipids Brother     Elevated Lipids Sister     Elevated Lipids Sister     Heart Disease Sister     Cancer Sister         uterine    Heart Disease Sister     Cancer Sister         uterine    Heart Disease Sister    Jamesetta Medal Cancer Sister         colon cancer with liver mets    Bleeding Prob Brother     Heart Attack Brother     Anesth Problems Neg Hx      Reviewed and no changes     Social History     Socioeconomic History    Marital status:      Spouse name: Not on file    Number of children: Not on file    Years of education: Not on file    Highest education level: Not on file   Occupational History    Not on file   Social Needs    Financial resource strain: Not on file    Food insecurity     Worry: Not on file     Inability: Not on file    Transportation needs     Medical: Not on file     Non-medical: Not on file   Tobacco Use    Smoking status: Former Smoker     Packs/day: 1.50     Years: 25.00     Pack years: 37.50     Last attempt to quit: 2015     Years since quittin.1    Smokeless tobacco: Never Used    Tobacco comment: vape   Substance and Sexual Activity    Alcohol use: Yes     Comment: occasional drinker    Drug use: No    Sexual activity: Yes     Partners: Male     Birth control/protection: Surgical   Lifestyle    Physical activity     Days per week: Not on file     Minutes per session: Not on file    Stress: Not on file   Relationships    Social connections     Talks on phone: Not on file     Gets together: Not on file     Attends Restorationism service: Not on file     Active member of club or organization: Not on file     Attends meetings of clubs or organizations: Not on file     Relationship status: Not on file    Intimate partner violence     Fear of current or ex partner: Not on file     Emotionally abused: Not on file     Physically abused: Not on file     Forced sexual activity: Not on file   Other Topics Concern    Not on file   Social History Narrative    Not on file            Visit Vitals  LMP 2009       Physical Examination:   General - Well appearing female, no acute distress   HEENT - normal conjunctiva  Pulm - not in respiratory distress, speaking in full sentences   Extrem - swelling in legs appears to have improved   Psych - normal affect, appropriate mood    Lab Results   Component Value Date/Time    WBC 3.6 2019 02:39 PM    Hemoglobin (POC) 12.9 10/28/2013 03:40 PM    HGB 12.8 05/08/2019 02:39 PM    Hematocrit (POC) 38 10/28/2013 03:40 PM    HCT 40.8 05/08/2019 02:39 PM    PLATELET 969 76/82/1213 02:39 PM    MCV 98.6 05/08/2019 02:39 PM     Lab Results   Component Value Date/Time    Sodium 142 09/26/2019 03:54 PM    Potassium 4.4 09/26/2019 03:54 PM    Chloride 99 09/26/2019 03:54 PM    CO2 25 09/26/2019 03:54 PM    Anion gap 5 05/08/2019 02:39 PM    Glucose 81 09/26/2019 03:54 PM    BUN 9 09/26/2019 03:54 PM    Creatinine 0.95 09/26/2019 03:54 PM    BUN/Creatinine ratio 9 09/26/2019 03:54 PM    GFR est AA 78 09/26/2019 03:54 PM    GFR est non-AA 68 09/26/2019 03:54 PM    Calcium 9.3 09/26/2019 03:54 PM     Lab Results   Component Value Date/Time    Cholesterol, total 214 (H) 06/21/2019 03:37 PM    HDL Cholesterol 74 06/21/2019 03:37 PM    LDL, calculated 123 (H) 06/21/2019 03:37 PM    VLDL, calculated 17 06/21/2019 03:37 PM    Triglyceride 85 06/21/2019 03:37 PM     Lab Results   Component Value Date/Time    TSH 2.880 06/21/2019 03:37 PM     No results found for: PSA, PSA2, PSAR1, Murtis Romance, [id number], [id number], PSALT  Lab Results   Component Value Date/Time    Hemoglobin A1c 5.4 08/02/2018 08:19 AM     Lab Results   Component Value Date/Time    VITAMIN D, 25-HYDROXY 28.5 (L) 05/09/2017 08:54 AM       Lab Results   Component Value Date/Time    ALT (SGPT) 18 05/08/2019 02:39 PM    AST (SGOT) 15 05/08/2019 02:39 PM    Alk. phosphatase 88 05/08/2019 02:39 PM    Bilirubin, total 0.4 05/08/2019 02:39 PM           Assessment/Plan:    1. Chronic deep vein thrombosis (DVT) of femoral vein of left lower extremity ([address]  She had increased pain and swelling on 10mg dose therefore decision to increase to 20mg daily and referral to hematologist to assist with plan of care. We discussed risk of bleeding with xarelto and counseled not to take NSAIDs  - rivaroxaban (XARELTO) 20 mg tab tablet; Take 1 Tab by mouth daily (with breakfast). Dispense: 30 Tab;  Refill: 2  - Reminded to schedule appointment with hematologist    2. Chronic pain syndrome  Previous UDS appropiate. Reviewed  and appropiate  - also on gabapentin for chronic pain   - pain is currently not well controlled therefore will increase the dose of gabapentin  Orders Placed This Encounter    traMADoL (ULTRAM) 50 mg tablet     Sig: Take 1 Tab by mouth every six (6) hours as needed for Pain for up to 30 days. Max Daily Amount: 200 mg. Dispense:  120 Tab     Refill:  0    gabapentin (NEURONTIN) 400 mg capsule     Sig: Take 1 Cap by mouth three (3) times daily. Max Daily Amount: 1,200 mg. Dispense:  90 Cap     Refill:  1     3.depression: stable /continue lexapro     4. Hypothyroidism: on levothyroxine 88 mcg daily and overdue for TSH< patient will wait until COVID pandemic resolves         This is an established visit conducted via real time video and audio telemedicine. The patient has been instructed that this meets HIPAA criteria and acknowledges and agrees to this method of visitation.     Carlos Ornelas MD  04/07/20  1:04 PM      Carlos Ornelas MD

## 2020-04-14 RX ORDER — FLUTICASONE PROPIONATE 50 MCG
SPRAY, SUSPENSION (ML) NASAL
Qty: 1 BOTTLE | Refills: 1 | Status: SHIPPED | OUTPATIENT
Start: 2020-04-14 | End: 2020-12-28 | Stop reason: SDUPTHER

## 2020-04-24 DIAGNOSIS — K21.9 GASTROESOPHAGEAL REFLUX DISEASE WITHOUT ESOPHAGITIS: ICD-10-CM

## 2020-04-24 RX ORDER — TRAZODONE HYDROCHLORIDE 50 MG/1
TABLET ORAL
Qty: 90 TAB | Refills: 2 | Status: SHIPPED | OUTPATIENT
Start: 2020-04-24 | End: 2020-12-28 | Stop reason: SDUPTHER

## 2020-04-24 RX ORDER — METHOCARBAMOL 500 MG/1
TABLET, FILM COATED ORAL
Qty: 90 TAB | Refills: 0 | Status: SHIPPED | OUTPATIENT
Start: 2020-04-24 | End: 2020-05-18

## 2020-04-24 RX ORDER — PANTOPRAZOLE SODIUM 40 MG/1
TABLET, DELAYED RELEASE ORAL
Qty: 90 TAB | Refills: 1 | Status: SHIPPED | OUTPATIENT
Start: 2020-04-24 | End: 2020-09-18

## 2020-05-05 DIAGNOSIS — M48.062 SPINAL STENOSIS OF LUMBAR REGION WITH NEUROGENIC CLAUDICATION: ICD-10-CM

## 2020-05-05 DIAGNOSIS — G89.4 CHRONIC PAIN SYNDROME: ICD-10-CM

## 2020-05-07 RX ORDER — TRAMADOL HYDROCHLORIDE 50 MG/1
50 TABLET ORAL
Qty: 120 TAB | Refills: 0 | Status: SHIPPED | OUTPATIENT
Start: 2020-05-07 | End: 2020-06-05

## 2020-05-12 ENCOUNTER — PATIENT MESSAGE (OUTPATIENT)
Dept: SLEEP MEDICINE | Age: 55
End: 2020-05-12

## 2020-05-12 RX ORDER — MONTELUKAST SODIUM 10 MG/1
TABLET ORAL
Qty: 90 TAB | Refills: 0 | Status: SHIPPED | OUTPATIENT
Start: 2020-05-12 | End: 2020-10-27

## 2020-05-16 DIAGNOSIS — M48.062 SPINAL STENOSIS OF LUMBAR REGION WITH NEUROGENIC CLAUDICATION: ICD-10-CM

## 2020-05-18 RX ORDER — METHOCARBAMOL 500 MG/1
TABLET, FILM COATED ORAL
Qty: 90 TAB | Refills: 0 | Status: SHIPPED | OUTPATIENT
Start: 2020-05-18 | End: 2020-06-25

## 2020-05-18 RX ORDER — GABAPENTIN 300 MG/1
CAPSULE ORAL
Qty: 90 CAP | Refills: 1 | OUTPATIENT
Start: 2020-05-18

## 2020-05-27 DIAGNOSIS — G89.4 CHRONIC PAIN SYNDROME: ICD-10-CM

## 2020-05-27 DIAGNOSIS — M48.062 SPINAL STENOSIS OF LUMBAR REGION WITH NEUROGENIC CLAUDICATION: ICD-10-CM

## 2020-05-27 RX ORDER — GABAPENTIN 400 MG/1
400 CAPSULE ORAL 3 TIMES DAILY
Qty: 90 CAP | Refills: 1 | Status: SHIPPED | OUTPATIENT
Start: 2020-05-27 | End: 2020-07-01 | Stop reason: SDUPTHER

## 2020-05-29 DIAGNOSIS — E78.5 HYPERLIPIDEMIA, UNSPECIFIED HYPERLIPIDEMIA TYPE: ICD-10-CM

## 2020-05-29 RX ORDER — LOVASTATIN 20 MG/1
TABLET ORAL
Qty: 90 TAB | Refills: 1 | Status: SHIPPED | OUTPATIENT
Start: 2020-05-29 | End: 2021-07-07 | Stop reason: SDUPTHER

## 2020-06-04 DIAGNOSIS — M48.062 SPINAL STENOSIS OF LUMBAR REGION WITH NEUROGENIC CLAUDICATION: ICD-10-CM

## 2020-06-04 DIAGNOSIS — G89.4 CHRONIC PAIN SYNDROME: ICD-10-CM

## 2020-06-05 DIAGNOSIS — G89.4 CHRONIC PAIN SYNDROME: ICD-10-CM

## 2020-06-05 DIAGNOSIS — I82.512 CHRONIC DEEP VEIN THROMBOSIS (DVT) OF FEMORAL VEIN OF LEFT LOWER EXTREMITY (HCC): ICD-10-CM

## 2020-06-05 DIAGNOSIS — M48.062 SPINAL STENOSIS OF LUMBAR REGION WITH NEUROGENIC CLAUDICATION: ICD-10-CM

## 2020-06-05 RX ORDER — TRAMADOL HYDROCHLORIDE 50 MG/1
50 TABLET ORAL
Qty: 120 TAB | Refills: 0 | Status: CANCELLED | OUTPATIENT
Start: 2020-06-05 | End: 2020-07-05

## 2020-06-05 RX ORDER — TRAMADOL HYDROCHLORIDE 50 MG/1
TABLET ORAL
Qty: 120 TAB | Refills: 0 | Status: SHIPPED | OUTPATIENT
Start: 2020-06-05 | End: 2020-07-05

## 2020-06-11 ENCOUNTER — DOCUMENTATION ONLY (OUTPATIENT)
Dept: SLEEP MEDICINE | Age: 55
End: 2020-06-11

## 2020-06-11 ENCOUNTER — VIRTUAL VISIT (OUTPATIENT)
Dept: SLEEP MEDICINE | Age: 55
End: 2020-06-11

## 2020-06-11 DIAGNOSIS — G47.33 OBSTRUCTIVE SLEEP APNEA (ADULT) (PEDIATRIC): Primary | ICD-10-CM

## 2020-06-11 NOTE — PATIENT INSTRUCTIONS
[address] [facility] [facility], [address] Tel.  [phone] Fax. [address] [place], [address] Tel.  [phone] Fax. [phone] [address] Tel.  [phone] Fax. [phone] PROPER SLEEP HYGIENE What to avoid · Do not have drinks with caffeine, such as coffee or black tea, for 8 hours before bed. · Do not smoke or use other types of tobacco near bedtime. Nicotine is a stimulant and can keep you awake. · Avoid drinking alcohol late in the evening, because it can cause you to wake in the middle of the night. · Do not eat a big meal close to bedtime. If you are hungry, eat a light snack. · Do not drink a lot of water close to bedtime, because the need to urinate may wake you up during the night. · Do not read or watch TV in bed. Use the bed only for sleeping and sexual activity. What to try · Go to bed at the same time every night, and wake up at the same time every morning. Do not take naps during the day. · Keep your bedroom quiet, dark, and cool. · Get regular exercise, but not within 3 to 4 hours of your bedtime. Heddy  · Sleep on a comfortable pillow and mattress. · If watching the clock makes you anxious, turn it facing away from you so you cannot see the time. · If you worry when you lie down, start a worry book. Well before bedtime, write down your worries, and then set the book and your concerns aside. · Try meditation or other relaxation techniques before you go to bed. · If you cannot fall asleep, get up and go to another room until you feel sleepy. Do something relaxing. Repeat your bedtime routine before you go to bed again. · Make your house quiet and calm about an hour before bedtime. Turn down the lights, turn off the TV, log off the computer, and turn down the volume on music. This can help you relax after a busy day. Drowsy Driving The [facility] cites drowsiness as a causing factor in more than 031,047 police reported crashes annually, resulting in 76,000 injuries and 1,500 deaths. Other surveys suggest 55% of people polled have driven while drowsy in the past year, 23% had fallen asleep but not crashed, 3% crashed, and 2% had and accident due to drowsy driving. Who is at risk? Young Drivers: One study of drowsy driving accidents states that 55% of the drivers were under 25 years. Of those, 75% were male. Shift Workers and Travelers: People who work overnight or travel across time zones frequently are at higher risk of experiencing Circadian Rhythm Disorders. They are trying to work and function when their body is programed to sleep. Sleep Deprived: Lack of sleep has a serious impact on your ability to pay attention or focus on a task. Consistently getting less than the average of 8 hours your body needs creates partial or cumulative sleep deprivation. Untreated Sleep Disorders: Sleep Apnea, Narcolepsy, R.L.S., and other sleep disorders (untreated) prevent a person from getting enough restful sleep. This leads to excessive daytime sleepiness and increases the risk for drowsy driving accidents by up to 7 times. Medications / Alcohol: Even over the counter medications can cause drowsiness. Medications that impair a drivers attention should have a warning label. Alcohol naturally makes you sleepy and on its own can cause accidents. Combined with excessive drowsiness its effects are amplified. Signs of Drowsy Driving: * You don't remember driving the last few miles * You may drift out of your bowen * You are unable to focus and your thoughts wander * You may yawn more often than normal 
 * You have difficulty keeping your eyes open / nodding off * Missing traffic signs, speeding, or tailgating Prevention-  
Good sleep hygiene, lifestyle and behavioral choices have the most impact on drowsy driving. There is no substitute for sleep and the average person requires 8 hours nightly. If you find yourself driving drowsy, stop and sleep. Consider the sleep hygiene tips provided during your visit as well. Medication Refill Policy: Refills for all medications require 1 week advance notice. Please have your pharmacy fax a refill request. We are unable to fax, or call in \"controled substance\" medications and you will need to pick these prescriptions up from our office. [employer] Activation Thank you for requesting access to [employer]. Please follow the instructions below to securely access and download your online medical record. [employer] allows you to send messages to your doctor, view your test results, renew your prescriptions, schedule appointments, and more. How Do I Sign Up? 1. In your internet browser, go to [email or web address]. 2. Click on the First Time User? Click Here link in the Sign In box. You will see the New Member Sign Up page. 3. Enter your [employer] Access Code exactly as it appears below. You will not need to use this code after youve completed the sign-up process. If you do not sign up before the expiration date, you must request a new code. [employer] Access Code: Activation code not generated Current [employer] Status: Active (This is the date your [employer] access code will ) 4. Enter the last four digits of your Social Security Number (xxxx) and Date of Birth (mm/dd/yyyy) as indicated and click Submit. You will be taken to the next sign-up page. 5. Create a [employer] ID. This will be your [employer] login ID and cannot be changed, so think of one that is secure and easy to remember. 6. Create a [employer] password. You can change your password at any time. 7. Enter your Password Reset Question and Answer. This can be used at a later time if you forget your password. 8. Enter your e-mail address.  You will receive e-mail notification when new information is available in [employer]. 9. Click Sign Up. You can now view and download portions of your medical record. 10. Click the Download Summary menu link to download a portable copy of your medical information. Additional Information If you have questions, please call [phone]. Remember, [employer] is NOT to be used for urgent needs. For medical emergencies, dial 911.

## 2020-06-11 NOTE — PROGRESS NOTES
[address] [facility] [place], [address]  Tel.  [phone]  Fax. [address] [facility] [address]  [place], [address] [facility]  Tel.  [phone]  Fax. [phone] [address] [facility] [address]   Tel.  [phone]  Fax. [phone]       Telemedicine visit performed with verbal consent of the patient. Patient called and identity confirmed with 2 patient identifers    Patient was seen at home  Willy Cook is a 53 y.o. female who was seen by [employer] (real-time) audio-video technology on 6/11/2020. Consent:  She and/or her healthcare decision maker is aware that this patient-initiated Telehealth encounter is the equivalent to a face to face encounter in the sleep disorder center and has provided verbal consent to proceed: Yes    I was at home while conducting this encounter. S>Smitha Gonzales is a 53 y.o. female seen at this telemedicine visit for a positive airway pressure follow-up. She reports no problems using the device. She is 50% compliant over the past 30 days. The following problems are identified:    Drowsiness At times. Doesn't feel refreshed when she wakes up like she did when she started PAP Problems exhaling no   Snoring no Forget to put on yes   Mask Comfortable yes  Can't fall asleep no   Dry Mouth no Mask falls off no   Air Leaking no Frequent awakenings no       Download reviewed. She admits that her sleep has improved on PAP therapy     Allergies   Allergen Reactions    Codeine Hives    Pcn [Penicillins] Hives       She has a current medication list which includes the following prescription(s): rivaroxaban, tramadol, lovastatin, gabapentin, methocarbamol, montelukast, pantoprazole, trazodone, fluticasone propionate, furosemide, escitalopram oxalate, levothyroxine, ventolin hfa, umeclidinium-vilanterol, albuterol, fluticasone furoate, acetaminophen, albuterol, metronidazole, nebulizer accessories, and cromolyn. .      She  has a past medical history of Arthritis, Asthma, Breast cyst (1996), Chronic pain, Colon polyps, Diverticulitis, DVT (deep venous thrombosis) ([facility] (10/18/2018), Fatty liver, GERD (gastroesophageal reflux disease), Hypercholesteremia, Hypothyroidism, Nicotine vapor product user, Obesity, SUN (obstructive sleep apnea), Psychiatric disorder, and Thyroid disease. [place] Sleepiness Score: 5   and     which reflect improved sleep quality over therapy time. O>      Visit Vitals  LMP 06/22/2009       Weight 278 lb  Vital Signs: (As obtained by patient/caregiver at home)        Constitutional: [x] Appears well-developed and well-nourished [x] No apparent distress      [] Abnormal -     Mental status: [x] Alert and awake  [x] Oriented to person/place/time [x] Able to follow commands    [] Abnormal -     Eyes:   EOM    [x]  Normal    [] Abnormal -   Sclera  [x]  Normal    [] Abnormal -          Discharge [x]  None visible   [] Abnormal -     HENT: [x] Normocephalic, atraumatic  [] Abnormal -     External Ears [x] Normal  [] Abnormal -    Neck: [x] No visualized mass [] Abnormal -     Pulmonary/Chest: [x] Respiratory effort normal   [x] No visualized signs of difficulty breathing or respiratory distress        [] Abnormal -       Neurological:        [x] No Facial Asymmetry (Cranial nerve 7 motor function) (limited exam due to video visit)          [x] No gaze palsy        [] Abnormal -          Skin:        [x] No significant exanthematous lesions or discoloration noted on facial skin         [] Abnormal -            Psychiatric:       [x] Normal Affect [] Abnormal -        Other pertinent observable physical exam findings:-            A>    ICD-10-CM ICD-9-CM    1. Obstructive sleep apnea (adult) (pediatric) G47.33 327.23 AMB SUPPLY ORDER   2. BMI 40.0-44.9, adult ([facility]) Z68.41 V85.41       On CPAP, Respironics :  13-15 cmH2O.     Compliant:      yes    Therapeutic Response:  Positive    P>    PAP continues to benefit patient and remains necessary for control of her sleep apnea. CPAP setting - change setting to 13-18 cmH20   Changes made via modem  * We have recommended a dedicated weight loss through appropriate diet and an exercise regimen as significant weight reduction has been shown to reduce severity of obstructive sleep apnea. *   Follow-up and Dispositions    · Return in about 1 year (around 6/11/2021). I have ordered replacement supplies  She was encouraged to use it nightly  * She was asked to contact our office for any problems regarding PAP therapy. * Counseling was provided regarding the importance of regular PAP use and on proper sleep hygiene and safe driving. * Re-enforced proper and regular cleaning for the device. 2. Obesity - I have counseled the patient regarding the benefits of weight loss. All of her questions were addressed. Pursuant to the emergency declaration under the [facility]5 waiver authority and the [employer] General Act, this Virtual  Visit was conducted, with patient's consent, to reduce the patient's risk of exposure to COVID-19 and provide continuity of care for an established patient. Services were provided through a video synchronous discussion virtually to substitute for in-person clinic visit.     Isaias Garcia MD    Electronically signed by    Pennie Villanueva MD  Diplomate in Sleep Medicine  [facility]

## 2020-06-24 NOTE — TELEPHONE ENCOUNTER
PCP: Bryan Ace MD    Last appt: 4/7/2020  Future Appointments   Date Time Provider Rozina Arizmendi   6/11/2020  2:40 PM Bernadine Gitelman,  [address]       Requested Prescriptions     Pending Prescriptions Disp Refills    gabapentin (NEURONTIN) 400 mg capsule 90 Cap 1     Sig: Take 1 Cap by mouth three (3) times daily. Max Daily Amount: 1,200 mg.
No

## 2020-06-26 ENCOUNTER — PATIENT MESSAGE (OUTPATIENT)
Dept: INTERNAL MEDICINE CLINIC | Age: 55
End: 2020-06-26

## 2020-06-26 DIAGNOSIS — H10.13 ALLERGIC CONJUNCTIVITIS OF BOTH EYES: Primary | ICD-10-CM

## 2020-06-26 DIAGNOSIS — F32.A DEPRESSION, UNSPECIFIED DEPRESSION TYPE: ICD-10-CM

## 2020-07-01 DIAGNOSIS — G89.4 CHRONIC PAIN SYNDROME: ICD-10-CM

## 2020-07-01 DIAGNOSIS — M48.062 SPINAL STENOSIS OF LUMBAR REGION WITH NEUROGENIC CLAUDICATION: ICD-10-CM

## 2020-07-01 NOTE — TELEPHONE ENCOUNTER
PCP: Ford Rodriguez MD    Last appt: 4/7/2020  Future Appointments   Date Time Provider Rozina Arizmendi   7/6/2020  9:30 AM Appa MD [address]       Requested Prescriptions     Pending Prescriptions Disp Refills    gabapentin (NEURONTIN) 400 mg capsule 90 Cap 1     Sig: Take 1 Cap by mouth three (3) times daily. Max Daily Amount: 1,200 mg.

## 2020-07-02 RX ORDER — GABAPENTIN 400 MG/1
400 CAPSULE ORAL 3 TIMES DAILY
Qty: 90 CAP | Refills: 1 | Status: SHIPPED | OUTPATIENT
Start: 2020-07-02 | End: 2020-07-06 | Stop reason: SDUPTHER

## 2020-07-06 ENCOUNTER — VIRTUAL VISIT (OUTPATIENT)
Dept: INTERNAL MEDICINE CLINIC | Age: 55
End: 2020-07-06

## 2020-07-06 DIAGNOSIS — E78.5 HYPERLIPIDEMIA, UNSPECIFIED HYPERLIPIDEMIA TYPE: ICD-10-CM

## 2020-07-06 DIAGNOSIS — M48.062 SPINAL STENOSIS OF LUMBAR REGION WITH NEUROGENIC CLAUDICATION: ICD-10-CM

## 2020-07-06 DIAGNOSIS — I82.4Y2 ACUTE DEEP VEIN THROMBOSIS (DVT) OF PROXIMAL VEIN OF LEFT LOWER EXTREMITY (HCC): ICD-10-CM

## 2020-07-06 DIAGNOSIS — E03.9 ACQUIRED HYPOTHYROIDISM: Primary | ICD-10-CM

## 2020-07-06 DIAGNOSIS — G89.4 CHRONIC PAIN SYNDROME: ICD-10-CM

## 2020-07-06 RX ORDER — ESCITALOPRAM OXALATE 20 MG/1
20 TABLET ORAL DAILY
Qty: 90 TAB | Refills: 3 | Status: SHIPPED | OUTPATIENT
Start: 2020-07-06 | End: 2020-09-12 | Stop reason: SDUPTHER

## 2020-07-06 RX ORDER — TRAMADOL HYDROCHLORIDE 50 MG/1
50 TABLET ORAL
Qty: 120 TAB | Refills: 0 | Status: SHIPPED | OUTPATIENT
Start: 2020-07-06 | End: 2020-08-02

## 2020-07-06 RX ORDER — GABAPENTIN 400 MG/1
400 CAPSULE ORAL 3 TIMES DAILY
Qty: 90 CAP | Refills: 2 | Status: SHIPPED | OUTPATIENT
Start: 2020-07-06 | End: 2020-11-06 | Stop reason: SDUPTHER

## 2020-07-06 NOTE — PROGRESS NOTES
Reviewed record in preparation for visit and have obtained necessary documentation. Identified pt with two pt identifiers(name and ). Chief Complaint   Patient presents with    Medication Refill       Health Maintenance Due   Topic Date Due    Shingles Vaccine (2 of 2) 01/15/2020    Mammogram  2020    Cholesterol Test   2020    Annual Well Visit  07/15/2020    Colonoscopy  2020       Ms. Rebekah Johnson has a reminder for a \"due or due soon\" health maintenance. I have asked that she discuss this further with her primary care provider for follow-up on this health maintenance. Coordination of Care Questionnaire:  :     1) Have you been to an emergency room, urgent care clinic since your last visit? no   Hospitalized since your last visit? no             2) Have you seen or consulted any other health care providers outside of [address] [place] [address] since your last visit? no  (Include any pap smears or colon screenings in this section.)    3) In the event something were to happen to you and you were unable to speak on your behalf, do you have an Advance Directive/ Living Will in place stating your wishes? NO    Do you have an Advance Directive on file? no    4) Are you interested in receiving information on Advance Directives? NO    Patient is accompanied by self I have received verbal consent from Srikanth Leal to discuss any/all medical information while they are present in the room.

## 2020-07-06 NOTE — PROGRESS NOTES
CC: Medication Refill      HPI:    She is a 53 y.o. female who presents for evaluation of     She was  last seen on     She is a 53 y.o. female who presents for evaluation of leg pain on the left   Recall Ms Nacho Aguilar had a provoked DVT  10/17/2018  Provoked/ after sx/  Acute deep vein thrombosis (DVT) of proximal vein of left lower extremity ([address]  She was on anticoagulation initially coumadin then switched to xarelto for 6 months. March 2019 repeated US    · Resolved left leg DVT. Minimal nonocclusive chronic mural thickening remains in the common femoral vein. · Then presented December 2019 with increased swelling and pain in groin area and thigh and had repeat duplex which showed Chronic non-occlusive thrombus present in the left common femoral vein. She developed swelling of leg on low dose xarelto and doing well on xarelto 20mg daily no swelling the legs currently     She has chronic back pain and had 3 back surgeries. Spinal stenosis and disc disease. She is taking tramadol for chronic back pain and leg pain   Tramadol 50mg taking 3-4 daily  Taking muscle relaxant robaxin 4 times a day due to muscles spasms  Also taking gapabentin      Wear CPAP For SUN    Asthma is stable     Depression: stable on lexapro 20mg daily    Hypothyroidism: patient is on levothyroxine 88 mcg daily / she denies hair loss constipation  Struggles with weight - \" despite healthy diet not loosing weight\"           This is an established visit conducted via telemedicine with video. The patient has been instructed that this meets HIPAA criteria and acknowledges and agrees to this method of visitation.      Pursuant to the emergency declaration under the [facility] waiver authority and the [employer] Act, this Virtual Visit was conducted, with patient's consent, to reduce the patient's risk of exposure to COVID-19 and provide continuity of care for an established patient. Services were provided through a video synchronous discussion virtually to substitute for in-person clinic visit. ROS:  Constitutional: negative for fevers, chills, anorexia and weight loss  10 systems reviewed and negative other than HPI     Past Medical History:   Diagnosis Date    Arthritis     Asthma     Breast cyst 1996    left, removed    Chronic pain     LOWER BACK    Colon polyps     Diverticulitis     DVT (deep venous thrombosis) ([facility] [address] 10/18/2018    provoked after back sx    Fatty liver     GERD (gastroesophageal reflux disease)     Hypercholesteremia     Hypothyroidism     Nicotine vapor product user     Obesity     SUN (obstructive sleep apnea)     uses CPAP    Psychiatric disorder     depression    Thyroid disease     hypothyroid       Current Outpatient Medications on File Prior to Visit   Medication Sig Dispense Refill    methocarbamoL (ROBAXIN) 500 mg tablet TAKE 1 TABLET BY MOUTH FOUR TIMES A DAY 90 Tab 0    rivaroxaban (Xarelto) 20 mg tab tablet Take 1 Tab by mouth daily (with breakfast). 30 Tab 2    lovastatin (MEVACOR) 20 mg tablet TAKE 1 TABLET BY MOUTH EVERY DAY EVERY NIGHT 90 Tab 1    montelukast (SINGULAIR) 10 mg tablet TAKE 1 TABLET BY MOUTH EVERY DAY 90 Tab 0    pantoprazole (PROTONIX) 40 mg tablet TAKE 1 TABLET BY MOUTH EVERY DAY FOR GERD 90 Tab 1    traZODone (DESYREL) 50 mg tablet TAKE 1 TABLET BY MOUTH EVERY DAY AT NIGHT 90 Tab 2    fluticasone propionate (FLONASE) 50 mcg/actuation nasal spray SPRAY 2 SPRAYS INTO EACH NOSTRIL EVERY DAY 1 Bottle 1    furosemide (LASIX) 20 mg tablet TAKE 1 TABLET BY MOUTH DAILY AS NEEDED (SWELLING).  20 Tab 0    escitalopram oxalate (LEXAPRO) 20 mg tablet TAKE 1 TABLET BY MOUTH EVERY DAY 90 Tab 1    levothyroxine (SYNTHROID) 88 mcg tablet TAKE 1 TABLET BY MOUTH EVERY DAY BEFORE BREAKFAST 90 Tab 5    VENTOLIN HFA 90 mcg/actuation inhaler TAKE 1 PUFF BY INHALATION EVERY SIX (6) HOURS AS NEEDED FOR WHEEZING. 18 Inhaler 2    umeclidinium-vilanterol (ANORO ELLIPTA) 62.5-25 mcg/actuation inhaler Take 1 Puff by inhalation daily. 1 Inhaler 0    albuterol (PROVENTIL VENTOLIN) 2.5 mg /3 mL (0.083 %) nebulizer solution 3 mL by Nebulization route every four (4) hours as needed for Wheezing. 24 Each 0    Nebulizer Accessories kit Use nebulizer machine as needed for dyspnea 1 Kit 0    fluticasone furoate (ARNUITY ELLIPTA) 100 mcg/actuation dsdv inhaler Take 1 Puff by inhalation daily. 2 Inhaler 4    acetaminophen (TYLENOL) 325 mg tablet Take 325 mg by mouth two (2) times daily as needed for Pain.  albuterol (PROVENTIL HFA, VENTOLIN HFA, PROAIR HFA) 90 mcg/actuation inhaler Take 1 Puff by inhalation every six (6) hours as needed for Wheezing. 1 Inhaler 2    cromolyn (OPTICROM) 4 % ophthalmic solution Administer 1 Drop to both eyes as needed. Use in affected eye(s)       No current facility-administered medications on file prior to visit. Past Surgical History:   Procedure Laterality Date    COLONOSCOPY N/A 11/14/2017    COLONOSCOPY performed by Elva [facility]ales [facility]   [facility] SURGERY  2005, 2006    L5 & S 1    HX BACK SURGERY  08/30/2018    HX BACK SURGERY      HX BREAST BIOPSY      Left    HX HYSTERECTOMY  6/22/09    [facility]    HX OOPHORECTOMY Left     One ovary removed.     HX ORTHOPAEDIC Left 1972    thumb surgery    HX ORTHOPAEDIC Left 2000    left knee surgery    HX ORTHOPAEDIC Left 2013    ankle    HX TUBAL LIGATION      REMOVAL OF KIDNEY STONE      RENAL STENT         Family History   Problem Relation Age of Onset    Cancer Mother 54        Lung cancer    Heart Disease Father     Hypertension Father     Elevated Lipids Father     Heart Attack Father     Stroke Father         x 3    Elevated Lipids Sister     Heart Disease Brother     Hypertension Brother     Elevated Lipids Brother     Elevated Lipids Sister     Elevated Lipids Sister     Heart Disease Sister     Cancer Sister         uterine    Heart Disease Sister     Cancer Sister         uterine    Heart Disease Sister     Cancer Sister         colon cancer with liver mets    Bleeding Prob Brother     Heart Attack Brother     Anesth Problems Neg Hx      Reviewed and no changes     Social History     Socioeconomic History    Marital status:      Spouse name: Not on file    Number of children: Not on file    Years of education: Not on file    Highest education level: Not on file   Occupational History    Not on file   Social Needs    Financial resource strain: Not on file    Food insecurity     Worry: Not on file     Inability: Not on file   [employer] needs     Medical: Not on file     Non-medical: Not on file   Tobacco Use    Smoking status: Former Smoker     Packs/day: 1.50     Years: 25.00     Pack years: 37.50     Last attempt to quit: 2015     Years since quittin.4    Smokeless tobacco: Never Used    Tobacco comment: vape   Substance and Sexual Activity    Alcohol use: Yes     Comment: occasional drinker    Drug use: No    Sexual activity: Yes     Partners: Male     Birth control/protection: Surgical   Lifestyle    Physical activity     Days per week: Not on file     Minutes per session: Not on file    Stress: Not on file   Relationships    Social connections     Talks on phone: Not on file     Gets together: Not on file     Attends Evangelical service: Not on file     Active member of club or organization: Not on file     Attends meetings of clubs or organizations: Not on file     Relationship status: Not on file    Intimate partner violence     Fear of current or ex partner: Not on file     Emotionally abused: Not on file     Physically abused: Not on file     Forced sexual activity: Not on file   Other Topics Concern    Not on file   Social History Narrative    Not on file            [facility]       Physical Examination:   Gen: well appearing female  HEENT: normal conjunctiva, no audible congestion, patient does not see oral erythema, has MMM  Neck: patient does not feel enlarged or tender LAD or masses  Resp: normal respiratory effort, no audible wheezing. CV: patient does not feel palpitations or heart irregularity  Abd: patient does not feel abdominal tenderness or mass, patient does not notice distension  Extrem: patient does not see swelling in ankles or joints.    Neuro: Alert and oriented, able to answer questions without difficulty, able to move all extremities and walk normally          Lab Results   Component Value Date/Time    WBC 3.6 05/08/2019 02:39 PM    Hemoglobin (POC) 12.9 10/28/2013 03:40 PM    HGB 12.8 05/08/2019 02:39 PM    Hematocrit (POC) 38 10/28/2013 03:40 PM    HCT 40.8 05/08/2019 02:39 PM    PLATELET 391 52/79/8440 02:39 PM    MCV 98.6 05/08/2019 02:39 PM     Lab Results   Component Value Date/Time    Sodium 142 09/26/2019 03:54 PM    Potassium 4.4 09/26/2019 03:54 PM    Chloride 99 09/26/2019 03:54 PM    CO2 25 09/26/2019 03:54 PM    Anion gap 5 05/08/2019 02:39 PM    Glucose 81 09/26/2019 03:54 PM    BUN 9 09/26/2019 03:54 PM    Creatinine 0.95 09/26/2019 03:54 PM    BUN/Creatinine ratio 9 09/26/2019 03:54 PM    GFR est AA 78 09/26/2019 03:54 PM    GFR est non-AA 68 09/26/2019 03:54 PM    Calcium 9.3 09/26/2019 03:54 PM     Lab Results   Component Value Date/Time    Cholesterol, total 214 (H) 06/21/2019 03:37 PM    HDL Cholesterol 74 06/21/2019 03:37 PM    LDL, calculated 123 (H) 06/21/2019 03:37 PM    VLDL, calculated 17 06/21/2019 03:37 PM    Triglyceride 85 06/21/2019 03:37 PM     Lab Results   Component Value Date/Time    TSH 2.880 06/21/2019 03:37 PM     No results found for: PSA, Hilda Livia, [id number], [id number], PSALT  Lab Results   Component Value Date/Time    Hemoglobin A1c 5.4 08/02/2018 08:19 AM     Lab Results   Component Value Date/Time    VITAMIN D, 25-HYDROXY 28.5 (L) 05/09/2017 08:54 AM       Lab Results   Component Value Date/Time    ALT (SGPT) 18 05/08/2019 02:39 PM    Alk. phosphatase 88 05/08/2019 02:39 PM    Bilirubin, total 0.4 05/08/2019 02:39 PM           Assessment/Plan:    1. Chronic pain syndrome    2. Spinal stenosis of lumbar region with neurogenic claudication  Has had 3 back surgeries with Dr Maggi Augustine and unfortunately back pain has not improved  - gabapentin (NEURONTIN) 400 mg capsule; Take 1 Cap by mouth three (3) times daily. Max Daily Amount: 1,200 mg. Dispense: 90 Cap; Refill: 2  - traMADoL (ULTRAM) 50 mg tablet; Take 1 Tab by mouth every six (6) hours as needed for Pain for up to 30 days. Max Daily Amount: 200 mg. Dispense: 120 Tab; Refill: 0  - COMPLIANCE DRUG SCREEN/PRESCRIPTION MONITORING  -no signs of abuse or misuse    3. Acute deep vein thrombosis (DVT) of proximal vein of left lower extremity ([address] which then became chronic on repeat imaging with recurrent symptoms  She is on xarelto 20mg chronically   No bleeding   - CBC WITH AUTOMATED DIFF    4. Acquired hypothyroidism  Due for re check , euthyroid on exam  - TSH AND FREE T4  - METABOLIC PANEL, COMPREHENSIVE      5. High cholesterol: on statin, due for repeat lipid panel    6. GERD: on PPI chronically, has symptoms if tries to come off medication       Shira Higgins MD    This is an established visit conducted via real time video and audio telemedicine. The patient has been instructed that this meets HIPAA criteria and acknowledges and agrees to this method of visitation.

## 2020-07-08 RX ORDER — ALBUTEROL SULFATE 90 UG/1
AEROSOL, METERED RESPIRATORY (INHALATION)
Qty: 18 INHALER | Refills: 2 | Status: SHIPPED | OUTPATIENT
Start: 2020-07-08 | End: 2020-12-28 | Stop reason: SDUPTHER

## 2020-07-08 RX ORDER — OLOPATADINE HYDROCHLORIDE 1 MG/ML
2 SOLUTION/ DROPS OPHTHALMIC 2 TIMES DAILY
Qty: 1 BOTTLE | Refills: 1 | Status: SHIPPED | OUTPATIENT
Start: 2020-07-08 | End: 2020-12-28 | Stop reason: SDUPTHER

## 2020-07-08 NOTE — TELEPHONE ENCOUNTER
Umm Candelarios, LPN 1/2/0727 6:74 AM EDT      ----- Message -----  From: Claire Cooley  Sent: 7/7/2020 10:54 PM EDT  To: Barber Frye Pool  Subject: RE: Non-Urgent Medical Question       Can u ask Dr Willie Perez, if she would perscribe me eyedrops, for allergy eyes? plz. Thank you .  ----- Message -----  From: Macy Mcclendon  Sent: 7/6/20 10:29 AM  To: Jean Claude Thomas  Subject: RE: Non-Urgent Medical Question     Yes . I faxed them over right after your visit. Steffen Saeed       ----- Message -----   From:Smitha Thomas   Sent:7/6/2020 10:12 AM EDT   To:Nichole Dotson MD   Subject:RE: Non-Urgent Medical Question    Did dr Arteaga send lab papers to [facility] ?      ----- Message -----   From:Abida Saldaña   Sent:6/30/2020 3:00 PM EDT   To:Smitha Thomas   Subject:RE: Non-Urgent Medical Question    She does not have anything at that time or open on that day. Steffen Saeed       ----- Message -----   From:Smitha Canales   Sent:6/29/2020 4:58 AM EDT   To:Nichole Dotson MD   Subject:RE: Non-Urgent Medical Question    Can i be seen later, during day like 3:00 , or later if she has any openings July 6 ?      ----- Message -----   From:Edwige Young LPN   YTKR:5/09/6392 1:59 PM EDT   To:Smitha Thomas   Subject:RE: Non-Urgent Medical Question    Good Afternoon,  I have scheduled an appointment for you to have a virtual visit with Dr. Willie Perez on Monday, July 6, 2020 09:30 AM. Please let the office know if this appointment does not work for you. Thanks,     Jaime Alcaraz. Shanti Arthur, [facility] [place]   Please note- My Chart is for non-urgent health concerns. You can expect a reply from our office within 2 business days. You should not email your provider about emergent health issues. If you have an emergency, please call 911.  If you have an urgent concern, please call our office at [phone].       ----- Message -----   From:Smitha Canales   Sent:6/26/2020 9:32 AM EDT   To:Nichole Martin, MD   Subject:Non-Urgent Medical Question    Can i make a virtual appointment, so i can get a refill for my pain pills plz, n my gap pills . - - - DISCLAIMER - - -  [email or web address][This message may contain formatting that cannot be shown on this site.]

## 2020-07-27 ENCOUNTER — TELEPHONE (OUTPATIENT)
Dept: SLEEP MEDICINE | Age: 55
End: 2020-07-27

## 2020-07-27 DIAGNOSIS — G47.33 OBSTRUCTIVE SLEEP APNEA (ADULT) (PEDIATRIC): Primary | ICD-10-CM

## 2020-07-27 NOTE — TELEPHONE ENCOUNTER
Patient called into the office to request an order for a travel pap to be sent to [address] [place] [address]. Patient can be reached at [phone].

## 2020-08-02 DIAGNOSIS — M48.062 SPINAL STENOSIS OF LUMBAR REGION WITH NEUROGENIC CLAUDICATION: ICD-10-CM

## 2020-08-02 DIAGNOSIS — G89.4 CHRONIC PAIN SYNDROME: ICD-10-CM

## 2020-08-02 RX ORDER — TRAMADOL HYDROCHLORIDE 50 MG/1
50 TABLET ORAL
Qty: 120 TAB | Refills: 0 | Status: SHIPPED | OUTPATIENT
Start: 2020-08-02 | End: 2020-09-02

## 2020-08-07 ENCOUNTER — DOCUMENTATION ONLY (OUTPATIENT)
Dept: SLEEP MEDICINE | Age: 55
End: 2020-08-07

## 2020-08-24 ENCOUNTER — TELEPHONE (OUTPATIENT)
Dept: INTERNAL MEDICINE CLINIC | Age: 55
End: 2020-08-24

## 2020-08-24 NOTE — TELEPHONE ENCOUNTER
----- Message from Ellen Tafoya sent at 8/24/2020  1:24 PM EDT -----  Regarding: Dr. Marium Fowler    Reason: The patient would like to schedule an in-person visit in regard to their blood work as well as a potential ear infection. She [facility]. Call back as soon as possible.      Phone: [phone]

## 2020-08-24 NOTE — TELEPHONE ENCOUNTER
Spoke with patient. Two pt identifiers confirmed. Patient states she no longer needs to speak with a nurse. Patient states her ear is better and [facility] found her orders.

## 2020-08-26 ENCOUNTER — HOSPITAL ENCOUNTER (OUTPATIENT)
Dept: LAB | Age: 55
Discharge: HOME | End: 2020-08-26
Payer: MEDICARE

## 2020-08-26 PROCEDURE — 80053 COMPREHEN METABOLIC PANEL: CPT

## 2020-08-26 PROCEDURE — 85025 COMPLETE CBC W/AUTO DIFF WBC: CPT

## 2020-08-26 PROCEDURE — 84443 ASSAY THYROID STIM HORMONE: CPT

## 2020-08-26 PROCEDURE — 36415 COLL VENOUS BLD VENIPUNCTURE: CPT

## 2020-08-26 PROCEDURE — 80061 LIPID PANEL: CPT

## 2020-08-27 DIAGNOSIS — I82.512 CHRONIC DEEP VEIN THROMBOSIS (DVT) OF FEMORAL VEIN OF LEFT LOWER EXTREMITY (HCC): ICD-10-CM

## 2020-08-27 LAB
ALBUMIN SERPL-MCNC: 4.5 G/DL (ref 3.8–4.9)
ALBUMIN/GLOB SERPL: 2.1 {RATIO} (ref 1.2–2.2)
ALP SERPL-CCNC: 80 IU/L (ref 39–117)
ALT SERPL-CCNC: 14 IU/L (ref 0–32)
AST SERPL-CCNC: 16 IU/L (ref 0–40)
BASOPHILS # BLD AUTO: 0 X10E3/UL (ref 0–0.2)
BASOPHILS NFR BLD AUTO: 1 %
BILIRUB SERPL-MCNC: 0.5 MG/DL (ref 0–1.2)
BUN SERPL-MCNC: 9 MG/DL (ref 6–24)
BUN/CREAT SERPL: 9 (ref 9–23)
CALCIUM SERPL-MCNC: 9.3 MG/DL (ref 8.7–10.2)
CHLORIDE SERPL-SCNC: 101 MMOL/L (ref 96–106)
CHOLEST SERPL-MCNC: 188 MG/DL (ref 100–199)
CO2 SERPL-SCNC: 23 MMOL/L (ref 20–29)
CREAT SERPL-MCNC: 0.97 MG/DL (ref 0.57–1)
EOSINOPHIL # BLD AUTO: 0.1 X10E3/UL (ref 0–0.4)
EOSINOPHIL NFR BLD AUTO: 3 %
ERYTHROCYTE [DISTWIDTH] IN BLOOD BY AUTOMATED COUNT: 12.6 % (ref 11.7–15.4)
GLOBULIN SER CALC-MCNC: 2.1 G/DL (ref 1.5–4.5)
GLUCOSE SERPL-MCNC: 87 MG/DL (ref 65–99)
HCT VFR BLD AUTO: 41.3 % (ref 34–46.6)
HDLC SERPL-MCNC: 69 MG/DL
HGB BLD-MCNC: 13.7 G/DL (ref 11.1–15.9)
IMM GRANULOCYTES # BLD AUTO: 0 X10E3/UL (ref 0–0.1)
IMM GRANULOCYTES NFR BLD AUTO: 0 %
LDLC SERPL CALC-MCNC: 98 MG/DL (ref 0–99)
LYMPHOCYTES # BLD AUTO: 1.6 X10E3/UL (ref 0.7–3.1)
LYMPHOCYTES NFR BLD AUTO: 39 %
MCH RBC QN AUTO: 31.9 PG (ref 26.6–33)
MCHC RBC AUTO-ENTMCNC: 33.2 G/DL (ref 31.5–35.7)
MCV RBC AUTO: 96 FL (ref 79–97)
MONOCYTES # BLD AUTO: 0.3 X10E3/UL (ref 0.1–0.9)
MONOCYTES NFR BLD AUTO: 8 %
NEUTROPHILS # BLD AUTO: 2 X10E3/UL (ref 1.4–7)
NEUTROPHILS NFR BLD AUTO: 49 %
PLATELET # BLD AUTO: 245 X10E3/UL (ref 150–450)
POTASSIUM SERPL-SCNC: 4.3 MMOL/L (ref 3.5–5.2)
PROT SERPL-MCNC: 6.6 G/DL (ref 6–8.5)
RBC # BLD AUTO: 4.29 X10E6/UL (ref 3.77–5.28)
SODIUM SERPL-SCNC: 141 MMOL/L (ref 134–144)
T4 FREE SERPL-MCNC: 1.25 NG/DL (ref 0.82–1.77)
TRIGL SERPL-MCNC: 107 MG/DL (ref 0–149)
TSH SERPL DL<=0.005 MIU/L-ACNC: 2.84 UIU/ML (ref 0.45–4.5)
VLDLC SERPL CALC-MCNC: 21 MG/DL (ref 5–40)
WBC # BLD AUTO: 4 X10E3/UL (ref 3.4–10.8)

## 2020-08-27 RX ORDER — RIVAROXABAN 20 MG/1
TABLET, FILM COATED ORAL
Qty: 90 TAB | Refills: 0 | Status: SHIPPED | OUTPATIENT
Start: 2020-08-27 | End: 2020-12-02 | Stop reason: SDUPTHER

## 2020-08-27 NOTE — PROGRESS NOTES
All labs are normal  Thyroid at goal  Normal blood count  Normal kidney and liver function and blood count  Results via my chart

## 2020-08-29 RX ORDER — METHOCARBAMOL 500 MG/1
TABLET, FILM COATED ORAL
Qty: 90 TAB | Refills: 0 | Status: SHIPPED | OUTPATIENT
Start: 2020-08-29 | End: 2020-10-07

## 2020-09-01 DIAGNOSIS — G89.4 CHRONIC PAIN SYNDROME: ICD-10-CM

## 2020-09-01 DIAGNOSIS — M48.062 SPINAL STENOSIS OF LUMBAR REGION WITH NEUROGENIC CLAUDICATION: ICD-10-CM

## 2020-09-02 RX ORDER — TRAMADOL HYDROCHLORIDE 50 MG/1
TABLET ORAL
Qty: 120 TAB | Refills: 0 | Status: SHIPPED | OUTPATIENT
Start: 2020-09-02 | End: 2020-10-07

## 2020-09-12 RX ORDER — DULOXETIN HYDROCHLORIDE 30 MG/1
30 CAPSULE, DELAYED RELEASE ORAL DAILY
Qty: 90 CAP | Refills: 1 | Status: SHIPPED | OUTPATIENT
Start: 2020-09-12 | End: 2020-09-16 | Stop reason: DRUGHIGH

## 2020-09-16 RX ORDER — DULOXETIN HYDROCHLORIDE 60 MG/1
60 CAPSULE, DELAYED RELEASE ORAL DAILY
Qty: 90 CAP | Refills: 2 | Status: SHIPPED | OUTPATIENT
Start: 2020-09-16 | End: 2020-09-21 | Stop reason: SINTOL

## 2020-09-18 DIAGNOSIS — K21.9 GASTROESOPHAGEAL REFLUX DISEASE WITHOUT ESOPHAGITIS: ICD-10-CM

## 2020-09-18 RX ORDER — PANTOPRAZOLE SODIUM 40 MG/1
TABLET, DELAYED RELEASE ORAL
Qty: 90 TAB | Refills: 1 | Status: SHIPPED | OUTPATIENT
Start: 2020-09-18 | End: 2020-12-28 | Stop reason: SDUPTHER

## 2020-09-21 RX ORDER — ESCITALOPRAM OXALATE 20 MG/1
20 TABLET ORAL DAILY
Qty: 90 TAB | Refills: 1
Start: 2020-09-21 | End: 2020-12-28 | Stop reason: SDUPTHER

## 2020-10-06 DIAGNOSIS — G89.4 CHRONIC PAIN SYNDROME: ICD-10-CM

## 2020-10-06 DIAGNOSIS — M48.062 SPINAL STENOSIS OF LUMBAR REGION WITH NEUROGENIC CLAUDICATION: ICD-10-CM

## 2020-10-06 DIAGNOSIS — K57.92 DIVERTICULITIS: ICD-10-CM

## 2020-10-07 ENCOUNTER — OFFICE VISIT (OUTPATIENT)
Dept: URGENT CARE | Age: 55
End: 2020-10-07
Payer: MEDICARE

## 2020-10-07 VITALS — HEART RATE: 75 BPM | RESPIRATION RATE: 16 BRPM | OXYGEN SATURATION: 95 % | TEMPERATURE: 98.1 F

## 2020-10-07 DIAGNOSIS — R68.83 CHILLS: ICD-10-CM

## 2020-10-07 DIAGNOSIS — R52 BODY ACHES: Primary | ICD-10-CM

## 2020-10-07 DIAGNOSIS — Z11.59 SCREENING FOR VIRAL DISEASE: ICD-10-CM

## 2020-10-07 DIAGNOSIS — R05.9 COUGH: ICD-10-CM

## 2020-10-07 PROCEDURE — G9899 SCRN MAM PERF RSLTS DOC: HCPCS | Performed by: FAMILY MEDICINE

## 2020-10-07 PROCEDURE — 3017F COLORECTAL CA SCREEN DOC REV: CPT | Performed by: FAMILY MEDICINE

## 2020-10-07 PROCEDURE — 99203 OFFICE O/P NEW LOW 30 MIN: CPT | Performed by: FAMILY MEDICINE

## 2020-10-07 PROCEDURE — G8432 DEP SCR NOT DOC, RNG: HCPCS | Performed by: FAMILY MEDICINE

## 2020-10-07 PROCEDURE — G8427 DOCREV CUR MEDS BY ELIG CLIN: HCPCS | Performed by: FAMILY MEDICINE

## 2020-10-07 PROCEDURE — G8417 CALC BMI ABV UP PARAM F/U: HCPCS | Performed by: FAMILY MEDICINE

## 2020-10-07 RX ORDER — TRAMADOL HYDROCHLORIDE 50 MG/1
TABLET ORAL
Qty: 120 TAB | Refills: 0 | Status: SHIPPED | OUTPATIENT
Start: 2020-10-07 | End: 2020-11-06 | Stop reason: SDUPTHER

## 2020-10-07 RX ORDER — ALBUTEROL SULFATE 0.83 MG/ML
2.5 SOLUTION RESPIRATORY (INHALATION)
Qty: 75 ML | Refills: 0 | Status: SHIPPED | OUTPATIENT
Start: 2020-10-07 | End: 2020-12-28 | Stop reason: SDUPTHER

## 2020-10-07 RX ORDER — METHOCARBAMOL 500 MG/1
TABLET, FILM COATED ORAL
Qty: 90 TAB | Refills: 0 | Status: SHIPPED | OUTPATIENT
Start: 2020-10-07 | End: 2020-10-26

## 2020-10-07 NOTE — PROGRESS NOTES
This patient was seen in [facility] at [address] [place] [address] Urgent Care while in their vehicle due to COVID-19 pandemic with PPE and focused examination in order to decrease community viral transmission. The patient/guardian gave verbal consent to treat. Enedina Amato is a 63 y.o. female who presents with cough, congestion, chills and body aches x  3 days, worsened yesterday. No known COVID-19 exposure. Denies fever, SOB. PMH: Asthma, HLD, hypothyroidism. The history is provided by the patient. Past Medical History:   Diagnosis Date    Arthritis     Asthma     Breast cyst 1996    left, removed    Chronic pain     LOWER BACK    Colon polyps     Diverticulitis     DVT (deep venous thrombosis) ([facility] [address] 10/18/2018    provoked after back sx    Fatty liver     GERD (gastroesophageal reflux disease)     Hypercholesteremia     Hypothyroidism     Nicotine vapor product user     Obesity     SUN (obstructive sleep apnea)     uses CPAP    Psychiatric disorder     depression    Thyroid disease     hypothyroid        Past Surgical History:   Procedure Laterality Date    COLONOSCOPY N/A 11/14/2017    COLON[facility]night [facility] at [facility] ENDOSCOPY   [facility] SURGERY  2005, 2006    L5 & S 1    HX BACK SURGERY  08/30/2018    HX BACK SURGERY      HX BREAST BIOPSY      Left    HX HYSTERECTOMY  6/22/09    [facility]    HX OOPHORECTOMY Left     One ovary removed.     HX ORTHOPAEDIC Left 1972    thumb surgery    HX ORTHOPAEDIC Left 2000    left knee surgery    HX ORTHOPAEDIC Left 2013    ankle    HX TUBAL LIGATION      REMOVAL OF KIDNEY STONE      RENAL STENT           Family History   Problem Relation Age of Onset    Cancer Mother 54        Lung cancer    Heart Disease Father     Hypertension Father     Elevated Lipids Father     Heart Attack Father     Stroke Father         x 3    Elevated Lipids Sister     Heart Disease Brother     Hypertension Brother     Elevated Lipids Brother     Elevated Lipids Sister     Elevated Lipids Sister     Heart Disease Sister     Cancer Sister         uterine    Heart Disease Sister     Cancer Sister         uterine    Heart Disease Sister    العراق[place] Cancer Sister         colon cancer with liver mets    Bleeding Prob Brother     Heart Attack Brother     Anesth Problems Neg Hx         Social History     Socioeconomic History    Marital status:      Spouse name: Not on file    Number of children: Not on file    Years of education: Not on file    Highest education level: Not on file   Occupational History    Not on file   Social Needs    Financial resource strain: Not on file    Food insecurity     Worry: Not on file     Inability: Not on file   [employer] needs     Medical: Not on file     Non-medical: Not on file   Tobacco Use    Smoking status: Former Smoker     Packs/day: 1.50     Years: 25.00     Pack years: 37.50     Last attempt to quit: 2015     Years since quittin.6    Smokeless tobacco: Never Used    Tobacco comment: vape   Substance and Sexual Activity    Alcohol use: Yes     Comment: occasional drinker    Drug use: No    Sexual activity: Yes     Partners: Male     Birth control/protection: Surgical   Lifestyle    Physical activity     Days per week: Not on file     Minutes per session: Not on file    Stress: Not on file   Relationships    Social connections     Talks on phone: Not on file     Gets together: Not on file     Attends Congregational service: Not on file     Active member of club or organization: Not on file     Attends meetings of clubs or organizations: Not on file     Relationship status: Not on file    Intimate partner violence     Fear of current or ex partner: Not on file     Emotionally abused: Not on file     Physically abused: Not on file     Forced sexual activity: Not on file   Other Topics Concern    Not on file   Social History Narrative    Not on file ALLERGIES: Codeine and Pcn [penicillins]    Review of Systems   Constitutional: Positive for chills. Negative for activity change, appetite change and fever. HENT: Positive for congestion. Negative for rhinorrhea and sore throat. Respiratory: Positive for cough. Negative for shortness of breath and wheezing. Cardiovascular: Negative for chest pain. Gastrointestinal: Negative for abdominal pain, diarrhea, nausea and vomiting. Musculoskeletal: Positive for myalgias. Neurological: Negative for headaches. Vitals:    10/07/20 1652   Pulse: 75   Resp: 16   Temp: 98.1 °F (36.7 °C)   SpO2: 95%       Physical Exam  Vitals signs and nursing note reviewed. Constitutional:       General: She is not in acute distress. Appearance: She is well-developed. She is not diaphoretic. Pulmonary:      Effort: Pulmonary effort is normal. No respiratory distress. Breath sounds: Normal breath sounds. No stridor. No wheezing, rhonchi or rales. Neurological:      Mental Status: She is alert. Psychiatric:         Behavior: Behavior normal.         Thought Content: Thought content normal.         Judgment: Judgment normal.         MDM    ICD-10-CM ICD-9-CM   1. Body aches  R52 780.96   2. Chills  R68.83 780.64   3. Cough  R05 786.2   4. Screening for viral disease  Z11.59 V73.99       Orders Placed This Encounter    NOVEL CORONAVIRUS (COVID-19)     Scheduling Instructions:      1) Due to current limited availability of the COVID-19 PCR test, tests will be prioritized and may not be completed.              2) Order only if the test result will change clinical management or necessary for a return to mission-critical employment decision.              3) Print and instruct patient to adhere to [facility] home isolation program. (Link Above)              4) Set up or refer patient for a monitoring program.              5) Have patient sign up for and leverage MyChart (if not previously done).      Order Specific Question:   Is this test for diagnosis or screening? Answer:   Diagnosis of ill patient     Order Specific Question:   Symptomatic for COVID-19 as defined by CDC? Answer:   Yes     Order Specific Question:   Date of Symptom Onset     Answer:   10/5/2020     Order Specific Question:   Hospitalized for COVID-19? Answer:   No     Order Specific Question:   Admitted to ICU for COVID-19? Answer:   No     Order Specific Question:   Employed in healthcare setting? Answer:   No     Order Specific Question:   Resident in a congregate (group) care setting? Answer:   No     Order Specific Question:   Pregnant? Answer:   No     Order Specific Question:   Previously tested for COVID-19? Answer:   No        Self Quarantine  Deep breathing exercises, ambulation  Tylenol prn  Increase fluids    If signs and symptoms become worse the pt is to go to the ER.          Procedures

## 2020-10-07 NOTE — TELEPHONE ENCOUNTER
Patient requires virtual visit follow up on narcotics. On chronic tramadol. Needs to be seen every 3 months. Please schedule. Medical was refilled. Also, what is the reason for request for flagyl?

## 2020-10-09 LAB — SARS-COV-2, NAA: NOT DETECTED

## 2020-10-19 RX ORDER — METRONIDAZOLE 500 MG/1
TABLET ORAL
Qty: 42 TAB | Refills: 0 | Status: SHIPPED | OUTPATIENT
Start: 2020-10-19 | End: 2021-01-08 | Stop reason: ALTCHOICE

## 2020-10-22 ENCOUNTER — TELEPHONE (OUTPATIENT)
Dept: INTERNAL MEDICINE CLINIC | Age: 55
End: 2020-10-22

## 2020-10-22 NOTE — TELEPHONE ENCOUNTER
Maryanne Sheets [facility] Office Pool               General Message/Vendor Calls     Caller's first and last name: n/a       Reason for call: Pt wanted to inform the practice of a Pharmacy change for her. Callback required yes/no and why: yes       Best contact number(s): [phone]       Details to clarify the request: [address] [address] is [address] [place] [address]. [phone].        Shaka Trejo     Copy/Paste  ENVERA

## 2020-10-27 RX ORDER — MONTELUKAST SODIUM 10 MG/1
TABLET ORAL
Qty: 90 TAB | Refills: 0 | Status: SHIPPED | OUTPATIENT
Start: 2020-10-27 | End: 2020-12-01 | Stop reason: SDUPTHER

## 2020-10-30 ENCOUNTER — TELEPHONE (OUTPATIENT)
Dept: INTERNAL MEDICINE CLINIC | Age: 55
End: 2020-10-30

## 2020-10-30 RX ORDER — SULFAMETHOXAZOLE AND TRIMETHOPRIM 800; 160 MG/1; MG/1
1 TABLET ORAL 2 TIMES DAILY
Qty: 14 TAB | Refills: 0 | Status: SHIPPED | OUTPATIENT
Start: 2020-10-30 | End: 2020-11-06

## 2020-10-30 NOTE — TELEPHONE ENCOUNTER
Spoke with patient. Two pt identifiers confirmed. Patient advised per Dr. Lb Torres that a prescription for Bactrim has been sent to her pharmacy. Pt verbalized understanding of information discussed w/ no further questions at this time.

## 2020-10-30 NOTE — TELEPHONE ENCOUNTER
Spoke with patient. Two pt identifiers confirmed. Patient states that she thinks that she has a kidney infection. Patient states that she is having a lot of left flank pain. Patient denies any pain with urination. Patient states that she has had a lot of urine frequency. Patient states that her urine in very dark yellow in color. Patient states that she does not know if she has a fever, but she has been having the chills. Patient states that her symptoms started about a week ago. Patient states that she has had kidney infections in the past and had the same symptoms. Patient states that she just finished an antibiotic for her diverticulitis. Patient states that she would like to know if someone can send in antibiotic for her, she does not want to get worse over the weekend. Advised patient that I will send a message to the on call physician and will give her a call back as soon as she responds. Pt verbalized understanding of information discussed w/ no further questions at this time.

## 2020-10-30 NOTE — TELEPHONE ENCOUNTER
#[phone] pt states she has left kidney pain and needs an antibiotic called in for her today. Please mesfin pt back yet today she ask to let her know what you can do.

## 2020-11-06 ENCOUNTER — VIRTUAL VISIT (OUTPATIENT)
Dept: INTERNAL MEDICINE CLINIC | Age: 55
End: 2020-11-06
Payer: MEDICARE

## 2020-11-06 DIAGNOSIS — I82.512 CHRONIC DEEP VEIN THROMBOSIS (DVT) OF FEMORAL VEIN OF LEFT LOWER EXTREMITY (HCC): ICD-10-CM

## 2020-11-06 DIAGNOSIS — E03.9 ACQUIRED HYPOTHYROIDISM: ICD-10-CM

## 2020-11-06 DIAGNOSIS — M48.062 SPINAL STENOSIS OF LUMBAR REGION WITH NEUROGENIC CLAUDICATION: Primary | ICD-10-CM

## 2020-11-06 DIAGNOSIS — J45.30 MILD PERSISTENT ASTHMA WITHOUT COMPLICATION: ICD-10-CM

## 2020-11-06 DIAGNOSIS — G89.4 CHRONIC PAIN SYNDROME: ICD-10-CM

## 2020-11-06 PROCEDURE — G8427 DOCREV CUR MEDS BY ELIG CLIN: HCPCS | Performed by: FAMILY MEDICINE

## 2020-11-06 PROCEDURE — 3017F COLORECTAL CA SCREEN DOC REV: CPT | Performed by: FAMILY MEDICINE

## 2020-11-06 PROCEDURE — G9899 SCRN MAM PERF RSLTS DOC: HCPCS | Performed by: FAMILY MEDICINE

## 2020-11-06 PROCEDURE — G8432 DEP SCR NOT DOC, RNG: HCPCS | Performed by: FAMILY MEDICINE

## 2020-11-06 PROCEDURE — G0463 HOSPITAL OUTPT CLINIC VISIT: HCPCS | Performed by: FAMILY MEDICINE

## 2020-11-06 PROCEDURE — 99214 OFFICE O/P EST MOD 30 MIN: CPT | Performed by: FAMILY MEDICINE

## 2020-11-06 PROCEDURE — G8417 CALC BMI ABV UP PARAM F/U: HCPCS | Performed by: FAMILY MEDICINE

## 2020-11-06 RX ORDER — GABAPENTIN 400 MG/1
400 CAPSULE ORAL 3 TIMES DAILY
Qty: 90 CAP | Refills: 2 | Status: SHIPPED | OUTPATIENT
Start: 2020-11-06 | End: 2020-12-28 | Stop reason: SDUPTHER

## 2020-11-06 RX ORDER — METHOCARBAMOL 500 MG/1
TABLET, FILM COATED ORAL
Qty: 90 TAB | Refills: 1 | Status: SHIPPED | OUTPATIENT
Start: 2020-11-06 | End: 2020-12-28 | Stop reason: SDUPTHER

## 2020-11-06 RX ORDER — TRAMADOL HYDROCHLORIDE 50 MG/1
50 TABLET ORAL
Qty: 120 TAB | Refills: 0 | Status: SHIPPED | OUTPATIENT
Start: 2020-11-06 | End: 2020-12-02 | Stop reason: SDUPTHER

## 2020-11-06 NOTE — PROGRESS NOTES
Ronald Villa is a 63 y.o. female patient of Dr Deacon Farnsworth who presents for follow up on medications. Last seen by Dr Deacon Farnsworth in July 2020. Treated for chronic pain. Taking tramadol #120 per month. Gabapentin 400mg TID. Robaxin 500mg 4 a day. She reports the pain medication allows her to function better. Reduces her pain by 30% she states. She is not having side effects. Treated with antibiotics recently. Is using arnuity inhaler. Occasional albuterol use. Had flu shot. Taking xarelto for chronic DVT. No NSAIDS use. This is an established visit conducted via telemedicine with video. The patient has been instructed that this meets HIPAA criteria and acknowledges and agrees to this method of visitation. Pursuant to the emergency declaration under the [facility], [facility] waiver authority and the [employer] and [employer] Act, this Virtual Visit was conducted, with patient's consent, to reduce the patient's risk of exposure to COVID-19 and provide continuity of care for an established patient. Services were provided through a video synchronous discussion virtually to substitute for in-person clinic visit.         Past Medical History:   Diagnosis Date    Arthritis     Asthma     Breast cyst 1996    left, removed    Chronic pain     LOWER BACK    Colon polyps     Diverticulitis     DVT (deep venous thrombosis) ([facility] [address] 10/18/2018    provoked after back sx    Fatty liver     GERD (gastroesophageal reflux disease)     Hypercholesteremia     Hypothyroidism     Nicotine vapor product user     Obesity     SUN (obstructive sleep apnea)     uses CPAP    Psychiatric disorder     depression    Thyroid disease     hypothyroid       Family History   Problem Relation Age of Onset    Cancer Mother 54        Lung cancer    Heart Disease Father     Hypertension Father     Elevated Lipids Father     Heart Attack Father     Stroke Father         x 3    Elevated Lipids Sister     Heart Disease Brother     Hypertension Brother     Elevated Lipids Brother     Elevated Lipids Sister     Elevated Lipids Sister     Heart Disease Sister     Cancer Sister         uterine    Heart Disease Sister     Cancer Sister         uterine    Heart Disease Sister    [facility] Cancer Sister         colon cancer with liver mets    Bleeding Prob Brother     Heart Attack Brother     Anesth Problems Neg Hx        Social History     Socioeconomic History    Marital status:      Spouse name: Not on file    Number of children: Not on file    Years of education: Not on file    Highest education level: Not on file   Occupational History    Not on file   Social Needs    Financial resource strain: Not on file    Food insecurity     Worry: Not on file     Inability: Not on file   [employer] needs     Medical: Not on file     Non-medical: Not on file   Tobacco Use    Smoking status: Former Smoker     Packs/day: 1.50     Years: 25.00     Pack years: 37.50     Last attempt to quit: 2015     Years since quittin.7    Smokeless tobacco: Never Used    Tobacco comment: vape   Substance and Sexual Activity    Alcohol use: Yes     Comment: occasional drinker    Drug use: No    Sexual activity: Yes     Partners: Male     Birth control/protection: Surgical   Lifestyle    Physical activity     Days per week: Not on file     Minutes per session: Not on file    Stress: Not on file   Relationships    Social connections     Talks on phone: Not on file     Gets together: Not on file     Attends Episcopal service: Not on file     Active member of club or organization: Not on file     Attends meetings of clubs or organizations: Not on file     Relationship status: Not on file    Intimate partner violence     Fear of current or ex partner: Not on file     Emotionally abused: Not on file     Physically abused: Not on file     Forced sexual activity: Not on file   Other Topics Concern    Not on file   Social History Narrative    Not on file       Current Outpatient Medications on File Prior to Visit   Medication Sig Dispense Refill    montelukast (SINGULAIR) 10 mg tablet TAKE 1 TABLET BY MOUTH EVERY DAY 90 Tab 0    albuterol (PROVENTIL VENTOLIN) 2.5 mg /3 mL (0.083 %) nebu 3 ML BY NEBULIZATION ROUTE EVERY FOUR (4) HOURS AS NEEDED FOR WHEEZING. 75 mL 0    escitalopram oxalate (LEXAPRO) 20 mg tablet Take 1 Tab by mouth daily. 90 Tab 1    pantoprazole (PROTONIX) 40 mg tablet TAKE 1 TABLET BY MOUTH EVERY DAY FOR GERD 90 Tab 1    Xarelto 20 mg tab tablet TAKE 1 TABLET BY MOUTH EVERY DAY IN THE MORNING WITH BREAKFAST 90 Tab 0    Ventolin HFA 90 mcg/actuation inhaler INHALE 1 PUFF EVERY 6 HOURS AS NEEDED FOR WHEEZING 18 Inhaler 2    lovastatin (MEVACOR) 20 mg tablet TAKE 1 TABLET BY MOUTH EVERY DAY EVERY NIGHT 90 Tab 1    traZODone (DESYREL) 50 mg tablet TAKE 1 TABLET BY MOUTH EVERY DAY AT NIGHT 90 Tab 2    fluticasone propionate (FLONASE) 50 mcg/actuation nasal spray SPRAY 2 SPRAYS INTO EACH NOSTRIL EVERY DAY 1 Bottle 1    furosemide (LASIX) 20 mg tablet TAKE 1 TABLET BY MOUTH DAILY AS NEEDED (SWELLING). 20 Tab 0    levothyroxine (SYNTHROID) 88 mcg tablet TAKE 1 TABLET BY MOUTH EVERY DAY BEFORE BREAKFAST 90 Tab 5    fluticasone furoate (ARNUITY ELLIPTA) 100 mcg/actuation dsdv inhaler Take 1 Puff by inhalation daily. 2 Inhaler 4    acetaminophen (TYLENOL) 325 mg tablet Take 325 mg by mouth two (2) times daily as needed for Pain.  albuterol (PROVENTIL HFA, VENTOLIN HFA, PROAIR HFA) 90 mcg/actuation inhaler Take 1 Puff by inhalation every six (6) hours as needed for Wheezing. 1 Inhaler 2    cromolyn (OPTICROM) 4 % ophthalmic solution Administer 1 Drop to both eyes as needed. Use in affected eye(s)      [DISCONTINUED] trimethoprim-sulfamethoxazole (BACTRIM DS, SEPTRA DS) 160-800 mg per tablet Take 1 Tab by mouth two (2) times a day. 14 Tab 0    [DISCONTINUED] methocarbamoL (ROBAXIN) 500 mg tablet TAKE 1 TABLET BY MOUTH FOUR TIMES A DAY as needed for muscle spasm 90 Tab 0    metroNIDAZOLE (FLAGYL) 500 mg tablet TAKE 1 TABLET BY MOUTH THREE TIMES A DAY 42 Tab 0    [DISCONTINUED] traMADoL (ULTRAM) 50 mg tablet TAKE 1 TABLET BY MOUTH EVERY 6 HOURS AS NEEDED FOR PAIN 120 Tab 0    olopatadine (Pataday) 0.1 % ophthalmic solution Administer 2 Drops to both eyes two (2) times a day. 1 Bottle 1    [DISCONTINUED] gabapentin (NEURONTIN) 400 mg capsule Take 1 Cap by mouth three (3) times daily. Max Daily Amount: 1,200 mg. 90 Cap 2    [DISCONTINUED] umeclidinium-vilanterol (ANORO ELLIPTA) 62.5-25 mcg/actuation inhaler Take 1 Puff by inhalation daily. 1 Inhaler 0    Nebulizer Accessories kit Use nebulizer machine as needed for dyspnea 1 Kit 0     No current facility-administered medications on file prior to visit. Review of Systems  Pertinent items are noted in HPI. Objective:     Gen: well appearing female  HEENT: normal conjunctiva, no audible congestion, patient does not see oral erythema, has MMM  Neck: patient does not feel enlarged or tender LAD or masses  Resp: normal respiratory effort, no audible wheezing. CV: patient does not feel palpitations or heart irregularity  Abd: patient does not feel abdominal tenderness or mass, patient does not notice distension  Extrem: patient does not see swelling in ankles or joints. Neuro: Alert and oriented, able to answer questions without difficulty      Assessment/Plan:       ICD-10-CM ICD-9-CM    1. Spinal stenosis of lumbar region with neurogenic claudication  M48.062 724.03 gabapentin (NEURONTIN) 400 mg capsule      traMADoL (ULTRAM) 50 mg tablet      methocarbamoL (ROBAXIN) 500 mg tablet   2. Chronic pain syndrome  G89.4 338.4 gabapentin (NEURONTIN) 400 mg capsule      traMADoL (ULTRAM) 50 mg tablet      methocarbamoL (ROBAXIN) 500 mg tablet   3.  Chronic deep vein thrombosis (DVT) of femoral vein of left lower extremity (HCC)  I82.512 453.51    4. Acquired hypothyroidism  E03.9 244.9    5. Mild persistent asthma without complication  A55.51 473.18        This was a telemedicine visit with video. Pacheco Adames MD    Follow-up and Dispositions    · Return in about 3 months (around 2/6/2021) for follow up with Dr Livia Sosa. Susan Spencer

## 2020-12-01 RX ORDER — MONTELUKAST SODIUM 10 MG/1
TABLET ORAL
Qty: 90 TAB | Refills: 0 | Status: SHIPPED | OUTPATIENT
Start: 2020-12-01 | End: 2020-12-28 | Stop reason: SDUPTHER

## 2020-12-02 DIAGNOSIS — M48.062 SPINAL STENOSIS OF LUMBAR REGION WITH NEUROGENIC CLAUDICATION: ICD-10-CM

## 2020-12-02 DIAGNOSIS — I82.512 CHRONIC DEEP VEIN THROMBOSIS (DVT) OF FEMORAL VEIN OF LEFT LOWER EXTREMITY (HCC): ICD-10-CM

## 2020-12-02 DIAGNOSIS — G89.4 CHRONIC PAIN SYNDROME: ICD-10-CM

## 2020-12-03 RX ORDER — TRAMADOL HYDROCHLORIDE 50 MG/1
50 TABLET ORAL
Qty: 120 TAB | Refills: 0 | Status: SHIPPED | OUTPATIENT
Start: 2020-12-03 | End: 2021-01-08 | Stop reason: ALTCHOICE

## 2020-12-03 NOTE — TELEPHONE ENCOUNTER
Future Appointments:  No future appointments.      Last Appointment With Me:  7/6/2020     Requested Prescriptions     Pending Prescriptions Disp Refills    rivaroxaban (Xarelto) 20 mg tab tablet 90 Tab 0

## 2020-12-28 DIAGNOSIS — M48.062 SPINAL STENOSIS OF LUMBAR REGION WITH NEUROGENIC CLAUDICATION: ICD-10-CM

## 2020-12-28 DIAGNOSIS — G89.4 CHRONIC PAIN SYNDROME: ICD-10-CM

## 2020-12-28 DIAGNOSIS — H10.13 ALLERGIC CONJUNCTIVITIS OF BOTH EYES: ICD-10-CM

## 2020-12-28 DIAGNOSIS — F32.A DEPRESSION, UNSPECIFIED DEPRESSION TYPE: ICD-10-CM

## 2020-12-28 DIAGNOSIS — K21.9 GASTROESOPHAGEAL REFLUX DISEASE WITHOUT ESOPHAGITIS: ICD-10-CM

## 2020-12-28 RX ORDER — TRAMADOL HYDROCHLORIDE 50 MG/1
50 TABLET ORAL
Qty: 120 TAB | Refills: 0 | OUTPATIENT
Start: 2020-12-28 | End: 2021-01-27

## 2020-12-28 RX ORDER — GABAPENTIN 400 MG/1
400 CAPSULE ORAL 3 TIMES DAILY
Qty: 90 CAP | Refills: 2 | Status: SHIPPED | OUTPATIENT
Start: 2020-12-28 | End: 2021-05-23 | Stop reason: SDUPTHER

## 2020-12-28 RX ORDER — ESCITALOPRAM OXALATE 20 MG/1
20 TABLET ORAL DAILY
Qty: 90 TAB | Refills: 1 | Status: SHIPPED | OUTPATIENT
Start: 2020-12-28 | End: 2021-08-01 | Stop reason: SDUPTHER

## 2020-12-28 RX ORDER — ALBUTEROL SULFATE 90 UG/1
1 AEROSOL, METERED RESPIRATORY (INHALATION)
Qty: 3 INHALER | Refills: 2 | Status: SHIPPED | OUTPATIENT
Start: 2020-12-28

## 2020-12-28 RX ORDER — FLUTICASONE PROPIONATE 50 MCG
2 SPRAY, SUSPENSION (ML) NASAL DAILY
Qty: 1 BOTTLE | Refills: 1 | Status: SHIPPED | OUTPATIENT
Start: 2020-12-28 | End: 2021-06-07 | Stop reason: SDUPTHER

## 2020-12-28 RX ORDER — OLOPATADINE HYDROCHLORIDE 1 MG/ML
2 SOLUTION/ DROPS OPHTHALMIC 2 TIMES DAILY
Qty: 1 BOTTLE | Refills: 1 | Status: SHIPPED | OUTPATIENT
Start: 2020-12-28 | End: 2022-02-24 | Stop reason: ALTCHOICE

## 2020-12-28 RX ORDER — ALBUTEROL SULFATE 0.83 MG/ML
2.5 SOLUTION RESPIRATORY (INHALATION)
Qty: 75 ML | Refills: 0 | Status: SHIPPED | OUTPATIENT
Start: 2020-12-28 | End: 2021-02-22 | Stop reason: SDUPTHER

## 2020-12-28 RX ORDER — MONTELUKAST SODIUM 10 MG/1
10 TABLET ORAL DAILY
Qty: 90 TAB | Refills: 0 | Status: SHIPPED | OUTPATIENT
Start: 2020-12-28 | End: 2021-02-22 | Stop reason: SDUPTHER

## 2020-12-28 RX ORDER — METHOCARBAMOL 500 MG/1
TABLET, FILM COATED ORAL
Qty: 90 TAB | Refills: 1 | Status: SHIPPED | OUTPATIENT
Start: 2020-12-28 | End: 2021-04-26

## 2020-12-28 RX ORDER — PANTOPRAZOLE SODIUM 40 MG/1
40 TABLET, DELAYED RELEASE ORAL DAILY
Qty: 90 TAB | Refills: 1 | Status: SHIPPED | OUTPATIENT
Start: 2020-12-28 | End: 2021-09-21

## 2020-12-28 RX ORDER — TRAZODONE HYDROCHLORIDE 50 MG/1
50 TABLET ORAL
Qty: 90 TAB | Refills: 2 | Status: SHIPPED | OUTPATIENT
Start: 2020-12-28 | End: 2021-02-22 | Stop reason: SDUPTHER

## 2020-12-29 ENCOUNTER — PATIENT MESSAGE (OUTPATIENT)
Dept: INTERNAL MEDICINE CLINIC | Age: 55
End: 2020-12-29

## 2020-12-30 NOTE — TELEPHONE ENCOUNTER
Spoke with patient and appointment was scheduled  1/8/21 at 9:45 am for medication refills with Dr. Maryana Herrera. Identified patient 2 identifiers verified.

## 2020-12-30 NOTE — TELEPHONE ENCOUNTER
----- Message from Sania Burroughs. Nolvia Braga sent at 12/29/2020 10:22 AM EST -----  Regarding: Non-Urgent Medical Question  Contact: [phone]  Need to make an appointment,  So I can get a refill on trumodol. Thank you .

## 2021-01-08 ENCOUNTER — VIRTUAL VISIT (OUTPATIENT)
Dept: INTERNAL MEDICINE CLINIC | Age: 56
End: 2021-01-08
Payer: MEDICARE

## 2021-01-08 DIAGNOSIS — G89.4 CHRONIC PAIN SYNDROME: ICD-10-CM

## 2021-01-08 DIAGNOSIS — M48.062 SPINAL STENOSIS OF LUMBAR REGION WITH NEUROGENIC CLAUDICATION: Primary | ICD-10-CM

## 2021-01-08 PROCEDURE — G8421 BMI NOT CALCULATED: HCPCS | Performed by: FAMILY MEDICINE

## 2021-01-08 PROCEDURE — G8432 DEP SCR NOT DOC, RNG: HCPCS | Performed by: FAMILY MEDICINE

## 2021-01-08 PROCEDURE — 3017F COLORECTAL CA SCREEN DOC REV: CPT | Performed by: FAMILY MEDICINE

## 2021-01-08 PROCEDURE — G0463 HOSPITAL OUTPT CLINIC VISIT: HCPCS | Performed by: FAMILY MEDICINE

## 2021-01-08 PROCEDURE — 99213 OFFICE O/P EST LOW 20 MIN: CPT | Performed by: FAMILY MEDICINE

## 2021-01-08 PROCEDURE — G8427 DOCREV CUR MEDS BY ELIG CLIN: HCPCS | Performed by: FAMILY MEDICINE

## 2021-01-08 RX ORDER — TRAMADOL HYDROCHLORIDE 50 MG/1
50 TABLET ORAL
Qty: 120 TAB | Refills: 0 | Status: SHIPPED | OUTPATIENT
Start: 2021-01-08 | End: 2021-02-07

## 2021-01-08 RX ORDER — FLUCONAZOLE 150 MG/1
TABLET ORAL
Qty: 2 TAB | Refills: 0 | Status: SHIPPED | OUTPATIENT
Start: 2021-01-08 | End: 2021-08-19 | Stop reason: ALTCHOICE

## 2021-01-08 NOTE — PROGRESS NOTES
Eddie Nichols is a 63 y.o. female who patient of Dr Camila Benedict presents for follow-up on  Medication. She was last seen by me November 6. The patient is treated for chronic pain and is under a controlled substance agreement. She has been on gabapentin 400 mg 3 times a day (12/29), Robaxin 500 mg 4 times a day, and tramadol #120 (12/4)  per month. The patient reports that the pain medication improves her daily function. She denies side effects. She is on Xarelto for chronic DVT. No bleeding. She reports vaginal yeast infection. This is an established visit conducted via telemedicine with video. The patient has been instructed that this meets HIPAA criteria and acknowledges and agrees to this method of visitation. Pursuant to the emergency declaration under the [facility], [facility] waiver authority and the [employer] and [employer] Act, this Virtual Visit was conducted, with patient's consent, to reduce the patient's risk of exposure to COVID-19 and provide continuity of care for an established patient. Services were provided through a video synchronous discussion virtually to substitute for in-person clinic visit.         Past Medical History:   Diagnosis Date    Arthritis     Asthma     Breast cyst 1996    left, removed    Chronic pain     LOWER BACK    Colon polyps     Diverticulitis     DVT (deep venous thrombosis) ([facility] [address] 10/18/2018    provoked after back sx    Fatty liver     GERD (gastroesophageal reflux disease)     Hypercholesteremia     Hypothyroidism     Nicotine vapor product user     Obesity     SUN (obstructive sleep apnea)     uses CPAP    Psychiatric disorder     depression    Thyroid disease     hypothyroid       Family History   Problem Relation Age of Onset    Cancer Mother 54        Lung cancer    Heart Disease Father     Hypertension Father     Elevated Lipids Father     Heart Attack Father     Stroke Father         x 3    Elevated Lipids Sister     Heart Disease Brother     Hypertension Brother     Elevated Lipids Brother     Elevated Lipids Sister     Elevated Lipids Sister     Heart Disease Sister     Cancer Sister         uterine    Heart Disease Sister     Cancer Sister         uterine    Heart Disease Sister    [facility] Cancer Sister         colon cancer with liver mets    Bleeding Prob Brother     Heart Attack Brother     Anesth Problems Neg Hx        Social History     Socioeconomic History    Marital status:      Spouse name: Not on file    Number of children: Not on file    Years of education: Not on file    Highest education level: Not on file   Occupational History    Not on file   Social Needs    Financial resource strain: Not on file    Food insecurity     Worry: Not on file     Inability: Not on file    Transportation needs     Medical: Not on file     Non-medical: Not on file   Tobacco Use    Smoking status: Former Smoker     Packs/day: 1.50     Years: 25.00     Pack years: 37.50     Quit date: 2015     Years since quittin.9    Smokeless tobacco: Never Used    Tobacco comment: vape   Substance and Sexual Activity    Alcohol use: Yes     Comment: occasional drinker    Drug use: No    Sexual activity: Yes     Partners: Male     Birth control/protection: Surgical   Lifestyle    Physical activity     Days per week: Not on file     Minutes per session: Not on file    Stress: Not on file   Relationships    Social connections     Talks on phone: Not on file     Gets together: Not on file     Attends Adventism service: Not on file     Active member of club or organization: Not on file     Attends meetings of clubs or organizations: Not on file     Relationship status: Not on file    Intimate partner violence     Fear of current or ex partner: Not on file     Emotionally abused: Not on file     Physically abused: Not on file     Forced sexual activity: Not on file   Other Topics Concern    Not on file   Social History Narrative    Not on file       Current Outpatient Medications on File Prior to Visit   Medication Sig Dispense Refill    gabapentin (NEURONTIN) 400 mg capsule Take 1 Cap by mouth three (3) times daily. Max Daily Amount: 1,200 mg. 90 Cap 2    methocarbamoL (ROBAXIN) 500 mg tablet TAKE 1 TABLET BY MOUTH FOUR TIMES A DAY as needed for muscle spasm 90 Tab 1    montelukast (SINGULAIR) 10 mg tablet Take 1 Tab by mouth daily. 90 Tab 0    fluticasone propionate (FLONASE) 50 mcg/actuation nasal spray 2 Sprays by Both Nostrils route daily. 1 Bottle 1    traZODone (DESYREL) 50 mg tablet Take 1 Tab by mouth nightly. 90 Tab 2    albuterol (Ventolin HFA) 90 mcg/actuation inhaler Take 1 Puff by inhalation every six (6) hours as needed for Wheezing. 3 Inhaler 2    olopatadine (Pataday) 0.1 % ophthalmic solution Administer 2 Drops to both eyes two (2) times a day. 1 Bottle 1    pantoprazole (PROTONIX) 40 mg tablet Take 1 Tab by mouth daily. 90 Tab 1    escitalopram oxalate (LEXAPRO) 20 mg tablet Take 1 Tab by mouth daily. 90 Tab 1    albuterol (PROVENTIL VENTOLIN) 2.5 mg /3 mL (0.083 %) nebu 3 mL by Nebulization route every four (4) hours as needed for Wheezing. 75 mL 0    rivaroxaban (Xarelto) 20 mg tab tablet Take 1 Tab by mouth daily (with breakfast). 90 Tab 0    lovastatin (MEVACOR) 20 mg tablet TAKE 1 TABLET BY MOUTH EVERY DAY EVERY NIGHT 90 Tab 1    furosemide (LASIX) 20 mg tablet TAKE 1 TABLET BY MOUTH DAILY AS NEEDED (SWELLING). 20 Tab 0    levothyroxine (SYNTHROID) 88 mcg tablet TAKE 1 TABLET BY MOUTH EVERY DAY BEFORE BREAKFAST 90 Tab 5    fluticasone furoate (ARNUITY ELLIPTA) 100 mcg/actuation dsdv inhaler Take 1 Puff by inhalation daily. 2 Inhaler 4    acetaminophen (TYLENOL) 325 mg tablet Take 325 mg by mouth two (2) times daily as needed for Pain.       cromolyn (OPTICROM) 4 % ophthalmic solution Administer 1 Drop to both eyes as needed. Use in affected eye(s)      [DISCONTINUED] traMADoL (ULTRAM) 50 mg tablet Take 1 Tab by mouth every six (6) hours as needed for Pain for up to 30 days. Max Daily Amount: 200 mg. 120 Tab 0    [DISCONTINUED] metroNIDAZOLE (FLAGYL) 500 mg tablet TAKE 1 TABLET BY MOUTH THREE TIMES A DAY 42 Tab 0    Nebulizer Accessories kit Use nebulizer machine as needed for dyspnea 1 Kit 0    [DISCONTINUED] albuterol (PROVENTIL HFA, VENTOLIN HFA, PROAIR HFA) 90 mcg/actuation inhaler Take 1 Puff by inhalation every six (6) hours as needed for Wheezing. 1 Inhaler 2     No current facility-administered medications on file prior to visit. Review of Systems  Pertinent items are noted in HPI. Objective:     Gen: well appearing female  HEENT: normal conjunctiva, no audible congestion, patient does not see oral erythema, has MMM  Neck: patient does not feel enlarged or tender LAD or masses  Resp: normal respiratory effort, no audible wheezing. CV: patient does not feel palpitations or heart irregularity  Abd: patient does not feel abdominal tenderness or mass, patient does not notice distension  Extrem: patient does not see swelling in ankles or joints. Neuro: Alert and oriented, able to answer questions without difficulty, able to move all extremities and walk normally        Assessment/Plan:       ICD-10-CM ICD-9-CM    1. Spinal stenosis of lumbar region with neurogenic claudication  M48.062 724.03 traMADoL (ULTRAM) 50 mg tablet   2. Chronic pain syndrome  G89.4 338.4 traMADoL (ULTRAM) 50 mg tablet   Patient is stable on current medication regimen. She is to follow-up with Dr. Shahida Rendon in the office in 3 months    This was a telemedicine visit with video. Indio Covington MD    Follow-up and Dispositions    · Return in about 3 months (around 4/8/2021) for follow up with Dr Shahida Rendon in 3 months. Araseli Miller

## 2021-01-25 ENCOUNTER — TELEPHONE (OUTPATIENT)
Dept: INTERNAL MEDICINE CLINIC | Age: 56
End: 2021-01-25

## 2021-01-25 NOTE — TELEPHONE ENCOUNTER
----- Message from Clemencia Ashley sent at 1/25/2021  4:09 PM EST -----  Regarding: Luh Spence  Contact: [phone]  Level 1/Escalated Issue      Caller's first and last name and relationship (if not the patient):N/A      Best contact number(s): [phone]      What are the symptoms:  L leg swelling, redness and heat. She thinks she has a blood clot.        Message from [facility]

## 2021-01-26 NOTE — TELEPHONE ENCOUNTER
Attempted to reach patient without success. Phone rings unavailable & states \"caller is not accepting calls\"; unable to LVM. Noted my chart response from Dr. Sylvester yesterday which patient viewed.       Nichole Bosch MD  to Smitha Thomas  1/25/21 4:00 PM  Xarelto 20mg is highest dose ( unless acute blood clot)  Please call the office to schedule an appointment so I can examine your leg. I am in the office Tuesday, Thursday, Friday mornings  Regards     Dr Sylvester     Last read by Smitha Thomas at 4:01 PM on 1/25/2021.

## 2021-02-11 DIAGNOSIS — E03.9 ACQUIRED HYPOTHYROIDISM: ICD-10-CM

## 2021-02-11 RX ORDER — LEVOTHYROXINE SODIUM 88 UG/1
TABLET ORAL
Qty: 90 TAB | Refills: 1 | Status: SHIPPED | OUTPATIENT
Start: 2021-02-11 | End: 2021-08-01 | Stop reason: SDUPTHER

## 2021-02-22 DIAGNOSIS — F32.A DEPRESSION, UNSPECIFIED DEPRESSION TYPE: ICD-10-CM

## 2021-02-24 RX ORDER — TRAZODONE HYDROCHLORIDE 50 MG/1
50 TABLET ORAL
Qty: 90 TAB | Refills: 2 | Status: SHIPPED | OUTPATIENT
Start: 2021-02-24 | End: 2021-03-24 | Stop reason: SDUPTHER

## 2021-02-24 RX ORDER — ALBUTEROL SULFATE 0.83 MG/ML
2.5 SOLUTION RESPIRATORY (INHALATION)
Qty: 75 ML | Refills: 0 | Status: SHIPPED | OUTPATIENT
Start: 2021-02-24 | End: 2021-02-26 | Stop reason: SDUPTHER

## 2021-02-24 RX ORDER — MONTELUKAST SODIUM 10 MG/1
10 TABLET ORAL DAILY
Qty: 90 TAB | Refills: 0 | Status: SHIPPED | OUTPATIENT
Start: 2021-02-24 | End: 2021-08-01 | Stop reason: SDUPTHER

## 2021-02-26 DIAGNOSIS — R06.2 WHEEZING: Primary | ICD-10-CM

## 2021-02-26 RX ORDER — ALBUTEROL SULFATE 0.83 MG/ML
2.5 SOLUTION RESPIRATORY (INHALATION)
Qty: 75 ML | Refills: 0 | Status: SHIPPED | OUTPATIENT
Start: 2021-02-26

## 2021-02-26 NOTE — TELEPHONE ENCOUNTER
Future Appointments:  Future Appointments   Date Time Provider Rozina Arizmendi   3/29/2021  3:30 PM Appa Lul Barton MD [facility] BS AMB        Last Appointment With Me:  Visit date not found     Requested Prescriptions     Pending Prescriptions Disp Refills    albuterol (PROVENTIL VENTOLIN) 2.5 mg /3 mL (0.083 %) nebu 75 mL 0     Sig: 3 mL by Nebulization route every four (4) hours as needed for Wheezing.  R06.2 wheezing

## 2021-03-24 DIAGNOSIS — F32.A DEPRESSION, UNSPECIFIED DEPRESSION TYPE: ICD-10-CM

## 2021-03-24 DIAGNOSIS — I82.512 CHRONIC DEEP VEIN THROMBOSIS (DVT) OF FEMORAL VEIN OF LEFT LOWER EXTREMITY (HCC): Primary | ICD-10-CM

## 2021-03-24 RX ORDER — TRAZODONE HYDROCHLORIDE 50 MG/1
50 TABLET ORAL
Qty: 90 TAB | Refills: 2 | Status: SHIPPED | OUTPATIENT
Start: 2021-03-24 | End: 2021-08-01 | Stop reason: SDUPTHER

## 2021-03-24 NOTE — TELEPHONE ENCOUNTER
Future Appointments:  Future Appointments   Date Time Provider Department Center   3/29/2021  3:30 PM Nichole Bosch MD [facility] BS AMB        Last Appointment With Me:  Visit date not found     Requested Prescriptions     Pending Prescriptions Disp Refills   • traZODone (DESYREL) 50 mg tablet 90 Tab 2     Sig: Take 1 Tab by mouth nightly.

## 2021-03-24 NOTE — TELEPHONE ENCOUNTER
Future Appointments:  Future Appointments   Date Time Provider Rozina Arizmendi   3/29/2021  3:30 PM Appa Duke Candelario MD [facility] BS AMB        Last Appointment With Me:  Visit date not found     Requested Prescriptions     Pending Prescriptions Disp Refills    traMADoL (ULTRAM) 50 mg tablet        Sig: Take 1 Tab by mouth every six (6) hours as needed for Pain for up to 3 days. Max Daily Amount: 200 mg.

## 2021-03-24 NOTE — TELEPHONE ENCOUNTER
----- Message from Abraham Monge. Nicole Saldivar sent at 3/24/2021  2:33 PM EDT -----  Regarding: Non-Urgent Medical Question  Contact: [phone]  I didn't need tradzone . I needed. my pain pill plz I'm in so much pain . Trumadol.

## 2021-03-24 NOTE — TELEPHONE ENCOUNTER
----- Message from [facility]. Panda Jimmy sent at 3/23/2021  6:49 PM EDT -----  Regarding: Non-Urgent Medical Question  Contact: [phone]  Can u plz refill my trumadol . Thank You .

## 2021-03-25 RX ORDER — TRAMADOL HYDROCHLORIDE 50 MG/1
50 TABLET ORAL
Qty: 60 TAB | Refills: 1 | Status: SHIPPED | OUTPATIENT
Start: 2021-03-25 | End: 2021-03-28

## 2021-03-29 ENCOUNTER — VIRTUAL VISIT (OUTPATIENT)
Dept: INTERNAL MEDICINE CLINIC | Age: 56
End: 2021-03-29
Payer: MEDICARE

## 2021-03-29 DIAGNOSIS — M48.062 SPINAL STENOSIS OF LUMBAR REGION WITH NEUROGENIC CLAUDICATION: ICD-10-CM

## 2021-03-29 DIAGNOSIS — I82.512 CHRONIC DEEP VEIN THROMBOSIS (DVT) OF FEMORAL VEIN OF LEFT LOWER EXTREMITY (HCC): Primary | ICD-10-CM

## 2021-03-29 DIAGNOSIS — E03.9 ACQUIRED HYPOTHYROIDISM: ICD-10-CM

## 2021-03-29 DIAGNOSIS — G89.4 CHRONIC PAIN SYNDROME: ICD-10-CM

## 2021-03-29 DIAGNOSIS — F32.A DEPRESSION, UNSPECIFIED DEPRESSION TYPE: ICD-10-CM

## 2021-03-29 DIAGNOSIS — Z12.31 ENCOUNTER FOR SCREENING MAMMOGRAM FOR MALIGNANT NEOPLASM OF BREAST: ICD-10-CM

## 2021-03-29 DIAGNOSIS — K63.5 POLYP OF COLON, UNSPECIFIED PART OF COLON, UNSPECIFIED TYPE: ICD-10-CM

## 2021-03-29 PROCEDURE — G8427 DOCREV CUR MEDS BY ELIG CLIN: HCPCS | Performed by: INTERNAL MEDICINE

## 2021-03-29 PROCEDURE — 3017F COLORECTAL CA SCREEN DOC REV: CPT | Performed by: INTERNAL MEDICINE

## 2021-03-29 PROCEDURE — G9899 SCRN MAM PERF RSLTS DOC: HCPCS | Performed by: INTERNAL MEDICINE

## 2021-03-29 PROCEDURE — G8510 SCR DEP NEG, NO PLAN REQD: HCPCS | Performed by: INTERNAL MEDICINE

## 2021-03-29 PROCEDURE — G0439 PPPS, SUBSEQ VISIT: HCPCS | Performed by: INTERNAL MEDICINE

## 2021-03-29 NOTE — PROGRESS NOTES
Reviewed record in preparation for visit and have obtained necessary documentation. Identified pt with two pt identifiers(name and ). Chief Complaint   Patient presents with    Medication Evaluation       Health Maintenance Due   Topic Date Due    Shingles Vaccine (2 of 2) 01/15/2020    Mammogram  2020    Annual Well Visit  07/15/2020    Yearly Flu Vaccine (1) 2020    Colorectal Screening  2020    Pap Test  2021       Ms. Qiana Mueller has a reminder for a \"due or due soon\" health maintenance. I have asked that she discuss this further with her primary care provider for follow-up on this health maintenance. Coordination of Care Questionnaire:  :     1) Have you been to an emergency room, urgent care clinic since your last visit? no   Hospitalized since your last visit? no             2) Have you seen or consulted any other health care providers outside of [address] [place] [address] since your last visit? no  (Include any pap smears or colon screenings in this section.)    3) In the event something were to happen to you and you were unable to speak on your behalf, do you have an Advance Directive/ Living Will in place stating your wishes? NO    Do you have an Advance Directive on file? no    4) Are you interested in receiving information on Advance Directives? NO    Patient is accompanied by self I have received verbal consent from Annette Cabral to discuss any/all medical information while they are present in the room.

## 2021-03-29 NOTE — PROGRESS NOTES
CC: Medication Evaluation      HPI:    She is a 63 y.o. female who presents for evaluation of chronic medical issues     1. Chronic pain syndrome     2. Spinal stenosis of lumbar region with neurogenic claudication  Has had 3 back surgeries with Dr Rosa Eckert and unfortunately back pain has not improved  Taking gabapentin 400mg 4 times a day  Taking 100mg tramadol BID   Still has pain but this regimen helps improve quality of life  Since last surgery hurting twice as bad       3. Acute deep vein thrombosis (DVT) of proximal vein of left lower extremity (HCC) which then became chronic on repeat imaging with recurrent symptoms  She is on xarelto 20mg chronically        4. Acquired hypothyroidism: TSH is 2.8 and at goal last checked 08/2020          5. High cholesterol: on statin, done in August and at goal      6. GERD: on PPI chronically, has symptoms if tries to come off medication       This is an established visit conducted via telemedicine with video. The patient has been instructed that this meets HIPAA criteria and acknowledges and agrees to this method of visitation. Pursuant to the emergency declaration under the [facility], [facility]5 waiver authority and the [employer] Act, this Virtual Visit was conducted, with patient's consent, to reduce the patient's risk of exposure to COVID-19 and provide continuity of care for an established patient. Services were provided through a video synchronous discussion virtually to substitute for in-person clinic visit.        ROS:  Constitutional: negative for fevers, chills, anorexia and weight loss  10 systems reviewed and negative other than HPI     Past Medical History:   Diagnosis Date    Arthritis     Asthma     Breast cyst 1996    left, removed    Chronic pain     LOWER BACK    Colon polyps     Diverticulitis     [facility]    provoked after back sx    Fatty liver     GERD (gastroesophageal reflux disease)     Hypercholesteremia     Hypothyroidism     Nicotine vapor product user     Obesity     SUN (obstructive sleep apnea)     uses CPAP    Psychiatric disorder     depression    Thyroid disease     hypothyroid       Current Outpatient Medications on File Prior to Visit   Medication Sig Dispense Refill    traZODone (DESYREL) 50 mg tablet Take 1 Tab by mouth nightly. 90 Tab 2    albuterol (PROVENTIL VENTOLIN) 2.5 mg /3 mL (0.083 %) nebu 3 mL by Nebulization route every four (4) hours as needed for Wheezing. R06.2 wheezing 75 mL 0    montelukast (SINGULAIR) 10 mg tablet Take 1 Tab by mouth daily. 90 Tab 0    Xarelto 20 mg tab tablet TAKE 1 TABLET BY MOUTH EVERY DAY IN THE MORNING WITH BREAKFAST 90 Tab 0    levothyroxine (SYNTHROID) 88 mcg tablet TAKE 1 TABLET BY MOUTH EVERY DAY BEFORE BREAKFAST 90 Tab 1    fluconazole (DIFLUCAN) 150 mg tablet Taking one tablet every other day for 2 doses. Indications: Candida yeast infection of abdominal cavity lining 2 Tab 0    gabapentin (NEURONTIN) 400 mg capsule Take 1 Cap by mouth three (3) times daily. Max Daily Amount: 1,200 mg. 90 Cap 2    methocarbamoL (ROBAXIN) 500 mg tablet TAKE 1 TABLET BY MOUTH FOUR TIMES A DAY as needed for muscle spasm 90 Tab 1    fluticasone propionate (FLONASE) 50 mcg/actuation nasal spray 2 Sprays by Both Nostrils route daily. 1 Bottle 1    albuterol (Ventolin HFA) 90 mcg/actuation inhaler Take 1 Puff by inhalation every six (6) hours as needed for Wheezing. 3 Inhaler 2    olopatadine (Pataday) 0.1 % ophthalmic solution Administer 2 Drops to both eyes two (2) times a day. 1 Bottle 1    pantoprazole (PROTONIX) 40 mg tablet Take 1 Tab by mouth daily. 90 Tab 1    escitalopram oxalate (LEXAPRO) 20 mg tablet Take 1 Tab by mouth daily.  90 Tab 1    lovastatin (MEVACOR) 20 mg tablet TAKE 1 TABLET BY MOUTH EVERY DAY EVERY NIGHT 90 Tab 1    furosemide (LASIX) 20 mg tablet TAKE 1 TABLET BY MOUTH DAILY AS NEEDED (SWELLING). 20 Tab 0    Nebulizer Accessories kit Use nebulizer machine as needed for dyspnea 1 Kit 0    fluticasone furoate (ARNUITY ELLIPTA) 100 mcg/actuation dsdv inhaler Take 1 Puff by inhalation daily. 2 Inhaler 4    acetaminophen (TYLENOL) 325 mg tablet Take 325 mg by mouth two (2) times daily as needed for Pain.  [] traMADoL (ULTRAM) 50 mg tablet Take 1 Tab by mouth every six (6) hours as needed for Pain for up to 3 days. Max Daily Amount: 200 mg. 60 Tab 1    cromolyn (OPTICROM) 4 % ophthalmic solution Administer 1 Drop to both eyes as needed. Use in affected eye(s)       No current facility-administered medications on file prior to visit. Past Surgical History:   Procedure Laterality Date    COLONOSCOPY N/A 2017    COLONOSCOPY performed by Tonio Rankin MD [facility] ENDOSCOPY   [facility] SERVICES SURGERY  ,     L5 & S 1    HX BACK SURGERY  2018    HX BACK SURGERY      HX BREAST BIOPSY      Left    HX HYSTERECTOMY  09    [facility]    HX OOPHORECTOMY Left     One ovary removed.     HX ORTHOPAEDIC Left     thumb surgery    HX ORTHOPAEDIC Left     left knee surgery    HX ORTHOPAEDIC Left     ankle    HX TUBAL LIGATION      REMOVAL OF KIDNEY STONE      RENAL STENT         Family History   Problem Relation Age of Onset    Cancer Mother 54        Lung cancer    Heart Disease Father     Hypertension Father     Elevated Lipids Father     Heart Attack Father     Stroke Father         x 3    Elevated Lipids Sister     Heart Disease Brother     Hypertension Brother     Elevated Lipids Brother     Elevated Lipids Sister     Elevated Lipids Sister     Heart Disease Sister     Cancer Sister         uterine    Heart Disease Sister     Cancer Sister         uterine    Heart Disease Sister     Cancer Sister         colon cancer with liver mets    Bleeding Prob Brother     Heart Attack Brother    Allyson Problems Neg Hx      Reviewed and no changes     Social History     Socioeconomic History    Marital status:      Spouse name: Not on file    Number of children: Not on file    Years of education: Not on file    Highest education level: Not on file   Occupational History    Not on file   Social Needs    Financial resource strain: Not on file    Food insecurity     Worry: Not on file     Inability: Not on file    Transportation needs     Medical: Not on file     Non-medical: Not on file   Tobacco Use    Smoking status: Former Smoker     Packs/day: 1.50     Years: 25.00     Pack years: 45.53     Quit date: 2015     Years since quittin.1    Smokeless tobacco: Never Used    Tobacco comment: vape   Substance and Sexual Activity    Alcohol use: Yes     Comment: occasional drinker    Drug use: No    Sexual activity: Yes     Partners: Male     Birth control/protection: Surgical   Lifestyle    Physical activity     Days per week: Not on file     Minutes per session: Not on file    Stress: Not on file   Relationships    Social connections     Talks on phone: Not on file     Gets together: Not on file     Attends Taoism service: Not on file     Active member of club or organization: Not on file     Attends meetings of clubs or organizations: Not on file     Relationship status: Not on file    Intimate partner violence     Fear of current or ex partner: Not on file     Emotionally abused: Not on file     Physically abused: Not on file     Forced sexual activity: Not on file   Other Topics Concern    Not on file   Social History Narrative    Not on file            Visit Vitals  [facility] 2009       Physical Examination:   Gen: well appearing female  HEENT: normal conjunctiva, no audible congestion, patient does not see oral erythema, has MMM  Neck: patient does not feel enlarged or tender LAD or masses  Resp: normal respiratory effort, no audible wheezing.    CV: patient does not feel palpitations or heart irregularity  Abd: patient does not feel abdominal tenderness or mass, patient does not notice distension  Extrem: patient does not see swelling in ankles or joints. Neuro: Alert and oriented, able to answer questions without difficulty, able to move all extremities and walk normally          Lab Results   Component Value Date/Time    WBC 4.0 08/26/2020 03:55 PM    Hemoglobin (POC) 12.9 10/28/2013 03:40 PM    HGB 13.7 08/26/2020 03:55 PM    Hematocrit (POC) 38 10/28/2013 03:40 PM    HCT 41.3 08/26/2020 03:55 PM    PLATELET 961 09/26/8689 03:55 PM    MCV 96 08/26/2020 03:55 PM     Lab Results   Component Value Date/Time    Sodium 141 08/26/2020 03:55 PM    Potassium 4.3 08/26/2020 03:55 PM    Chloride 101 08/26/2020 03:55 PM    CO2 23 08/26/2020 03:55 PM    Anion gap 5 05/08/2019 02:39 PM    Glucose 87 08/26/2020 03:55 PM    BUN 9 08/26/2020 03:55 PM    Creatinine 0.97 08/26/2020 03:55 PM    BUN/Creatinine ratio 9 08/26/2020 03:55 PM    GFR est AA 76 08/26/2020 03:55 PM    GFR est non-AA 65 08/26/2020 03:55 PM    Calcium 9.3 08/26/2020 03:55 PM     Lab Results   Component Value Date/Time    Cholesterol, total 188 08/26/2020 03:55 PM    HDL Cholesterol 69 08/26/2020 03:55 PM    LDL, calculated 98 08/26/2020 03:55 PM    VLDL, calculated 21 08/26/2020 03:55 PM    Triglyceride 107 08/26/2020 03:55 PM     Lab Results   Component Value Date/Time    TSH 2.840 08/26/2020 03:55 PM     No results found for: PSA, Leotis Candy, [id number], [id number]  Lab Results   Component Value Date/Time    Hemoglobin A1c 5.4 08/02/2018 08:19 AM     Lab Results   Component Value Date/Time    VITAMIN D, 25-HYDROXY 28.5 (L) 05/09/2017 08:54 AM       Lab Results   Component Value Date/Time    ALT (SGPT) 14 08/26/2020 03:55 PM    Alk. phosphatase 80 08/26/2020 03:55 PM    Bilirubin, total 0.5 08/26/2020 03:55 PM           Assessment/Plan:    1.  Chronic deep vein thrombosis (DVT) of femoral vein of left lower extremity ([address]  - on xarelto    2. Depression, unspecified depression type  - stable on trazodone and celexa  - METABOLIC PANEL, COMPREHENSIVE; Future  - CBC WITH AUTOMATED DIFF; Future    3. Acquired hypothyroidism  - on synthroid 88 mcg daily  - METABOLIC PANEL, COMPREHENSIVE; Future  - CBC WITH AUTOMATED DIFF; Future  - TSH AND FREE T4    4. Chronic pain syndrome    5. Spinal stenosis of lumbar region with neurogenic claudication  - had several back surgeries and since last surgery increased back pain   - gabapentin 400mg 4 times a day   - tramadol 50 mg every 6 hours  - COMPLIANCE DRUG SCREEN/PRESCRIPTION MONITORING; Future    6. Encounter for screening mammogram for malignant neoplasm of breast  - GEE MAMMO BI SCREENING INCL CAD; Future    7. Polyp of colon, unspecified part of colon, unspecified type  - REFERRAL FOR COLONOSCOPY          Follow up in 4 months    Aspen Lyles MD    This is an established visit conducted via real time video and audio telemedicine. The patient has been instructed that this meets HIPAA criteria and acknowledges and agrees to this method of visitation. This is the Subsequent Medicare Annual Wellness Exam, performed 12 months or more after the Initial AWV or the last Subsequent AWV    I have reviewed the patient's medical history in detail and updated the computerized patient record. Depression Risk Factor Screening:     3 most recent PHQ Screens 3/29/2021   PHQ Not Done -   Little interest or pleasure in doing things Not at all   Feeling down, depressed, irritable, or hopeless Not at all   Total Score PHQ 2 0       Alcohol Risk Screen    Do you average more than 1 drink per night or more than 7 drinks a week:  No    On any one occasion in the past three months have you have had more than 3 drinks containing alcohol:  No        Functional Ability and Level of Safety:    Hearing: Hearing is good. Activities of Daily Living:   The home contains: no safety equipment. Patient does total self care      Ambulation: uses cane     Fall Risk:  No flowsheet data found. Abuse Screen:  Patient is not abused       Cognitive Screening    Has your family/caregiver stated any concerns about your memory: no    Cognitive Screening: Normal - Verbal Fluency Test    Assessment/Plan   Education and counseling provided:  Are appropriate based on today's review and evaluation    Diagnoses and all orders for this visit:    1. Chronic deep vein thrombosis (DVT) of femoral vein of left lower extremity (HCC)    2. Depression, unspecified depression type  -     METABOLIC PANEL, COMPREHENSIVE; Future  -     CBC WITH AUTOMATED DIFF; Future    3. Acquired hypothyroidism  -     METABOLIC PANEL, COMPREHENSIVE; Future  -     CBC WITH AUTOMATED DIFF; Future  -     TSH AND FREE T4; Future    4. Chronic pain syndrome  -     COMPLIANCE DRUG SCREEN/PRESCRIPTION MONITORING; Future    5. Spinal stenosis of lumbar region with neurogenic claudication  -     COMPLIANCE DRUG SCREEN/PRESCRIPTION MONITORING; Future    6. Encounter for screening mammogram for malignant neoplasm of breast  -     GEE MAMMO BI SCREENING INCL CAD; Future    7.  Polyp of colon, unspecified part of colon, unspecified type  -     REFERRAL FOR COLONOSCOPY        Health Maintenance Due     Health Maintenance Due   Topic Date Due    Shingrix Vaccine Age 49> (2 of 2) 01/15/2020    Breast Cancer Screen Mammogram  05/11/2020    Flu Vaccine (1) 09/01/2020    Colorectal Cancer Screening Combo  11/14/2020    PAP AKA CERVICAL CYTOLOGY  04/25/2021       Patient Care Team   Patient Care Team:  Monty Sullivan MD as PCP - General (Internal Medicine)  Monty Sullivan MD as PCP - REHABILITATION [facility] Empaneled Provider  Nathan Toth MD as Physician (Sleep Medicine)  Kendal Carrillo MD as Consulting Provider (Gynecology)  Therman Skiff, MD (Orthopedic Surgery)    Jose Newby, who was evaluated through a synchronous (real-time) audio-video encounter, and/or her healthcare decision maker, is aware that it is a billable service, with coverage as determined by her insurance carrier. She provided verbal consent to proceed: Yes, and patient identification was verified. It was conducted pursuant to the emergency declaration under the [address] [place] [address] authority and the [employer] Act. A caregiver was present when appropriate. Ability to conduct physical exam was limited. I was at home. The patient was at home.     Ang Adair MD

## 2021-03-29 NOTE — PATIENT INSTRUCTIONS
Medicare Wellness Visit, Female The best way to live healthy is to have a lifestyle where you eat a well-balanced diet, exercise regularly, limit alcohol use, and quit all forms of tobacco/nicotine, if applicable. Regular preventive services are another way to keep healthy. Preventive services (vaccines, screening tests, monitoring & exams) can help personalize your care plan, which helps you manage your own care. Screening tests can find health problems at the earliest stages, when they are easiest to treat. [facility] follows the current, evidence-based guidelines published by the [facility] Noel Chakraborty ([facility]) when recommending preventive services for our patients. Because we follow these guidelines, sometimes recommendations change over time as research supports it. (For example, mammograms used to be recommended annually. Even though Medicare will still pay for an annual mammogram, the newer guidelines recommend a mammogram every two years for women of average risk). Of course, you and your doctor may decide to screen more often for some diseases, based on your risk and your co-morbidities (chronic disease you are already diagnosed with). Preventive services for you include: - Medicare offers their members a free annual wellness visit, which is time for you and your primary care provider to discuss and plan for your preventive service needs. Take advantage of this benefit every year! 
-All adults over the age of 72 should receive the recommended pneumonia vaccines. Current USPSTF guidelines recommend a series of two vaccines for the best pneumonia protection.  
-All adults should have a flu vaccine yearly and a tetanus vaccine every 10 years.  
-All adults age 48 and older should receive the shingles vaccines (series of two vaccines).      
-All adults age 38-68 who are overweight should have a diabetes screening test once every three years.  
-All adults born between [birth year] and 1965 should be screened once for Hepatitis C. 
-Other screening tests and preventive services for persons with diabetes include: an eye exam to screen for diabetic retinopathy, a kidney function test, a foot exam, and stricter control over your cholesterol.  
-Cardiovascular screening for adults with routine risk involves an electrocardiogram (ECG) at intervals determined by your doctor.  
-Colorectal cancer screenings should be done for adults age 54-65 with no increased risk factors for colorectal cancer. There are a number of acceptable methods of screening for this type of cancer. Each test has its own benefits and drawbacks. Discuss with your doctor what is most appropriate for you during your annual wellness visit. The different tests include: colonoscopy (considered the best screening method), a fecal occult blood test, a fecal DNA test, and sigmoidoscopy. 
 
-A bone mass density test is recommended when a woman turns 65 to screen for osteoporosis. This test is only recommended one time, as a screening. Some providers will use this same test as a disease monitoring tool if you already have osteoporosis. -Breast cancer screenings are recommended every other year for women of normal risk, age 54-69. 
-Cervical cancer screenings for women over age 72 are only recommended with certain risk factors. Here is a list of your current Health Maintenance items (your personalized list of preventive services) with a due date: 
Health Maintenance Due Topic Date Due  Shingles Vaccine (2 of 2) 01/15/2020  Mammogram  05/11/2020  Yearly Flu Vaccine (1) 09/01/2020  Colorectal Screening  11/14/2020  Pap Test  04/25/2021

## 2021-04-23 DIAGNOSIS — G89.4 CHRONIC PAIN SYNDROME: ICD-10-CM

## 2021-04-23 DIAGNOSIS — M48.062 SPINAL STENOSIS OF LUMBAR REGION WITH NEUROGENIC CLAUDICATION: ICD-10-CM

## 2021-04-26 RX ORDER — METHOCARBAMOL 500 MG/1
TABLET, FILM COATED ORAL
Qty: 90 TAB | Refills: 1 | Status: SHIPPED | OUTPATIENT
Start: 2021-04-26 | End: 2021-05-10 | Stop reason: DRUGHIGH

## 2021-04-27 ENCOUNTER — HOSPITAL ENCOUNTER (OUTPATIENT)
Dept: LAB | Age: 56
Discharge: HOME | End: 2021-04-27
Payer: MEDICARE

## 2021-04-27 PROCEDURE — 36415 COLL VENOUS BLD VENIPUNCTURE: CPT

## 2021-04-27 PROCEDURE — 84443 ASSAY THYROID STIM HORMONE: CPT

## 2021-04-28 ENCOUNTER — TELEPHONE (OUTPATIENT)
Dept: INTERNAL MEDICINE CLINIC | Age: 56
End: 2021-04-28

## 2021-04-28 LAB
T4 FREE SERPL-MCNC: 1.43 NG/DL (ref 0.82–1.77)
TSH SERPL DL<=0.005 MIU/L-ACNC: 2.38 UIU/ML (ref 0.45–4.5)

## 2021-04-28 NOTE — TELEPHONE ENCOUNTER
#[phone]   Pt states she received an e mail today telling her they didn't get enough blood for test that doctor wanted done. They did not get a urine sample as there was no order for that to be done. Please call pt to let her know what you want her to do. Does she need to go back to the lab with a new order?

## 2021-05-01 ENCOUNTER — IMMUNIZATION (OUTPATIENT)
Dept: FAMILY MEDICINE CLINIC | Age: 56
End: 2021-05-01
Payer: MEDICARE

## 2021-05-01 DIAGNOSIS — Z23 ENCOUNTER FOR IMMUNIZATION: Primary | ICD-10-CM

## 2021-05-01 PROCEDURE — 91300 COVID-19, MRNA, LNP-S, PF, 30MCG/0.3ML DOSE(PFIZER): CPT

## 2021-05-06 ENCOUNTER — PATIENT MESSAGE (OUTPATIENT)
Dept: INTERNAL MEDICINE CLINIC | Age: 56
End: 2021-05-06

## 2021-05-06 DIAGNOSIS — I82.512 CHRONIC DEEP VEIN THROMBOSIS (DVT) OF FEMORAL VEIN OF LEFT LOWER EXTREMITY (HCC): ICD-10-CM

## 2021-05-07 DIAGNOSIS — G89.4 CHRONIC PAIN SYNDROME: Primary | ICD-10-CM

## 2021-05-07 RX ORDER — RIVAROXABAN 20 MG/1
TABLET, FILM COATED ORAL
Qty: 90 TAB | Refills: 0 | Status: SHIPPED | OUTPATIENT
Start: 2021-05-07 | End: 2021-07-26

## 2021-05-07 NOTE — TELEPHONE ENCOUNTER
Future Appointments:  Future Appointments   Date Time Provider Rozina Kyleigh   5/22/2021  2:50 PM [facility] COVID VACCINE 21 DAY [facility] BS AMB   7/26/2021  3:30 PM Appa Tony Diaz MD [facility] BS AMB        Last Appointment With Me:  3/29/2021     Requested Prescriptions     Pending Prescriptions Disp Refills    traMADoL (ULTRAM) 50 mg tablet 30 Tab      Sig: Take 1 Tab by mouth every six (6) hours as needed for Pain for up to 30 days. Max Daily Amount: 200 mg.

## 2021-05-07 NOTE — TELEPHONE ENCOUNTER
----- Message from Ezequiel Thomas sent at 5/7/2021 12:16 PM EDT -----  Regarding: Non-Urgent Medical Question  Contact: [phone]  Can u refill my trumadol 50mg plz ? It needs to be refill May the 8th . Thank you.

## 2021-05-10 RX ORDER — METHOCARBAMOL 750 MG/1
750 TABLET, FILM COATED ORAL 4 TIMES DAILY
Qty: 120 TAB | Refills: 1 | OUTPATIENT
Start: 2021-05-10 | End: 2021-06-26

## 2021-05-10 RX ORDER — TRAMADOL HYDROCHLORIDE 50 MG/1
50 TABLET ORAL
Qty: 90 TAB | Refills: 0 | Status: SHIPPED | OUTPATIENT
Start: 2021-05-10 | End: 2021-06-07 | Stop reason: SDUPTHER

## 2021-05-10 NOTE — TELEPHONE ENCOUNTER
Joanafrancisco Kaycee 5/7/2021 7:35 AM EDT      ----- Message -----  From: Frank Rod  Sent: 5/6/2021 4:10 PM EDT  To: Srikanth Alfonso Nurse [place]  Subject: RE: Non-Urgent Medical Question     [facility] thank you can u raise the MG on the muscle spasms med to 750     ----- Message -----  From: Sherron Molina MD  Sent: 5/6/21 4:06 PM  To: Sofya Thomas  Subject: RE: Non-Urgent Medical Question    Charlean Fleischer is a muscle relaxant and you already are on on muscle relaxant. It would have to replace methocarbamol Robaxin  It is not as potent as Robaxin      ----- Message -----   From:Smitha Thomas   Sent:5/6/2021 10:18 AM EDT   To:Nichole Lucas MD   Subject:Non-Urgent Medical Question    Can u change my trumaol 50 mg to Tizanidine hcl 4mg , I heard it was great for arthritis in back too .

## 2021-05-22 ENCOUNTER — IMMUNIZATION (OUTPATIENT)
Dept: FAMILY MEDICINE CLINIC | Age: 56
End: 2021-05-22
Payer: MEDICARE

## 2021-05-22 DIAGNOSIS — Z23 ENCOUNTER FOR IMMUNIZATION: Primary | ICD-10-CM

## 2021-05-22 PROCEDURE — 91300 COVID-19, MRNA, LNP-S, PF, 30MCG/0.3ML DOSE(PFIZER): CPT

## 2021-05-23 DIAGNOSIS — G89.4 CHRONIC PAIN SYNDROME: ICD-10-CM

## 2021-05-23 DIAGNOSIS — M48.062 SPINAL STENOSIS OF LUMBAR REGION WITH NEUROGENIC CLAUDICATION: ICD-10-CM

## 2021-05-24 RX ORDER — GABAPENTIN 400 MG/1
400 CAPSULE ORAL 3 TIMES DAILY
Qty: 90 CAPSULE | Refills: 2 | OUTPATIENT
Start: 2021-05-24 | End: 2021-06-26

## 2021-05-24 NOTE — TELEPHONE ENCOUNTER
Future Appointments:  Future Appointments   Date Time Provider Rozina Arizmendi   7/26/2021  3:30 PM Appa Nika Santillan MD [facility] BS AMB        Last Appointment With Me:  3/29/2021     Requested Prescriptions     Pending Prescriptions Disp Refills    gabapentin (NEURONTIN) 400 mg capsule 90 Capsule 2     Sig: Take 1 Capsule by mouth three (3) times daily. Max Daily Amount: 1,200 mg.

## 2021-06-07 DIAGNOSIS — G89.4 CHRONIC PAIN SYNDROME: ICD-10-CM

## 2021-06-08 RX ORDER — FLUTICASONE PROPIONATE 50 MCG
2 SPRAY, SUSPENSION (ML) NASAL DAILY
Qty: 1 BOTTLE | Refills: 1 | Status: SHIPPED | OUTPATIENT
Start: 2021-06-08 | End: 2022-02-24 | Stop reason: ALTCHOICE

## 2021-06-08 RX ORDER — TRAMADOL HYDROCHLORIDE 50 MG/1
50 TABLET ORAL
Qty: 90 TABLET | Refills: 0 | OUTPATIENT
Start: 2021-06-08 | End: 2021-06-26

## 2021-06-08 NOTE — TELEPHONE ENCOUNTER
Future Appointments:  Future Appointments   Date Time Provider Rozina Arizmendi   2021  3:30 PM Appa Suzy Tim MD [facility] BS AMB        Last Appointment With Me:  3/29/2021     Requested Prescriptions     Pending Prescriptions Disp Refills    fluticasone propionate (FLONASE) 50 mcg/actuation nasal spray 1 Bottle 1     Si Sprays by Both Nostrils route daily.  traMADoL (ULTRAM) 50 mg tablet 90 Tablet 0     Sig: Take 1 Tablet by mouth every eight (8) hours as needed for Pain for up to 30 days. Max Daily Amount: 150 mg.

## 2021-06-26 ENCOUNTER — HOSPITAL ENCOUNTER (EMERGENCY)
Age: 56
Discharge: HOME | End: 2021-06-26
Attending: EMERGENCY MEDICINE
Payer: MEDICARE

## 2021-06-26 ENCOUNTER — APPOINTMENT (OUTPATIENT)
Dept: GENERAL RADIOLOGY | Age: 56
End: 2021-06-26
Attending: EMERGENCY MEDICINE
Payer: MEDICARE

## 2021-06-26 VITALS
OXYGEN SATURATION: 98 % | DIASTOLIC BLOOD PRESSURE: 82 MMHG | SYSTOLIC BLOOD PRESSURE: 139 MMHG | BODY MASS INDEX: 45.99 KG/M2 | HEART RATE: 83 BPM | WEIGHT: 293 LBS | HEIGHT: 67 IN | RESPIRATION RATE: 14 BRPM | TEMPERATURE: 99 F

## 2021-06-26 DIAGNOSIS — Z87.81 HISTORY OF WRIST FRACTURE: ICD-10-CM

## 2021-06-26 DIAGNOSIS — M25.532 ACUTE WRIST PAIN, LEFT: Primary | ICD-10-CM

## 2021-06-26 PROCEDURE — 99283 EMERGENCY DEPT VISIT LOW MDM: CPT

## 2021-06-26 PROCEDURE — 73110 X-RAY EXAM OF WRIST: CPT

## 2021-06-26 PROCEDURE — 74011250637 HC RX REV CODE- 250/637: Performed by: PHYSICIAN ASSISTANT

## 2021-06-26 RX ORDER — OXYCODONE HYDROCHLORIDE 5 MG/1
5 TABLET ORAL
Qty: 10 TABLET | Refills: 0 | Status: SHIPPED | OUTPATIENT
Start: 2021-06-26 | End: 2021-06-29

## 2021-06-26 RX ORDER — OXYCODONE AND ACETAMINOPHEN 5; 325 MG/1; MG/1
1 TABLET ORAL
Status: COMPLETED | OUTPATIENT
Start: 2021-06-26 | End: 2021-06-26

## 2021-06-26 RX ADMIN — OXYCODONE HYDROCHLORIDE AND ACETAMINOPHEN 1 TABLET: 5; 325 TABLET ORAL at 18:29

## 2021-06-26 NOTE — DISCHARGE INSTRUCTIONS
Rest, ice, elevation. Use splint as needed for comfort. Follow up with Orthopedist for recheck. Return to the Emergency Dept for any concerns.

## 2021-06-27 NOTE — ED PROVIDER NOTES
EMERGENCY DEPARTMENT HISTORY AND PHYSICAL EXAM      Date: 6/26/2021  Patient Name: Zulay Noble    History of Presenting Illness     Chief Complaint   Patient presents with    Wrist Pain     Ambulatory into the ED with c/o Lt wrist pain x last week, after picking  up her granddaughter. No deformity noted. Reports fracture 6 years ago. History Provided By: Patient    HPI: Zulay Noble, 63 y.o. female presents ambulatory to the emergency department with complaint of pain to the left wrist over the last week. She states the symptoms began after picking up her granddaughter. She denied any numbness, tingling. She is right-hand dominant. She suffered a fracture to this wrist 6 years ago but states this was able to heal without a pin. Her pain is on the dorsal surface of her wrist.  She has no history of carpal tunnel. There is been no bruising. She rates her pain an 8 out of 10 on the pain scale and describes it as a constant ache. Pt is o/w healthy without fever, chills, cough, congestion, ST, shortness of breath, chest pain, N/V/D. There are no other complaints, changes, or physical findings at this time. PCP: Candice Sauer MD    Current Outpatient Medications   Medication Sig Dispense Refill    oxyCODONE IR (Roxicodone) 5 mg immediate release tablet Take 1 Tablet by mouth every six (6) hours as needed for Pain for up to 3 days. Max Daily Amount: 20 mg. 10 Tablet 0    fluticasone propionate (FLONASE) 50 mcg/actuation nasal spray 2 Sprays by Both Nostrils route daily. 1 Bottle 1    Xarelto 20 mg tab tablet TAKE 1 TABLET BY MOUTH EVERY DAY IN THE MORNING WITH BREAKFAST 90 Tab 0    traZODone (DESYREL) 50 mg tablet Take 1 Tab by mouth nightly. 90 Tab 2    albuterol (PROVENTIL VENTOLIN) 2.5 mg /3 mL (0.083 %) nebu 3 mL by Nebulization route every four (4) hours as needed for Wheezing. R06.2 wheezing 75 mL 0    montelukast (SINGULAIR) 10 mg tablet Take 1 Tab by mouth daily.  90 Tab 0    levothyroxine (SYNTHROID) 88 mcg tablet TAKE 1 TABLET BY MOUTH EVERY DAY BEFORE BREAKFAST 90 Tab 1    fluconazole (DIFLUCAN) 150 mg tablet Taking one tablet every other day for 2 doses. Indications: Candida yeast infection of abdominal cavity lining 2 Tab 0    albuterol (Ventolin HFA) 90 mcg/actuation inhaler Take 1 Puff by inhalation every six (6) hours as needed for Wheezing. 3 Inhaler 2    olopatadine (Pataday) 0.1 % ophthalmic solution Administer 2 Drops to both eyes two (2) times a day. 1 Bottle 1    pantoprazole (PROTONIX) 40 mg tablet Take 1 Tab by mouth daily. 90 Tab 1    escitalopram oxalate (LEXAPRO) 20 mg tablet Take 1 Tab by mouth daily. 90 Tab 1    lovastatin (MEVACOR) 20 mg tablet TAKE 1 TABLET BY MOUTH EVERY DAY EVERY NIGHT 90 Tab 1    furosemide (LASIX) 20 mg tablet TAKE 1 TABLET BY MOUTH DAILY AS NEEDED (SWELLING). 20 Tab 0    Nebulizer Accessories kit Use nebulizer machine as needed for dyspnea 1 Kit 0    fluticasone furoate (ARNUITY ELLIPTA) 100 mcg/actuation dsdv inhaler Take 1 Puff by inhalation daily. 2 Inhaler 4    acetaminophen (TYLENOL) 325 mg tablet Take 325 mg by mouth two (2) times daily as needed for Pain.  cromolyn (OPTICROM) 4 % ophthalmic solution Administer 1 Drop to both eyes as needed.  Use in affected eye(s)         Past History     Past Medical History:  Past Medical History:   Diagnosis Date    Arthritis     Asthma     Breast cyst 1996    left, removed    Chronic pain     LOWER BACK    Colon polyps     Diverticulitis     DVT (deep venous thrombosis) ([facility] [address] 10/18/2018    provoked after back sx    Fatty liver     GERD (gastroesophageal reflux disease)     Hypercholesteremia     Hypothyroidism     Nicotine vapor product user     Obesity     SUN (obstructive sleep apnea)     uses CPAP    Psychiatric disorder     depression    Thyroid disease     hypothyroid       Past Surgical History:  Past Surgical History:   Procedure Laterality Date    COLONOSCOPY N/A 2017    COLONOSCOPY performed by Tacos Rascon MD at [facility] ENDOSCOPY   [facility] SERVICES SURGERY  ,     L5 & S 1    HX BACK SURGERY  2018    HX BACK SURGERY      HX BREAST BIOPSY      Left    HX HYSTERECTOMY  09    [facility] LSO    HX OOPHORECTOMY Left     One ovary removed.  HX ORTHOPAEDIC Left     thumb surgery    HX ORTHOPAEDIC Left     left knee surgery    HX ORTHOPAEDIC Left     ankle    HX TUBAL LIGATION      OR REMOVAL OF KIDNEY STONE      RENAL STENT         Family History:  Family History   Problem Relation Age of Onset    Cancer Mother 54        Lung cancer    Heart Disease Father     Hypertension Father     Elevated Lipids Father     Heart Attack Father     Stroke Father         x 3    Elevated Lipids Sister     Heart Disease Brother     Hypertension Brother     Elevated Lipids Brother     Elevated Lipids Sister     Elevated Lipids Sister     Heart Disease Sister     Cancer Sister         uterine    Heart Disease Sister     Cancer Sister         uterine    Heart Disease Sister    [de-identified] Cancer Sister         colon cancer with liver mets    Bleeding Prob Brother     Heart Attack Brother     Anesth Problems Neg Hx        Social History:  Social History     Tobacco Use    Smoking status: Former Smoker     Packs/day: 1.50     Years: 25.00     Pack years: 37.50     Quit date: 2015     Years since quittin.4    Smokeless tobacco: Never Used    Tobacco comment: vape   Substance Use Topics    Alcohol use: Yes     Comment: occasional drinker    Drug use: No       Allergies: Allergies   Allergen Reactions    Codeine Hives    Pcn [Penicillins] Hives         Review of Systems   Review of Systems   Constitutional: Negative for chills and fever. HENT: Negative for congestion, rhinorrhea and sore throat. Respiratory: Negative for cough and shortness of breath. Cardiovascular: Negative for chest pain and palpitations. Gastrointestinal: Negative for abdominal pain, diarrhea, nausea and vomiting. Endocrine: Negative for polydipsia, polyphagia and polyuria. Genitourinary: Negative for dysuria and hematuria. Musculoskeletal: Positive for arthralgias. Negative for neck pain and neck stiffness. Skin: Negative for color change, pallor, rash and wound. Allergic/Immunologic: Negative for food allergies and immunocompromised state. Neurological: Negative for dizziness, weakness, numbness and headaches. Hematological: Negative for adenopathy. Does not bruise/bleed easily. Psychiatric/Behavioral: Negative for agitation and confusion. All other systems reviewed and are negative. Physical Exam   Physical Exam  Vitals and nursing note reviewed. Constitutional:       General: She is not in acute distress. Appearance: Normal appearance. She is well-developed and normal weight. She is not ill-appearing, toxic-appearing or diaphoretic. HENT:      Head: Normocephalic and atraumatic. Nose: Nose normal.      Mouth/Throat:      Pharynx: No oropharyngeal exudate. Eyes:      General: No scleral icterus. Right eye: No discharge. Left eye: No discharge. Conjunctiva/sclera: Conjunctivae normal.   Neck:      Thyroid: No thyromegaly. Vascular: No JVD. Trachea: No tracheal deviation. Cardiovascular:      Rate and Rhythm: Normal rate and regular rhythm. Pulses: Normal pulses. Heart sounds: Normal heart sounds. Pulmonary:      Effort: Pulmonary effort is normal. No respiratory distress. Breath sounds: Normal breath sounds. No wheezing. Abdominal:      Palpations: Abdomen is soft. Tenderness: There is no abdominal tenderness. Musculoskeletal:         General: Swelling and tenderness present. Cervical back: Normal range of motion and neck supple. No tenderness.       Comments: Decreased A/P ROM to L wrist due to tenderness over dorsum of wrist with palpation/movement, mild to moderate soft tissue swelling, no crepitus, no erythema/rash/lesion/heat. 2+ distal pulses, NVI, sensation grossly intact to light touch. Neg Jarod's sign. Skin:     General: Skin is warm and dry. Coloration: Skin is not pale. Findings: No bruising, erythema or rash. Neurological:      General: No focal deficit present. Mental Status: She is alert and oriented to person, place, and time. Motor: No abnormal muscle tone. Coordination: Coordination normal.   Psychiatric:         Mood and Affect: Mood normal.         Behavior: Behavior normal.         Judgment: Judgment normal.         Diagnostic Study Results     Labs -   No results found for this or any previous visit (from the past 12 hour(s)). Radiologic Studies -   XR WRIST LT AP/LAT/OBL MIN 3V   Final Result   No acute abnormality. Medical Decision Making   I am the first provider for this patient. I reviewed the vital signs, available nursing notes, past medical history, past surgical history, family history and social history. Vital Signs-Reviewed the patient's vital signs. Patient Vitals for the past 12 hrs:   Temp Pulse Resp BP SpO2   06/26/21 1714 99 °F (37.2 °C) 83 14 139/82 98 %           Records Reviewed: Nursing Notes, Old Medical Records, Previous Radiology Studies and Previous Laboratory Studies    Provider Notes (Medical Decision Making):   Strain, sprain, fx, contusion    ED Course:   Initial assessment performed. The patients presenting problems have been discussed, and they are in agreement with the care plan formulated and outlined with them. I have encouraged them to ask questions as they arise throughout their visit. DISCHARGE NOTE:  The care plan has been outline with the patient and/or family, who verbally conveyed understanding and agreement. Available results have been reviewed.  Patient and/or family understand the follow up plan as outlined and discharge instructions. Should their condition deterioration at any time after discharge the patient agrees to return, follow up sooner than outlined or seek medical assistance at the closest Emergency Room as soon as possible. Questions have been answered. Discharge instructions and educational information regarding the patient's diagnosis as well a list of reasons why the patient would want to seek immediate medical attention, should their condition change, were reviewed directly with the patient/family          PLAN:  1. Discharge Medication List as of 6/26/2021  6:29 PM      START taking these medications    Details   oxyCODONE IR (Roxicodone) 5 mg immediate release tablet Take 1 Tablet by mouth every six (6) hours as needed for Pain for up to 3 days. Max Daily Amount: 20 mg., Normal, Disp-10 Tablet, R-0         CONTINUE these medications which have NOT CHANGED    Details   fluticasone propionate (FLONASE) 50 mcg/actuation nasal spray 2 Sprays by Both Nostrils route daily. , Normal, Disp-1 Bottle, R-1      Xarelto 20 mg tab tablet TAKE 1 TABLET BY MOUTH EVERY DAY IN THE MORNING WITH BREAKFAST, Normal, Disp-90 Tab, R-0      traZODone (DESYREL) 50 mg tablet Take 1 Tab by mouth nightly., Normal, Disp-90 Tab, R-2      albuterol (PROVENTIL VENTOLIN) 2.5 mg /3 mL (0.083 %) nebu 3 mL by Nebulization route every four (4) hours as needed for Wheezing. R06.2 wheezing, Normal, Disp-75 mL, R-0      montelukast (SINGULAIR) 10 mg tablet Take 1 Tab by mouth daily. , Normal, Disp-90 Tab, R-0      levothyroxine (SYNTHROID) 88 mcg tablet TAKE 1 TABLET BY MOUTH EVERY DAY BEFORE BREAKFAST, Normal, Disp-90 Tab, R-1      fluconazole (DIFLUCAN) 150 mg tablet Taking one tablet every other day for 2 doses.   Indications: Candida yeast infection of abdominal cavity lining, Normal, Disp-2 Tab, R-0Hold lexapro      albuterol (Ventolin HFA) 90 mcg/actuation inhaler Take 1 Puff by inhalation every six (6) hours as needed for Wheezing., Normal, Disp-3 Inhaler, R-2      olopatadine (Pataday) 0.1 % ophthalmic solution Administer 2 Drops to both eyes two (2) times a day., Normal, Disp-1 Bottle, R-1      pantoprazole (PROTONIX) 40 mg tablet Take 1 Tab by mouth daily. , Normal, Disp-90 Tab, R-1      escitalopram oxalate (LEXAPRO) 20 mg tablet Take 1 Tab by mouth daily. , Normal, Disp-90 Tab, R-1      lovastatin (MEVACOR) 20 mg tablet TAKE 1 TABLET BY MOUTH EVERY DAY EVERY NIGHT, Normal, Disp-90 Tab, R-1      furosemide (LASIX) 20 mg tablet TAKE 1 TABLET BY MOUTH DAILY AS NEEDED (SWELLING). , Normal, Disp-20 Tab, R-0      Nebulizer Accessories kit Use nebulizer machine as needed for dyspnea, Normal, Disp-1 Kit, R-0      fluticasone furoate (ARNUITY ELLIPTA) 100 mcg/actuation dsdv inhaler Take 1 Puff by inhalation daily. , Normal, Disp-2 Inhaler, R-4      acetaminophen (TYLENOL) 325 mg tablet Take 325 mg by mouth two (2) times daily as needed for Pain., Historical Med      cromolyn (OPTICROM) 4 % ophthalmic solution Administer 1 Drop to both eyes as needed. Use in affected eye(s), Historical Med           2. Follow-up Information     Follow up With Specialties Details Why Contact Info    Tera Cason MD Hand Surgery   [address] [facility]  [address]  [address] [facility]  [phone]      \[facility] [place] [facility]\"" EMERGENCY DEPT Emergency Medicine  If symptoms worsen [address] [place] [address] Drive  [address] [facility]  [phone]        Return to ED if worse     Diagnosis     Clinical Impression:   1. Acute wrist pain, left    2.  History of wrist fracture

## 2021-06-28 DIAGNOSIS — G89.4 CHRONIC PAIN SYNDROME: ICD-10-CM

## 2021-06-29 RX ORDER — TRAMADOL HYDROCHLORIDE 50 MG/1
TABLET ORAL
Qty: 90 TABLET | Refills: 0 | Status: SHIPPED | OUTPATIENT
Start: 2021-06-29 | End: 2021-08-01 | Stop reason: SDUPTHER

## 2021-07-01 ENCOUNTER — DOCUMENTATION ONLY (OUTPATIENT)
Dept: SLEEP MEDICINE | Age: 56
End: 2021-07-01

## 2021-07-01 ENCOUNTER — VIRTUAL VISIT (OUTPATIENT)
Dept: SLEEP MEDICINE | Age: 56
End: 2021-07-01
Payer: MEDICARE

## 2021-07-01 DIAGNOSIS — G47.33 OBSTRUCTIVE SLEEP APNEA (ADULT) (PEDIATRIC): Primary | ICD-10-CM

## 2021-07-01 DIAGNOSIS — E66.01 MORBID OBESITY WITH BMI OF 45.0-49.9, ADULT (HCC): ICD-10-CM

## 2021-07-01 PROCEDURE — G8427 DOCREV CUR MEDS BY ELIG CLIN: HCPCS | Performed by: INTERNAL MEDICINE

## 2021-07-01 PROCEDURE — G8432 DEP SCR NOT DOC, RNG: HCPCS | Performed by: INTERNAL MEDICINE

## 2021-07-01 PROCEDURE — 99213 OFFICE O/P EST LOW 20 MIN: CPT | Performed by: INTERNAL MEDICINE

## 2021-07-01 PROCEDURE — 3017F COLORECTAL CA SCREEN DOC REV: CPT | Performed by: INTERNAL MEDICINE

## 2021-07-01 PROCEDURE — G9899 SCRN MAM PERF RSLTS DOC: HCPCS | Performed by: INTERNAL MEDICINE

## 2021-07-01 NOTE — PATIENT INSTRUCTIONS
[address] [place] [place], [address]  Tel.  [phone]  Fax. [phone] [address]  [address], [address]  Tel.  [phone]  Fax. [phone] [address]  Tel.  [phone]  Fax. [phone]     PROPER SLEEP HYGIENE    What to avoid  · Do not have drinks with caffeine, such as coffee or black tea, for 8 hours before bed. · Do not smoke or use other types of tobacco near bedtime. Nicotine is a stimulant and can keep you awake. · Avoid drinking alcohol late in the evening, because it can cause you to wake in the middle of the night. · Do not eat a big meal close to bedtime. If you are hungry, eat a light snack. · Do not drink a lot of water close to bedtime, because the need to urinate may wake you up during the night. · Do not read or watch TV in bed. Use the bed only for sleeping and sexual activity. What to try  · Go to bed at the same time every night, and wake up at the same time every morning. Do not take naps during the day. · Keep your bedroom quiet, dark, and cool. · Get regular exercise, but not within 3 to 4 hours of your bedtime. .  · Sleep on a comfortable pillow and mattress. · If watching the clock makes you anxious, turn it facing away from you so you cannot see the time. · If you worry when you lie down, start a worry book. Well before bedtime, write down your worries, and then set the book and your concerns aside. · Try meditation or other relaxation techniques before you go to bed. · If you cannot fall asleep, get up and go to another room until you feel sleepy. Do something relaxing. Repeat your bedtime routine before you go to bed again. · Make your house quiet and calm about an hour before bedtime. Turn down the lights, turn off the TV, log off the computer, and turn down the volume on music. This can help you relax after a busy day.     Drowsy Driving  The [address] [place] [address] Road Traffic Safety Administration cites drowsiness as a causing factor in more than 587,027 police reported crashes annually, resulting in 76,000 injuries and 1,500 deaths. Other surveys suggest 55% of people polled have driven while drowsy in the past year, 23% had fallen asleep but not crashed, 3% crashed, and 2% had and accident due to drowsy driving. Who is at risk? Young Drivers: One study of drowsy driving accidents states that 55% of the drivers were under 25 years. Of those, 75% were male. Shift Workers and Travelers: People who work overnight or travel across time zones frequently are at higher risk of experiencing Circadian Rhythm Disorders. They are trying to work and function when their body is programed to sleep. Sleep Deprived: Lack of sleep has a serious impact on your ability to pay attention or focus on a task. Consistently getting less than the average of 8 hours your body needs creates partial or cumulative sleep deprivation. Untreated Sleep Disorders: Sleep Apnea, Narcolepsy, R.L.S., and other sleep disorders (untreated) prevent a person from getting enough restful sleep. This leads to excessive daytime sleepiness and increases the risk for drowsy driving accidents by up to 7 times. Medications / Alcohol: Even over the counter medications can cause drowsiness. Medications that impair a drivers attention should have a warning label. Alcohol naturally makes you sleepy and on its own can cause accidents. Combined with excessive drowsiness its effects are amplified. Signs of Drowsy Driving:   * You don't remember driving the last few miles   * You may drift out of your bowen   * You are unable to focus and your thoughts wander   * You may yawn more often than normal   * You have difficulty keeping your eyes open / nodding off   * Missing traffic signs, speeding, or tailgating  Prevention-   Good sleep hygiene, lifestyle and behavioral choices have the most impact on drowsy driving.  There is no substitute for sleep and the average person requires 8 hours nightly. If you find yourself driving drowsy, stop and sleep. Consider the sleep hygiene tips provided during your visit as well. Medication Refill Policy: Refills for all medications require 1 week advance notice. Please have your pharmacy fax a refill request. We are unable to fax, or call in \"controled substance\" medications and you will need to pick these prescriptions up from our office. [employer] Activation    Thank you for requesting access to [facility]. Please follow the instructions below to securely access and download your online medical record. [facility] allows you to send messages to your doctor, view your test results, renew your prescriptions, schedule appointments, and more. How Do I Sign Up? 1. In your internet browser, go to [email or web address]. 2. Click on the First Time User? Click Here link in the Sign In box. You will see the New Member Sign Up page. 3. Enter your [facility] Access Code exactly as it appears below. You will not need to use this code after youve completed the sign-up process. If you do not sign up before the expiration date, you must request a new code. [facility] Access Code: Activation code not generated  Current [facility] Status: Active (This is the date your [facility] access code will )    4. Enter the last four digits of your Social Security Number (xxxx) and Date of Birth (mm/dd/yyyy) as indicated and click Submit. You will be taken to the next sign-up page. 5. Create a [facility] ID. This will be your [facility] login ID and cannot be changed, so think of one that is secure and easy to remember. 6. Create a [facility] password. You can change your password at any time. 7. Enter your Password Reset Question and Answer. This can be used at a later time if you forget your password. 8. Enter your e-mail address. You will receive e-mail notification when new information is available in [address]   9. Click Sign Up. You can now view and download portions of your medical record. 10. Click the Download Summary menu link to download a portable copy of your medical information. Additional Information    If you have questions, please call [phone]. Remember, [facility] is NOT to be used for urgent needs. For medical emergencies, dial 911.

## 2021-07-01 NOTE — PROGRESS NOTES
217 [facility]., [facility]. [place], [address]  Tel.  [phone]  Fax. 100 [facility] 60  [place], [address] [facility]  Tel.  [phone]  Fax. [phone] 9[address] [facility] [facility]  Tel.  [phone]  Fax. [phone]       Telemedicine visit performed with verbal consent of the patient. Patient called and identity confirmed with 2 patient identifers    Patient was seen at home  Darby Adam is a 63 y.o. female who was seen by synchronous (real-time) audio-video technology on 7/1/2021. Consent:  She and/or her healthcare decision maker is aware that this patient-initiated Telehealth encounter is the equivalent to a face to face encounter in the sleep disorder center and has provided verbal consent to proceed: Yes    I was in the office while conducting this encounter. S>Smitha Andujar is a 63 y.o. female seen at this telemedicine visit for a positive airway pressure follow-up. She reports no problems using the device. She is  compliant over the past 90 days. The following problems are identified:    Drowsiness no Problems exhaling no   Snoring no Forget to put on sometimes   Mask Comfortable yes  Can't fall asleep someimes   Dry Mouth no Mask falls off no   Air Leaking no Frequent awakenings sometimes       Download not available    She admits that her sleep has improved on PAP therapy using full mask and heated tubing. Allergies   Allergen Reactions    Codeine Hives    Pcn [Penicillins] Hives       She has a current medication list which includes the following prescription(s): tramadol, fluticasone propionate, xarelto, trazodone, albuterol, montelukast, levothyroxine, fluconazole, albuterol, olopatadine, pantoprazole, escitalopram oxalate, lovastatin, furosemide, nebulizer accessories, fluticasone furoate, acetaminophen, cromolyn, and [DISCONTINUED] gabapentin. .      She  has a past medical history of Arthritis, Asthma, Breast cyst (1996), Chronic pain, Colon polyps, Diverticulitis, DVT (deep venous thrombosis) ([facility] [address] (10/18/2018), Fatty liver, GERD (gastroesophageal reflux disease), Hypercholesteremia, Hypothyroidism, Nicotine vapor product user, Obesity, SUN (obstructive sleep apnea), Psychiatric disorder, and Thyroid disease. [place] Sleepiness Score: 8      O>      Visit Vitals  LMP 06/22/2009     weight 280 lb    Vital Signs: (As obtained by patient/caregiver at home)        Constitutional: [x] Appears well-developed and well-nourished [x] No apparent distress      [] Abnormal -     Mental status: [x] Alert and awake  [x] Oriented to person/place/time [x] Able to follow commands    [] Abnormal -     Eyes:   EOM    [x]  Normal    [] Abnormal -   Sclera  [x]  Normal    [] Abnormal -          Discharge [x]  None visible   [] Abnormal -     HENT: [x] Normocephalic, atraumatic  [] Abnormal -     External Ears [x] Normal  [] Abnormal -    Neck: [x] No visualized mass [] Abnormal -     Pulmonary/Chest: [x] Respiratory effort normal   [x] No visualized signs of difficulty breathing or respiratory distress        [] Abnormal -       Neurological:        [x] No Facial Asymmetry (Cranial nerve 7 motor function) (limited exam due to video visit)          [x] No gaze palsy        [] Abnormal -          Skin:        [x] No significant exanthematous lesions or discoloration noted on facial skin         [] Abnormal -            Psychiatric:       [x] Normal Affect [] Abnormal -        Other pertinent observable physical exam findings:-            A>    ICD-10-CM ICD-9-CM    1. Obstructive sleep apnea (adult) (pediatric)  G47.33 327.23 AMB SUPPLY ORDER   2. Morbid obesity with BMI of 45.0-49.9, adult (HCC)  E66.01 278.01     Z68.42 V85.42        AHI 21/hour (2017) On CPAP, Respironics :  13-18 cmH2O.     Compliant:      yes    Therapeutic Response:  Positive    P>    she is compliant with PAP therapy and PAP continues to benefit patient and remains necessary for control of her sleep apnea. I reviewed the Take the Interview recall. We discussed the problem of possible sound abatement foam breakdown. she does  use a So-clean device or other commercial PAP . she understands the use of those devices may increase likelihood of the foam breakdown. she was advised of the 's recommendation to stop use of these PAP devices until the problem is rectified. We discussed the pros and cons of withholding PAP therapy. she was informed that [employer] is working on a solution and updating their website daily. she was encouraged to check the site daily. she will register her device on the recall list today. Should she decide to discontinue PAP device, she should sleep with head of bed elevated or avoid sleeping on her back to help minimize respiratory events. She will stop using the so-clean device. * We have recommended a dedicated weight loss through appropriate diet and an exercise regimen as significant weight reduction has been shown to reduce severity of obstructive sleep apnea. * She was asked to contact our office for any problems regarding PAP therapy. * Counseling was provided regarding the importance of regular PAP use and on proper sleep hygiene and safe driving. * Re-enforced proper and regular cleaning for the device, using manufacturers instructions  2. Obesity - I have counseled the patient regarding the benefits of weight loss. All of her questions were addressed. Pursuant to the emergency declaration under the [facility], [address]5 waiver authority and the [employer] and [employer] Act, this Virtual  Visit was conducted, with patient's consent, to reduce the patient's risk of exposure to COVID-19 and provide continuity of care for an established patient.      Services were provided through a video synchronous discussion virtually to substitute for in-person clinic visit.     Yamilex Johns MD    Electronically signed by    Shelia Arriaga MD  Diplomate in Sleep Medicine  [facility]

## 2021-07-11 DIAGNOSIS — M48.062 SPINAL STENOSIS OF LUMBAR REGION WITH NEUROGENIC CLAUDICATION: ICD-10-CM

## 2021-07-11 DIAGNOSIS — G89.4 CHRONIC PAIN SYNDROME: ICD-10-CM

## 2021-07-12 RX ORDER — GABAPENTIN 400 MG/1
400 CAPSULE ORAL 3 TIMES DAILY
Qty: 90 CAPSULE | Refills: 0 | Status: SHIPPED | OUTPATIENT
Start: 2021-07-12 | End: 2021-08-01 | Stop reason: SDUPTHER

## 2021-07-13 ENCOUNTER — PATIENT MESSAGE (OUTPATIENT)
Dept: INTERNAL MEDICINE CLINIC | Age: 56
End: 2021-07-13

## 2021-07-13 DIAGNOSIS — J45.30 MILD PERSISTENT ASTHMA WITHOUT COMPLICATION: ICD-10-CM

## 2021-07-19 ENCOUNTER — TELEPHONE (OUTPATIENT)
Dept: SLEEP MEDICINE | Age: 56
End: 2021-07-19

## 2021-07-19 DIAGNOSIS — G47.33 OBSTRUCTIVE SLEEP APNEA (ADULT) (PEDIATRIC): Primary | ICD-10-CM

## 2021-07-19 NOTE — TELEPHONE ENCOUNTER
Patient called and asked if we can write a rx for a new cpap for her due to the recall. Patient was emailed the information to register her device on [facility] and that insurance may not pay for a replacement since it has not been 5 years as of yet.

## 2021-07-26 ENCOUNTER — VIRTUAL VISIT (OUTPATIENT)
Dept: INTERNAL MEDICINE CLINIC | Age: 56
End: 2021-07-26
Payer: MEDICARE

## 2021-07-26 DIAGNOSIS — E03.9 ACQUIRED HYPOTHYROIDISM: ICD-10-CM

## 2021-07-26 DIAGNOSIS — G89.4 CHRONIC PAIN SYNDROME: Primary | ICD-10-CM

## 2021-07-26 DIAGNOSIS — E78.5 HYPERLIPIDEMIA, UNSPECIFIED HYPERLIPIDEMIA TYPE: ICD-10-CM

## 2021-07-26 DIAGNOSIS — K21.9 GASTROESOPHAGEAL REFLUX DISEASE WITHOUT ESOPHAGITIS: ICD-10-CM

## 2021-07-26 DIAGNOSIS — M48.062 SPINAL STENOSIS OF LUMBAR REGION WITH NEUROGENIC CLAUDICATION: ICD-10-CM

## 2021-07-26 DIAGNOSIS — I82.512 CHRONIC DEEP VEIN THROMBOSIS (DVT) OF FEMORAL VEIN OF LEFT LOWER EXTREMITY (HCC): ICD-10-CM

## 2021-07-26 PROCEDURE — G8417 CALC BMI ABV UP PARAM F/U: HCPCS | Performed by: INTERNAL MEDICINE

## 2021-07-26 PROCEDURE — G0463 HOSPITAL OUTPT CLINIC VISIT: HCPCS | Performed by: INTERNAL MEDICINE

## 2021-07-26 PROCEDURE — G8427 DOCREV CUR MEDS BY ELIG CLIN: HCPCS | Performed by: INTERNAL MEDICINE

## 2021-07-26 PROCEDURE — 3017F COLORECTAL CA SCREEN DOC REV: CPT | Performed by: INTERNAL MEDICINE

## 2021-07-26 PROCEDURE — G8432 DEP SCR NOT DOC, RNG: HCPCS | Performed by: INTERNAL MEDICINE

## 2021-07-26 PROCEDURE — 99214 OFFICE O/P EST MOD 30 MIN: CPT | Performed by: INTERNAL MEDICINE

## 2021-07-26 PROCEDURE — G9899 SCRN MAM PERF RSLTS DOC: HCPCS | Performed by: INTERNAL MEDICINE

## 2021-07-26 NOTE — PROGRESS NOTES
CC: Pain (Chronic)      HPI:    She is a 63 y.o. female who presents for evaluation of chronic medical issues     1. Chronic pain syndrome     2. Spinal stenosis of lumbar region with neurogenic claudication  Has had 3 back surgeries with Dr Giovani Bryant and unfortunately back pain has not improved  Taking gabapentin 400mg 4 times a day  Taking 100mg tramadol BID   Taking methocarmabol 750 TID  lyrica did not work well in the past   Still has pain but this regimen helps improve quality of life    Saw a ortho doctor and started on celebrex. Discussed celebrex is contraindicated with xarelto        3. Acute deep vein thrombosis (DVT) of proximal vein of left lower extremity (HCC) which then became chronic on repeat imaging with recurrent symptoms  She is on xarelto 20mg chronically        4. Acquired hypothyroidism: TSH is 2.3 and  In April and euthyroid on exam           5. High cholesterol: on statin, done in August and at goal      6. GERD: on PPI chronically, has symptoms if tries to come off medication     Has pap smear scheduled   Had first shot of shingrix - and overdue       Colonoscopy has apt august  Dr Lionel Hernandez     This is an established visit conducted via telemedicine with video. The patient has been instructed that this meets HIPAA criteria and acknowledges and agrees to this method of visitation. Pursuant to the emergency declaration under the [facility], [address]5 waiver authority and the [employer] and [employer] Act, this Virtual Visit was conducted, with patient's consent, to reduce the patient's risk of exposure to COVID-19 and provide continuity of care for an established patient. Services were provided through a video synchronous discussion virtually to substitute for in-person clinic visit.        ROS:  Constitutional: negative for fevers, chills, anorexia and weight loss  10 systems reviewed and negative other than HPI     Past Medical History:   Diagnosis Date    Arthritis     Asthma     Breast cyst 1996    left, removed    Chronic pain     LOWER BACK    Colon polyps     Diverticulitis     DVT (deep venous thrombosis) ([facility] [address] 10/18/2018    provoked after back sx    Fatty liver     GERD (gastroesophageal reflux disease)     Hypercholesteremia     Hypothyroidism     Nicotine vapor product user     Obesity     SUN (obstructive sleep apnea)     uses CPAP    Psychiatric disorder     depression    Thyroid disease     hypothyroid       Current Outpatient Medications on File Prior to Visit   Medication Sig Dispense Refill    gabapentin (NEURONTIN) 400 mg capsule TAKE 1 CAP BY MOUTH THREE (3) TIMES DAILY. MAX DAILY AMOUNT: 1,200 MG. 90 Capsule 0    lovastatin (MEVACOR) 20 mg tablet TAKE 1 TABLET BY MOUTH EVERY DAY EVERY NIGHT 90 Tablet 1    traMADoL (ULTRAM) 50 mg tablet TAKE 1 TABLET BY MOUTH EVERY 8 HOURS AS NEEDED FOR PAIN 90 Tablet 0    fluticasone propionate (FLONASE) 50 mcg/actuation nasal spray 2 Sprays by Both Nostrils route daily. 1 Bottle 1    Xarelto 20 mg tab tablet TAKE 1 TABLET BY MOUTH EVERY DAY IN THE MORNING WITH BREAKFAST 90 Tab 0    traZODone (DESYREL) 50 mg tablet Take 1 Tab by mouth nightly. 90 Tab 2    albuterol (PROVENTIL VENTOLIN) 2.5 mg /3 mL (0.083 %) nebu 3 mL by Nebulization route every four (4) hours as needed for Wheezing. R06.2 wheezing 75 mL 0    montelukast (SINGULAIR) 10 mg tablet Take 1 Tab by mouth daily. 90 Tab 0    levothyroxine (SYNTHROID) 88 mcg tablet TAKE 1 TABLET BY MOUTH EVERY DAY BEFORE BREAKFAST 90 Tab 1    fluconazole (DIFLUCAN) 150 mg tablet Taking one tablet every other day for 2 doses. Indications: Candida yeast infection of abdominal cavity lining 2 Tab 0    albuterol (Ventolin HFA) 90 mcg/actuation inhaler Take 1 Puff by inhalation every six (6) hours as needed for Wheezing.  3 Inhaler 2    olopatadine (Pataday) 0.1 % ophthalmic solution Administer 2 Drops to both eyes two (2) times a day. 1 Bottle 1    pantoprazole (PROTONIX) 40 mg tablet Take 1 Tab by mouth daily. 90 Tab 1    escitalopram oxalate (LEXAPRO) 20 mg tablet Take 1 Tab by mouth daily. 90 Tab 1    furosemide (LASIX) 20 mg tablet TAKE 1 TABLET BY MOUTH DAILY AS NEEDED (SWELLING). 20 Tab 0    Nebulizer Accessories kit Use nebulizer machine as needed for dyspnea 1 Kit 0    fluticasone furoate (ARNUITY ELLIPTA) 100 mcg/actuation dsdv inhaler Take 1 Puff by inhalation daily. 2 Inhaler 4    acetaminophen (TYLENOL) 325 mg tablet Take 325 mg by mouth two (2) times daily as needed for Pain.  cromolyn (OPTICROM) 4 % ophthalmic solution Administer 1 Drop to both eyes as needed. Use in affected eye(s) (Patient not taking: Reported on 7/26/2021)       No current facility-administered medications on file prior to visit. Past Surgical History:   Procedure Laterality Date    COLONOSCOPY N/A 11/14/2017    COLONOSCOPY performed by Damon [facility] at [facility] ENDOSCOPY   [facility] SERVICES SURGERY  2005, 2006    L5 & S 1    HX BACK SURGERY  08/30/2018    HX BACK SURGERY      HX BREAST BIOPSY      Left    HX HYSTERECTOMY  6/22/09    [facility]    HX OOPHORECTOMY Left     One ovary removed.     HX ORTHOPAEDIC Left 1972    thumb surgery    HX ORTHOPAEDIC Left 2000    left knee surgery    HX ORTHOPAEDIC Left 2013    ankle    HX TUBAL LIGATION      CO REMOVAL OF KIDNEY STONE      RENAL STENT         Family History   Problem Relation Age of Onset    Cancer Mother 54        Lung cancer    Heart Disease Father     Hypertension Father     Elevated Lipids Father     Heart Attack Father     Stroke Father         x 3    Elevated Lipids Sister     Heart Disease Brother     Hypertension Brother     Elevated Lipids Brother     Elevated Lipids Sister     Elevated Lipids Sister     Heart Disease Sister     Cancer Sister         uterine    Heart Disease Sister     Cancer Sister         uterine    Heart Disease Sister    tar Cancer Sister         colon cancer with liver mets    Bleeding Prob Brother     Heart Attack Brother     Anesth Problems Neg Hx      Reviewed and no changes     Social History     Socioeconomic History    Marital status:      Spouse name: Not on file    Number of children: Not on file    Years of education: Not on file    Highest education level: Not on file   Occupational History    Not on file   Tobacco Use    Smoking status: Former Smoker     Packs/day: 1.50     Years: 25.00     Pack years: 45.53     Quit date: 2015     Years since quittin.4    Smokeless tobacco: Never Used    Tobacco comment: vape   Substance and Sexual Activity    Alcohol use: Yes     Comment: occasional drinker    Drug use: No    Sexual activity: Yes     Partners: Male     Birth control/protection: Surgical   Other Topics Concern    Not on file   Social History Narrative    Not on file     Social Determinants of Health     Financial Resource Strain:     Difficulty of Paying Living Expenses:    Food Insecurity:     Worried About Running Out of Food in the Last Year:     [address] in the Last Year:    Transportation Needs:     Lack of Transportation (Medical):      Lack of Transportation (Non-Medical):    Physical Activity:     Days of Exercise per Week:     Minutes of Exercise per Session:    Stress:     Feeling of Stress :    Social Connections:     Frequency of Communication with Friends and Family:     Frequency of Social Gatherings with Friends and Family:     Attends Shinto Services:     Active Member of Clubs or Organizations:     Attends Club or Organization Meetings:     Marital Status:    Intimate Partner Violence:     Fear of Current or Ex-Partner:     Emotionally Abused:     Physically Abused:     Sexually Abused:             Visit Vitals  [facility] 2009       Physical Examination:   Gen: well appearing female  HEENT: normal conjunctiva, no audible congestion, patient does not see oral erythema, has MMM  Neck: patient does not feel enlarged or tender LAD or masses  Resp: normal respiratory effort, no audible wheezing. CV: patient does not feel palpitations or heart irregularity  Abd: patient does not feel abdominal tenderness or mass, patient does not notice distension  Extrem: patient does not see swelling in ankles or joints.    Neuro: Alert and oriented, able to answer questions without difficulty, able to move all extremities and walk normally          Lab Results   Component Value Date/Time    WBC 3.7 04/30/2021 03:50 PM    Hemoglobin (POC) 12.9 10/28/2013 03:40 PM    HGB 13.2 04/30/2021 03:50 PM    Hematocrit (POC) 38 10/28/2013 03:40 PM    HCT 41.4 04/30/2021 03:50 PM    PLATELET 725 38/52/1510 03:50 PM    .7 (H) 04/30/2021 03:50 PM     Lab Results   Component Value Date/Time    Sodium 138 04/30/2021 03:50 PM    Potassium 3.8 04/30/2021 03:50 PM    Chloride 104 04/30/2021 03:50 PM    CO2 26 04/30/2021 03:50 PM    Anion gap 8 04/30/2021 03:50 PM    Glucose 98 04/30/2021 03:50 PM    BUN 8 04/30/2021 03:50 PM    Creatinine 0.87 04/30/2021 03:50 PM    BUN/Creatinine ratio 9 (L) 04/30/2021 03:50 PM    GFR est AA >60 04/30/2021 03:50 PM    GFR est non-AA >60 04/30/2021 03:50 PM    Calcium 9.1 04/30/2021 03:50 PM     Lab Results   Component Value Date/Time    Cholesterol, total 188 08/26/2020 03:55 PM    HDL Cholesterol 69 08/26/2020 03:55 PM    LDL, calculated 98 08/26/2020 03:55 PM    VLDL, calculated 21 08/26/2020 03:55 PM    Triglyceride 107 08/26/2020 03:55 PM     Lab Results   Component Value Date/Time    TSH 2.380 04/27/2021 02:53 PM     No results found for: PSA, Ang Miguel A, [id number], [id number]  Lab Results   Component Value Date/Time    Hemoglobin A1c 5.4 08/02/2018 08:19 AM     Lab Results   Component Value Date/Time    VITAMIN D, 25-HYDROXY 28.5 (L) 05/09/2017 08:54 AM       Lab Results   Component Value Date/Time    ALT (SGPT) 19 04/30/2021 03:50 PM    Alk. phosphatase 78 04/30/2021 03:50 PM    Bilirubin, total 0.6 04/30/2021 03:50 PM           Assessment/Plan:    1. Chronic deep vein thrombosis (DVT) of femoral vein of left lower extremity (HCC)  - on xarelto    2. Depression, unspecified depression type  - stable on trazodone and celexa    3. Acquired hypothyroidism  - on synthroid 88 mcg daily  -euthyroid on exam     4. Chronic pain syndrome    5. Spinal stenosis of lumbar region with neurogenic claudication  - had several back surgeries and since last surgery increased back pain   - gabapentin 400mg 4 times a day   - tramadol 50 mg every 6- 8 hours - this improves quality of life   - on methocarbamol       6. Encounter for screening mammogram for malignant neoplasm of breast  - reminded to schedule this     7. Polyp of colon has appointment with GI coming up     8. OA of knees: taking celebrex which is an NSAID - advised patient to stop due to interaction with xarelto and risk of bleeding    given history of smoking discussed with patient considering yearly CT chest to screen for lung cancer  Follow up in 4 months    Keven Mahajan MD    This is an established visit conducted via real time video and audio telemedicine. The patient has been instructed that this meets HIPAA criteria and acknowledges and agrees to this method of visitation.     Keven Mahajan MD

## 2021-07-27 ENCOUNTER — DOCUMENTATION ONLY (OUTPATIENT)
Dept: SLEEP MEDICINE | Age: 56
End: 2021-07-27

## 2021-08-01 DIAGNOSIS — M48.062 SPINAL STENOSIS OF LUMBAR REGION WITH NEUROGENIC CLAUDICATION: ICD-10-CM

## 2021-08-01 DIAGNOSIS — G89.4 CHRONIC PAIN SYNDROME: ICD-10-CM

## 2021-08-01 DIAGNOSIS — E03.9 ACQUIRED HYPOTHYROIDISM: ICD-10-CM

## 2021-08-01 DIAGNOSIS — F32.A DEPRESSION, UNSPECIFIED DEPRESSION TYPE: ICD-10-CM

## 2021-08-02 RX ORDER — LEVOTHYROXINE SODIUM 88 UG/1
88 TABLET ORAL
Qty: 90 TABLET | Refills: 1 | Status: SHIPPED | OUTPATIENT
Start: 2021-08-02 | End: 2022-01-04

## 2021-08-02 RX ORDER — METHOCARBAMOL 750 MG/1
750 TABLET, FILM COATED ORAL 4 TIMES DAILY
Qty: 120 TABLET | Refills: 3 | Status: SHIPPED | OUTPATIENT
Start: 2021-08-02 | End: 2021-12-02 | Stop reason: SDUPTHER

## 2021-08-02 RX ORDER — GABAPENTIN 400 MG/1
400 CAPSULE ORAL 3 TIMES DAILY
Qty: 90 CAPSULE | Refills: 0 | Status: SHIPPED | OUTPATIENT
Start: 2021-08-02 | End: 2021-09-21

## 2021-08-02 RX ORDER — ESCITALOPRAM OXALATE 20 MG/1
20 TABLET ORAL DAILY
Qty: 90 TABLET | Refills: 1 | Status: SHIPPED | OUTPATIENT
Start: 2021-08-02 | End: 2022-02-01 | Stop reason: SDUPTHER

## 2021-08-02 RX ORDER — TRAMADOL HYDROCHLORIDE 50 MG/1
50 TABLET ORAL
Qty: 90 TABLET | Refills: 0 | Status: SHIPPED | OUTPATIENT
Start: 2021-08-02 | End: 2021-09-08

## 2021-08-02 RX ORDER — MONTELUKAST SODIUM 10 MG/1
10 TABLET ORAL DAILY
Qty: 90 TABLET | Refills: 0 | Status: SHIPPED | OUTPATIENT
Start: 2021-08-02 | End: 2022-01-03

## 2021-08-02 RX ORDER — TRAZODONE HYDROCHLORIDE 50 MG/1
50 TABLET ORAL
Qty: 90 TABLET | Refills: 2 | Status: SHIPPED | OUTPATIENT
Start: 2021-08-02 | End: 2022-01-04

## 2021-08-02 NOTE — TELEPHONE ENCOUNTER
Elvist, Generic 8/1/2021 6:25 PM EDT      Can u refill my methocarbamol 750 mg plz . Shiloh Kaba Thank you .  ----- Message -----  From: Ponce Aburto MD  Sent: 7/13/21 12:33 PM  To: Marianela Thomas  Subject: RE: Non-Urgent Medical Question    That is high, can you monitor your blood pressure at home until your appointment in two weeks?       ----- Message -----   From:Smitha Thomas   Sent:7/13/2021 10:55 AM EDT   To:Nichole Benz MD   Subject:Non-Urgent Medical Question    I'm here at [address] with my knee. Should I be concerned about my blood pressure? That said it was high. 131/103 .

## 2021-08-02 NOTE — TELEPHONE ENCOUNTER
Future Appointments:  Future Appointments   Date Time Provider Rozina Arizmendi   7/5/2022  4:00 [facility]        Last Appointment With Me:  7/26/2021     Requested Prescriptions     Pending Prescriptions Disp Refills    escitalopram oxalate (LEXAPRO) 20 mg tablet 90 Tablet 1     Sig: Take 1 Tablet by mouth daily.  levothyroxine (SYNTHROID) 88 mcg tablet 90 Tablet 1    montelukast (SINGULAIR) 10 mg tablet 90 Tablet 0     Sig: Take 1 Tablet by mouth daily.  traZODone (DESYREL) 50 mg tablet 90 Tablet 2     Sig: Take 1 Tablet by mouth nightly.  traMADoL (ULTRAM) 50 mg tablet 90 Tablet 0    gabapentin (NEURONTIN) 400 mg capsule 90 Capsule 0     Sig: Take 1 Capsule by mouth three (3) times daily. Max Daily Amount: 1,200 mg.

## 2021-08-08 RX ORDER — ALBUTEROL SULFATE 90 UG/1
1 AEROSOL, METERED RESPIRATORY (INHALATION)
Qty: 1 INHALER | Refills: 5 | Status: SHIPPED | OUTPATIENT
Start: 2021-08-08 | End: 2022-02-24

## 2021-08-19 ENCOUNTER — TELEPHONE (OUTPATIENT)
Dept: INTERNAL MEDICINE CLINIC | Age: 56
End: 2021-08-19

## 2021-08-19 ENCOUNTER — VIRTUAL VISIT (OUTPATIENT)
Dept: INTERNAL MEDICINE CLINIC | Age: 56
End: 2021-08-19
Payer: MEDICARE

## 2021-08-19 DIAGNOSIS — J01.00 ACUTE NON-RECURRENT MAXILLARY SINUSITIS: Primary | ICD-10-CM

## 2021-08-19 DIAGNOSIS — K57.92 DIVERTICULITIS: ICD-10-CM

## 2021-08-19 PROCEDURE — 99214 OFFICE O/P EST MOD 30 MIN: CPT | Performed by: INTERNAL MEDICINE

## 2021-08-19 PROCEDURE — G8428 CUR MEDS NOT DOCUMENT: HCPCS | Performed by: INTERNAL MEDICINE

## 2021-08-19 PROCEDURE — 3017F COLORECTAL CA SCREEN DOC REV: CPT | Performed by: INTERNAL MEDICINE

## 2021-08-19 PROCEDURE — G0463 HOSPITAL OUTPT CLINIC VISIT: HCPCS | Performed by: INTERNAL MEDICINE

## 2021-08-19 PROCEDURE — G8432 DEP SCR NOT DOC, RNG: HCPCS | Performed by: INTERNAL MEDICINE

## 2021-08-19 PROCEDURE — G9899 SCRN MAM PERF RSLTS DOC: HCPCS | Performed by: INTERNAL MEDICINE

## 2021-08-19 PROCEDURE — G8417 CALC BMI ABV UP PARAM F/U: HCPCS | Performed by: INTERNAL MEDICINE

## 2021-08-19 RX ORDER — LEVOFLOXACIN 500 MG/1
500 TABLET, FILM COATED ORAL DAILY
Qty: 10 TABLET | Refills: 0 | Status: SHIPPED | OUTPATIENT
Start: 2021-08-19 | End: 2022-01-04

## 2021-08-19 RX ORDER — METRONIDAZOLE 500 MG/1
500 TABLET ORAL 3 TIMES DAILY
Qty: 30 TABLET | Refills: 0 | Status: SHIPPED | OUTPATIENT
Start: 2021-08-19 | End: 2022-01-04

## 2021-08-19 NOTE — PROGRESS NOTES
CC: acute visits   HPI:    She is a 61 y.o. female who presents for evaluation of tenderness on left lower side  99.7 fever  Has had symptoms for 3 days  No nausea or vomiting  Has a hx of diverticulitis  Denies blood in the stool  She also has sinus pain for 4 days with congestions  Had covid vaccine in may  No taste changes  Denies sick contacts       This is an established visit conducted via telemedicine with video. The patient has been instructed that this meets HIPAA criteria and acknowledges and agrees to this method of visitation. Pursuant to the emergency declaration under the [facility], [facility] waiver authority and the [employer] Act, this Virtual Visit was conducted, with patient's consent, to reduce the patient's risk of exposure to COVID-19 and provide continuity of care for an established patient. Services were provided through a video synchronous discussion virtually to substitute for in-person clinic visit. ROS:  10 systems reviewed and negative other then HPI     Past Medical History:   Diagnosis Date    Arthritis     Asthma     Breast cyst 1996    left, removed    Chronic pain     LOWER BACK    Colon polyps     Diverticulitis     DVT (deep venous thrombosis) ([facility] [address] 10/18/2018    provoked after back sx    Fatty liver     GERD (gastroesophageal reflux disease)     Hypercholesteremia     Hypothyroidism     Nicotine vapor product user     Obesity     SUN (obstructive sleep apnea)     uses CPAP    Psychiatric disorder     depression    Thyroid disease     hypothyroid       Current Outpatient Medications on File Prior to Visit   Medication Sig Dispense Refill    fluticasone furoate (ARNUITY ELLIPTA) 100 mcg/actuation dsdv inhaler Take 1 Puff by inhalation daily.  2 Inhaler 4    albuterol (PROVENTIL HFA, VENTOLIN HFA, PROAIR HFA) 90 mcg/actuation inhaler Take 1 Puff by inhalation every four (4) hours as needed for Wheezing. 1 Inhaler 5    escitalopram oxalate (LEXAPRO) 20 mg tablet Take 1 Tablet by mouth daily. 90 Tablet 1    levothyroxine (SYNTHROID) 88 mcg tablet Take 1 Tablet by mouth Daily (before breakfast). 90 Tablet 1    montelukast (SINGULAIR) 10 mg tablet Take 1 Tablet by mouth daily. 90 Tablet 0    traZODone (DESYREL) 50 mg tablet Take 1 Tablet by mouth nightly. 90 Tablet 2    traMADoL (ULTRAM) 50 mg tablet Take 1 Tablet by mouth every eight (8) hours as needed for Pain for up to 30 days. Max Daily Amount: 150 mg. 90 Tablet 0    gabapentin (NEURONTIN) 400 mg capsule Take 1 Capsule by mouth three (3) times daily. Max Daily Amount: 1,200 mg. 90 Capsule 0    methocarbamoL (ROBAXIN) 750 mg tablet Take 1 Tablet by mouth four (4) times daily. 120 Tablet 3    rivaroxaban (Xarelto) 20 mg tab tablet Take 1 Tablet by mouth daily (with breakfast). 90 Tablet 0    lovastatin (MEVACOR) 20 mg tablet TAKE 1 TABLET BY MOUTH EVERY DAY EVERY NIGHT 90 Tablet 1    fluticasone propionate (FLONASE) 50 mcg/actuation nasal spray 2 Sprays by Both Nostrils route daily. 1 Bottle 1    albuterol (PROVENTIL VENTOLIN) 2.5 mg /3 mL (0.083 %) nebu 3 mL by Nebulization route every four (4) hours as needed for Wheezing. R06.2 wheezing 75 mL 0    albuterol (Ventolin HFA) 90 mcg/actuation inhaler Take 1 Puff by inhalation every six (6) hours as needed for Wheezing. 3 Inhaler 2    olopatadine (Pataday) 0.1 % ophthalmic solution Administer 2 Drops to both eyes two (2) times a day. 1 Bottle 1    pantoprazole (PROTONIX) 40 mg tablet Take 1 Tab by mouth daily. 90 Tab 1    furosemide (LASIX) 20 mg tablet TAKE 1 TABLET BY MOUTH DAILY AS NEEDED (SWELLING). 20 Tab 0    Nebulizer Accessories kit Use nebulizer machine as needed for dyspnea 1 Kit 0    acetaminophen (TYLENOL) 325 mg tablet Take 325 mg by mouth two (2) times daily as needed for Pain.       cromolyn (OPTICROM) 4 % ophthalmic solution Administer 1 Drop to both eyes as needed. Use in affected eye(s) (Patient not taking: Reported on 2021)       No current facility-administered medications on file prior to visit. Past Surgical History:   Procedure Laterality Date    COLONOSCOPY N/A 2017    COLONOSCOPY performed by Ashlee Branch MD at [facility] ENDOSCOPY   [facility] SERVICES SURGERY  ,     L5 & S 1    HX BACK SURGERY  2018    HX BACK SURGERY      HX BREAST BIOPSY      Left    HX HYSTERECTOMY  09    LSH LSO    HX OOPHORECTOMY Left     One ovary removed.     HX ORTHOPAEDIC Left     thumb surgery    HX ORTHOPAEDIC Left     left knee surgery    HX ORTHOPAEDIC Left     ankle    HX TUBAL LIGATION      [place] REMOVAL OF KIDNEY STONE      RENAL STENT         Family History   Problem Relation Age of Onset    Cancer Mother 54        Lung cancer    Heart Disease Father     Hypertension Father     Elevated Lipids Father     Heart Attack Father     Stroke Father         x 3    Elevated Lipids Sister     Heart Disease Brother     Hypertension Brother     Elevated Lipids Brother     Elevated Lipids Sister     Elevated Lipids Sister     Heart Disease Sister     Cancer Sister         uterine    Heart Disease Sister     Cancer Sister         uterine    Heart Disease Sister    Wilhelminia Blazing Cancer Sister         colon cancer with liver mets    Bleeding Prob Brother     Heart Attack Brother     Anesth Problems Neg Hx      Reviewed and no changes     Social History     Socioeconomic History    Marital status:      Spouse name: Not on file    Number of children: Not on file    Years of education: Not on file    Highest education level: Not on file   Occupational History    Not on file   Tobacco Use    Smoking status: Former Smoker     Packs/day: 1.50     Years: 25.00     Pack years: 37.50     Quit date: 2015     Years since quittin.5    Smokeless tobacco: Never Used    Tobacco comment: vape   Substance and Sexual Activity    Alcohol use: Yes     Comment: occasional drinker    Drug use: No    Sexual activity: Yes     Partners: Male     Birth control/protection: Surgical   Other Topics Concern    Not on file   Social History Narrative    Not on file     Social Determinants of Health     Financial Resource Strain:     Difficulty of Paying Living Expenses:    Food Insecurity:     Worried About Running Out of Food in the Last Year:     [address] in the Last Year:    Transportation Needs:     Lack of Transportation (Medical):  Lack of Transportation (Non-Medical):    Physical Activity:     Days of Exercise per Week:     Minutes of Exercise per Session:    Stress:     Feeling of Stress :    Social Connections:     Frequency of Communication with Friends and Family:     Frequency of Social Gatherings with Friends and Family:     Attends Orthodoxy Services:     Active Member of Clubs or Organizations:     Attends Club or Organization Meetings:     Marital Status:    Intimate Partner Violence:     Fear of Current or Ex-Partner:     Emotionally Abused:     Physically Abused:     Sexually Abused:             Visit Vitals  LMP 06/22/2009       Physical Examination:   Gen: well appearing female  HEENT: normal conjunctiva, no audible congestion, patient does not see oral erythema, has MMM  Neck: patient does not feel enlarged or tender LAD or masses  Resp: normal respiratory effort, no audible wheezing. CV: patient does not feel palpitations or heart irregularity  Abd: patient does feel tenderness on LLQ patient does not notice distension  Extrem: patient does not see swelling in ankles or joints.    Neuro: Alert and oriented, able to answer questions without difficulty, able to move all extremities and walk normally          Lab Results   Component Value Date/Time    WBC 3.7 04/30/2021 03:50 PM    Hemoglobin (POC) 12.9 10/28/2013 03:40 PM    HGB 13.2 04/30/2021 03:50 PM    Hematocrit (POC) 38 10/28/2013 03:40 PM    HCT 41.4 04/30/2021 03:50 PM    PLATELET 975 95/29/7832 03:50 PM    .7 (H) 04/30/2021 03:50 PM     Lab Results   Component Value Date/Time    Sodium 138 04/30/2021 03:50 PM    Potassium 3.8 04/30/2021 03:50 PM    Chloride 104 04/30/2021 03:50 PM    CO2 26 04/30/2021 03:50 PM    Anion gap 8 04/30/2021 03:50 PM    Glucose 98 04/30/2021 03:50 PM    BUN 8 04/30/2021 03:50 PM    Creatinine 0.87 04/30/2021 03:50 PM    BUN/Creatinine ratio 9 (L) 04/30/2021 03:50 PM    GFR est AA >60 04/30/2021 03:50 PM    GFR est non-AA >60 04/30/2021 03:50 PM    Calcium 9.1 04/30/2021 03:50 PM     Lab Results   Component Value Date/Time    Cholesterol, total 188 08/26/2020 03:55 PM    HDL Cholesterol 69 08/26/2020 03:55 PM    LDL, calculated 98 08/26/2020 03:55 PM    VLDL, calculated 21 08/26/2020 03:55 PM    Triglyceride 107 08/26/2020 03:55 PM     Lab Results   Component Value Date/Time    TSH 2.380 04/27/2021 02:53 PM     No results found for: PSA, Susan Palacio, [id number], [id number]  Lab Results   Component Value Date/Time    Hemoglobin A1c 5.4 08/02/2018 08:19 AM     Lab Results   Component Value Date/Time    VITAMIN D, 25-HYDROXY 28.5 (L) 05/09/2017 08:54 AM       Lab Results   Component Value Date/Time    ALT (SGPT) 19 04/30/2021 03:50 PM    Alk. phosphatase 78 04/30/2021 03:50 PM    Bilirubin, total 0.6 04/30/2021 03:50 PM           Assessment/Plan:    1. Acute non-recurrent maxillary sinusitis  - levoFLOXacin (LEVAQUIN) 500 mg tablet; Take 1 Tablet by mouth daily. Dispense: 10 Tablet; Refill: 0    2. Diverticulitis  Recurrent  Discussed diet   - metroNIDAZOLE (FLAGYL) 500 mg tablet; Take 1 Tablet by mouth three (3) times daily. Dispense: 30 Tablet; Refill: 0  - levoFLOXacin (LEVAQUIN) 500 mg tablet; Take 1 Tablet by mouth daily. Dispense: 10 Tablet;  Refill: 0    Call ifs symptoms worsen        Keven Mahajan MD    This is an established visit conducted via real time video and audio telemedicine. The patient has been instructed that this meets HIPAA criteria and acknowledges and agrees to this method of visitation.

## 2021-08-19 NOTE — TELEPHONE ENCOUNTER
LVM for patient on home and cell numbers listed in the chart to return call to the office. Doxy me link sent to patient.

## 2021-09-07 DIAGNOSIS — G89.4 CHRONIC PAIN SYNDROME: ICD-10-CM

## 2021-09-08 RX ORDER — TRAMADOL HYDROCHLORIDE 50 MG/1
TABLET ORAL
Qty: 90 TABLET | Refills: 0 | Status: SHIPPED | OUTPATIENT
Start: 2021-09-08 | End: 2021-10-06
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
## 2021-09-20 DIAGNOSIS — K21.9 GASTROESOPHAGEAL REFLUX DISEASE WITHOUT ESOPHAGITIS: ICD-10-CM

## 2021-09-20 DIAGNOSIS — G89.4 CHRONIC PAIN SYNDROME: ICD-10-CM

## 2021-09-20 DIAGNOSIS — M48.062 SPINAL STENOSIS OF LUMBAR REGION WITH NEUROGENIC CLAUDICATION: ICD-10-CM

## 2021-09-21 RX ORDER — GABAPENTIN 400 MG/1
CAPSULE ORAL
Qty: 90 CAPSULE | Refills: 0 | Status: SHIPPED | OUTPATIENT
Start: 2021-09-21 | End: 2021-10-26

## 2021-09-21 RX ORDER — PANTOPRAZOLE SODIUM 40 MG/1
TABLET, DELAYED RELEASE ORAL
Qty: 90 TABLET | Refills: 1 | Status: SHIPPED | OUTPATIENT
Start: 2021-09-21 | End: 2022-03-01

## 2021-10-06 DIAGNOSIS — G89.4 CHRONIC PAIN SYNDROME: ICD-10-CM

## 2021-10-06 RX ORDER — TRAMADOL HYDROCHLORIDE 50 MG/1
TABLET ORAL
Qty: 90 TABLET | Refills: 0 | Status: SHIPPED | OUTPATIENT
Start: 2021-10-06 | End: 2021-10-13 | Stop reason: SDUPTHER

## 2021-10-13 DIAGNOSIS — G89.4 CHRONIC PAIN SYNDROME: ICD-10-CM

## 2021-10-13 RX ORDER — TRAMADOL HYDROCHLORIDE 50 MG/1
50 TABLET ORAL
Qty: 90 TABLET | Refills: 0 | Status: SHIPPED | OUTPATIENT
Start: 2021-10-13 | End: 2021-11-08 | Stop reason: SDUPTHER

## 2021-10-13 NOTE — TELEPHONE ENCOUNTER
Future Appointments:  Future Appointments   Date Time Provider Rozina Arizmendi   7/5/2022  4:00 PM Ovidio Chung MD [facility] BS AMB        Last Appointment With Me:  8/19/2021     Requested Prescriptions     Pending Prescriptions Disp Refills    traMADoL (ULTRAM) 50 mg tablet 90 Tablet 0     Sig: Take 1 Tablet by mouth every six (6) hours as needed for Pain for up to 30 days. Max Daily Amount: 200 mg.

## 2021-10-13 NOTE — TELEPHONE ENCOUNTER
----- Message from Petty Costello. Storm Brooks sent at 10/12/2021  5:31 PM EDT -----  Regarding: Update Medical Information  Contact: [phone]  Will I need to make appointment,  mason longoria to get more refills for trumadol ?

## 2021-10-21 NOTE — TELEPHONE ENCOUNTER
----- Message from Addison Carrasco.  Murali Júnior sent at 10/21/2021  4:48 PM EDT -----  Regarding: Non-Urgent Medical Question  Contact: [phone]  Can u call me in a Voltaren cream arthritis pain it does wonderful for my shoulders n it's like 20 to 25 dollars n it's getting  expensive thank you

## 2021-10-22 RX ORDER — DICLOFENAC SODIUM 10 MG/G
0.2 GEL TOPICAL 4 TIMES DAILY
Qty: 1 EACH | Refills: 2 | Status: SHIPPED | OUTPATIENT
Start: 2021-10-22 | End: 2021-11-08 | Stop reason: SDUPTHER

## 2021-10-26 DIAGNOSIS — I82.512 CHRONIC DEEP VEIN THROMBOSIS (DVT) OF FEMORAL VEIN OF LEFT LOWER EXTREMITY (HCC): ICD-10-CM

## 2021-10-26 DIAGNOSIS — M48.062 SPINAL STENOSIS OF LUMBAR REGION WITH NEUROGENIC CLAUDICATION: ICD-10-CM

## 2021-10-26 DIAGNOSIS — G89.4 CHRONIC PAIN SYNDROME: ICD-10-CM

## 2021-10-26 RX ORDER — GABAPENTIN 400 MG/1
CAPSULE ORAL
Qty: 90 CAPSULE | Refills: 0 | Status: SHIPPED | OUTPATIENT
Start: 2021-10-26 | End: 2021-12-02 | Stop reason: SDUPTHER

## 2021-10-26 RX ORDER — RIVAROXABAN 20 MG/1
TABLET, FILM COATED ORAL
Qty: 90 TABLET | Refills: 0 | Status: SHIPPED | OUTPATIENT
Start: 2021-10-26 | End: 2021-11-17

## 2021-11-08 ENCOUNTER — TELEPHONE (OUTPATIENT)
Dept: SLEEP MEDICINE | Age: 56
End: 2021-11-08

## 2021-11-08 DIAGNOSIS — E78.5 HYPERLIPIDEMIA, UNSPECIFIED HYPERLIPIDEMIA TYPE: ICD-10-CM

## 2021-11-08 DIAGNOSIS — G89.4 CHRONIC PAIN SYNDROME: ICD-10-CM

## 2021-11-08 RX ORDER — LOVASTATIN 20 MG/1
TABLET ORAL
Qty: 90 TABLET | Refills: 1 | Status: SHIPPED | OUTPATIENT
Start: 2021-11-08 | End: 2022-06-09

## 2021-11-08 RX ORDER — TRAMADOL HYDROCHLORIDE 50 MG/1
50 TABLET ORAL
Qty: 90 TABLET | Refills: 0 | Status: SHIPPED | OUTPATIENT
Start: 2021-11-08 | End: 2021-12-02 | Stop reason: SDUPTHER

## 2021-11-08 RX ORDER — DICLOFENAC SODIUM 10 MG/G
0.2 GEL TOPICAL 4 TIMES DAILY
Qty: 1 EACH | Refills: 2 | Status: SHIPPED | OUTPATIENT
Start: 2021-11-08 | End: 2022-01-03

## 2021-11-08 NOTE — TELEPHONE ENCOUNTER
Patient left a voicemail on [facility] voicemail wanting a prescription for a new device to pay for out of pocket. Returned call to patient explaining there was already a RX in her chart for a new device that was sent to [facility]. Asked when patient was eligible for a 5 year replacement and she stated not until March/May of next year. Patient has registered her device with [employer] for the recall. Explained to patient that other patients have started to received their replacement devices from [employer]. She can contact [employer] to see if there is a ETA of her device. Also explained we will reach out to [facility] to see if they have any devices she can pay for with self pay while she waits. Email sent to [facility].

## 2021-11-08 NOTE — TELEPHONE ENCOUNTER
PCP: Nena Raymundo MD    Last appt: 7/6/2020  Future Appointments   Date Time Provider Rozina Arizmendi   7/5/2022  4:00 PM Debra Woodall MD [facility] AMB       Requested Prescriptions     Pending Prescriptions Disp Refills    lovastatin (MEVACOR) 20 mg tablet 90 Tablet 1     Sig: TAKE 1 TABLET BY MOUTH EVERY DAY EVERY NIGHT    traMADoL (ULTRAM) 50 mg tablet 90 Tablet 0     Sig: Take 1 Tablet by mouth every six (6) hours as needed for Pain for up to 30 days. Max Daily Amount: 200 mg.    diclofenac (VOLTAREN) 1 % gel 1 Each 2     Sig: Apply 0.2 g to affected area four (4) times daily.

## 2021-11-09 NOTE — TELEPHONE ENCOUNTER
Madalyn Galeazzi, MD 13 hours ago (6:22 PM)     TO         Can u prescribe me Cymbalta for fibormyslga  I'm hurting in my arms

## 2021-11-10 ENCOUNTER — TELEPHONE (OUTPATIENT)
Dept: SLEEP MEDICINE | Age: 56
End: 2021-11-10

## 2021-11-10 ENCOUNTER — DOCUMENTATION ONLY (OUTPATIENT)
Dept: SLEEP MEDICINE | Age: 56
End: 2021-11-10

## 2021-11-10 NOTE — TELEPHONE ENCOUNTER
Patient requesting a call back to discuss recall and other options that might be available. Patient stated she has not been sleeping and extremely drowsy during the day.

## 2021-11-16 DIAGNOSIS — I82.512 CHRONIC DEEP VEIN THROMBOSIS (DVT) OF FEMORAL VEIN OF LEFT LOWER EXTREMITY (HCC): ICD-10-CM

## 2021-11-17 RX ORDER — RIVAROXABAN 20 MG/1
TABLET, FILM COATED ORAL
Qty: 90 TABLET | Refills: 0 | Status: SHIPPED | OUTPATIENT
Start: 2021-11-17 | End: 2022-02-03

## 2021-11-26 ENCOUNTER — TELEPHONE (OUTPATIENT)
Dept: INTERNAL MEDICINE CLINIC | Age: 56
End: 2021-11-26
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
## 2021-12-01 NOTE — TELEPHONE ENCOUNTER
Patient currently has his device under recall and is fully registered with [employer]. Patient was given what is looking to find out 800 number for [facility] per request. Patient stated that he is stopped using device per his choice Until a replacement can be obtained.

## 2021-12-06 ENCOUNTER — TELEPHONE (OUTPATIENT)
Dept: INTERNAL MEDICINE CLINIC | Age: 56
End: 2021-12-06

## 2021-12-06 NOTE — TELEPHONE ENCOUNTER
----- Message from Tracee Ware sent at 12/6/2021 11:39 AM EST -----  Subject: Message to Provider    QUESTIONS  Information for Provider? Pt is wanting to know if she needs blood work,   she said she did not hear back. Please call pt back   ---------------------------------------------------------------------------  --------------  CALL BACK INFO  What is the best way for the office to contact you? OK to leave message on   voicemail  Preferred Call Back Phone Number? [phone]  ---------------------------------------------------------------------------  --------------  SCRIPT ANSWERS  Relationship to Patient?  Self

## 2021-12-07 ENCOUNTER — PATIENT MESSAGE (OUTPATIENT)
Dept: INTERNAL MEDICINE CLINIC | Age: 56
End: 2021-12-07

## 2021-12-07 DIAGNOSIS — M48.062 SPINAL STENOSIS OF LUMBAR REGION WITH NEUROGENIC CLAUDICATION: ICD-10-CM

## 2021-12-07 DIAGNOSIS — G89.4 CHRONIC PAIN SYNDROME: ICD-10-CM

## 2021-12-08 RX ORDER — GABAPENTIN 400 MG/1
400 CAPSULE ORAL 4 TIMES DAILY
Qty: 120 CAPSULE | Refills: 1 | Status: SHIPPED | OUTPATIENT
Start: 2021-12-08 | End: 2022-01-06 | Stop reason: SDUPTHER

## 2021-12-17 ENCOUNTER — TELEPHONE (OUTPATIENT)
Dept: INTERNAL MEDICINE CLINIC | Age: 56
End: 2021-12-17

## 2021-12-17 NOTE — LETTER
12/17/2021 10:52 AM    Ms. Patricia Jack  [address] [facility] [id number]        Stop xarelto 2 days prior to procedure and resume after procedure if no active bleeding        Sincerely,      Nidia Rocha MD

## 2021-12-17 NOTE — TELEPHONE ENCOUNTER
#[phone]  Buck Zhang with [facility] is requesting clearance for procedure to stop Xeralto.   Procedure 1-5-22    Please fax today   Fax #[phone]  Attn: Buck Zhang

## 2021-12-27 ENCOUNTER — PATIENT MESSAGE (OUTPATIENT)
Dept: INTERNAL MEDICINE CLINIC | Age: 56
End: 2021-12-27

## 2021-12-30 ENCOUNTER — HOSPITAL ENCOUNTER (OUTPATIENT)
Dept: PREADMISSION TESTING | Age: 56
Discharge: HOME | End: 2021-12-30
Payer: MEDICARE

## 2021-12-30 PROCEDURE — U0005 INFEC AGEN DETEC AMPLI PROBE: HCPCS

## 2021-12-31 LAB
SARS-COV-2, XPLCVT: NOT DETECTED
SOURCE, COVRS: NORMAL

## 2022-01-03 DIAGNOSIS — G89.4 CHRONIC PAIN SYNDROME: ICD-10-CM

## 2022-01-03 RX ORDER — MONTELUKAST SODIUM 10 MG/1
TABLET ORAL
Qty: 90 TABLET | Refills: 0 | Status: SHIPPED | OUTPATIENT
Start: 2022-01-03 | End: 2022-02-24 | Stop reason: SDUPTHER

## 2022-01-03 RX ORDER — DICLOFENAC SODIUM 10 MG/G
GEL TOPICAL
Qty: 100 G | Refills: 2 | Status: SHIPPED | OUTPATIENT
Start: 2022-01-03 | End: 2022-03-31

## 2022-01-03 RX ORDER — TRAMADOL HYDROCHLORIDE 50 MG/1
TABLET ORAL
Qty: 90 TABLET | Refills: 0 | OUTPATIENT
Start: 2022-01-03

## 2022-01-06 DIAGNOSIS — M48.062 SPINAL STENOSIS OF LUMBAR REGION WITH NEUROGENIC CLAUDICATION: ICD-10-CM

## 2022-01-06 DIAGNOSIS — G89.4 CHRONIC PAIN SYNDROME: ICD-10-CM

## 2022-01-06 RX ORDER — GABAPENTIN 400 MG/1
400 CAPSULE ORAL 4 TIMES DAILY
Qty: 120 CAPSULE | Refills: 1 | Status: SHIPPED | OUTPATIENT
Start: 2022-01-06 | End: 2022-02-24 | Stop reason: SDUPTHER

## 2022-01-31 ENCOUNTER — HOSPITAL ENCOUNTER (OUTPATIENT)
Dept: PREADMISSION TESTING | Age: 57
Discharge: HOME | End: 2022-01-31

## 2022-02-03 DIAGNOSIS — I82.512 CHRONIC DEEP VEIN THROMBOSIS (DVT) OF FEMORAL VEIN OF LEFT LOWER EXTREMITY (HCC): ICD-10-CM

## 2022-02-03 RX ORDER — RIVAROXABAN 20 MG/1
TABLET, FILM COATED ORAL
Qty: 90 TABLET | Refills: 0 | Status: SHIPPED | OUTPATIENT
Start: 2022-02-03 | End: 2022-05-04

## 2022-02-24 ENCOUNTER — VIRTUAL VISIT (OUTPATIENT)
Dept: INTERNAL MEDICINE CLINIC | Age: 57
End: 2022-02-24
Payer: MEDICARE

## 2022-02-24 DIAGNOSIS — I82.512 CHRONIC DEEP VEIN THROMBOSIS (DVT) OF FEMORAL VEIN OF LEFT LOWER EXTREMITY (HCC): ICD-10-CM

## 2022-02-24 DIAGNOSIS — G89.4 CHRONIC PAIN SYNDROME: ICD-10-CM

## 2022-02-24 DIAGNOSIS — E78.5 HYPERLIPIDEMIA, UNSPECIFIED HYPERLIPIDEMIA TYPE: Primary | ICD-10-CM

## 2022-02-24 DIAGNOSIS — F32.A DEPRESSION, UNSPECIFIED DEPRESSION TYPE: ICD-10-CM

## 2022-02-24 DIAGNOSIS — M48.062 SPINAL STENOSIS OF LUMBAR REGION WITH NEUROGENIC CLAUDICATION: ICD-10-CM

## 2022-02-24 DIAGNOSIS — F11.99 OPIOID USE, UNSPECIFIED WITH UNSPECIFIED OPIOID-INDUCED DISORDER (HCC): ICD-10-CM

## 2022-02-24 PROCEDURE — G0463 HOSPITAL OUTPT CLINIC VISIT: HCPCS | Performed by: INTERNAL MEDICINE

## 2022-02-24 PROCEDURE — G9899 SCRN MAM PERF RSLTS DOC: HCPCS | Performed by: INTERNAL MEDICINE

## 2022-02-24 PROCEDURE — 3017F COLORECTAL CA SCREEN DOC REV: CPT | Performed by: INTERNAL MEDICINE

## 2022-02-24 PROCEDURE — G8427 DOCREV CUR MEDS BY ELIG CLIN: HCPCS | Performed by: INTERNAL MEDICINE

## 2022-02-24 PROCEDURE — G8417 CALC BMI ABV UP PARAM F/U: HCPCS | Performed by: INTERNAL MEDICINE

## 2022-02-24 PROCEDURE — 99214 OFFICE O/P EST MOD 30 MIN: CPT | Performed by: INTERNAL MEDICINE

## 2022-02-24 PROCEDURE — G8432 DEP SCR NOT DOC, RNG: HCPCS | Performed by: INTERNAL MEDICINE

## 2022-02-24 RX ORDER — LEVOTHYROXINE SODIUM 88 UG/1
88 TABLET ORAL
Qty: 90 TABLET | Refills: 1 | Status: SHIPPED | OUTPATIENT
Start: 2022-02-24 | End: 2022-08-17

## 2022-02-24 RX ORDER — GABAPENTIN 400 MG/1
400 CAPSULE ORAL 4 TIMES DAILY
Qty: 120 CAPSULE | Refills: 2 | Status: SHIPPED | OUTPATIENT
Start: 2022-02-24 | End: 2022-07-28

## 2022-02-24 RX ORDER — TRAMADOL HYDROCHLORIDE 50 MG/1
50 TABLET ORAL
Qty: 90 TABLET | Refills: 0 | Status: SHIPPED | OUTPATIENT
Start: 2022-02-24 | End: 2022-03-26

## 2022-02-24 RX ORDER — METHOCARBAMOL 750 MG/1
750 TABLET, FILM COATED ORAL 4 TIMES DAILY
Qty: 120 TABLET | Refills: 3 | Status: SHIPPED | OUTPATIENT
Start: 2022-02-24 | End: 2022-08-29 | Stop reason: SDUPTHER

## 2022-02-24 RX ORDER — ALBUTEROL SULFATE 90 UG/1
AEROSOL, METERED RESPIRATORY (INHALATION)
Qty: 54 EACH | Refills: 1 | Status: SHIPPED | OUTPATIENT
Start: 2022-02-24 | End: 2022-09-30

## 2022-02-24 RX ORDER — MONTELUKAST SODIUM 10 MG/1
10 TABLET ORAL DAILY
Qty: 90 TABLET | Refills: 1 | Status: SHIPPED | OUTPATIENT
Start: 2022-02-24 | End: 2022-08-17

## 2022-02-24 RX ORDER — ESCITALOPRAM OXALATE 20 MG/1
20 TABLET ORAL DAILY
Qty: 90 TABLET | Refills: 1 | Status: SHIPPED | OUTPATIENT
Start: 2022-02-24 | End: 2022-10-17

## 2022-02-24 NOTE — PROGRESS NOTES
Kenny Antoine (: 1965) is a 61 y.o. female, established patient, here for evaluation of the following chief complaint(s):   Follow-up, Medication Evaluation, Pain (Chronic), and Medication Refill       ASSESSMENT/PLAN:  Below is the assessment and plan developed based on review of pertinent history, labs, studies, and medications. 1. Depression, unspecified depression type  2. Chronic pain syndrome  3. Spinal stenosis of lumbar region with neurogenic claudication          Kenny Antoine, was evaluated through a synchronous (real-time) audio-video encounter. The patient (or guardian if applicable) is aware that this is a billable service, which includes applicable co-pays. Verbal consent to proceed has been obtained. The visit was conducted pursuant to the emergency declaration under the [address] [place] [address] authority and the [employer] Act. Patient identification was verified, and a caregiver was present when appropriate. The patient was located at home in a state where the provider was licensed to provide care. An electronic signature was used to authenticate this note.   -- Virgen Ochoa, JADE

## 2022-02-24 NOTE — PROGRESS NOTES
CC: Follow-up, Medication Evaluation, Pain (Chronic), and Medication Refill      HPI:    She is a 61 y.o. female with a hx of spinal stenosis, hx of DVT,  Hypothyroidism, high cholesterol, GERD            Chronic pain syndrome     Spinal stenosis of lumbar region with neurogenic claudication  Has had 3 back surgeries with Dr Oscar Murillo and unfortunately back pain has not improved  Taking gabapentin 400mg 4 times a day  Taking 100mg tramadol BID   Taking methocarmabol 750 TID  lyrica did not work well in the past   Still has pain but this regimen helps improve quality of life      Saw a ortho doctor and started on celebrex. Discussed celebrex is contraindicated with xarelto  and stopped         Acute deep vein thrombosis (DVT) of proximal vein of left lower extremity (HCC) which then became chronic on repeat imaging with recurrent symptoms  She is on xarelto 20mg chronically          Acquired hypothyroidism: she denies hair loss and feels ok. She is taking levothyroxine             High cholesterol: on statin and tolerating it well.       GERD: on PPI chronically, has symptoms if tries to come off medication            Colonoscopy \" I am too scared\"         This is an established visit conducted via telemedicine with video. The patient has been instructed that this meets HIPAA criteria and acknowledges and agrees to this method of visitation. Pursuant to the emergency declaration under the [facility], 1135 waiver authority and the [employer] Act, this Virtual Visit was conducted, with patient's consent, to reduce the patient's risk of exposure to COVID-19 and provide continuity of care for an established patient. Services were provided through a video synchronous discussion virtually to substitute for in-person clinic visit.        ROS:  Constitutional: negative for fevers, chills, anorexia and weight loss  10 systems reviewed and negative other then [facility]     Past Medical History:   Diagnosis Date    Arthritis     Asthma     Breast cyst 1996    left, removed    Chronic pain     LOWER BACK    Colon polyps     Diverticulitis     DVT (deep venous thrombosis) ([facility] [address] 10/18/2018    provoked after back sx    Fatty liver     GERD (gastroesophageal reflux disease)     Hypercholesteremia     Hypothyroidism     Nicotine vapor product user     Obesity     SUN (obstructive sleep apnea)     uses CPAP    Psychiatric disorder     depression    Thyroid disease     hypothyroid       Current Outpatient Medications on File Prior to Visit   Medication Sig Dispense Refill    Xarelto 20 mg tab tablet TAKE 1 TABLET BY MOUTH EVERY DAY WITH BREAKFAST 90 Tablet 0    cholecalciferol, vitamin D3, (VITAMIN D3 PO) Take 800 Units by mouth daily.  vit B Cmplx 3-FA-Vit C-Biotin (NEPHRO JEROME RX) 1- mg-mg-mcg tablet Take 1 Tablet by mouth daily.  diclofenac (VOLTAREN) 1 % gel APPLY 0.2 GRAMS TO AFFECTED AREA FOUR (4) TIMES DAILY. (Patient taking differently: as needed.) 100 g 2    lovastatin (MEVACOR) 20 mg tablet TAKE 1 TABLET BY MOUTH EVERY DAY EVERY NIGHT 90 Tablet 1    pantoprazole (PROTONIX) 40 mg tablet TAKE 1 TABLET BY MOUTH EVERY DAY 90 Tablet 1    fluticasone furoate (ARNUITY ELLIPTA) 100 mcg/actuation dsdv inhaler Take 1 Puff by inhalation daily. 2 Inhaler 4    albuterol (PROVENTIL VENTOLIN) 2.5 mg /3 mL (0.083 %) nebu 3 mL by Nebulization route every four (4) hours as needed for Wheezing. R06.2 wheezing 75 mL 0    albuterol (Ventolin HFA) 90 mcg/actuation inhaler Take 1 Puff by inhalation every six (6) hours as needed for Wheezing. 3 Inhaler 2    Nebulizer Accessories kit Use nebulizer machine as needed for dyspnea 1 Kit 0     No current facility-administered medications on file prior to visit.        Past Surgical History:   Procedure Laterality Date    COLONOSCOPY N/A 11/14/2017    COLONOSCOPY performed by Tracee Stovall MD [facility] ENDOSCOPY   [facility] SERVICES SURGERY  ,     L5 & S 1    HX BACK SURGERY  2018    HX BACK SURGERY      HX BREAST BIOPSY      Left    HX HYSTERECTOMY  09    LSH LSO    HX OOPHORECTOMY Left     One ovary removed.     HX ORTHOPAEDIC Left     thumb surgery    HX ORTHOPAEDIC Left     left knee surgery    HX ORTHOPAEDIC Left     ankle    HX TUBAL LIGATION      SD REMOVAL OF KIDNEY STONE      RENAL STENT         Family History   Problem Relation Age of Onset    Cancer Mother 54        Lung cancer    Heart Disease Father     Hypertension Father     Elevated Lipids Father     Heart Attack Father     Stroke Father         x 3    Elevated Lipids Sister     Heart Disease Brother     Hypertension Brother     Elevated Lipids Brother     Elevated Lipids Sister     Elevated Lipids Sister     Heart Disease Sister     Cancer Sister         uterine    Heart Disease Sister     Cancer Sister         uterine    Heart Disease Sister    [facility] Cancer Sister         colon cancer with liver mets    Bleeding Prob Brother     Heart Attack Brother     Anesth Problems Neg Hx      Reviewed and no changes     Social History     Socioeconomic History    Marital status:      Spouse name: Not on file    Number of children: Not on file    Years of education: Not on file    Highest education level: Not on file   Occupational History    Not on file   Tobacco Use    Smoking status: Former Smoker     Packs/day: 1.50     Years: 25.00     Pack years: 37.50     Quit date: 2015     Years since quittin.0    Smokeless tobacco: Never Used    Tobacco comment: vape   Vaping Use    Vaping Use: Every day    Substances: Nicotine    Devices: Pre-filled or refillable cartridge   Substance and Sexual Activity    Alcohol use: Yes     Comment: occasional drinker/rare beer    Drug use: No    Sexual activity: Yes     Partners: Male     Birth control/protection: Surgical   Other Topics Concern    Not on file   Social History Narrative    Not on file     Social Determinants of Health     Financial Resource Strain:     Difficulty of Paying Living Expenses: Not on file   Food Insecurity:     Worried About Running Out of Food in the Last Year: Not on file    Jake of Food in the Last Year: Not on file   Transportation Needs:     Lack of Transportation (Medical): Not on file    Lack of Transportation (Non-Medical): Not on file   Physical Activity:     Days of Exercise per Week: Not on file    Minutes of Exercise per Session: Not on file   Stress:     Feeling of Stress : Not on file   Social Connections:     Frequency of Communication with Friends and Family: Not on file    Frequency of Social Gatherings with Friends and Family: Not on file    Attends [employer] Services: Not on file    Active Member of Clubs or Organizations: Not on file    Attends Club or Organization Meetings: Not on file    Marital Status: Not on file   Intimate Partner Violence:     Fear of Current or Ex-Partner: Not on file    Emotionally Abused: Not on file    Physically Abused: Not on file    Sexually Abused: Not on file   Housing Stability:     Unable to Pay for Housing in the Last Year: Not on file    Number of [place] in the Last Year: Not on file    Unstable Housing in the Last Year: Not on file            Visit Vitals  [facility] 06/22/2009       Physical Examination:   Gen: well appearing female  HEENT: normal conjunctiva, no audible congestion, patient does not see oral erythema, has MMM  Neck: patient does not feel enlarged or tender LAD or masses  Resp: normal respiratory effort, no audible wheezing. CV: patient does not feel palpitations or heart irregularity  Abd: patient does not feel abdominal tenderness or mass, patient does not notice distension  Extrem: patient does not see swelling in ankles or joints.    Neuro: Alert and oriented, able to answer questions without difficulty, able to move all extremities and walk normally          Lab Results   Component Value Date/Time    WBC 3.7 04/30/2021 03:50 PM    Hemoglobin (POC) 12.9 10/28/2013 03:40 PM    HGB 13.2 04/30/2021 03:50 PM    Hematocrit (POC) 38 10/28/2013 03:40 PM    HCT 41.4 04/30/2021 03:50 PM    PLATELET 338 73/60/3774 03:50 PM    .7 (H) 04/30/2021 03:50 PM     Lab Results   Component Value Date/Time    Sodium 138 04/30/2021 03:50 PM    Potassium 3.8 04/30/2021 03:50 PM    Chloride 104 04/30/2021 03:50 PM    CO2 26 04/30/2021 03:50 PM    Anion gap 8 04/30/2021 03:50 PM    Glucose 98 04/30/2021 03:50 PM    BUN 8 04/30/2021 03:50 PM    Creatinine 0.87 04/30/2021 03:50 PM    BUN/Creatinine ratio 9 (L) 04/30/2021 03:50 PM    GFR est AA >60 04/30/2021 03:50 PM    GFR est non-AA >60 04/30/2021 03:50 PM    Calcium 9.1 04/30/2021 03:50 PM     Lab Results   Component Value Date/Time    Cholesterol, total 188 08/26/2020 03:55 PM    HDL Cholesterol 69 08/26/2020 03:55 PM    LDL, calculated 98 08/26/2020 03:55 PM    VLDL, calculated 21 08/26/2020 03:55 PM    Triglyceride 107 08/26/2020 03:55 PM     Lab Results   Component Value Date/Time    TSH 2.380 04/27/2021 02:53 PM     No results found for: PSA, Nelly Stewart, [id number], [id number]  Lab Results   Component Value Date/Time    Hemoglobin A1c 5.4 08/02/2018 08:19 AM     Lab Results   Component Value Date/Time    VITAMIN D, 25-HYDROXY 28.5 (L) 05/09/2017 08:54 AM       Lab Results   Component Value Date/Time    ALT (SGPT) 19 04/30/2021 03:50 PM    Alk.  phosphatase 78 04/30/2021 03:50 PM    Bilirubin, total 0.6 04/30/2021 03:50 PM           Assessment/Plan:       Chronic pain syndrome     Spinal stenosis of lumbar region with neurogenic claudication  Has had 3 back surgeries with Dr Gutiérrez and unfortunately back pain has not improved  Taking gabapentin 400mg 4 times a day  Taking 100mg tramadol BID   Taking methocarmabol 750 TID  lyrica did not work well in the past Still has pain but this regimen helps improve quality of life    refilled prescriptions today and due for UDS  Saw a ortho doctor and started on celebrex. Discussed celebrex is contraindicated with xarelto  and stopped         Acute deep vein thrombosis (DVT) of proximal vein of left lower extremity (HCC) which then became chronic on repeat imaging with recurrent symptoms  She is on xarelto 20mg chronically          Acquired hypothyroidism:eutyhroid, due for TSH check  On levothyroxine            High cholesterol: on statin and tolerating it well.    due for lipid panel     GERD: on PPI chronically, has symptoms if tries to come off medication         Orders Placed This Encounter    COMPLIANCE DRUG SCREEN/PRESCRIPTION MONITORING     Standing Status:   Future     Standing Expiration Date:   2/24/2023    CBC WITH AUTOMATED DIFF     Standing Status:   Future     Standing Expiration Date:   8/63/5400    METABOLIC PANEL, COMPREHENSIVE     Standing Status:   Future     Standing Expiration Date:   2/24/2023    LIPID PANEL     Standing Status:   Future     Standing Expiration Date:   2/24/2023    escitalopram oxalate (LEXAPRO) 20 mg tablet     Sig: Take 1 Tablet by mouth daily. Dispense:  90 Tablet     Refill:  1    levothyroxine (SYNTHROID) 88 mcg tablet     Sig: Take 1 Tablet by mouth Daily (before breakfast). Dispense:  90 Tablet     Refill:  1    gabapentin (NEURONTIN) 400 mg capsule     Sig: Take 1 Capsule by mouth four (4) times daily. Max Daily Amount: 1,600 mg. Dispense:  120 Capsule     Refill:  2     Not to exceed 5 additional fills before 03/20/2022    montelukast (SINGULAIR) 10 mg tablet     Sig: Take 1 Tablet by mouth daily. Dispense:  90 Tablet     Refill:  1    traMADoL (ULTRAM) 50 mg tablet     Sig: Take 1 Tablet by mouth every six (6) hours as needed for Pain for up to 30 days. Max Daily Amount: 200 mg.      Dispense:  90 Tablet     Refill:  0     Not to exceed 5 additional fills before 03/07/2022    methocarbamoL (ROBAXIN) 750 mg tablet     Sig: Take 1 Tablet by mouth four (4) times daily. Dispense:  120 Tablet     Refill:  3     Nichole Sousa MD    This is an established visit conducted via real time video and audio telemedicine. The patient has been instructed that this meets HIPAA criteria and acknowledges and agrees to this method of visitation.

## 2022-02-28 DIAGNOSIS — K21.9 GASTROESOPHAGEAL REFLUX DISEASE WITHOUT ESOPHAGITIS: ICD-10-CM

## 2022-03-01 RX ORDER — PANTOPRAZOLE SODIUM 40 MG/1
TABLET, DELAYED RELEASE ORAL
Qty: 90 TABLET | Refills: 1 | Status: SHIPPED | OUTPATIENT
Start: 2022-03-01 | End: 2022-08-17

## 2022-03-08 ENCOUNTER — DOCUMENTATION ONLY (OUTPATIENT)
Dept: SLEEP MEDICINE | Age: 57
End: 2022-03-08

## 2022-03-14 ENCOUNTER — LAB ONLY (OUTPATIENT)
Dept: INTERNAL MEDICINE CLINIC | Age: 57
End: 2022-03-14

## 2022-03-14 DIAGNOSIS — F32.A DEPRESSION, UNSPECIFIED DEPRESSION TYPE: ICD-10-CM

## 2022-03-14 DIAGNOSIS — M48.062 SPINAL STENOSIS OF LUMBAR REGION WITH NEUROGENIC CLAUDICATION: ICD-10-CM

## 2022-03-14 DIAGNOSIS — G89.4 CHRONIC PAIN SYNDROME: ICD-10-CM

## 2022-03-14 DIAGNOSIS — E78.5 HYPERLIPIDEMIA, UNSPECIFIED HYPERLIPIDEMIA TYPE: ICD-10-CM

## 2022-03-14 LAB
ALBUMIN SERPL-MCNC: 3.9 G/DL (ref 3.5–5)
ALBUMIN/GLOB SERPL: 1.3 {RATIO} (ref 1.1–2.2)
ALP SERPL-CCNC: 83 U/L (ref 45–117)
ALT SERPL-CCNC: 19 U/L (ref 12–78)
ANION GAP SERPL CALC-SCNC: 8 MMOL/L (ref 5–15)
AST SERPL-CCNC: 12 U/L (ref 15–37)
BILIRUB SERPL-MCNC: 0.7 MG/DL (ref 0.2–1)
BUN SERPL-MCNC: 10 MG/DL (ref 6–20)
BUN/CREAT SERPL: 9 (ref 12–20)
CALCIUM SERPL-MCNC: 9.3 MG/DL (ref 8.5–10.1)
CHLORIDE SERPL-SCNC: 103 MMOL/L (ref 97–108)
CHOLEST SERPL-MCNC: 200 MG/DL
CO2 SERPL-SCNC: 27 MMOL/L (ref 21–32)
CREAT SERPL-MCNC: 1.06 MG/DL (ref 0.55–1.02)
GLOBULIN SER CALC-MCNC: 3.1 G/DL (ref 2–4)
GLUCOSE SERPL-MCNC: 96 MG/DL (ref 65–100)
HDLC SERPL-MCNC: 82 MG/DL
HDLC SERPL: 2.4 {RATIO} (ref 0–5)
LDLC SERPL CALC-MCNC: 101.6 MG/DL (ref 0–100)
POTASSIUM SERPL-SCNC: 3.8 MMOL/L (ref 3.5–5.1)
PROT SERPL-MCNC: 7 G/DL (ref 6.4–8.2)
SODIUM SERPL-SCNC: 138 MMOL/L (ref 136–145)
TRIGL SERPL-MCNC: 82 MG/DL (ref ?–150)
VLDLC SERPL CALC-MCNC: 16.4 MG/DL

## 2022-03-15 LAB
BASOPHILS # BLD: 0 K/UL (ref 0–0.1)
BASOPHILS NFR BLD: 1 % (ref 0–1)
DIFFERENTIAL METHOD BLD: ABNORMAL
EOSINOPHIL # BLD: 0.1 K/UL (ref 0–0.4)
EOSINOPHIL NFR BLD: 3 % (ref 0–7)
ERYTHROCYTE [DISTWIDTH] IN BLOOD BY AUTOMATED COUNT: 12.6 % (ref 11.5–14.5)
HCT VFR BLD AUTO: 45.5 % (ref 35–47)
HGB BLD-MCNC: 13.6 G/DL (ref 11.5–16)
IMM GRANULOCYTES # BLD AUTO: 0 K/UL (ref 0–0.04)
IMM GRANULOCYTES NFR BLD AUTO: 0 % (ref 0–0.5)
LYMPHOCYTES # BLD: 1.6 K/UL (ref 0.8–3.5)
LYMPHOCYTES NFR BLD: 38 % (ref 12–49)
MCH RBC QN AUTO: 31.5 PG (ref 26–34)
MCHC RBC AUTO-ENTMCNC: 29.9 G/DL (ref 30–36.5)
MCV RBC AUTO: 105.3 FL (ref 80–99)
MONOCYTES # BLD: 0.3 K/UL (ref 0–1)
MONOCYTES NFR BLD: 7 % (ref 5–13)
NEUTS SEG # BLD: 2.1 K/UL (ref 1.8–8)
NEUTS SEG NFR BLD: 51 % (ref 32–75)
NRBC # BLD: 0 K/UL (ref 0–0.01)
NRBC BLD-RTO: 0 PER 100 WBC
PLATELET # BLD AUTO: 207 K/UL (ref 150–400)
PMV BLD AUTO: 9.3 FL (ref 8.9–12.9)
RBC # BLD AUTO: 4.32 M/UL (ref 3.8–5.2)
WBC # BLD AUTO: 4.1 K/UL (ref 3.6–11)

## 2022-03-18 PROBLEM — I82.409 DVT (DEEP VENOUS THROMBOSIS) (HCC): Status: ACTIVE | Noted: 2018-10-18

## 2022-03-18 PROBLEM — M17.10 OSTEOARTHROSIS, LOCALIZED, PRIMARY, KNEE: Status: ACTIVE | Noted: 2017-09-06

## 2022-03-18 PROBLEM — J45.909 ASTHMA: Status: ACTIVE | Noted: 2017-01-26

## 2022-03-18 PROBLEM — K57.30 DIVERTICULOSIS OF LARGE INTESTINE WITHOUT HEMORRHAGE: Status: ACTIVE | Noted: 2017-03-24

## 2022-03-18 PROBLEM — F11.99 OPIOID USE, UNSPECIFIED WITH UNSPECIFIED OPIOID-INDUCED DISORDER (HCC): Status: ACTIVE | Noted: 2022-02-24

## 2022-03-19 PROBLEM — M48.061 SPINAL STENOSIS, LUMBAR: Status: ACTIVE | Noted: 2018-08-30

## 2022-03-19 PROBLEM — G47.00 INSOMNIA: Status: ACTIVE | Noted: 2017-01-26

## 2022-03-19 PROBLEM — M25.562 CHRONIC PAIN OF LEFT KNEE: Status: ACTIVE | Noted: 2017-09-06

## 2022-03-19 PROBLEM — G89.29 CHRONIC PAIN OF LEFT KNEE: Status: ACTIVE | Noted: 2017-09-06

## 2022-03-19 PROBLEM — E66.01 OBESITY, MORBID (HCC): Status: ACTIVE | Noted: 2018-04-17

## 2022-03-19 PROBLEM — K57.32 DIVERTICULITIS OF LARGE INTESTINE WITHOUT PERFORATION OR ABSCESS WITHOUT BLEEDING: Status: ACTIVE | Noted: 2017-03-17

## 2022-03-22 LAB — DRUGS UR: NORMAL

## 2022-03-24 ENCOUNTER — PATIENT MESSAGE (OUTPATIENT)
Dept: INTERNAL MEDICINE CLINIC | Age: 57
End: 2022-03-24

## 2022-03-24 DIAGNOSIS — R79.89 ELEVATED SERUM CREATININE: Primary | ICD-10-CM

## 2022-03-25 ENCOUNTER — TELEPHONE (OUTPATIENT)
Dept: INTERNAL MEDICINE CLINIC | Age: 57
End: 2022-03-25

## 2022-03-25 NOTE — TELEPHONE ENCOUNTER
Heron Chatterjee 3/25/2022 7:28 AM EDT      ----- Message -----  From: Kristine Rollins  Sent: 3/24/2022 9:32 PM EDT  To: Jen Chavis  Subject: Concerned     I got a message for yall yesterday , saying drug screen test came back showing, I had a problem with my bladder. Plz contact me at [phone] . Thank You.

## 2022-03-25 NOTE — TELEPHONE ENCOUNTER
Pt called stating she would like a call back as soon as able. States that she received a [employer] message regarding her drug screen that stated that there was a problem with her bladder. Pt would like to talk to someone about it. Pt has also sent mychart encounters inquiring about the issue. Pt states please respond by phone or [employer] as soon as possible.

## 2022-03-31 DIAGNOSIS — G89.4 CHRONIC PAIN SYNDROME: ICD-10-CM

## 2022-03-31 RX ORDER — DICLOFENAC SODIUM 10 MG/G
GEL TOPICAL AS NEEDED
Qty: 1 EACH | Refills: 2 | Status: SHIPPED | OUTPATIENT
Start: 2022-03-31 | End: 2022-08-29 | Stop reason: SDUPTHER

## 2022-04-04 DIAGNOSIS — G89.4 CHRONIC PAIN SYNDROME: Primary | ICD-10-CM

## 2022-04-04 RX ORDER — TRAMADOL HYDROCHLORIDE 50 MG/1
50 TABLET ORAL
Qty: 90 TABLET | Refills: 0 | Status: SHIPPED | OUTPATIENT
Start: 2022-04-04 | End: 2022-05-04

## 2022-04-04 NOTE — TELEPHONE ENCOUNTER
----- Message from Elroy Juarez. Shanice Oreilly sent at 4/2/2022  2:20 PM EDT -----  Regarding: Hi  I have a UTI. ..   can call me an antibiotics plz . Can u refill trumaol. Thank you.

## 2022-04-28 ENCOUNTER — LAB ONLY (OUTPATIENT)
Dept: INTERNAL MEDICINE CLINIC | Age: 57
End: 2022-04-28

## 2022-04-28 DIAGNOSIS — R79.89 ELEVATED SERUM CREATININE: ICD-10-CM

## 2022-04-29 LAB
ANION GAP SERPL CALC-SCNC: 6 MMOL/L (ref 5–15)
BUN SERPL-MCNC: 11 MG/DL (ref 6–20)
BUN/CREAT SERPL: 13 (ref 12–20)
CALCIUM SERPL-MCNC: 9.1 MG/DL (ref 8.5–10.1)
CHLORIDE SERPL-SCNC: 105 MMOL/L (ref 97–108)
CO2 SERPL-SCNC: 30 MMOL/L (ref 21–32)
CREAT SERPL-MCNC: 0.87 MG/DL (ref 0.55–1.02)
GLUCOSE SERPL-MCNC: 79 MG/DL (ref 65–100)
POTASSIUM SERPL-SCNC: 4 MMOL/L (ref 3.5–5.1)
SODIUM SERPL-SCNC: 141 MMOL/L (ref 136–145)

## 2022-05-04 DIAGNOSIS — I82.512 CHRONIC DEEP VEIN THROMBOSIS (DVT) OF FEMORAL VEIN OF LEFT LOWER EXTREMITY (HCC): ICD-10-CM

## 2022-05-04 RX ORDER — RIVAROXABAN 20 MG/1
TABLET, FILM COATED ORAL
Qty: 90 TABLET | Refills: 0 | Status: SHIPPED | OUTPATIENT
Start: 2022-05-04 | End: 2022-07-28

## 2022-05-05 ENCOUNTER — PATIENT MESSAGE (OUTPATIENT)
Dept: INTERNAL MEDICINE CLINIC | Age: 57
End: 2022-05-05

## 2022-05-05 DIAGNOSIS — M48.062 SPINAL STENOSIS OF LUMBAR REGION WITH NEUROGENIC CLAUDICATION: ICD-10-CM

## 2022-05-05 DIAGNOSIS — G89.4 CHRONIC PAIN SYNDROME: Primary | ICD-10-CM

## 2022-05-05 RX ORDER — TRAMADOL HYDROCHLORIDE 50 MG/1
50 TABLET ORAL
Qty: 90 TABLET | Refills: 0 | Status: SHIPPED | OUTPATIENT
Start: 2022-05-05 | End: 2022-06-02

## 2022-05-05 NOTE — TELEPHONE ENCOUNTER
Dina Conteh 5/5/2022 12:30 PM EDT      ----- Message -----  From: Michael Warren  Sent: 5/5/2022 12:00 PM EDT  To: Rosalba Frye Pool  Subject: Meds     Can u pecribe my trumadol 50mg plz ? Thank you .

## 2022-06-02 DIAGNOSIS — G89.4 CHRONIC PAIN SYNDROME: ICD-10-CM

## 2022-06-02 DIAGNOSIS — M48.062 SPINAL STENOSIS OF LUMBAR REGION WITH NEUROGENIC CLAUDICATION: ICD-10-CM

## 2022-06-02 RX ORDER — TRAMADOL HYDROCHLORIDE 50 MG/1
TABLET ORAL
Qty: 90 TABLET | Refills: 0 | Status: SHIPPED | OUTPATIENT
Start: 2022-06-02 | End: 2022-06-28

## 2022-06-09 DIAGNOSIS — E78.5 HYPERLIPIDEMIA, UNSPECIFIED HYPERLIPIDEMIA TYPE: ICD-10-CM

## 2022-06-09 RX ORDER — LOVASTATIN 20 MG/1
TABLET ORAL
Qty: 90 TABLET | Refills: 1 | Status: SHIPPED | OUTPATIENT
Start: 2022-06-09

## 2022-06-28 DIAGNOSIS — G89.4 CHRONIC PAIN SYNDROME: ICD-10-CM

## 2022-06-28 DIAGNOSIS — M48.062 SPINAL STENOSIS OF LUMBAR REGION WITH NEUROGENIC CLAUDICATION: ICD-10-CM

## 2022-06-28 RX ORDER — TRAMADOL HYDROCHLORIDE 50 MG/1
TABLET ORAL
Qty: 90 TABLET | Refills: 0 | Status: SHIPPED | OUTPATIENT
Start: 2022-06-28 | End: 2022-07-28

## 2022-07-28 DIAGNOSIS — I82.512 CHRONIC DEEP VEIN THROMBOSIS (DVT) OF FEMORAL VEIN OF LEFT LOWER EXTREMITY (HCC): ICD-10-CM

## 2022-07-28 DIAGNOSIS — M48.062 SPINAL STENOSIS OF LUMBAR REGION WITH NEUROGENIC CLAUDICATION: ICD-10-CM

## 2022-07-28 DIAGNOSIS — G89.4 CHRONIC PAIN SYNDROME: ICD-10-CM

## 2022-07-28 RX ORDER — GABAPENTIN 400 MG/1
CAPSULE ORAL
Qty: 120 CAPSULE | Refills: 2 | Status: SHIPPED | OUTPATIENT
Start: 2022-07-28 | End: 2022-08-29 | Stop reason: SDUPTHER

## 2022-07-28 RX ORDER — TRAMADOL HYDROCHLORIDE 50 MG/1
TABLET ORAL
Qty: 90 TABLET | Refills: 0 | Status: SHIPPED | OUTPATIENT
Start: 2022-07-28 | End: 2022-08-30

## 2022-07-28 RX ORDER — RIVAROXABAN 20 MG/1
TABLET, FILM COATED ORAL
Qty: 90 TABLET | Refills: 0 | Status: SHIPPED | OUTPATIENT
Start: 2022-07-28 | End: 2022-10-17

## 2022-08-16 DIAGNOSIS — K21.9 GASTROESOPHAGEAL REFLUX DISEASE WITHOUT ESOPHAGITIS: ICD-10-CM

## 2022-08-17 DIAGNOSIS — M79.7 FIBROMYALGIA: ICD-10-CM

## 2022-08-17 DIAGNOSIS — M48.062 SPINAL STENOSIS OF LUMBAR REGION WITH NEUROGENIC CLAUDICATION: ICD-10-CM

## 2022-08-17 DIAGNOSIS — E03.9 ACQUIRED HYPOTHYROIDISM: Primary | ICD-10-CM

## 2022-08-17 RX ORDER — PANTOPRAZOLE SODIUM 40 MG/1
TABLET, DELAYED RELEASE ORAL
Qty: 90 TABLET | Refills: 1 | Status: SHIPPED | OUTPATIENT
Start: 2022-08-17 | End: 2022-09-08 | Stop reason: ALTCHOICE

## 2022-08-17 RX ORDER — LEVOTHYROXINE SODIUM 88 UG/1
TABLET ORAL
Qty: 90 TABLET | Refills: 1 | Status: SHIPPED | OUTPATIENT
Start: 2022-08-17 | End: 2022-08-31 | Stop reason: DRUGHIGH

## 2022-08-17 RX ORDER — MONTELUKAST SODIUM 10 MG/1
TABLET ORAL
Qty: 90 TABLET | Refills: 1 | Status: SHIPPED | OUTPATIENT
Start: 2022-08-17

## 2022-08-18 ENCOUNTER — TELEPHONE (OUTPATIENT)
Dept: INTERNAL MEDICINE CLINIC | Age: 57
End: 2022-08-18

## 2022-08-23 ENCOUNTER — VIRTUAL VISIT (OUTPATIENT)
Dept: SLEEP MEDICINE | Age: 57
End: 2022-08-23

## 2022-08-24 ENCOUNTER — DOCUMENTATION ONLY (OUTPATIENT)
Dept: SLEEP MEDICINE | Age: 57
End: 2022-08-24

## 2022-08-26 NOTE — TELEPHONE ENCOUNTER
History  Chief Complaint   Patient presents with    Fever - 9 weeks to 74 years     Fever last 3 days  Highest 103  7  covid test at home negative  Sore throat ,headache, decreased appetite     Patient no PMH, no PSH   presents to ED with mother who helps with history for further evaluation of 3 days of intermittent but persistent fever T-max 103°, complaining of right side pain, headaches, sore throat at home  Patient has no complaints in emergency department at present  Patient also currently menstruating  None       History reviewed  No pertinent past medical history  History reviewed  No pertinent surgical history  Family History   Problem Relation Age of Onset    No Known Problems Mother     No Known Problems Father     No Known Problems Sister     Hypertension Maternal Grandmother     No Known Problems Sister      I have reviewed and agree with the history as documented  E-Cigarette/Vaping     E-Cigarette/Vaping Substances     Social History     Tobacco Use    Smoking status: Never Smoker    Smokeless tobacco: Never Used       Review of Systems   Constitutional: Positive for fatigue and fever  Negative for chills  HENT: Positive for sore throat  Negative for congestion, ear pain, mouth sores, nosebleeds and trouble swallowing  Eyes: Negative for pain and discharge  Respiratory: Negative for cough and shortness of breath  Cardiovascular: Negative for chest pain  Gastrointestinal: Negative for abdominal pain, diarrhea, nausea and vomiting  Genitourinary: Positive for vaginal bleeding  Negative for dysuria and flank pain  Musculoskeletal: Positive for myalgias  Negative for arthralgias and gait problem  Skin: Negative for color change and rash  Neurological: Positive for headaches  Negative for weakness  All other systems reviewed and are negative  Physical Exam  Physical Exam  Vitals and nursing note reviewed  Exam conducted with a chaperone present  PCP: Anum Pollard MD    Last appt: 12/31/2018  Future Appointments   Date Time Provider Rozina Arizmendi   3/21/2019  3:00 PM Appa Brando Russell MD [facility]       Requested Prescriptions     Pending Prescriptions Disp Refills    traZODone (DESYREL) 50 mg tablet 30 Tab 0     Sig: Take 1 Tab by mouth nightly. Constitutional:       General: She is active  She is not in acute distress  Appearance: Normal appearance  She is well-developed  She is not toxic-appearing  HENT:      Head: Atraumatic  Right Ear: Tympanic membrane, ear canal and external ear normal       Left Ear: Tympanic membrane, ear canal and external ear normal       Nose: Congestion and rhinorrhea (Clear) present  Mouth/Throat:      Mouth: Mucous membranes are moist       Pharynx: Oropharynx is clear  Uvula midline  No pharyngeal swelling, oropharyngeal exudate or uvula swelling  Eyes:      Conjunctiva/sclera: Conjunctivae normal       Pupils: Pupils are equal, round, and reactive to light  Cardiovascular:      Rate and Rhythm: Normal rate and regular rhythm  Pulmonary:      Effort: Pulmonary effort is normal  No respiratory distress  Breath sounds: Normal breath sounds  Abdominal:      General: Abdomen is flat  Bowel sounds are normal       Palpations: Abdomen is soft  Tenderness: There is no abdominal tenderness  Musculoskeletal:         General: No swelling or tenderness  Normal range of motion  Cervical back: Normal range of motion  No muscular tenderness  Skin:     General: Skin is warm and dry  Findings: No rash  Neurological:      General: No focal deficit present  Mental Status: She is alert  Motor: No weakness     Psychiatric:         Mood and Affect: Mood normal          Vital Signs  ED Triage Vitals [08/26/22 1811]   Temperature Pulse Respirations Blood Pressure SpO2   (!) 101 6 °F (38 7 °C) (!) 131 18 (!) 122/66 99 %      Temp src Heart Rate Source Patient Position - Orthostatic VS BP Location FiO2 (%)   Oral Monitor Sitting Left arm --      Pain Score       --           Vitals:    08/26/22 1811   BP: (!) 122/66   Pulse: (!) 131   Patient Position - Orthostatic VS: Sitting         Visual Acuity      ED Medications  Medications   ibuprofen (MOTRIN) tablet 400 mg (400 mg Oral Given 8/26/22 1910)       Diagnostic Studies  Results Reviewed     Procedure Component Value Units Date/Time    Urine Microscopic [[id number]]  (Abnormal) Collected: 08/26/22 1826    Lab Status: Final result Specimen: Urine, Clean Catch Updated: 08/26/22 1915     RBC, UA 20-30 /hpf      WBC, UA 30-50 /hpf      Epithelial Cells Occasional /hpf      Bacteria, UA Moderate /hpf     Urine culture [[id number]] Collected: 08/26/22 1826    Lab Status: In process Specimen: Urine, Clean Catch Updated: 08/26/22 1915    FLU/RSV/COVID - if FLU/RSV clinically relevant [[id number]]  (Normal) Collected: 08/26/22 1825    Lab Status: Final result Specimen: Nares from Nose Updated: 08/26/22 1908     SARS-CoV-2 Negative     INFLUENZA A PCR Negative     INFLUENZA B PCR Negative     RSV PCR Negative    Narrative:      FOR PEDIATRIC PATIENTS - copy/paste COVID Guidelines URL to browser: [email or web address]  [email or web address]    SARS-CoV-2 assay is a Nucleic Acid Amplification assay intended for the  qualitative detection of nucleic acid from SARS-CoV-2 in nasopharyngeal  swabs  Results are for the presumptive identification of SARS-CoV-2 RNA  Positive results are indicative of infection with SARS-CoV-2, the virus  causing COVID-19, but do not rule out bacterial infection or co-infection  with other viruses  Laboratories within the [place] and its  territories are required to report all positive results to the appropriate  public health authorities  Negative results do not preclude SARS-CoV-2  infection and should not be used as the sole basis for treatment or other  patient management decisions  Negative results must be combined with  clinical observations, patient history, and epidemiological information  This test has not been FDA cleared or approved  This test has been authorized by FDA under an Emergency Use Authorization  (EUA)   This test is only authorized for the duration of time the  declaration that circumstances exist justifying the authorization of the  emergency use of an in vitro diagnostic tests for detection of SARS-CoV-2  virus and/or diagnosis of COVID-19 infection under section 564(b)(1) of  the Act, 21 U  S C  788RQG-2(B)(4), unless the authorization is terminated  or revoked sooner  The test has been validated but independent review by FDA  and CLIA is pending  Test performed using [employer] GeneXpert: This RT-PCR assay targets N2,  a region unique to SARS-CoV-2  A conserved region in the E-gene was chosen  for pan-Sarbecovirus detection which includes SARS-CoV-2  UA w Reflex to Microscopic w Reflex to Culture [[id number]]  (Abnormal) Collected: 08/26/22 1826    Lab Status: Final result Specimen: Urine, Clean Catch Updated: 08/26/22 1831     Color, UA Yellow     Clarity, UA Slightly Cloudy     Specific [place], UA 1 010     pH, UA 6 0     Leukocytes, UA 1+     Nitrite, UA Negative     Protein, UA 1+ mg/dl      Glucose, UA Negative mg/dl      Ketones, UA Negative mg/dl      Urobilinogen, UA 1 0 E U /dl      Bilirubin, UA Negative     Occult Blood, UA 3+                 No orders to display              Procedures  Procedures         ED Course         CRAFFT    Flowsheet Row Most Recent Value   SBIRT (13-23 yo)    In order to provide better care to our patients, we are screening all of our patients for alcohol and drug use  Would it be okay to ask you these screening questions? Yes Filed at: 08/26/2022 1818   CRAFFT Initial Screen: During the past 12 months, did you:    1  Drink any alcohol (more than a few sips)? No Filed at: 08/26/2022 1818   2  Smoke any marijuana or hashish No Filed at: 08/26/2022 1818   3  Use anything else to get high? ("anything else" includes illegal drugs, over the counter and prescription drugs, and things that you sniff or 'guallpa')?  No Filed at: 08/26/2022 1818                                          MDM    Disposition  Final diagnoses:   Fever   UTI (urinary tract infection)     Time reflects when diagnosis was documented in both MDM as applicable and the Disposition within this note     Time User Action Codes Description Comment    8/26/2022  7:20 PM Con Inch Add [R50 9] Fever     8/26/2022  7:20 PM Con Inch Add [N39 0] UTI (urinary tract infection)       ED Disposition     ED Disposition   Discharge    Condition   Stable    Date/Time   Fri Aug 26, 2022  7:20 PM    Comment   Ami Kayser discharge to home/self care  Follow-up Information     Follow up With Specialties Details Why Chelita Dean MD Pediatrics  As needed [address] [place] [address]  [phone]            Patient's Medications   Discharge Prescriptions    CEPHALEXIN (KEFLEX) 500 MG CAPSULE    Take 1 capsule (500 mg total) by mouth 3 (three) times a day for 7 days       Start Date: 8/26/2022 End Date: 9/2/2022       Order Dose: 500 mg       Quantity: 21 capsule    Refills: 0    IBUPROFEN (MOTRIN) 200 MG TABLET    Take 2 tablets (400 mg total) by mouth every 6 (six) hours as needed for mild pain or fever       Start Date: 8/26/2022 End Date: --       Order Dose: 400 mg       Quantity: 20 tablet    Refills: 0       No discharge procedures on file      PDMP Review     None          ED Provider  Electronically Signed by           Liliana Vasquez PA-C  08/26/22 1925

## 2022-08-28 DIAGNOSIS — F32.A DEPRESSION, UNSPECIFIED DEPRESSION TYPE: ICD-10-CM

## 2022-08-28 RX ORDER — TRAZODONE HYDROCHLORIDE 50 MG/1
TABLET ORAL
Qty: 90 TABLET | Refills: 1 | Status: SHIPPED | OUTPATIENT
Start: 2022-08-28 | End: 2022-10-31 | Stop reason: SDUPTHER

## 2022-08-29 DIAGNOSIS — G89.4 CHRONIC PAIN SYNDROME: ICD-10-CM

## 2022-08-29 DIAGNOSIS — J45.30 MILD PERSISTENT ASTHMA WITHOUT COMPLICATION: ICD-10-CM

## 2022-08-29 DIAGNOSIS — M48.062 SPINAL STENOSIS OF LUMBAR REGION WITH NEUROGENIC CLAUDICATION: ICD-10-CM

## 2022-08-29 NOTE — TELEPHONE ENCOUNTER
Future Appointments:  Future Appointments   Date Time Provider Rozina Arizmendi   10/27/2022  9:20 AM Lashonda Mendez MD [facility] BS AMB        Last Appointment With Me:  Visit date not found     Requested Prescriptions     Pending Prescriptions Disp Refills    fluticasone furoate (ARNUITY ELLIPTA) 100 mcg/actuation dsdv inhaler 2 Each 4     Sig: Take 1 Puff by inhalation daily. methocarbamoL (ROBAXIN) 750 mg tablet 120 Tablet 3     Sig: Take 1 Tablet by mouth four (4) times daily.     diclofenac (VOLTAREN) 1 % gel 1 Each 2     Sig: Apply  to affected area as needed for Pain.    gabapentin (NEURONTIN) 400 mg capsule 120 Capsule 2

## 2022-08-30 RX ORDER — TRAMADOL HYDROCHLORIDE 50 MG/1
50 TABLET ORAL
Qty: 30 TABLET | Refills: 0 | OUTPATIENT
Start: 2022-08-30 | End: 2022-09-02

## 2022-08-30 RX ORDER — METHOCARBAMOL 750 MG/1
750 TABLET, FILM COATED ORAL 4 TIMES DAILY
Qty: 120 TABLET | Refills: 3 | Status: SHIPPED | OUTPATIENT
Start: 2022-08-30 | End: 2022-09-28 | Stop reason: SDUPTHER

## 2022-08-30 RX ORDER — DICLOFENAC SODIUM 10 MG/G
GEL TOPICAL AS NEEDED
Qty: 1 EACH | Refills: 2 | Status: SHIPPED | OUTPATIENT
Start: 2022-08-30 | End: 2022-10-17 | Stop reason: SDUPTHER

## 2022-08-30 RX ORDER — GABAPENTIN 400 MG/1
400 CAPSULE ORAL 3 TIMES DAILY
Qty: 90 CAPSULE | Refills: 2 | Status: SHIPPED | OUTPATIENT
Start: 2022-08-30 | End: 2022-09-28 | Stop reason: SDUPTHER

## 2022-08-30 NOTE — TELEPHONE ENCOUNTER
----- Message from Christal Goldberg. Chloe Mckenzie sent at 8/30/2022  9:18 AM EDT -----  Regarding: Refill  Plz refill my trumadol and muscle relaxant . Thank you.

## 2022-08-30 NOTE — TELEPHONE ENCOUNTER
Future Appointments:  Future Appointments   Date Time Provider Rozina Arizmendi   10/27/2022  9:20 AM Bossman Ovalle MD [facility] BS AMB        Last Appointment With Me:  Visit date not found     Requested Prescriptions     Pending Prescriptions Disp Refills    traMADoL (ULTRAM) 50 mg tablet 30 Tablet 0     Sig: Take 1 Tablet by mouth every eight (8) hours as needed for Pain for up to 3 days. Max Daily Amount: 150 mg.

## 2022-08-31 ENCOUNTER — LAB ONLY (OUTPATIENT)
Dept: INTERNAL MEDICINE CLINIC | Age: 57
End: 2022-08-31

## 2022-08-31 DIAGNOSIS — M79.7 FIBROMYALGIA: ICD-10-CM

## 2022-08-31 DIAGNOSIS — M48.062 SPINAL STENOSIS OF LUMBAR REGION WITH NEUROGENIC CLAUDICATION: ICD-10-CM

## 2022-08-31 DIAGNOSIS — E03.9 ACQUIRED HYPOTHYROIDISM: ICD-10-CM

## 2022-08-31 LAB
ANION GAP SERPL CALC-SCNC: 9 MMOL/L (ref 5–15)
BUN SERPL-MCNC: 11 MG/DL (ref 6–20)
BUN/CREAT SERPL: 10 (ref 12–20)
CALCIUM SERPL-MCNC: 9.6 MG/DL (ref 8.5–10.1)
CHLORIDE SERPL-SCNC: 104 MMOL/L (ref 97–108)
CO2 SERPL-SCNC: 27 MMOL/L (ref 21–32)
CREAT SERPL-MCNC: 1.05 MG/DL (ref 0.55–1.02)
GLUCOSE SERPL-MCNC: 94 MG/DL (ref 65–100)
POTASSIUM SERPL-SCNC: 3.8 MMOL/L (ref 3.5–5.1)
SODIUM SERPL-SCNC: 140 MMOL/L (ref 136–145)
T4 FREE SERPL-MCNC: 1.3 NG/DL (ref 0.8–1.5)
TSH SERPL DL<=0.05 MIU/L-ACNC: 7.34 UIU/ML (ref 0.36–3.74)

## 2022-08-31 RX ORDER — LEVOTHYROXINE SODIUM 100 UG/1
100 TABLET ORAL
Qty: 30 TABLET | Refills: 2 | Status: SHIPPED | OUTPATIENT
Start: 2022-08-31 | End: 2022-09-01 | Stop reason: SDUPTHER

## 2022-09-01 RX ORDER — LEVOTHYROXINE SODIUM 100 UG/1
100 TABLET ORAL
Qty: 30 TABLET | Refills: 2 | Status: SHIPPED | OUTPATIENT
Start: 2022-09-01

## 2022-09-01 NOTE — TELEPHONE ENCOUNTER
----- Message from Katie Mendoza.  Sony Carpenter sent at 9/1/2022  7:28 AM EDT -----  Regarding: Question regarding T4, FREE  Hello u sending over a prescription of thyroid meds to pharmacy of a 100 mg?

## 2022-09-01 NOTE — TELEPHONE ENCOUNTER
Future Appointments:  Future Appointments   Date Time Provider Rozina Arizmendi   10/27/2022  9:20 AM [facility]        Last Appointment With Me:  Visit date not found     Requested Prescriptions     Pending Prescriptions Disp Refills    levothyroxine (SYNTHROID) 100 mcg tablet 30 Tablet 2     Sig: Take 1 Tablet by mouth Daily (before breakfast).

## 2022-09-01 NOTE — PROGRESS NOTES
Thyroid medication needs to be increased    Mild renal impairment - no NSAIDs no advil.  Aleve, ibuprofen and drink plenty of water

## 2022-09-01 NOTE — PROGRESS NOTES
Called pt, verified by 2 identifiers, gave lab results per Dr. Enma Smith, pt reports \"left  kidney pain\" off and on for \"a while\", describes urine as \"foggy\", advised pt she could come by and give urine however Dr. Enma Smith may need to see her, she will come tomorrow to provide urine.

## 2022-09-05 LAB — DRUGS UR: NORMAL

## 2022-09-08 ENCOUNTER — VIRTUAL VISIT (OUTPATIENT)
Dept: INTERNAL MEDICINE CLINIC | Age: 57
End: 2022-09-08
Payer: MEDICARE

## 2022-09-08 DIAGNOSIS — M54.9 BACK PAIN, UNSPECIFIED BACK LOCATION, UNSPECIFIED BACK PAIN LATERALITY, UNSPECIFIED CHRONICITY: Primary | ICD-10-CM

## 2022-09-08 DIAGNOSIS — M79.7 FIBROMYALGIA: ICD-10-CM

## 2022-09-08 DIAGNOSIS — N18.31 STAGE 3A CHRONIC KIDNEY DISEASE (HCC): ICD-10-CM

## 2022-09-08 DIAGNOSIS — E03.9 ACQUIRED HYPOTHYROIDISM: ICD-10-CM

## 2022-09-08 DIAGNOSIS — E78.5 HYPERLIPIDEMIA, UNSPECIFIED HYPERLIPIDEMIA TYPE: ICD-10-CM

## 2022-09-08 DIAGNOSIS — R30.0 DYSURIA: ICD-10-CM

## 2022-09-08 DIAGNOSIS — M48.062 SPINAL STENOSIS OF LUMBAR REGION WITH NEUROGENIC CLAUDICATION: ICD-10-CM

## 2022-09-08 DIAGNOSIS — I82.512 CHRONIC DEEP VEIN THROMBOSIS (DVT) OF FEMORAL VEIN OF LEFT LOWER EXTREMITY (HCC): ICD-10-CM

## 2022-09-08 PROBLEM — N18.30 CHRONIC RENAL DISEASE, STAGE III (HCC): Status: ACTIVE | Noted: 2022-09-08

## 2022-09-08 LAB
BILIRUB UR QL STRIP: NEGATIVE
GLUCOSE UR-MCNC: NEGATIVE MG/DL
KETONES P FAST UR STRIP-MCNC: NEGATIVE MG/DL
PH UR STRIP: 6 [PH] (ref 4.6–8)
PROT UR QL STRIP: NEGATIVE
SP GR UR STRIP: 1.02 (ref 1–1.03)
UA UROBILINOGEN AMB POC: NORMAL (ref 0.2–1)
URINALYSIS CLARITY POC: CLEAR
URINALYSIS COLOR POC: YELLOW
URINE BLOOD POC: NEGATIVE
URINE LEUKOCYTES POC: NEGATIVE
URINE NITRITES POC: NEGATIVE

## 2022-09-08 PROCEDURE — G8427 DOCREV CUR MEDS BY ELIG CLIN: HCPCS | Performed by: INTERNAL MEDICINE

## 2022-09-08 PROCEDURE — G8432 DEP SCR NOT DOC, RNG: HCPCS | Performed by: INTERNAL MEDICINE

## 2022-09-08 PROCEDURE — 99214 OFFICE O/P EST MOD 30 MIN: CPT | Performed by: INTERNAL MEDICINE

## 2022-09-08 PROCEDURE — 81003 URINALYSIS AUTO W/O SCOPE: CPT | Performed by: INTERNAL MEDICINE

## 2022-09-08 PROCEDURE — G8421 BMI NOT CALCULATED: HCPCS | Performed by: INTERNAL MEDICINE

## 2022-09-08 PROCEDURE — G9899 SCRN MAM PERF RSLTS DOC: HCPCS | Performed by: INTERNAL MEDICINE

## 2022-09-08 PROCEDURE — 3017F COLORECTAL CA SCREEN DOC REV: CPT | Performed by: INTERNAL MEDICINE

## 2022-09-08 PROCEDURE — G0463 HOSPITAL OUTPT CLINIC VISIT: HCPCS | Performed by: INTERNAL MEDICINE

## 2022-09-08 NOTE — PROGRESS NOTES
CC: Back Pain (Hx of kidney stone) and Urinary Hesitancy      HPI:     She is a 61 y.o. female with a hx of spinal stenosis, hx of DVT,  Hypothyroidism, high cholesterol, GERD               Chronic pain syndrome     Spinal stenosis of lumbar region with neurogenic claudication  Has had 3 back surgeries with Dr Latoya Chapman and unfortunately back pain has not improved  Taking gabapentin 400mg 4 times a day  Taking 100mg tramadol BID   Taking methocarmabol 750 TID  lyrica did not work well in the past   Still has pain but this regimen helps improve quality of life   Compliance urine testing is appropriate done last week            Acute deep vein thrombosis (DVT) of proximal vein of left lower extremity ([address] which then became chronic on repeat imaging with recurrent symptoms  She is on xarelto 20mg chronically    sister recently diagnosed with APS I advised her to see hematologist       Acquired hypothyroidism: TSH was 7 and increased dose to 100mcg daily   last week will repeat TSH in 6 weeks         High cholesterol: on statin and tolerating it well. GERD: on PPI chronically, advised to stop and change to pepcid      has increased renal frequency urine today is normal    Discussed mild dale insufficiency GFR of 54 advised to stop PPI      Colonoscopy  scheduled for December      This is an established visit conducted via telemedicine with video. The patient has been instructed that this meets HIPAA criteria and acknowledges and agrees to this method of visitation. Pursuant to the emergency declaration under the [address] waiver authority and the [employer] Act, this Virtual Visit was conducted, with patient's consent, to reduce the patient's risk of exposure to COVID-19 and provide continuity of care for an established patient.       Services were provided through a video synchronous discussion virtually to substitute for in-person clinic visit. ROS:  Constitutional: negative for fevers, chills, anorexia and weight loss  10 systems reviewed and negative other than HPI    Past Medical History:   Diagnosis Date    Arthritis     Asthma     Breast cyst 1996    left, removed    Chronic pain     LOWER BACK    Colon polyps     Diverticulitis     DVT (deep venous thrombosis) ([facility] [address] [address] 10/18/2018    Left groin after back sx    Fatty liver     GERD (gastroesophageal reflux disease)     Hypercholesteremia     Hypothyroidism     Kidney stones     Nicotine vapor product user     Obesity     SUN (obstructive sleep apnea)     CPAP recalled    Psychiatric disorder     depression    Thyroid disease     hypothyroid       Current Outpatient Medications on File Prior to Visit   Medication Sig Dispense Refill    levothyroxine (SYNTHROID) 100 mcg tablet Take 1 Tablet by mouth Daily (before breakfast). 30 Tablet 2    fluticasone furoate (ARNUITY ELLIPTA) 100 mcg/actuation dsdv inhaler Take 1 Puff by inhalation daily. 2 Each 4    methocarbamoL (ROBAXIN) 750 mg tablet Take 1 Tablet by mouth four (4) times daily. 120 Tablet 3    diclofenac (VOLTAREN) 1 % gel Apply  to affected area as needed for Pain. 1 Each 2    gabapentin (NEURONTIN) 400 mg capsule Take 1 Capsule by mouth three (3) times daily. Max Daily Amount: 1,200 mg. (Patient taking differently: Take 400 mg by mouth four (4) times daily.) 90 Capsule 2    traMADoL (ULTRAM) 50 mg tablet TAKE 1 TABLET BY MOUTH EVERY 8 HOURS AS NEEDED FOR PAIN 90 Tablet 0    montelukast (SINGULAIR) 10 mg tablet TAKE 1 TABLET BY MOUTH EVERY DAY 90 Tablet 1    Xarelto 20 mg tab tablet TAKE 1 TABLET BY MOUTH EVERY DAY WITH BREAKFAST 90 Tablet 0    lovastatin (MEVACOR) 20 mg tablet TAKE 1 TABLET BY MOUTH EVERY NIGHT 90 Tablet 1    Ventolin HFA 90 mcg/actuation inhaler INHALE 1 PUFF BY MOUTH EVERY 4 HOURS AS NEEDED FOR WHEEZING 54 Each 1    escitalopram oxalate (LEXAPRO) 20 mg tablet Take 1 Tablet by mouth daily.  80 Tablet 1    cholecalciferol, vitamin D3, (VITAMIN D3 PO) Take 800 Units by mouth daily. vit B Cmplx 3-FA-Vit C-Biotin (NEPHRO JEROME RX) 1- mg-mg-mcg tablet Take 1 Tablet by mouth daily. albuterol (PROVENTIL VENTOLIN) 2.5 mg /3 mL (0.083 %) nebu 3 mL by Nebulization route every four (4) hours as needed for Wheezing. R06.2 wheezing 75 mL 0    albuterol (Ventolin HFA) 90 mcg/actuation inhaler Take 1 Puff by inhalation every six (6) hours as needed for Wheezing. 3 Inhaler 2    Nebulizer Accessories kit Use nebulizer machine as needed for dyspnea 1 Kit 0    traZODone (DESYREL) 50 mg tablet TAKE 1 TABLET BY MOUTH EVERY DAY AT NIGHT (Patient not taking: Reported on 9/8/2022) 90 Tablet 1     No current facility-administered medications on file prior to visit. Past Surgical History:   Procedure Laterality Date    COLONOSCOPY N/A 11/14/2017    COLONOSCOPY performed by Elias Tinsley MD at [facility] ENDOSCOPY    HX BACK SURGERY  2005, 2006    L5 & S 1    HX BACK SURGERY  08/30/2018    HX BREAST BIOPSY      Left    HX HYSTERECTOMY  6/22/09    [facility]    HX OOPHORECTOMY Left     One ovary removed.     HX ORTHOPAEDIC Left 1972    thumb surgery    HX ORTHOPAEDIC Left 2000    left knee surgery    HX ORTHOPAEDIC Left 2013    ankle x2    HX TUBAL LIGATION      DE REMOVAL OF KIDNEY STONE      lithotripsy    RENAL STENT         Family History   Problem Relation Age of Onset    Cancer Mother 54        Lung cancer    Heart Disease Father     Hypertension Father     Elevated Lipids Father     Heart Attack Father     Stroke Father         x 3    Elevated Lipids Sister     Colon Cancer Sister     Cancer Sister         colon, uterine    Diabetes Sister     Heart Attack Sister     Heart Disease Brother     Hypertension Brother     Cancer Brother         stomach    Heart Attack Brother     Diabetes Brother     Peripheral Vascular Disease Brother     Anesth Problems Neg Hx      Reviewed and no changes     Social History Socioeconomic History    Marital status:      Spouse name: Not on file    Number of children: Not on file    Years of education: Not on file    Highest education level: Not on file   Occupational History    Not on file   Tobacco Use    Smoking status: Not on file    Smokeless tobacco: Never    Tobacco comments:     vape   Vaping Use    Vaping Use: Every day   Substance and Sexual Activity    Alcohol use: Yes     Comment: \"average of 5 times per year\"    Drug use: No    Sexual activity: Not on file   Other Topics Concern    Not on file   Social History Narrative    Not on file     Social Determinants of Health     Financial Resource Strain: Not on file   Food Insecurity: Not on file   Transportation Needs: Not on file   Physical Activity: Not on file   Stress: Not on file   Social Connections: Not on file   Intimate Partner Violence: Not on file   Housing Stability: Not on file          Visit Vitals  [facility] 06/22/2009       Physical Examination:   Gen: well appearing female  HEENT: normal conjunctiva, no audible congestion, patient does not see oral erythema, has MMM  Neck: patient does not feel enlarged or tender LAD or masses  Resp: normal respiratory effort, no audible wheezing. CV: patient does not feel palpitations or heart irregularity  Abd: patient does not feel abdominal tenderness or mass, patient does not notice distension  Extrem: patient does not see swelling in ankles or joints.    Neuro: Alert and oriented, able to answer questions without difficulty, able to move all extremities and walk normally          Lab Results   Component Value Date/Time    WBC 4.1 03/14/2022 08:10 AM    Hemoglobin (POC) 12.9 10/28/2013 03:40 PM    HGB 13.6 03/14/2022 08:10 AM    Hematocrit (POC) 38 10/28/2013 03:40 PM    HCT 45.5 03/14/2022 08:10 AM    PLATELET 155 40/34/4739 08:10 AM    .3 (H) 03/14/2022 08:10 AM     Lab Results   Component Value Date/Time    Sodium 140 08/31/2022 08:23 AM    Potassium 3.8 08/31/2022 08:23 AM    Chloride 104 08/31/2022 08:23 AM    CO2 27 08/31/2022 08:23 AM    Anion gap 9 08/31/2022 08:23 AM    Glucose 94 08/31/2022 08:23 AM    BUN 11 08/31/2022 08:23 AM    Creatinine 1.05 (H) 08/31/2022 08:23 AM    BUN/Creatinine ratio 10 (L) 08/31/2022 08:23 AM    GFR est AA >60 08/31/2022 08:23 AM    GFR est non-AA 54 (L) 08/31/2022 08:23 AM    Calcium 9.6 08/31/2022 08:23 AM     Lab Results   Component Value Date/Time    Cholesterol, total 200 (H) 03/14/2022 08:10 AM    HDL Cholesterol 82 03/14/2022 08:10 AM    LDL, calculated 101.6 (H) 03/14/2022 08:10 AM    VLDL, calculated 16.4 03/14/2022 08:10 AM    Triglyceride 82 03/14/2022 08:10 AM    CHOL/HDL Ratio 2.4 03/14/2022 08:10 AM     Lab Results   Component Value Date/Time    TSH 7.34 (H) 08/31/2022 08:23 AM     No results found for: PSA, Gilma Patel, [id number], [id number]  Lab Results   Component Value Date/Time    Hemoglobin A1c 5.4 08/02/2018 08:19 AM     Lab Results   Component Value Date/Time    VITAMIN D, 25-HYDROXY 28.5 (L) 05/09/2017 08:54 AM       Lab Results   Component Value Date/Time    ALT (SGPT) 19 03/14/2022 08:10 AM    Alk. phosphatase 83 03/14/2022 08:10 AM    Bilirubin, total 0.7 03/14/2022 08:10 AM           Assessment/Plan:    1. Back pain, unspecified back location, unspecified back pain laterality, unspecified chronicity  Chronic back pain UA is normal  - AMB POC URINALYSIS DIP STICK AUTO W/O MICRO    2. Dysuria  Normal UA  - AMB POC URINALYSIS DIP STICK AUTO W/O MICRO    3. Acquired hypothyroidism  Recently adjusted levothyroxine dose to 100 mcg due to elevated TSH of 7 repeat TSH in 6 weeks  - TSH 3RD GENERATION; Future  - T4, FREE; Future    4. Spinal stenosis of lumbar region with neurogenic claudication  Status post lumbar surgery continues to have chronic pain she is on tramadol and Robaxin chronically. No signs of overuse or abuse reviewed her  and compliance urine .   Continue same regimen    5. Fibromyalgia  On tramadol and Lexapro    6. Chronic deep vein thrombosis (DVT) of femoral vein of left lower extremity ([facility] [address]  She is on Xarelto recently found out that her sister has antiphospholipid syndrome I advised her to see Dr. Meka Hardy    7. Hyperlipidemia, unspecified hyperlipidemia type  She is on med but for 20 mg daily  8. Stage 3a chronic kidney disease ([facility] [address]  Early GFR is 56 she was counseled on avoiding NSAIDs and drinking plenty of water. Stopping PPI advised to change to Pepcid as needed  Normal urinalysis          Nichole Lockhart MD    This is an established visit conducted via real time video and audio telemedicine. The patient has been instructed that this meets HIPAA criteria and acknowledges and agrees to this method of visitation.

## 2022-09-08 NOTE — PROGRESS NOTES
1. \"Have you been to the ER, urgent care clinic since your last visit? Hospitalized since your last visit? \" No    2. \"Have you seen or consulted any other health care providers outside of the [address] [place] [address] since your last visit? \" No     3. For patients aged 39-70: Has the patient had a colonoscopy / FIT/ Cologuard? No      If the patient is female:    4. For patients aged 41-77: Has the patient had a mammogram within the past 2 years? No      5. For patients aged 21-65: Has the patient had a pap smear?  No

## 2022-09-25 ENCOUNTER — PATIENT MESSAGE (OUTPATIENT)
Dept: INTERNAL MEDICINE CLINIC | Age: 57
End: 2022-09-25

## 2022-09-26 RX ORDER — CIPROFLOXACIN 500 MG/1
500 TABLET ORAL 2 TIMES DAILY
Qty: 14 TABLET | Refills: 0 | Status: SHIPPED | OUTPATIENT
Start: 2022-09-26

## 2022-09-26 RX ORDER — METRONIDAZOLE 500 MG/1
500 TABLET ORAL 3 TIMES DAILY
Qty: 21 TABLET | Refills: 0 | Status: SHIPPED | OUTPATIENT
Start: 2022-09-26

## 2022-09-26 NOTE — TELEPHONE ENCOUNTER
Sharri Villaseñor 9/26/2022 8:29 AM EDT      ----- Message -----  From: Xi Guallpa  Sent: 9/25/2022 6:29 PM EDT  To: Jimenez Chavis  Subject: Meds     My diverticolitis is inflamed n hurts . Can u please percribe me antibiotics. Thank you .

## 2022-09-28 DIAGNOSIS — G89.4 CHRONIC PAIN SYNDROME: ICD-10-CM

## 2022-09-28 DIAGNOSIS — M48.062 SPINAL STENOSIS OF LUMBAR REGION WITH NEUROGENIC CLAUDICATION: ICD-10-CM

## 2022-09-29 DIAGNOSIS — G89.4 CHRONIC PAIN SYNDROME: ICD-10-CM

## 2022-09-29 DIAGNOSIS — M48.062 SPINAL STENOSIS OF LUMBAR REGION WITH NEUROGENIC CLAUDICATION: ICD-10-CM

## 2022-09-29 RX ORDER — METHOCARBAMOL 750 MG/1
750 TABLET, FILM COATED ORAL 4 TIMES DAILY
Qty: 120 TABLET | Refills: 3 | Status: SHIPPED | OUTPATIENT
Start: 2022-09-29 | End: 2022-10-17 | Stop reason: SDUPTHER

## 2022-09-29 RX ORDER — GABAPENTIN 400 MG/1
400 CAPSULE ORAL 3 TIMES DAILY
Qty: 90 CAPSULE | Refills: 2 | Status: SHIPPED | OUTPATIENT
Start: 2022-09-29 | End: 2022-10-17 | Stop reason: SDUPTHER

## 2022-09-29 NOTE — TELEPHONE ENCOUNTER
Future Appointments:  Future Appointments   Date Time Provider Rozina Arizmendi   10/27/2022  9:20 AM Link MD [facility] BS AMB        Last Appointment With Me:  9/8/2022     Requested Prescriptions     Pending Prescriptions Disp Refills    methocarbamoL (ROBAXIN) 750 mg tablet 120 Tablet 3     Sig: Take 1 Tablet by mouth four (4) times daily. gabapentin (NEURONTIN) 400 mg capsule 90 Capsule 2     Sig: Take 1 Capsule by mouth three (3) times daily. Max Daily Amount: 1,200 mg.

## 2022-09-30 RX ORDER — ALBUTEROL SULFATE 90 UG/1
AEROSOL, METERED RESPIRATORY (INHALATION)
Qty: 54 EACH | Refills: 1 | Status: SHIPPED | OUTPATIENT
Start: 2022-09-30

## 2022-09-30 RX ORDER — TRAMADOL HYDROCHLORIDE 50 MG/1
TABLET ORAL
Qty: 90 TABLET | Refills: 0 | Status: SHIPPED | OUTPATIENT
Start: 2022-09-30 | End: 2022-10-17 | Stop reason: SDUPTHER

## 2022-10-17 DIAGNOSIS — F32.A DEPRESSION, UNSPECIFIED DEPRESSION TYPE: ICD-10-CM

## 2022-10-17 DIAGNOSIS — I82.512 CHRONIC DEEP VEIN THROMBOSIS (DVT) OF FEMORAL VEIN OF LEFT LOWER EXTREMITY (HCC): ICD-10-CM

## 2022-10-17 RX ORDER — ESCITALOPRAM OXALATE 20 MG/1
TABLET ORAL
Qty: 90 TABLET | Refills: 1 | Status: SHIPPED | OUTPATIENT
Start: 2022-10-17

## 2022-10-17 RX ORDER — RIVAROXABAN 20 MG/1
TABLET, FILM COATED ORAL
Qty: 90 TABLET | Refills: 0 | Status: SHIPPED | OUTPATIENT
Start: 2022-10-17

## 2022-11-17 ENCOUNTER — LAB ONLY (OUTPATIENT)
Dept: INTERNAL MEDICINE CLINIC | Age: 57
End: 2022-11-17

## 2022-11-17 ENCOUNTER — CLINICAL SUPPORT (OUTPATIENT)
Dept: INTERNAL MEDICINE CLINIC | Age: 57
End: 2022-11-17
Payer: MEDICARE

## 2022-11-17 DIAGNOSIS — Z23 NEEDS FLU SHOT: Primary | ICD-10-CM

## 2022-11-17 DIAGNOSIS — E03.9 ACQUIRED HYPOTHYROIDISM: ICD-10-CM

## 2022-11-17 PROCEDURE — 90686 IIV4 VACC NO PRSV 0.5 ML IM: CPT | Performed by: FAMILY MEDICINE

## 2022-11-17 NOTE — PROGRESS NOTES
Gave flu shot in left arm. Patient wanted to also receive her Pneumonia shot. Patient stated Dr Gillian Huerta approved it. Not due yet and was not given.

## 2022-11-18 LAB
T4 FREE SERPL-MCNC: 1.2 NG/DL (ref 0.8–1.5)
TSH SERPL DL<=0.05 MIU/L-ACNC: 1.72 UIU/ML (ref 0.36–3.74)

## 2022-11-23 DIAGNOSIS — E78.5 HYPERLIPIDEMIA, UNSPECIFIED HYPERLIPIDEMIA TYPE: ICD-10-CM

## 2022-11-23 RX ORDER — LOVASTATIN 20 MG/1
TABLET ORAL
Qty: 90 TABLET | Refills: 1 | Status: SHIPPED | OUTPATIENT
Start: 2022-11-23

## 2022-11-27 DIAGNOSIS — M48.062 SPINAL STENOSIS OF LUMBAR REGION WITH NEUROGENIC CLAUDICATION: ICD-10-CM

## 2022-11-27 DIAGNOSIS — G89.4 CHRONIC PAIN SYNDROME: ICD-10-CM

## 2022-11-28 ENCOUNTER — TELEPHONE (OUTPATIENT)
Dept: INTERNAL MEDICINE CLINIC | Age: 57
End: 2022-11-28

## 2022-11-28 DIAGNOSIS — M48.062 SPINAL STENOSIS OF LUMBAR REGION WITH NEUROGENIC CLAUDICATION: Primary | ICD-10-CM

## 2022-11-28 RX ORDER — GABAPENTIN 400 MG/1
400 CAPSULE ORAL 3 TIMES DAILY
Qty: 90 CAPSULE | Refills: 2 | Status: SHIPPED | OUTPATIENT
Start: 2022-11-28

## 2022-11-28 RX ORDER — TRAMADOL HYDROCHLORIDE 50 MG/1
50 TABLET ORAL
Qty: 90 TABLET | Refills: 0 | Status: SHIPPED | OUTPATIENT
Start: 2022-11-28 | End: 2022-12-28

## 2022-11-28 NOTE — TELEPHONE ENCOUNTER
Christofer Mireles at [facility]  [phone]    States that pt has surgery on 12/7    States needs \"cardiac clearance\"    States faxed form but will refax now, faxing to main office [phone]        Please call Christofer Mireles at [facility] to advise if clearance can be provided/form can be completed.     TIME SENSITIVE

## 2022-11-28 NOTE — TELEPHONE ENCOUNTER
Future Appointments:  Future Appointments   Date Time Provider Rozina Arizmendi   12/1/2022 10:40 AM Raúl Cortes MD [facility] BS AMB        Last Appointment With Me:  9/8/2022     Requested Prescriptions     Pending Prescriptions Disp Refills    gabapentin (NEURONTIN) 400 mg capsule 90 Capsule 2     Sig: Take 1 Capsule by mouth three (3) times daily. Max Daily Amount: 1,200 mg.

## 2022-11-28 NOTE — TELEPHONE ENCOUNTER
Called and left message that dr Paul Tran can see her Monday the 5 at 9:30 to call back to confirm/SO

## 2022-11-28 NOTE — TELEPHONE ENCOUNTER
Future Appointments:  Future Appointments   Date Time Provider Rozina Arizmendi   12/1/2022 10:40 AM [facility]        Last Appointment With Me:  9/8/2022     Requested Prescriptions     Pending Prescriptions Disp Refills    traMADoL (ULTRAM) 50 mg tablet 90 Tablet 0     Sig: Take 1 Tablet by mouth every eight (8) hours as needed for Pain for up to 3 days. Max Daily Amount: 150 mg.

## 2022-11-29 NOTE — TELEPHONE ENCOUNTER
I attempted to call Federico Perez at [facility], the phone kept ringing and ringing. Clearance form was faxed to their office, which went through.   Signed By: Anish Mcmillan LPN     November 29, 2022

## 2022-11-29 NOTE — TELEPHONE ENCOUNTER
[phone]  Shelby Kern at [address] [place] [address] again    States needs a call back please    States needs to know if pt can get the clearance in time    States also needs to know if office can advise pt to hold zeralto starting Sunday (usually held 2 days prior to surgery). States if pt takes it Monday, she will not be able to have the surgical procedure. Please call to advise if this is ok or not as they need to know if can proceed with surgery as scheduled.     Please call:  [phone]  Shelby Kern at [facility]

## 2023-01-02 DIAGNOSIS — M48.062 SPINAL STENOSIS OF LUMBAR REGION WITH NEUROGENIC CLAUDICATION: ICD-10-CM

## 2023-01-02 DIAGNOSIS — G89.4 CHRONIC PAIN SYNDROME: ICD-10-CM

## 2023-01-02 RX ORDER — GABAPENTIN 400 MG/1
400 CAPSULE ORAL 3 TIMES DAILY
Qty: 90 CAPSULE | Refills: 2 | Status: SHIPPED | OUTPATIENT
Start: 2023-01-02

## 2023-01-02 RX ORDER — TRAMADOL HYDROCHLORIDE 50 MG/1
TABLET ORAL
Qty: 90 TABLET | Refills: 1 | Status: SHIPPED | OUTPATIENT
Start: 2023-01-02 | End: 2023-02-01

## 2023-01-02 RX ORDER — METHOCARBAMOL 750 MG/1
750 TABLET, FILM COATED ORAL 4 TIMES DAILY
Qty: 30 TABLET | Refills: 0 | Status: SHIPPED | OUTPATIENT
Start: 2023-01-02

## 2023-01-02 NOTE — TELEPHONE ENCOUNTER
Future Appointments:  Future Appointments   Date Time Provider Rozina Arizmendi   1/5/2023  3:40 PM Ovidio Chung MD [facility] BS AMB        Last Appointment With Me:  12/5/2022     Requested Prescriptions     Pending Prescriptions Disp Refills    gabapentin (NEURONTIN) 400 mg capsule 90 Capsule 2     Sig: Take 1 Capsule by mouth three (3) times daily. Max Daily Amount: 1,200 mg.

## 2023-01-02 NOTE — TELEPHONE ENCOUNTER
Future Appointments:  Future Appointments   Date Time Provider Rozina Arizmendi   1/5/2023  3:40 PM Jeanine Tim MD [facility] BS AMB        Last Appointment With Me:  Visit date not found     Requested Prescriptions     Pending Prescriptions Disp Refills    methocarbamoL (ROBAXIN) 750 mg tablet 30 Tablet 0     Sig: Take 1 Tablet by mouth four (4) times daily.

## 2023-01-26 DIAGNOSIS — M48.062 SPINAL STENOSIS OF LUMBAR REGION WITH NEUROGENIC CLAUDICATION: ICD-10-CM

## 2023-01-26 DIAGNOSIS — G89.4 CHRONIC PAIN SYNDROME: ICD-10-CM

## 2023-01-26 RX ORDER — METHOCARBAMOL 750 MG/1
750 TABLET, FILM COATED ORAL 4 TIMES DAILY
Qty: 30 TABLET | Refills: 0 | Status: SHIPPED | OUTPATIENT
Start: 2023-01-26

## 2023-01-26 NOTE — TELEPHONE ENCOUNTER
Future Appointments:  No future appointments. Last Appointment With Me:  12/5/2022     Requested Prescriptions     Pending Prescriptions Disp Refills    methocarbamoL (ROBAXIN) 750 mg tablet 30 Tablet 0     Sig: Take 1 Tablet by mouth four (4) times daily.

## 2023-01-28 RX ORDER — MONTELUKAST SODIUM 10 MG/1
TABLET ORAL
Qty: 90 TABLET | Refills: 1 | Status: SHIPPED | OUTPATIENT
Start: 2023-01-28

## 2023-01-29 DIAGNOSIS — M48.062 SPINAL STENOSIS OF LUMBAR REGION WITH NEUROGENIC CLAUDICATION: ICD-10-CM

## 2023-01-29 DIAGNOSIS — G89.4 CHRONIC PAIN SYNDROME: ICD-10-CM

## 2023-01-30 DIAGNOSIS — I82.512 CHRONIC DEEP VEIN THROMBOSIS (DVT) OF FEMORAL VEIN OF LEFT LOWER EXTREMITY (HCC): ICD-10-CM

## 2023-01-30 RX ORDER — GABAPENTIN 400 MG/1
400 CAPSULE ORAL 3 TIMES DAILY
Qty: 90 CAPSULE | Refills: 2 | Status: SHIPPED | OUTPATIENT
Start: 2023-01-30

## 2023-01-30 RX ORDER — TRAMADOL HYDROCHLORIDE 50 MG/1
50 TABLET ORAL
Qty: 90 TABLET | Refills: 1 | Status: SHIPPED | OUTPATIENT
Start: 2023-01-30 | End: 2023-03-01

## 2023-01-30 NOTE — TELEPHONE ENCOUNTER
Future Appointments:  No future appointments. Last Appointment With Me:  12/5/2022     Requested Prescriptions     Pending Prescriptions Disp Refills    gabapentin (NEURONTIN) 400 mg capsule 90 Capsule 2     Sig: Take 1 Capsule by mouth three (3) times daily.  Max Daily Amount: 1,200 mg.    traMADoL (ULTRAM) 50 mg tablet 90 Tablet 1

## 2023-01-31 RX ORDER — RIVAROXABAN 20 MG/1
TABLET, FILM COATED ORAL
Qty: 90 TABLET | Refills: 0 | Status: SHIPPED | OUTPATIENT
Start: 2023-01-31

## 2023-02-16 DIAGNOSIS — F32.A DEPRESSION, UNSPECIFIED DEPRESSION TYPE: ICD-10-CM

## 2023-02-16 RX ORDER — TRAZODONE HYDROCHLORIDE 50 MG/1
50 TABLET ORAL
Qty: 90 TABLET | Refills: 1 | Status: SHIPPED | OUTPATIENT
Start: 2023-02-16

## 2023-02-16 NOTE — TELEPHONE ENCOUNTER
Future Appointments:  Future Appointments   Date Time Provider Rozina Starki   2/28/2023  9:20 AM Julio Florentino MD [facility] BS AMB        Last Appointment With Me:  12/5/2022     Requested Prescriptions     Pending Prescriptions Disp Refills    traZODone (DESYREL) 50 mg tablet 90 Tablet 1     Sig: Take 1 Tablet by mouth nightly.

## 2023-02-16 NOTE — TELEPHONE ENCOUNTER
----- Message from Roxi Krishna. Prasad Leiva sent at 2/15/2023  8:54 PM EST -----  Regarding: meds  Can u renew my Tradazone . I need to sleep.

## 2023-02-23 RX ORDER — LEVOTHYROXINE SODIUM 100 UG/1
TABLET ORAL
Qty: 90 TABLET | Refills: 0 | Status: SHIPPED | OUTPATIENT
Start: 2023-02-23

## 2023-02-27 ENCOUNTER — TELEPHONE (OUTPATIENT)
Dept: INTERNAL MEDICINE CLINIC | Age: 58
End: 2023-02-27

## 2023-02-27 NOTE — TELEPHONE ENCOUNTER
----- Message from Ana Laura Hutchins sent at 2/27/2023 10:31 AM EST -----  Subject: Appointment Request    Reason for Call: Established Patient Appointment needed: Routine Physical   Exam    QUESTIONS    Reason for appointment request? No appointments available during search     Additional Information for Provider? patient calling in to schedule AWV, I   had no available appts, pt requesting VV if possible, call pt to schedule   please  ---------------------------------------------------------------------------  --------------  Nohemy Mckeon JSQ  [phone]; OK to leave message on voicemail  ---------------------------------------------------------------------------  --------------  SCRIPT ANSWERS  COVID Screen: Saqib Castaneda

## 2023-02-28 ENCOUNTER — DOCUMENTATION ONLY (OUTPATIENT)
Dept: SLEEP MEDICINE | Age: 58
End: 2023-02-28

## 2023-02-28 ENCOUNTER — OFFICE VISIT (OUTPATIENT)
Dept: SLEEP MEDICINE | Age: 58
End: 2023-02-28
Payer: MEDICARE

## 2023-02-28 VITALS
WEIGHT: 293 LBS | DIASTOLIC BLOOD PRESSURE: 81 MMHG | BODY MASS INDEX: 45.99 KG/M2 | OXYGEN SATURATION: 92 % | HEIGHT: 67 IN | HEART RATE: 85 BPM | TEMPERATURE: 98.4 F | SYSTOLIC BLOOD PRESSURE: 134 MMHG

## 2023-02-28 DIAGNOSIS — E66.01 MORBID OBESITY WITH BMI OF 45.0-49.9, ADULT (HCC): ICD-10-CM

## 2023-02-28 DIAGNOSIS — G47.33 OBSTRUCTIVE SLEEP APNEA (ADULT) (PEDIATRIC): Primary | ICD-10-CM

## 2023-02-28 PROCEDURE — G8427 DOCREV CUR MEDS BY ELIG CLIN: HCPCS | Performed by: INTERNAL MEDICINE

## 2023-02-28 PROCEDURE — G8417 CALC BMI ABV UP PARAM F/U: HCPCS | Performed by: INTERNAL MEDICINE

## 2023-02-28 PROCEDURE — 99213 OFFICE O/P EST LOW 20 MIN: CPT | Performed by: INTERNAL MEDICINE

## 2023-02-28 PROCEDURE — 3017F COLORECTAL CA SCREEN DOC REV: CPT | Performed by: INTERNAL MEDICINE

## 2023-02-28 PROCEDURE — G8432 DEP SCR NOT DOC, RNG: HCPCS | Performed by: INTERNAL MEDICINE

## 2023-02-28 NOTE — PATIENT INSTRUCTIONS
217 [facility]., [place]. [place], [address]  Tel.  [phone]  Fax. 100 [facility] 60  SISTER [facility], [address]  Tel.  [phone]  Fax. [phone] [address]  Tel.  [phone]  Fax. [phone]     PROPER SLEEP HYGIENE    What to avoid  Do not have drinks with caffeine, such as coffee or black tea, for 8 hours before bed. Do not smoke or use other types of tobacco near bedtime. Nicotine is a stimulant and can keep you awake. Avoid drinking alcohol late in the evening, because it can cause you to wake in the middle of the night. Do not eat a big meal close to bedtime. If you are hungry, eat a light snack. Do not drink a lot of water close to bedtime, because the need to urinate may wake you up during the night. Do not read or watch TV in bed. Use the bed only for sleeping and sexual activity. What to try  Go to bed at the same time every night, and wake up at the same time every morning. Do not take naps during the day. Keep your bedroom quiet, dark, and cool. Get regular exercise, but not within 3 to 4 hours of your bedtime. .  Sleep on a comfortable pillow and mattress. If watching the clock makes you anxious, turn it facing away from you so you cannot see the time. If you worry when you lie down, start a worry book. Well before bedtime, write down your worries, and then set the book and your concerns aside. Try meditation or other relaxation techniques before you go to bed. If you cannot fall asleep, get up and go to another room until you feel sleepy. Do something relaxing. Repeat your bedtime routine before you go to bed again. Make your house quiet and calm about an hour before bedtime. Turn down the lights, turn off the TV, log off the computer, and turn down the volume on music. This can help you relax after a busy day.     Drowsy Driving  The [address] [place] [address] Road Traffic Safety Administration cites drowsiness as a causing factor in more than 637,221 police reported crashes annually, resulting in 76,000 injuries and 1,500 deaths. Other surveys suggest 55% of people polled have driven while drowsy in the past year, 23% had fallen asleep but not crashed, 3% crashed, and 2% had and accident due to drowsy driving. Who is at risk? Young Drivers: One study of drowsy driving accidents states that 55% of the drivers were under 25 years. Of those, 75% were male. Shift Workers and Travelers: People who work overnight or travel across time zones frequently are at higher risk of experiencing Circadian Rhythm Disorders. They are trying to work and function when their body is programed to sleep. Sleep Deprived: Lack of sleep has a serious impact on your ability to pay attention or focus on a task. Consistently getting less than the average of 8 hours your body needs creates partial or cumulative sleep deprivation. Untreated Sleep Disorders: Sleep Apnea, Narcolepsy, R.L.S., and other sleep disorders (untreated) prevent a person from getting enough restful sleep. This leads to excessive daytime sleepiness and increases the risk for drowsy driving accidents by up to 7 times. Medications / Alcohol: Even over the counter medications can cause drowsiness. Medications that impair a drivers attention should have a warning label. Alcohol naturally makes you sleepy and on its own can cause accidents. Combined with excessive drowsiness its effects are amplified. Signs of Drowsy Driving:   * You don't remember driving the last few miles   * You may drift out of your bowen   * You are unable to focus and your thoughts wander   * You may yawn more often than normal   * You have difficulty keeping your eyes open / nodding off   * Missing traffic signs, speeding, or tailgating  Prevention-   Good sleep hygiene, lifestyle and behavioral choices have the most impact on drowsy driving.  There is no substitute for sleep and the average person requires 8 hours nightly. If you find yourself driving drowsy, stop and sleep. Consider the sleep hygiene tips provided during your visit as well. Medication Refill Policy: Refills for all medications require 1 week advance notice. Please have your pharmacy fax a refill request. We are unable to fax, or call in \"controled substance\" medications and you will need to pick these prescriptions up from our office. [employer] Activation    Thank you for requesting access to [employer]. Please follow the instructions below to securely access and download your online medical record. [employer] allows you to send messages to your doctor, view your test results, renew your prescriptions, schedule appointments, and more. How Do I Sign Up? In your internet browser, go to [email or web address]. Click on the First Time User? Click Here link in the Sign In box. You will see the New Member Sign Up page. Enter your [employer] Access Code exactly as it appears below. You will not need to use this code after youve completed the sign-up process. If you do not sign up before the expiration date, you must request a new code. [employer] Access Code: Activation code not generated  Current [employer] Status: Active (This is the date your [employer] access code will )    Enter the last four digits of your Social Security Number (xxxx) and Date of Birth (mm/dd/yyyy) as indicated and click Submit. You will be taken to the next sign-up page. Create a [employer] ID. This will be your [employer] login ID and cannot be changed, so think of one that is secure and easy to remember. Create a [employer] password. You can change your password at any time. Enter your Password Reset Question and Answer. This can be used at a later time if you forget your password. Enter your e-mail address. You will receive e-mail notification when new information is available in [address] Click Sign Up.  You can now view and download portions of your medical record. Click the [employer] link to download a portable copy of your medical information. Additional Information    If you have questions, please call [phone]. Remember, The App[employer] is NOT to be used for urgent needs. For medical emergencies, dial 911.

## 2023-02-28 NOTE — PROGRESS NOTES
217 [facility]., [place]. [place], [address]  Tel.  [phone]  Fax. 100 [facility] 60  [place], [address] [facility]  Tel.  [phone]  Fax. [phone] [address]   Tel.  [phone]  Fax. [phone]     S>Smitha Loweyr is a 61 y.o. female seen for a positive airway pressure follow-up. She reports no problems using the device. The following problems are identified:    Drowsiness no Problems exhaling no   Snoring no Forget to put on no   Mask Comfortable yes Can't fall asleep no   Dry Mouth no Mask falls off no   Air Leaking no Frequent awakenings no     Download reviewed  She admits that her sleep has improved. Therapy Apnea Index averaged over PAP use: 3 /hr which reflects improved sleep breathing condition. Allergies   Allergen Reactions    Codeine Hives    Pcn [Penicillins] Hives       She has a current medication list which includes the following prescription(s): levothyroxine, trazodone, xarelto, gabapentin, tramadol, montelukast, methocarbamol, lovastatin, fluticasone furoate, escitalopram oxalate, diclofenac, ventolin hfa, ciprofloxacin hcl, metronidazole, cholecalciferol (vitamin d3), vit b cmplx 3-fa-vit c-biotin, albuterol, albuterol, and nebulizer accessories. .      She  has a past medical history of Arthritis, Asthma, Breast cyst (1996), Chronic pain, Colon polyps, Diverticulitis, DVT (deep venous thrombosis) ([facility] [address] (10/18/2018), Fatty liver, GERD (gastroesophageal reflux disease), Hypercholesteremia, Hypothyroidism, Kidney stones, Nicotine vapor product user, Obesity, SUN (obstructive sleep apnea), Psychiatric disorder, and Thyroid disease. [place] Sleepiness Score: 7   and Modified F.O.S.Q. Score Total / 2: 14.5   which reflect improved sleep quality over therapy time.     O>    Visit Vitals  /81 (BP 1 Location: Left upper arm, BP Patient Position: Sitting, BP Cuff Size: Adult long)   Pulse 85   Temp 98.4 °F (36.9 °C)   Ht 5' 7\" (1.702 m)   Wt 299 lb (135.6 kg)   LMP 06/22/2009   SpO2 92%   BMI 46.83 kg/m²           General:   Alert, oriented, not in distress   Neck:   No JVD    Chest/Lungs:  symetrical lung expansion , no accessory muscle use    Extremities:  no obvious rashes , negative edema    Neuro:  No focal deficits ; No obvious tremor    Psych:  Normal affect ,  Normal countenance ;         A>    ICD-10-CM ICD-9-CM    1. Obstructive sleep apnea (adult) (pediatric)  G47.33 327.23 AMB SUPPLY ORDER      2. Morbid obesity with BMI of 45.0-49.9, adult ([facility])  E66.01 278.01     Z68.42 V85.42           AHI 21/hour (2017) On CPAP, Respironics DS 2  13-18 cmH2O. Compliant:      yes    Therapeutic Response:  Positive    P>      *   She will continue on her current settings. Replacement supplies ordered    * She was asked to contact our office for any problems regarding PAP therapy. * Counseling was provided regarding the importance of regular PAP use and on proper sleep hygiene and safe driving. * Re-enforced proper and regular cleaning for the device. 2. Obesity -she says her weight has been relatively stable. I have discussed the relationship of weight to obstructive sleep apnea. I have advised her to start a weight loss plan. We discussed reducing portions and avoiding beverages with calories. Exercise can further assist with weight loss/maintenance and was recommended. she understands that weight loss can reduce severity of sleep apnea and snoring. Electronically signed by    Ozzie Stokes MD  Diplomate in Sleep Medicine  [facility]  2/28/2023    This note was created using voice recognition software. Despite editing, there may be syntax errors.

## 2023-03-09 ENCOUNTER — PATIENT MESSAGE (OUTPATIENT)
Dept: INTERNAL MEDICINE CLINIC | Age: 58
End: 2023-03-09

## 2023-03-09 DIAGNOSIS — M48.062 SPINAL STENOSIS OF LUMBAR REGION WITH NEUROGENIC CLAUDICATION: ICD-10-CM

## 2023-03-09 DIAGNOSIS — G89.4 CHRONIC PAIN SYNDROME: Primary | ICD-10-CM

## 2023-03-09 RX ORDER — TIZANIDINE 2 MG/1
2 TABLET ORAL 3 TIMES DAILY
Qty: 90 TABLET | Refills: 0 | Status: SHIPPED | OUTPATIENT
Start: 2023-03-09 | End: 2023-03-17 | Stop reason: DRUGHIGH

## 2023-03-17 RX ORDER — TIZANIDINE 4 MG/1
4 TABLET ORAL
Qty: 90 TABLET | Refills: 0 | Status: SHIPPED | OUTPATIENT
Start: 2023-03-17

## 2023-04-05 RX ORDER — ESCITALOPRAM OXALATE 20 MG/1
TABLET ORAL
Qty: 90 TABLET | Refills: 1
Start: 2023-04-05

## 2023-04-05 RX ORDER — ESCITALOPRAM OXALATE 20 MG/1
20 TABLET ORAL DAILY
Qty: 90 TABLET | Refills: 1
Start: 2023-04-05

## 2023-04-05 RX ORDER — TIZANIDINE 4 MG/1
TABLET ORAL
Qty: 90 TABLET | Refills: 0
Start: 2023-04-05

## 2023-04-06 NOTE — TELEPHONE ENCOUNTER
Future Appointments:  Future Appointments   Date Time Provider Rozina Arizmendi   7/24/2023  9:00 AM Higinio Tirado MD [facility] BS AMB   3/5/2024  9:30 AM JOSE Atkins BS AMB        Last Appointment With Me:  12/5/2022     Requested Prescriptions     Pending Prescriptions Disp Refills    escitalopram oxalate (LEXAPRO) 20 mg tablet 90 Tablet 1     Sig: Take 1 Tablet by mouth daily.

## 2023-04-07 ENCOUNTER — TELEPHONE (OUTPATIENT)
Dept: INTERNAL MEDICINE CLINIC | Age: 58
End: 2023-04-07

## 2023-04-15 DIAGNOSIS — I82.512 CHRONIC DEEP VEIN THROMBOSIS (DVT) OF FEMORAL VEIN OF LEFT LOWER EXTREMITY (HCC): ICD-10-CM

## 2023-04-17 RX ORDER — RIVAROXABAN 20 MG/1
TABLET, FILM COATED ORAL
Qty: 90 TABLET | Refills: 0 | Status: SHIPPED | OUTPATIENT
Start: 2023-04-17

## 2023-05-01 ENCOUNTER — VIRTUAL VISIT (OUTPATIENT)
Dept: INTERNAL MEDICINE CLINIC | Age: 58
End: 2023-05-01
Payer: MEDICARE

## 2023-05-01 DIAGNOSIS — G89.4 CHRONIC PAIN SYNDROME: ICD-10-CM

## 2023-05-01 DIAGNOSIS — M48.062 SPINAL STENOSIS OF LUMBAR REGION WITH NEUROGENIC CLAUDICATION: ICD-10-CM

## 2023-05-01 DIAGNOSIS — R10.32 LEFT LOWER QUADRANT ABDOMINAL PAIN: Primary | ICD-10-CM

## 2023-05-01 DIAGNOSIS — I82.512 CHRONIC DEEP VEIN THROMBOSIS (DVT) OF FEMORAL VEIN OF LEFT LOWER EXTREMITY (HCC): ICD-10-CM

## 2023-05-01 DIAGNOSIS — K57.92 DIVERTICULITIS: ICD-10-CM

## 2023-05-01 DIAGNOSIS — F11.99 OPIOID USE, UNSPECIFIED WITH UNSPECIFIED OPIOID-INDUCED DISORDER (HCC): ICD-10-CM

## 2023-05-01 DIAGNOSIS — N18.31 STAGE 3A CHRONIC KIDNEY DISEASE (HCC): ICD-10-CM

## 2023-05-01 PROCEDURE — G8417 CALC BMI ABV UP PARAM F/U: HCPCS | Performed by: INTERNAL MEDICINE

## 2023-05-01 PROCEDURE — G8427 DOCREV CUR MEDS BY ELIG CLIN: HCPCS | Performed by: INTERNAL MEDICINE

## 2023-05-01 PROCEDURE — G0463 HOSPITAL OUTPT CLINIC VISIT: HCPCS | Performed by: INTERNAL MEDICINE

## 2023-05-01 PROCEDURE — G8510 SCR DEP NEG, NO PLAN REQD: HCPCS | Performed by: INTERNAL MEDICINE

## 2023-05-01 PROCEDURE — 99214 OFFICE O/P EST MOD 30 MIN: CPT | Performed by: INTERNAL MEDICINE

## 2023-05-01 PROCEDURE — 3017F COLORECTAL CA SCREEN DOC REV: CPT | Performed by: INTERNAL MEDICINE

## 2023-05-01 RX ORDER — GABAPENTIN 400 MG/1
400 CAPSULE ORAL 3 TIMES DAILY
Qty: 90 CAPSULE | Refills: 2 | Status: SHIPPED | OUTPATIENT
Start: 2023-05-01

## 2023-05-01 RX ORDER — SODIUM PICOSULFATE, MAGNESIUM OXIDE, AND ANHYDROUS CITRIC ACID 10; 3.5; 12 MG/160ML; G/160ML; G/160ML
LIQUID ORAL
COMMUNITY
Start: 2022-11-07

## 2023-05-01 RX ORDER — TRAMADOL HYDROCHLORIDE 50 MG/1
50 TABLET ORAL
Qty: 60 TABLET | Refills: 0 | Status: SHIPPED | OUTPATIENT
Start: 2023-05-01 | End: 2023-05-31

## 2023-05-01 NOTE — PROGRESS NOTES
Chief Complaint   Patient presents with    Other     Follow up Diverticulitis. 1. \"Have you been to the ER, urgent care clinic since your last visit? Hospitalized since your last visit? \" No    2. \"Have you seen or consulted any other health care providers outside of the [address] [place] [address] since your last visit? \" No     3. For patients aged 39-70: Has the patient had a colonoscopy / FIT/ Cologuard? no      If the patient is female:    4. For patients aged 41-77: Has the patient had a mammogram within the past 2 years? No      5. For patients aged 21-65: Has the patient had a pap smear?  No

## 2023-05-01 NOTE — PROGRESS NOTES
CC: Other (Follow up Diverticulitis. )      HPI:    She is a 61 y.o. female who presents for evaluation of diverticulitis     She reports abdominal pain   Left sided pain is more pronounced no severe  Appetite is fair   Has chronic constipation   Uses prune juice and has abdominal pain  every other day  No nausea and no vomiting    She is requesting refill on tramadol for chronic back pain ( has had 3 surgeries- since last sx has increased pain)     This is an established visit conducted via telemedicine with video. The patient has been instructed that this meets HIPAA criteria and acknowledges and agrees to this method of visitation. Pursuant to the emergency declaration under the [facility] [place] [address] waiver authority and the [employer] Act, this Virtual Visit was conducted, with patient's consent, to reduce the patient's risk of exposure to COVID-19 and provide continuity of care for an established patient. Services were provided through a video synchronous discussion virtually to substitute for in-person clinic visit. ROS:  Constitutional: negative for fevers, chills, anorexia and + intentional weight loss   Eyes:   negative for visual disturbance,  irritation  ENT:   negative for tinnitus,sore throat,nasal congestion,ear pain, sinus pain.    Respiratory:  negative for cough, hemoptysis, dyspnea,wheezing  CV:   negative for chest pain, palpitations, lower extremity edema  GI:   See HPI  Genitourinary: negative for frequency, dysuria, hematuria  Musculoskel: negative for myalgias, arthralgias, back pain, muscle weakness, joint pain  Neurological:  negative for headaches, dizziness, focal weakness, numbness  Psych:             Negative for depression and anxiety    Past Medical History:   Diagnosis Date    Arthritis     Asthma     Breast cyst 1996    left, removed    Chronic pain     LOWER BACK    Colon polyps Diverticulitis     DVT (deep venous thrombosis) ([facility] [address] 10/18/2018    Left groin after back sx    Fatty liver     GERD (gastroesophageal reflux disease)     Hypercholesteremia     Hypothyroidism     Kidney stones     Nicotine vapor product user     Obesity     SUN (obstructive sleep apnea)     CPAP recalled    Psychiatric disorder     depression    Thyroid disease     hypothyroid       Current Outpatient Medications on File Prior to Visit   Medication Sig Dispense Refill    sod picosulf-mag ox-citric ac (Clenpiq) 10 mg-3.5 gram- 12 gram/160 mL soln See admin instructions      Xarelto 20 mg tab tablet TAKE 1 TABLET BY MOUTH EVERY DAY WITH BREAKFAST 90 Tablet 0    tiZANidine (ZANAFLEX) 4 mg tablet Take 1 Tablet by mouth every six (6) hours as needed for Muscle Spasm(s). 90 Tablet 1    escitalopram oxalate (LEXAPRO) 20 mg tablet TAKE 1 TABLET BY MOUTH EVERY DAY 90 Tablet 1    escitalopram oxalate (LEXAPRO) 20 mg tablet Take 1 Tablet by mouth daily. 90 Tablet 1    levothyroxine (SYNTHROID) 100 mcg tablet TAKE 1 TABLET BY MOUTH EVERY DAY BEFORE BREAKFAST 90 Tablet 0    traZODone (DESYREL) 50 mg tablet Take 1 Tablet by mouth nightly. 90 Tablet 1    gabapentin (NEURONTIN) 400 mg capsule Take 1 Capsule by mouth three (3) times daily. Max Daily Amount: 1,200 mg. 90 Capsule 2    montelukast (SINGULAIR) 10 mg tablet TAKE 1 TABLET BY MOUTH EVERY DAY 90 Tablet 1    lovastatin (MEVACOR) 20 mg tablet TAKE 1 TABLET BY MOUTH EVERY DAY AT NIGHT 90 Tablet 1    fluticasone furoate (ARNUITY ELLIPTA) 100 mcg/actuation dsdv inhaler Take 1 Puff by inhalation daily. 2 Each 4    diclofenac (VOLTAREN) 1 % gel Apply  to affected area as needed for Pain. 1 Each 2    Ventolin HFA 90 mcg/actuation inhaler INHALE 1 PUFF BY MOUTH EVERY 4 HOURS AS NEEDED FOR WHEEZING. 54 Each 1    metroNIDAZOLE (FLAGYL) 500 mg tablet Take 1 Tablet by mouth three (3) times daily. 21 Tablet 0    cholecalciferol, vitamin D3, (VITAMIN D3 PO) Take 800 Units by mouth daily. vit B Cmplx 3-FA-Vit C-Biotin (NEPHRO JEROME RX) 1- mg-mg-mcg tablet Take 1 Tablet by mouth daily. albuterol (PROVENTIL VENTOLIN) 2.5 mg /3 mL (0.083 %) nebu 3 mL by Nebulization route every four (4) hours as needed for Wheezing. R06.2 wheezing 75 mL 0    albuterol (Ventolin HFA) 90 mcg/actuation inhaler Take 1 Puff by inhalation every six (6) hours as needed for Wheezing. 3 Inhaler 2    Nebulizer Accessories kit Use nebulizer machine as needed for dyspnea 1 Kit 0     No current facility-administered medications on file prior to visit. Past Surgical History:   Procedure Laterality Date    COLONOSCOPY N/A 11/14/2017    COLONOSCOPY performed by Denson Baumgarten, MD at [facility] ENDOSCOPY    HX BACK SURGERY  2005, 2006    L5 & S 1    HX BACK SURGERY  08/30/2018    HX BREAST BIOPSY      Left    HX HYSTERECTOMY  6/22/09    LSH LSO    HX OOPHORECTOMY Left     One ovary removed.     HX ORTHOPAEDIC Left 1972    thumb surgery    HX ORTHOPAEDIC Left 2000    left knee surgery    HX ORTHOPAEDIC Left 2013    ankle x2    HX TUBAL LIGATION      KS NEPHROLITHOTOMY REMOVAL CALCULUS      lithotripsy    RENAL STENT         Family History   Problem Relation Age of Onset    Cancer Mother 54        Lung cancer    Heart Disease Father     Hypertension Father     Elevated Lipids Father     Heart Attack Father     Stroke Father         x 3    Other Sister         APS    Elevated Lipids Sister     Colon Cancer Sister     Cancer Sister         colon, uterine    Diabetes Sister     Heart Attack Sister     Heart Disease Brother     Hypertension Brother     Cancer Brother         stomach    Heart Attack Brother     Diabetes Brother     Peripheral Vascular Disease Brother     Anesth Problems Neg Hx      Reviewed and no changes     Social History     Socioeconomic History    Marital status:      Spouse name: Not on file    Number of children: Not on file    Years of education: Not on file    Highest education level: Not on file   Occupational History    Not on file   Tobacco Use    Smoking status: Former     Packs/day: 1.50     Years: 25.00     Pack years: 37.50     Types: Cigarettes     Quit date: 2015     Years since quittin.2    Smokeless tobacco: Current    Tobacco comments:     vape   Vaping Use    Vaping Use: Every day   Substance and Sexual Activity    Alcohol use: Yes     Comment: \"average of 5 times per year\"    Drug use: No    Sexual activity: Not on file   Other Topics Concern    Not on file   Social History Narrative    Not on file     Social Determinants of Health     Financial Resource Strain: Low Risk     Difficulty of Paying Living Expenses: Not hard at all   Food Insecurity: No Food Insecurity    Worried About Running Out of Food in the Last Year: Never true    Ran Out of Food in the Last Year: Never true   Transportation Needs: Not on file   Physical Activity: Not on file   Stress: Not on file   Social Connections: Not on file   Intimate Partner Violence: Not on file   Housing Stability: Not on file          Visit Vitals  [facility] 2009       Physical Examination:   Gen: well appearing female  HEENT: normal conjunctiva, no audible congestion, patient does not see oral erythema, has MMM  Neck: patient does not feel enlarged or tender LAD or masses  Resp: normal respiratory effort, no audible wheezing. CV: patient does not feel palpitations or heart irregularity  Abd: patient reports tenderness on left lower quadrant that is not severe   Extrem: patient does not see swelling in ankles or joints.    Neuro: Alert and oriented, able to answer questions without difficulty, able to move all extremities and walk normally          Lab Results   Component Value Date/Time    WBC 4.1 2022 08:10 AM    Hemoglobin (POC) 12.9 10/28/2013 03:40 PM    HGB 13.6 2022 08:10 AM    Hematocrit (POC) 38 10/28/2013 03:40 PM    HCT 45.5 2022 08:10 AM    PLATELET 693  08:10 AM    .3 (H) 2022 08:10 AM     Lab Results   Component Value Date/Time    Sodium 140 08/31/2022 08:23 AM    Potassium 3.8 08/31/2022 08:23 AM    Chloride 104 08/31/2022 08:23 AM    CO2 27 08/31/2022 08:23 AM    Anion gap 9 08/31/2022 08:23 AM    Glucose 94 08/31/2022 08:23 AM    BUN 11 08/31/2022 08:23 AM    Creatinine 1.05 (H) 08/31/2022 08:23 AM    BUN/Creatinine ratio 10 (L) 08/31/2022 08:23 AM    GFR est AA >60 08/31/2022 08:23 AM    GFR est non-AA 54 (L) 08/31/2022 08:23 AM    Calcium 9.6 08/31/2022 08:23 AM     Lab Results   Component Value Date/Time    Cholesterol, total 200 (H) 03/14/2022 08:10 AM    HDL Cholesterol 82 03/14/2022 08:10 AM    LDL, calculated 101.6 (H) 03/14/2022 08:10 AM    VLDL, calculated 16.4 03/14/2022 08:10 AM    Triglyceride 82 03/14/2022 08:10 AM    CHOL/HDL Ratio 2.4 03/14/2022 08:10 AM     Lab Results   Component Value Date/Time    TSH 1.72 11/17/2022 08:15 AM     No results found for: PSA, Dione Matos, [id number], [id number]  Lab Results   Component Value Date/Time    Hemoglobin A1c 5.4 08/02/2018 08:19 AM     Lab Results   Component Value Date/Time    VITAMIN D, 25-HYDROXY 28.5 (L) 05/09/2017 08:54 AM       Lab Results   Component Value Date/Time    ALT (SGPT) 19 03/14/2022 08:10 AM    Alk. phosphatase 83 03/14/2022 08:10 AM    Bilirubin, total 0.7 03/14/2022 08:10 AM           Assessment/Plan:  1. Left lower quadrant abdominal pain  Possible diverticulitis   Discussed bowel rest   - METABOLIC PANEL, BASIC; Future  - CBC WITH AUTOMATED DIFF; Future  - CT ABD PELV W CONT; Future    Opioid use, unspecified with unspecified opioid-induced disorder (HCC)       Chronic deep vein thrombosis (DVT) of femoral vein of left lower extremity (HCC)  On xarelto 20mg daily     Stage 3a chronic kidney disease (HCC)    Chronic pain syndrome  Spinal stenosis of lumbar region with neurogenic claudication  - gabapentin (NEURONTIN) 400 mg capsule; Take 1 Capsule by mouth three (3) times daily.  Max Daily Amount: 1,200 mg. Dispense: 90 Capsule; Refill: 2  - traMADoL (ULTRAM) 50 mg tablet; Take 1 Tablet by mouth every six (6) hours as needed for Pain for up to 30 days. Max Daily Amount: 200 mg. Dispense: 60 Tablet; Refill: 0      Follow up in 3 months       Ketan Salgado MD    This is an established visit conducted via real time video and audio telemedicine. The patient has been instructed that this meets HIPAA criteria and acknowledges and agrees to this method of visitation.

## 2023-05-10 ENCOUNTER — TRANSCRIBE ORDERS (OUTPATIENT)
Facility: HOSPITAL | Age: 58
End: 2023-05-10

## 2023-05-10 DIAGNOSIS — R10.32 LEFT LOWER QUADRANT ABDOMINAL PAIN: Primary | ICD-10-CM

## 2023-05-10 DIAGNOSIS — K57.92 DIVERTICULITIS: ICD-10-CM

## 2023-05-17 DIAGNOSIS — E78.5 HYPERLIPIDEMIA, UNSPECIFIED: ICD-10-CM

## 2023-05-17 NOTE — TELEPHONE ENCOUNTER
PCP: Cosme Hartmann MD    Last appt:   5/1/2023    Future Appointments   Date Time Provider Cintia Auguste   7/24/2023  9:00 AM Cosme Hartmann MD [facility] BS DANIA   3/5/2024  9:30 AM JAMAAL Medina - NP [facility] BS AMB       Requested Prescriptions     Pending Prescriptions Disp Refills    levothyroxine (SYNTHROID) 100 MCG tablet [Pharmacy Med Name: LEVOTHYROXINE 100 MCG TABLET] 90 tablet      Sig: TAKE 1 TABLET BY MOUTH EVERY DAY BEFORE BREAKFAST

## 2023-05-17 NOTE — TELEPHONE ENCOUNTER
PCP: Radha Lacey MD    Last appt:   5/1/2023    Future Appointments   Date Time Provider Cintia Auguste   7/24/2023  9:00 AM Radha Lacey MD [facility] BS DANIA   3/5/2024  9:30 AM JAMAAL Murillo - NP [facility] BS AMB       Requested Prescriptions     Pending Prescriptions Disp Refills    lovastatin (MEVACOR) 20 MG tablet [Pharmacy Med Name: LOVASTATIN 20 MG TABLET] 90 tablet 1     Sig: TAKE 1 TABLET BY MOUTH EVERY DAY AT NIGHT

## 2023-05-18 RX ORDER — LOVASTATIN 20 MG/1
TABLET ORAL
Qty: 90 TABLET | Refills: 1 | Status: SHIPPED | OUTPATIENT
Start: 2023-05-18

## 2023-05-18 RX ORDER — LEVOTHYROXINE SODIUM 0.1 MG/1
TABLET ORAL
Qty: 90 TABLET | Refills: 1 | Status: SHIPPED | OUTPATIENT
Start: 2023-05-18

## 2023-05-19 RX ORDER — TIZANIDINE 4 MG/1
TABLET ORAL
Qty: 90 TABLET | Refills: 1 | Status: SHIPPED | OUTPATIENT
Start: 2023-05-19

## 2023-05-22 ENCOUNTER — TELEPHONE (OUTPATIENT)
Age: 58
End: 2023-05-22

## 2023-05-22 DIAGNOSIS — M48.062 SPINAL STENOSIS, LUMBAR REGION WITH NEUROGENIC CLAUDICATION: Primary | ICD-10-CM

## 2023-05-22 DIAGNOSIS — N18.31 CHRONIC KIDNEY DISEASE, STAGE 3A (HCC): ICD-10-CM

## 2023-05-22 DIAGNOSIS — E06.3 HYPOTHYROIDISM DUE TO HASHIMOTO'S THYROIDITIS: ICD-10-CM

## 2023-05-22 DIAGNOSIS — G89.4 CHRONIC PAIN SYNDROME: ICD-10-CM

## 2023-05-22 DIAGNOSIS — E03.8 HYPOTHYROIDISM DUE TO HASHIMOTO'S THYROIDITIS: ICD-10-CM

## 2023-05-22 DIAGNOSIS — E78.00 PURE HYPERCHOLESTEROLEMIA: ICD-10-CM

## 2023-05-22 DIAGNOSIS — M48.062 SPINAL STENOSIS, LUMBAR REGION WITH NEUROGENIC CLAUDICATION: ICD-10-CM

## 2023-05-22 DIAGNOSIS — I82.512 CHRONIC EMBOLISM AND THROMBOSIS OF LEFT FEMORAL VEIN (HCC): ICD-10-CM

## 2023-05-22 NOTE — TELEPHONE ENCOUNTER
Pt states that she needs lab orders in the system so she can get her labs done. Can you please call her when these are in system so she can come in. Thanks.

## 2023-05-23 RX ORDER — TRAMADOL HYDROCHLORIDE 50 MG/1
TABLET ORAL
Qty: 60 TABLET | Refills: 0 | OUTPATIENT
Start: 2023-05-23

## 2023-06-29 DIAGNOSIS — I82.512 CHRONIC EMBOLISM AND THROMBOSIS OF LEFT FEMORAL VEIN (HCC): ICD-10-CM

## 2023-06-29 RX ORDER — MONTELUKAST SODIUM 10 MG/1
TABLET ORAL
Qty: 90 TABLET | Refills: 1 | Status: SHIPPED | OUTPATIENT
Start: 2023-06-29

## 2023-06-29 RX ORDER — RIVAROXABAN 20 MG/1
TABLET, FILM COATED ORAL
Qty: 90 TABLET | Refills: 1 | Status: SHIPPED | OUTPATIENT
Start: 2023-06-29

## 2023-07-06 RX ORDER — TIZANIDINE 4 MG/1
TABLET ORAL
Qty: 90 TABLET | Refills: 1 | Status: SHIPPED | OUTPATIENT
Start: 2023-07-06

## 2023-07-24 ENCOUNTER — OFFICE VISIT (OUTPATIENT)
Age: 58
End: 2023-07-24
Payer: MEDICARE

## 2023-07-24 VITALS
DIASTOLIC BLOOD PRESSURE: 60 MMHG | SYSTOLIC BLOOD PRESSURE: 132 MMHG | TEMPERATURE: 97.1 F | HEIGHT: 67 IN | BODY MASS INDEX: 42 KG/M2 | RESPIRATION RATE: 16 BRPM | WEIGHT: 267.6 LBS | HEART RATE: 65 BPM | OXYGEN SATURATION: 96 %

## 2023-07-24 DIAGNOSIS — M48.062 SPINAL STENOSIS, LUMBAR REGION WITH NEUROGENIC CLAUDICATION: ICD-10-CM

## 2023-07-24 DIAGNOSIS — Z00.00 MEDICARE ANNUAL WELLNESS VISIT, SUBSEQUENT: ICD-10-CM

## 2023-07-24 DIAGNOSIS — E78.00 PURE HYPERCHOLESTEROLEMIA: ICD-10-CM

## 2023-07-24 DIAGNOSIS — G89.4 CHRONIC PAIN SYNDROME: Primary | ICD-10-CM

## 2023-07-24 DIAGNOSIS — E03.8 HYPOTHYROIDISM DUE TO HASHIMOTO'S THYROIDITIS: ICD-10-CM

## 2023-07-24 DIAGNOSIS — I82.512 CHRONIC EMBOLISM AND THROMBOSIS OF LEFT FEMORAL VEIN (HCC): ICD-10-CM

## 2023-07-24 DIAGNOSIS — Z12.31 ENCOUNTER FOR SCREENING MAMMOGRAM FOR MALIGNANT NEOPLASM OF BREAST: ICD-10-CM

## 2023-07-24 DIAGNOSIS — N18.31 CHRONIC KIDNEY DISEASE, STAGE 3A (HCC): ICD-10-CM

## 2023-07-24 DIAGNOSIS — E06.3 HYPOTHYROIDISM DUE TO HASHIMOTO'S THYROIDITIS: ICD-10-CM

## 2023-07-24 LAB
ALBUMIN SERPL-MCNC: 3.9 G/DL (ref 3.5–5)
ALBUMIN/GLOB SERPL: 1.1 (ref 1.1–2.2)
ALP SERPL-CCNC: 81 U/L (ref 45–117)
ALT SERPL-CCNC: 23 U/L (ref 12–78)
ANION GAP SERPL CALC-SCNC: 5 MMOL/L (ref 5–15)
APPEARANCE UR: CLEAR
AST SERPL-CCNC: 25 U/L (ref 15–37)
BASOPHILS # BLD: 0 K/UL (ref 0–0.1)
BASOPHILS NFR BLD: 1 % (ref 0–1)
BILIRUB SERPL-MCNC: 0.8 MG/DL (ref 0.2–1)
BILIRUB UR QL: NEGATIVE
BUN SERPL-MCNC: 13 MG/DL (ref 6–20)
BUN/CREAT SERPL: 13 (ref 12–20)
CALCIUM SERPL-MCNC: 9.5 MG/DL (ref 8.5–10.1)
CHLORIDE SERPL-SCNC: 101 MMOL/L (ref 97–108)
CHOLEST SERPL-MCNC: 185 MG/DL
CO2 SERPL-SCNC: 31 MMOL/L (ref 21–32)
COLOR UR: NORMAL
CREAT SERPL-MCNC: 0.99 MG/DL (ref 0.55–1.02)
DIFFERENTIAL METHOD BLD: ABNORMAL
EOSINOPHIL # BLD: 0.1 K/UL (ref 0–0.4)
EOSINOPHIL NFR BLD: 3 % (ref 0–7)
ERYTHROCYTE [DISTWIDTH] IN BLOOD BY AUTOMATED COUNT: 12.5 % (ref 11.5–14.5)
GLOBULIN SER CALC-MCNC: 3.4 G/DL (ref 2–4)
GLUCOSE SERPL-MCNC: 100 MG/DL (ref 65–100)
GLUCOSE UR STRIP.AUTO-MCNC: NEGATIVE MG/DL
HCT VFR BLD AUTO: 43 % (ref 35–47)
HDLC SERPL-MCNC: 75 MG/DL
HDLC SERPL: 2.5 (ref 0–5)
HGB BLD-MCNC: 13.6 G/DL (ref 11.5–16)
HGB UR QL STRIP: NEGATIVE
IMM GRANULOCYTES # BLD AUTO: 0 K/UL (ref 0–0.04)
IMM GRANULOCYTES NFR BLD AUTO: 0 % (ref 0–0.5)
KETONES UR QL STRIP.AUTO: NEGATIVE MG/DL
LDLC SERPL CALC-MCNC: 89.6 MG/DL (ref 0–100)
LEUKOCYTE ESTERASE UR QL STRIP.AUTO: NEGATIVE
LYMPHOCYTES # BLD: 1.2 K/UL (ref 0.8–3.5)
LYMPHOCYTES NFR BLD: 30 % (ref 12–49)
MCH RBC QN AUTO: 31.9 PG (ref 26–34)
MCHC RBC AUTO-ENTMCNC: 31.6 G/DL (ref 30–36.5)
MCV RBC AUTO: 100.7 FL (ref 80–99)
MONOCYTES # BLD: 0.3 K/UL (ref 0–1)
MONOCYTES NFR BLD: 9 % (ref 5–13)
NEUTS SEG # BLD: 2.3 K/UL (ref 1.8–8)
NEUTS SEG NFR BLD: 57 % (ref 32–75)
NITRITE UR QL STRIP.AUTO: NEGATIVE
NRBC # BLD: 0 K/UL (ref 0–0.01)
NRBC BLD-RTO: 0 PER 100 WBC
PH UR STRIP: 5.5 (ref 5–8)
PLATELET # BLD AUTO: 213 K/UL (ref 150–400)
PMV BLD AUTO: 9.9 FL (ref 8.9–12.9)
POTASSIUM SERPL-SCNC: 4.3 MMOL/L (ref 3.5–5.1)
PROT SERPL-MCNC: 7.3 G/DL (ref 6.4–8.2)
PROT UR STRIP-MCNC: NEGATIVE MG/DL
RBC # BLD AUTO: 4.27 M/UL (ref 3.8–5.2)
SODIUM SERPL-SCNC: 137 MMOL/L (ref 136–145)
SP GR UR REFRACTOMETRY: 1.02 (ref 1–1.03)
T4 FREE SERPL-MCNC: 1.3 NG/DL (ref 0.8–1.5)
TRIGL SERPL-MCNC: 102 MG/DL
TSH SERPL DL<=0.05 MIU/L-ACNC: 1.71 UIU/ML (ref 0.36–3.74)
UROBILINOGEN UR QL STRIP.AUTO: 0.2 EU/DL (ref 0.2–1)
VLDLC SERPL CALC-MCNC: 20.4 MG/DL
WBC # BLD AUTO: 4 K/UL (ref 3.6–11)

## 2023-07-24 PROCEDURE — G0439 PPPS, SUBSEQ VISIT: HCPCS | Performed by: INTERNAL MEDICINE

## 2023-07-24 PROCEDURE — 3017F COLORECTAL CA SCREEN DOC REV: CPT | Performed by: INTERNAL MEDICINE

## 2023-07-24 RX ORDER — TIZANIDINE 4 MG/1
TABLET ORAL
Qty: 90 TABLET | Refills: 1 | Status: SHIPPED | OUTPATIENT
Start: 2023-07-24

## 2023-07-24 SDOH — ECONOMIC STABILITY: FOOD INSECURITY: WITHIN THE PAST 12 MONTHS, YOU WORRIED THAT YOUR FOOD WOULD RUN OUT BEFORE YOU GOT MONEY TO BUY MORE.: NEVER TRUE

## 2023-07-24 SDOH — ECONOMIC STABILITY: FOOD INSECURITY: WITHIN THE PAST 12 MONTHS, THE FOOD YOU BOUGHT JUST DIDN'T LAST AND YOU DIDN'T HAVE MONEY TO GET MORE.: NEVER TRUE

## 2023-07-24 SDOH — ECONOMIC STABILITY: HOUSING INSECURITY
IN THE LAST 12 MONTHS, WAS THERE A TIME WHEN YOU DID NOT HAVE A STEADY PLACE TO SLEEP OR SLEPT IN A SHELTER (INCLUDING NOW)?: NO

## 2023-07-24 SDOH — ECONOMIC STABILITY: INCOME INSECURITY: HOW HARD IS IT FOR YOU TO PAY FOR THE VERY BASICS LIKE FOOD, HOUSING, MEDICAL CARE, AND HEATING?: NOT HARD AT ALL

## 2023-07-24 ASSESSMENT — PATIENT HEALTH QUESTIONNAIRE - PHQ9
1. LITTLE INTEREST OR PLEASURE IN DOING THINGS: 0
SUM OF ALL RESPONSES TO PHQ QUESTIONS 1-9: 0
SUM OF ALL RESPONSES TO PHQ9 QUESTIONS 1 & 2: 0
2. FEELING DOWN, DEPRESSED OR HOPELESS: 0

## 2023-07-24 ASSESSMENT — LIFESTYLE VARIABLES
HOW OFTEN DO YOU HAVE A DRINK CONTAINING ALCOHOL: NEVER
HOW MANY STANDARD DRINKS CONTAINING ALCOHOL DO YOU HAVE ON A TYPICAL DAY: PATIENT DOES NOT DRINK

## 2023-07-24 NOTE — PATIENT INSTRUCTIONS
Incorporated. Care instructions adapted under license by [facility] ([facility]). If you have questions about a medical condition or this instruction, always ask your healthcare professional. [address] any warranty or liability for your use of this information.

## 2023-07-24 NOTE — PROGRESS NOTES
\"Have you been to the ER, urgent care clinic since your last visit? Hospitalized since your last visit? \" No    2. \"Have you seen or consulted any other health care providers outside of the [address] [place] [address] since your last visit? \" No     3. For patients aged 43-73: Has the patient had a colonoscopy / FIT/ Cologuard? Yes - Care Gap present. Most recent result on file      If the patient is female:    4. For patients aged 43-66: Has the patient had a mammogram within the past 2 years? Yes - Care Gap present. Rooming MA/LPN to request most recent results      5. For patients aged 21-65: Has the patient had a pap smear? Yes - Care Gap present.  Rooming MA/LPN to request most recent results
Authorizing Provider   tiZANidine (ZANAFLEX) 4 MG tablet TAKE 1 TABLET BY MOUTH THREE TIMES A DAY AS NEEDED FOR MUSCLE SPASM Yes Carolina Vegas MD   XARELTO 20 MG TABS tablet TAKE 1 TABLET BY MOUTH EVERY DAY WITH BREAKFAST  Patient taking differently: States takes in the evening. Yes Carolina Vegas MD   montelukast (SINGULAIR) 10 MG tablet TAKE 1 TABLET BY MOUTH EVERY DAY Yes Carolina Vegas MD   levothyroxine (SYNTHROID) 100 MCG tablet TAKE 1 TABLET BY MOUTH EVERY DAY BEFORE BREAKFAST Yes Carolina Vegas MD   lovastatin (MEVACOR) 20 MG tablet TAKE 1 TABLET BY MOUTH EVERY DAY AT NIGHT Yes Carolina Vegas MD   diclofenac sodium (VOLTAREN) 1 % GEL Apply topically as needed Yes Ar Automatic Reconciliation   escitalopram (LEXAPRO) 20 MG tablet TAKE 1 TABLET BY MOUTH EVERY DAY Yes Ar Automatic Reconciliation   gabapentin (NEURONTIN) 400 MG capsule Take 1 capsule by mouth 3 times daily. Yes Ar Automatic Reconciliation   traZODone (DESYREL) 50 MG tablet Take 1 tablet by mouth Yes Ar Automatic Reconciliation   albuterol (PROVENTIL) (2.5 MG/3ML) 0.083% nebulizer solution Inhale 3 mLs into the lungs every 4 hours as needed Yes Ar Automatic Reconciliation   albuterol sulfate HFA (PROVENTIL;VENTOLIN;PROAIR) 108 (90 Base) MCG/ACT inhaler INHALE 1 PUFF BY MOUTH EVERY 4 HOURS AS NEEDED FOR WHEEZING.  Yes Ar Automatic Reconciliation   fluticasone 100 MCG/ACT AEPB Inhale 1 puff into the lungs daily  Patient not taking: Reported on 7/24/2023  Ar Automatic Reconciliation   metroNIDAZOLE (FLAGYL) 500 MG tablet Take 500 mg by mouth 3 times daily  Patient not taking: Reported on 7/24/2023  Ar Automatic Reconciliation       CareTeam (Including outside providers/suppliers regularly involved in providing care):   Patient Care Team:  Carolina Vegas MD as PCP - Ernie Bashir MD as PCP - Empaneled Provider  Leah Arzate MD as Physician  Jenny Rodriguez MD as Consulting Physician     Reviewed and

## 2023-08-02 DIAGNOSIS — G89.4 CHRONIC PAIN SYNDROME: ICD-10-CM

## 2023-08-02 DIAGNOSIS — M48.062 SPINAL STENOSIS, LUMBAR REGION WITH NEUROGENIC CLAUDICATION: ICD-10-CM

## 2023-08-02 RX ORDER — TIZANIDINE 4 MG/1
TABLET ORAL
Qty: 90 TABLET | Refills: 1 | Status: SHIPPED | OUTPATIENT
Start: 2023-08-02

## 2023-08-02 NOTE — TELEPHONE ENCOUNTER
PCP: Carolina Vegas MD    Last appt: 7/24/2023  Future Appointments   Date Time Provider [address]   1/30/2024  9:00 AM Carolina Vegas MD [facility] LISA NAJERA   3/5/2024  9:30 AM JAMAAL Jackson - NP [facility] BS DANIA       Requested Prescriptions     Pending Prescriptions Disp Refills    tiZANidine (ZANAFLEX) 4 MG tablet [Pharmacy Med Name: TIZANIDINE HCL 4 MG TABLET] 90 tablet 1     Sig: TAKE 1 TABLET BY MOUTH THREE TIMES A DAY AS NEEDED FOR MUSCLE SPASM

## 2023-08-26 DIAGNOSIS — M48.062 SPINAL STENOSIS, LUMBAR REGION WITH NEUROGENIC CLAUDICATION: ICD-10-CM

## 2023-08-26 DIAGNOSIS — G89.4 CHRONIC PAIN SYNDROME: ICD-10-CM

## 2023-08-28 RX ORDER — TIZANIDINE 4 MG/1
TABLET ORAL
Qty: 90 TABLET | Refills: 1 | Status: SHIPPED | OUTPATIENT
Start: 2023-08-28

## 2023-10-19 DIAGNOSIS — E78.5 HYPERLIPIDEMIA, UNSPECIFIED: ICD-10-CM

## 2023-10-20 RX ORDER — LOVASTATIN 20 MG/1
TABLET ORAL
Qty: 90 TABLET | Refills: 1 | Status: SHIPPED | OUTPATIENT
Start: 2023-10-20

## 2023-10-20 RX ORDER — LEVOTHYROXINE SODIUM 0.1 MG/1
TABLET ORAL
Qty: 90 TABLET | Refills: 1 | Status: SHIPPED | OUTPATIENT
Start: 2023-10-20

## 2023-11-02 DIAGNOSIS — G89.4 CHRONIC PAIN SYNDROME: ICD-10-CM

## 2023-11-02 DIAGNOSIS — M48.062 SPINAL STENOSIS, LUMBAR REGION WITH NEUROGENIC CLAUDICATION: ICD-10-CM

## 2023-11-03 RX ORDER — GABAPENTIN 400 MG/1
CAPSULE ORAL
Qty: 90 CAPSULE | Refills: 1 | Status: SHIPPED | OUTPATIENT
Start: 2023-11-03 | End: 2024-05-01

## 2023-11-14 ENCOUNTER — TELEPHONE (OUTPATIENT)
Age: 58
End: 2023-11-14

## 2023-11-14 NOTE — TELEPHONE ENCOUNTER
Pt needs to know if she takes her chol or blood thinner again. She accidentally took these pills 12 hours apart, not 24. She called poison control and she was to call pcp. Please call to advise if pt should take evening pills or skip today?

## 2023-11-14 NOTE — TELEPHONE ENCOUNTER
Called and spoke to patient. Informed patient not to take medication again today. Wait until tomorrow.

## 2023-11-20 ENCOUNTER — TELEPHONE (OUTPATIENT)
Age: 58
End: 2023-11-20

## 2023-11-22 NOTE — TELEPHONE ENCOUNTER
atient identification verified with 2 identifiers.    Location: [facility] Patient Self-Testing Program [phone]    Referring Physician: Dominic Olivarez MD  Enrollment/ Re-enrollment date: 7/2/24   INR Goal: 2.0-3.0  INR monitoring is per [facility] protocol.  Anticoagulation Medication: warfarin  Indication: atrial fibrillation    Subjective   Bleeding signs/symptoms: No    Bruising: No   Major bleeding event: No  Thrombosis signs/symptoms: No  Thromboembolic event: No  Missed doses: No  Extra doses: No  Medication changes: No  Dietary changes: No  Change in health: No  Change in activity: No  Alcohol: No  Other concerns: No    Upcoming Surgeries:  Does the Patient Have any upcoming surgeries that require interruption in anticoagulation therapy? no  Does the patient require bridging? no      Anticoagulation Summary  As of 11/22/2023      INR goal:  2.0-3.0   TTR:  68.6 % (1.2 mo)   INR used for dosing:                 Assessment/Plan   Therapeutic     1. New dose:  Will maintain dose and patient to retest in 2 weeks.  Mago verbalized understanding..  2. Next INR: 2 weeks      Education provided to patient during the visit:  Patient instructed to call in interim with questions, concerns and changes.         rx lindane shampoo and discuss measures to reduce re-infestation.

## 2023-11-28 ENCOUNTER — TELEPHONE (OUTPATIENT)
Age: 58
End: 2023-11-28

## 2023-11-28 ENCOUNTER — HOSPITAL ENCOUNTER (EMERGENCY)
Facility: HOSPITAL | Age: 58
Discharge: HOME | End: 2023-11-28
Attending: EMERGENCY MEDICINE
Payer: MEDICARE

## 2023-11-28 VITALS
RESPIRATION RATE: 18 BRPM | TEMPERATURE: 98.1 F | OXYGEN SATURATION: 93 % | HEART RATE: 63 BPM | BODY MASS INDEX: 43.33 KG/M2 | WEIGHT: 276.68 LBS | DIASTOLIC BLOOD PRESSURE: 70 MMHG | SYSTOLIC BLOOD PRESSURE: 150 MMHG

## 2023-11-28 DIAGNOSIS — I82.512 CHRONIC EMBOLISM AND THROMBOSIS OF LEFT FEMORAL VEIN (HCC): ICD-10-CM

## 2023-11-28 DIAGNOSIS — R04.0 RECURRENT EPISTAXIS: ICD-10-CM

## 2023-11-28 DIAGNOSIS — R51.9 FRONTAL HEADACHE: ICD-10-CM

## 2023-11-28 DIAGNOSIS — R51.9 SINUS HEADACHE: Primary | ICD-10-CM

## 2023-11-28 LAB
ALBUMIN SERPL-MCNC: 3.6 G/DL (ref 3.5–5)
ALBUMIN/GLOB SERPL: 1 (ref 1.1–2.2)
ALP SERPL-CCNC: 70 U/L (ref 45–117)
ALT SERPL-CCNC: 16 U/L (ref 12–78)
ANION GAP SERPL CALC-SCNC: 5 MMOL/L (ref 5–15)
AST SERPL-CCNC: 16 U/L (ref 15–37)
BASOPHILS # BLD: 0 K/UL (ref 0–0.1)
BASOPHILS NFR BLD: 1 % (ref 0–1)
BILIRUB SERPL-MCNC: 0.6 MG/DL (ref 0.2–1)
BUN SERPL-MCNC: 9 MG/DL (ref 6–20)
BUN/CREAT SERPL: 9 (ref 12–20)
CALCIUM SERPL-MCNC: 8.9 MG/DL (ref 8.5–10.1)
CHLORIDE SERPL-SCNC: 105 MMOL/L (ref 97–108)
CO2 SERPL-SCNC: 30 MMOL/L (ref 21–32)
CREAT SERPL-MCNC: 0.96 MG/DL (ref 0.55–1.02)
DIFFERENTIAL METHOD BLD: ABNORMAL
EOSINOPHIL # BLD: 0.2 K/UL (ref 0–0.4)
EOSINOPHIL NFR BLD: 4 % (ref 0–7)
ERYTHROCYTE [DISTWIDTH] IN BLOOD BY AUTOMATED COUNT: 12.5 % (ref 11.5–14.5)
GLOBULIN SER CALC-MCNC: 3.7 G/DL (ref 2–4)
GLUCOSE SERPL-MCNC: 91 MG/DL (ref 65–100)
HCT VFR BLD AUTO: 39.8 % (ref 35–47)
HGB BLD-MCNC: 13 G/DL (ref 11.5–16)
IMM GRANULOCYTES # BLD AUTO: 0 K/UL (ref 0–0.04)
IMM GRANULOCYTES NFR BLD AUTO: 0 % (ref 0–0.5)
LYMPHOCYTES # BLD: 1.5 K/UL (ref 0.8–3.5)
LYMPHOCYTES NFR BLD: 37 % (ref 12–49)
MCH RBC QN AUTO: 32.3 PG (ref 26–34)
MCHC RBC AUTO-ENTMCNC: 32.7 G/DL (ref 30–36.5)
MCV RBC AUTO: 99 FL (ref 80–99)
MONOCYTES # BLD: 0.3 K/UL (ref 0–1)
MONOCYTES NFR BLD: 7 % (ref 5–13)
NEUTS SEG # BLD: 2.1 K/UL (ref 1.8–8)
NEUTS SEG NFR BLD: 51 % (ref 32–75)
NRBC # BLD: 0 K/UL (ref 0–0.01)
NRBC BLD-RTO: 0 PER 100 WBC
PLATELET # BLD AUTO: 172 K/UL (ref 150–400)
PMV BLD AUTO: 8.6 FL (ref 8.9–12.9)
POTASSIUM SERPL-SCNC: 3.7 MMOL/L (ref 3.5–5.1)
PROT SERPL-MCNC: 7.3 G/DL (ref 6.4–8.2)
RBC # BLD AUTO: 4.02 M/UL (ref 3.8–5.2)
SODIUM SERPL-SCNC: 140 MMOL/L (ref 136–145)
TROPONIN I SERPL HS-MCNC: 6 NG/L (ref 0–51)
WBC # BLD AUTO: 4.1 K/UL (ref 3.6–11)

## 2023-11-28 PROCEDURE — 6370000000 HC RX 637 (ALT 250 FOR IP): Performed by: EMERGENCY MEDICINE

## 2023-11-28 PROCEDURE — 84484 ASSAY OF TROPONIN QUANT: CPT

## 2023-11-28 PROCEDURE — 85025 COMPLETE CBC W/AUTO DIFF WBC: CPT

## 2023-11-28 PROCEDURE — 96374 THER/PROPH/DIAG INJ IV PUSH: CPT

## 2023-11-28 PROCEDURE — 80053 COMPREHEN METABOLIC PANEL: CPT

## 2023-11-28 PROCEDURE — 6360000002 HC RX W HCPCS: Performed by: EMERGENCY MEDICINE

## 2023-11-28 PROCEDURE — 99284 EMERGENCY DEPT VISIT MOD MDM: CPT

## 2023-11-28 PROCEDURE — 36415 COLL VENOUS BLD VENIPUNCTURE: CPT

## 2023-11-28 PROCEDURE — 93005 ELECTROCARDIOGRAM TRACING: CPT | Performed by: EMERGENCY MEDICINE

## 2023-11-28 RX ORDER — OXYMETAZOLINE HYDROCHLORIDE 0.05 G/100ML
2 SPRAY NASAL
Status: COMPLETED | OUTPATIENT
Start: 2023-11-28 | End: 2023-11-28

## 2023-11-28 RX ORDER — RIVAROXABAN 20 MG/1
TABLET, FILM COATED ORAL
Qty: 90 TABLET | Refills: 1 | Status: SHIPPED | OUTPATIENT
Start: 2023-11-28

## 2023-11-28 RX ORDER — CETIRIZINE HYDROCHLORIDE 10 MG/1
10 TABLET ORAL DAILY
Qty: 30 TABLET | Refills: 0 | Status: SHIPPED | OUTPATIENT
Start: 2023-11-28 | End: 2023-12-28

## 2023-11-28 RX ORDER — KETOROLAC TROMETHAMINE 30 MG/ML
30 INJECTION, SOLUTION INTRAMUSCULAR; INTRAVENOUS
Status: COMPLETED | OUTPATIENT
Start: 2023-11-28 | End: 2023-11-28

## 2023-11-28 RX ADMIN — NASAL DECONGESTANT 2 SPRAY: 0.05 SPRAY NASAL at 16:08

## 2023-11-28 RX ADMIN — KETOROLAC TROMETHAMINE 30 MG: 30 INJECTION, SOLUTION INTRAMUSCULAR; INTRAVENOUS at 16:08

## 2023-11-28 ASSESSMENT — ENCOUNTER SYMPTOMS: SINUS PRESSURE: 1

## 2023-11-28 ASSESSMENT — PAIN SCALES - GENERAL: PAINLEVEL_OUTOF10: 0

## 2023-11-28 NOTE — TELEPHONE ENCOUNTER
Has a procedure tomorrow. States new symptoms:  Nose bleed from left nostril for 4 days    Sinus headache for a week and a half      Please note:    Has been off blood thinners Friday in prep for procedure    Flu shot done October 27        What can she take over counter for sinus headache? Will procedure need to be cancelled bc of sinus headache?       States needs to know today as her procedure is tomorrow morning

## 2023-11-28 NOTE — DISCHARGE INSTRUCTIONS
Try using Afrin 1-2 times per day to treat sinus congestion, frontal headaches, and nosebleeds. You can also use Tylenol for headaches, and consider a daily antihistamine such as Zyrtec.

## 2023-11-28 NOTE — TELEPHONE ENCOUNTER
Called and spoke to patient . Informed patient that she will need to go to UC to be checked . Patient understands.

## 2023-11-29 LAB
EKG ATRIAL RATE: 60 BPM
EKG DIAGNOSIS: NORMAL
EKG P AXIS: 83 DEGREES
EKG P-R INTERVAL: 180 MS
EKG Q-T INTERVAL: 456 MS
EKG QRS DURATION: 70 MS
EKG QTC CALCULATION (BAZETT): 492 MS
EKG R AXIS: 79 DEGREES
EKG T AXIS: 79 DEGREES
EKG VENTRICULAR RATE: 70 BPM

## 2023-11-29 NOTE — ED PROVIDER NOTES
EMERGENCY DEPARTMENT HISTORY AND PHYSICAL EXAM    Date: 2023  Patient Name: Christen Garcia  Patient Age and Sex: 60 y.o. female  MRN:  [id number]  CSN:  [id number]    History of Present Illness     Chief Complaint   Patient presents with    Hypertension     Unable to get into PCP. States not on medication. Reports HA, blurry vision, and nose bleed from one nostril        History Provided By: Patient    Ability to gather history was limited by:     HPI: Christen Garcia, 60 y.o. female   Who is on Xarelto, complains of mild frontal headache symptoms as well as occasional mild nosebleeds. Symptoms have been on and off for few weeks. Tobacco Use      Smoking status: Former        Packs/day: 1.5        Types: Cigarettes        Quit date: 2015        Years since quittin.8      Smokeless tobacco: Current     Past History   The patient's medical, surgical, and social history were reviewed by me today. Current Medications:  No current facility-administered medications on file prior to encounter.      Current Outpatient Medications on File Prior to Encounter   Medication Sig Dispense Refill    XARELTO 20 MG TABS tablet TAKE 1 TABLET BY MOUTH EVERY DAY WITH BREAKFAST 90 tablet 1    vitamin D3 (CHOLECALCIFEROL) 10 MCG (400 UNIT) TABS tablet Take 800 Units by mouth daily      vitamin E 400 UNIT capsule Take 1 capsule by mouth daily      B Complex-C (SUPER B COMPLEX PO) Take by mouth daily      gabapentin (NEURONTIN) 400 MG capsule TAKE 1 CAPSULE BY MOUTH THREE TIMES A DAY MAX DAILY AMOUNT: 1200 MG 90 capsule 1    lovastatin (MEVACOR) 20 MG tablet TAKE 1 TABLET BY MOUTH EVERY DAY AT NIGHT 90 tablet 1    levothyroxine (SYNTHROID) 100 MCG tablet TAKE 1 TABLET BY MOUTH EVERY DAY BEFORE BREAKFAST 90 tablet 1    tiZANidine (ZANAFLEX) 4 MG tablet TAKE 1 TABLET BY MOUTH THREE TIMES A DAY AS NEEDED FOR MUSCLE SPASM 90 tablet 1    montelukast (SINGULAIR) 10 MG tablet TAKE 1 TABLET BY MOUTH EVERY DAY 90 tablet 1

## 2023-12-05 ENCOUNTER — TELEMEDICINE (OUTPATIENT)
Age: 58
End: 2023-12-05
Payer: MEDICARE

## 2023-12-05 DIAGNOSIS — M48.062 SPINAL STENOSIS, LUMBAR REGION WITH NEUROGENIC CLAUDICATION: ICD-10-CM

## 2023-12-05 DIAGNOSIS — E06.3 HYPOTHYROIDISM DUE TO HASHIMOTO'S THYROIDITIS: ICD-10-CM

## 2023-12-05 DIAGNOSIS — E03.8 HYPOTHYROIDISM DUE TO HASHIMOTO'S THYROIDITIS: ICD-10-CM

## 2023-12-05 DIAGNOSIS — J01.00 ACUTE NON-RECURRENT MAXILLARY SINUSITIS: ICD-10-CM

## 2023-12-05 DIAGNOSIS — I82.512 CHRONIC EMBOLISM AND THROMBOSIS OF LEFT FEMORAL VEIN (HCC): Primary | ICD-10-CM

## 2023-12-05 PROCEDURE — G8484 FLU IMMUNIZE NO ADMIN: HCPCS | Performed by: INTERNAL MEDICINE

## 2023-12-05 PROCEDURE — 3017F COLORECTAL CA SCREEN DOC REV: CPT | Performed by: INTERNAL MEDICINE

## 2023-12-05 PROCEDURE — 4004F PT TOBACCO SCREEN RCVD TLK: CPT | Performed by: INTERNAL MEDICINE

## 2023-12-05 PROCEDURE — G8417 CALC BMI ABV UP PARAM F/U: HCPCS | Performed by: INTERNAL MEDICINE

## 2023-12-05 PROCEDURE — G8427 DOCREV CUR MEDS BY ELIG CLIN: HCPCS | Performed by: INTERNAL MEDICINE

## 2023-12-05 PROCEDURE — 99214 OFFICE O/P EST MOD 30 MIN: CPT | Performed by: INTERNAL MEDICINE

## 2023-12-05 RX ORDER — CEFUROXIME AXETIL 500 MG/1
500 TABLET ORAL 2 TIMES DAILY
Qty: 14 TABLET | Refills: 0 | Status: SHIPPED | OUTPATIENT
Start: 2023-12-05 | End: 2023-12-12

## 2023-12-05 RX ORDER — DOXYCYCLINE HYCLATE 100 MG
100 TABLET ORAL 2 TIMES DAILY
Qty: 14 TABLET | Refills: 0 | Status: SHIPPED | OUTPATIENT
Start: 2023-12-05 | End: 2023-12-05 | Stop reason: ALTCHOICE

## 2023-12-05 NOTE — PROGRESS NOTES
Lynn De León, was evaluated through a synchronous (real-time) audio-video encounter. The patient (or guardian if applicable) is aware that this is a billable service, which includes applicable co-pays. This Virtual Visit was conducted with patient's (and/or legal guardian's) consent. Patient identification was verified, and a caregiver was present when appropriate. The patient was located at Home: [address] [place] [id number]  Provider was located at [facility] (Appt Dept): [address] Paola De León (:  1965) is a Established patient, presenting virtually for evaluation of the following:    Assessment & Plan   Below is the assessment and plan developed based on review of pertinent history, physical exam, labs, studies, and medications. 1. Acute non-recurrent maxillary sinusitis  Symptoms for 10 days start  - cefUROXime (CEFTIN) 500 MG tablet; Take 1 tablet by mouth 2 times daily for 7 days  Dispense: 14 tablet; Refill: 0  Continue humidifier  On Singulair and antihistamine  2. 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 Hypothyroidism due to Hashimoto's thyroiditis  Euthyroid on synthroid    4. Spinal stenosis, lumbar region with neurogenic claudication  S/p multiple back surgeries and on tizanidine      Follow up in January scheduled        Subjective   HPI  61 yo woman with a hx of of spinal stenosis s/p back surgery,, hypothyroidism, hx of chronic DVT left femoral on xarelto presenting to follow up on ER visit  Patient had over one week of sinus pain and head pain and nose bleed from one nostril.  She noted BP was high 170/80s and went to ER   She was given afrin and toradol injection    She obtained humidifier and changed antihistamine stili has pressure and pain and occasional nose bleeding  No fever  She missed her colonoscopy due to the above events     BP was high in ER but in range

## 2023-12-05 NOTE — PROGRESS NOTES
Chief Complaint   Patient presents with    Follow-up     Pt states went to [facility] for sinus pressure and BP    Referral - General     Pt needs for mammo and pap   [place]     There were no vitals taken for this visit. Pain Scale: /10  Pain Location:      Current Outpatient Medications on File Prior to Visit   Medication Sig Dispense Refill    XARELTO 20 MG TABS tablet TAKE 1 TABLET BY MOUTH EVERY DAY WITH BREAKFAST 90 tablet 1    vitamin D3 (CHOLECALCIFEROL) 10 MCG (400 UNIT) TABS tablet Take 800 Units by mouth daily      vitamin E 400 UNIT capsule Take 1 capsule by mouth daily      B Complex-C (SUPER B COMPLEX PO) Take by mouth daily      cetirizine (ZYRTEC) 10 MG tablet Take 1 tablet by mouth daily 30 tablet 0    gabapentin (NEURONTIN) 400 MG capsule TAKE 1 CAPSULE BY MOUTH THREE TIMES A DAY MAX DAILY AMOUNT: 1200 MG 90 capsule 1    lovastatin (MEVACOR) 20 MG tablet TAKE 1 TABLET BY MOUTH EVERY DAY AT NIGHT 90 tablet 1    levothyroxine (SYNTHROID) 100 MCG tablet TAKE 1 TABLET BY MOUTH EVERY DAY BEFORE BREAKFAST 90 tablet 1    tiZANidine (ZANAFLEX) 4 MG tablet TAKE 1 TABLET BY MOUTH THREE TIMES A DAY AS NEEDED FOR MUSCLE SPASM 90 tablet 1    montelukast (SINGULAIR) 10 MG tablet TAKE 1 TABLET BY MOUTH EVERY DAY 90 tablet 1    diclofenac sodium (VOLTAREN) 1 % GEL Apply topically as needed      escitalopram (LEXAPRO) 20 MG tablet TAKE 1 TABLET BY MOUTH EVERY DAY      traZODone (DESYREL) 50 MG tablet Take 1 tablet by mouth as needed      albuterol (PROVENTIL) (2.5 MG/3ML) 0.083% nebulizer solution Inhale 3 mLs into the lungs every 4 hours as needed      albuterol sulfate HFA (PROVENTIL;VENTOLIN;PROAIR) 108 (90 Base) MCG/ACT inhaler INHALE 1 PUFF BY MOUTH EVERY 4 HOURS AS NEEDED FOR WHEEZING. No current facility-administered medications on file prior to visit.        Health Maintenance Due   Topic    Hepatitis B vaccine (1 of 3 - 3-dose series)    HIV screen     DTaP/Tdap/Td vaccine (1 - Tdap)    Low dose CT lung

## 2023-12-12 DIAGNOSIS — F32.A DEPRESSION, UNSPECIFIED: ICD-10-CM

## 2023-12-12 RX ORDER — ESCITALOPRAM OXALATE 20 MG/1
TABLET ORAL
Qty: 90 TABLET | Refills: 1 | Status: SHIPPED | OUTPATIENT
Start: 2023-12-12

## 2023-12-12 RX ORDER — MONTELUKAST SODIUM 10 MG/1
TABLET ORAL
Qty: 90 TABLET | Refills: 1 | Status: SHIPPED | OUTPATIENT
Start: 2023-12-12

## 2023-12-27 DIAGNOSIS — G89.4 CHRONIC PAIN SYNDROME: ICD-10-CM

## 2023-12-27 DIAGNOSIS — M48.062 SPINAL STENOSIS, LUMBAR REGION WITH NEUROGENIC CLAUDICATION: ICD-10-CM

## 2023-12-27 DIAGNOSIS — J01.00 ACUTE NON-RECURRENT MAXILLARY SINUSITIS: ICD-10-CM

## 2023-12-28 RX ORDER — GABAPENTIN 400 MG/1
CAPSULE ORAL
Qty: 90 CAPSULE | Refills: 0 | Status: SHIPPED | OUTPATIENT
Start: 2023-12-28 | End: 2024-02-12

## 2023-12-28 RX ORDER — TIZANIDINE 4 MG/1
TABLET ORAL
Qty: 90 TABLET | Refills: 1 | Status: SHIPPED | OUTPATIENT
Start: 2023-12-28 | End: 2024-02-26

## 2023-12-28 RX ORDER — DOXYCYCLINE HYCLATE 100 MG
100 TABLET ORAL 2 TIMES DAILY
Qty: 14 TABLET | Refills: 0 | OUTPATIENT
Start: 2023-12-28 | End: 2024-01-04

## 2023-12-28 NOTE — TELEPHONE ENCOUNTER
I refilled his Inadine and gabapentin.  Please clarify with patient why she is requesting a refill on doxycycline

## 2024-01-10 RX ORDER — ALBUTEROL SULFATE 90 UG/1
AEROSOL, METERED RESPIRATORY (INHALATION)
Qty: 54 EACH | Refills: 1 | Status: SHIPPED | OUTPATIENT
Start: 2024-01-10

## 2024-02-12 DIAGNOSIS — G89.4 CHRONIC PAIN SYNDROME: ICD-10-CM

## 2024-02-12 DIAGNOSIS — M48.062 SPINAL STENOSIS, LUMBAR REGION WITH NEUROGENIC CLAUDICATION: ICD-10-CM

## 2024-02-12 RX ORDER — GABAPENTIN 400 MG/1
CAPSULE ORAL
Qty: 90 CAPSULE | Refills: 0 | Status: SHIPPED | OUTPATIENT
Start: 2024-02-12 | End: 2024-08-10

## 2024-02-13 ENCOUNTER — TELEMEDICINE (OUTPATIENT)
Age: 59
End: 2024-02-13
Payer: MEDICARE

## 2024-02-13 DIAGNOSIS — M48.062 SPINAL STENOSIS, LUMBAR REGION WITH NEUROGENIC CLAUDICATION: ICD-10-CM

## 2024-02-13 DIAGNOSIS — I82.512 CHRONIC EMBOLISM AND THROMBOSIS OF LEFT FEMORAL VEIN (HCC): ICD-10-CM

## 2024-02-13 DIAGNOSIS — E78.2 MODERATE MIXED HYPERLIPIDEMIA NOT REQUIRING STATIN THERAPY: ICD-10-CM

## 2024-02-13 DIAGNOSIS — E03.8 HYPOTHYROIDISM DUE TO HASHIMOTO'S THYROIDITIS: ICD-10-CM

## 2024-02-13 DIAGNOSIS — E06.3 HYPOTHYROIDISM DUE TO HASHIMOTO'S THYROIDITIS: ICD-10-CM

## 2024-02-13 DIAGNOSIS — G89.4 CHRONIC PAIN SYNDROME: Primary | ICD-10-CM

## 2024-02-13 PROBLEM — F11.99 OPIOID USE, UNSPECIFIED WITH UNSPECIFIED OPIOID-INDUCED DISORDER (HCC): Status: RESOLVED | Noted: 2022-02-24 | Resolved: 2024-02-13

## 2024-02-13 PROBLEM — N18.30 CHRONIC RENAL DISEASE, STAGE III (HCC): Status: RESOLVED | Noted: 2022-09-08 | Resolved: 2024-02-13

## 2024-02-13 PROCEDURE — 4004F PT TOBACCO SCREEN RCVD TLK: CPT | Performed by: INTERNAL MEDICINE

## 2024-02-13 PROCEDURE — G8427 DOCREV CUR MEDS BY ELIG CLIN: HCPCS | Performed by: INTERNAL MEDICINE

## 2024-02-13 PROCEDURE — G8417 CALC BMI ABV UP PARAM F/U: HCPCS | Performed by: INTERNAL MEDICINE

## 2024-02-13 PROCEDURE — G8484 FLU IMMUNIZE NO ADMIN: HCPCS | Performed by: INTERNAL MEDICINE

## 2024-02-13 PROCEDURE — 99214 OFFICE O/P EST MOD 30 MIN: CPT | Performed by: INTERNAL MEDICINE

## 2024-02-13 PROCEDURE — 3017F COLORECTAL CA SCREEN DOC REV: CPT | Performed by: INTERNAL MEDICINE

## 2024-02-13 NOTE — PROGRESS NOTES
Altagracia Patel, was evaluated through a synchronous (real-time) audio-video encounter. The patient (or guardian if applicable) is aware that this is a billable service, which includes applicable co-pays. This Virtual Visit was conducted with patient's (and/or legal guardian's) consent. Patient identification was verified, and a caregiver was present when appropriate.   The patient was located at Home: [address] [facility] [id number]  Provider was located at Facility (Appt Dept): [address]  [address]  [place] [address]      Altagracia Patel (:  1965) is a Established patient, presenting virtually for evaluation of the following:    Assessment & Plan   Below is the assessment and plan developed based on review of pertinent history, physical exam, labs, studies, and medications.  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)  On xarelto 20mg daily      Hypothyroidism due to Hashimoto's thyroiditis  Euthyroid on synthroid    Spinal stenosis, lumbar region with neurogenic claudication  S/p multiple back surgeries and on tizanidine and gabapentin    Tramadol eases the pain and requesting to resume it   Tried medical marijuana not helpful and not using it  She will come in for UDS and sign pain agreement    High cholesterol: on lovastatin    Anxiety/depression: controlled on lexapro    Seasonal allergies: on singulair     Follow up in 6 moths    Subjective   HPI    Chief Complaint   Patient presents with    Follow-up     Routine f/up      59 yo woman with a hx of of spinal stenosis s/p back surgery,, hypothyroidism, hx of chronic DVT left femoral on xarelto    High cholesterol on lovastatin    Hx of DVT recurrent on xarelto no issues    BP was high in ER but in range today   Chronic back pain has had multiple back surgeries on gabapentin and tizanidine, tramadol stopped due to marijuana use.  Stopped marijuana did not work for pain  Kendalia that tramadol helped pain more than

## 2024-02-22 ENCOUNTER — TELEPHONE (OUTPATIENT)
Age: 59
End: 2024-02-22

## 2024-02-26 DIAGNOSIS — G89.4 CHRONIC PAIN SYNDROME: ICD-10-CM

## 2024-02-26 DIAGNOSIS — M48.062 SPINAL STENOSIS, LUMBAR REGION WITH NEUROGENIC CLAUDICATION: ICD-10-CM

## 2024-02-26 RX ORDER — TIZANIDINE 4 MG/1
TABLET ORAL
Qty: 90 TABLET | Refills: 1 | Status: SHIPPED | OUTPATIENT
Start: 2024-02-26

## 2024-03-04 ASSESSMENT — SLEEP AND FATIGUE QUESTIONNAIRES
HOW LIKELY ARE YOU TO NOD OFF OR FALL ASLEEP WHILE WATCHING TV: 1
HOW LIKELY ARE YOU TO NOD OFF OR FALL ASLEEP WHILE LYING DOWN TO REST IN THE AFTERNOON WHEN CIRCUMSTANCES PERMIT: 3
HOW LIKELY ARE YOU TO NOD OFF OR FALL ASLEEP WHILE SITTING QUIETLY AFTER LUNCH WITHOUT ALCOHOL: 1
HOW LIKELY ARE YOU TO NOD OFF OR FALL ASLEEP IN A CAR, WHILE STOPPED FOR A FEW MINUTES IN TRAFFIC: 0
HOW LIKELY ARE YOU TO NOD OFF OR FALL ASLEEP WHEN YOU ARE A PASSENGER IN A CAR FOR AN HOUR WITHOUT A BREAK: 0
HOW LIKELY ARE YOU TO NOD OFF OR FALL ASLEEP WHILE SITTING INACTIVE IN A PUBLIC PLACE: 1
HOW LIKELY ARE YOU TO NOD OFF OR FALL ASLEEP WHILE SITTING AND TALKING TO SOMEONE: 0
ESS TOTAL SCORE: 7
HOW LIKELY ARE YOU TO NOD OFF OR FALL ASLEEP WHILE SITTING AND READING: 1

## 2024-03-05 ENCOUNTER — TELEMEDICINE (OUTPATIENT)
Age: 59
End: 2024-03-05
Payer: MEDICARE

## 2024-03-05 ENCOUNTER — CLINICAL DOCUMENTATION (OUTPATIENT)
Age: 59
End: 2024-03-05

## 2024-03-05 DIAGNOSIS — G47.33 OBSTRUCTIVE SLEEP APNEA (ADULT) (PEDIATRIC): Primary | ICD-10-CM

## 2024-03-05 PROCEDURE — 99213 OFFICE O/P EST LOW 20 MIN: CPT | Performed by: NURSE PRACTITIONER

## 2024-03-05 PROCEDURE — 4004F PT TOBACCO SCREEN RCVD TLK: CPT | Performed by: NURSE PRACTITIONER

## 2024-03-05 PROCEDURE — G8427 DOCREV CUR MEDS BY ELIG CLIN: HCPCS | Performed by: NURSE PRACTITIONER

## 2024-03-05 PROCEDURE — 3017F COLORECTAL CA SCREEN DOC REV: CPT | Performed by: NURSE PRACTITIONER

## 2024-03-05 PROCEDURE — G8484 FLU IMMUNIZE NO ADMIN: HCPCS | Performed by: NURSE PRACTITIONER

## 2024-03-05 PROCEDURE — G8417 CALC BMI ABV UP PARAM F/U: HCPCS | Performed by: NURSE PRACTITIONER

## 2024-03-05 NOTE — PATIENT INSTRUCTIONS
[address]  [place] [address]  Tel.  [phone]  Fax. [phone] [address]  [place] [address]  Tel.  [phone]  Fax. [phone] [address] [place] [address]  Tel.  [phone]  Fax. [phone]     PROPER SLEEP HYGIENE    What to avoid  Do not have drinks with caffeine, such as coffee or black tea, for 8 hours before bed.  Do not smoke or use other types of tobacco near bedtime. Nicotine is a stimulant and can keep you awake.  Avoid drinking alcohol late in the evening, because it can cause you to wake in the middle of the night.  Do not eat a big meal close to bedtime. If you are hungry, eat a light snack.  Do not drink a lot of water close to bedtime, because the need to urinate may wake you up during the night.  Do not read or watch TV in bed. Use the bed only for sleeping and sexual activity.  What to try  Go to bed at the same time every night, and wake up at the same time every morning. Do not take naps during the day.  Keep your bedroom quiet, dark, and cool.  Get regular exercise, but not within 3 to 4 hours of your bedtime..  Sleep on a comfortable pillow and mattress.  If watching the clock makes you anxious, turn it facing away from you so you cannot see the time.  If you worry when you lie down, start a worry book. Well before bedtime, write down your worries, and then set the book and your concerns aside.  Try meditation or other relaxation techniques before you go to bed.  If you cannot fall asleep, get up and go to another room until you feel sleepy. Do something relaxing. Repeat your bedtime routine before you go to bed again.  Make your house quiet and calm about an hour before bedtime. Turn down the lights, turn off the TV, log off the computer, and turn down the volume on music. This can help you relax after a busy day.    Drowsy Driving  The [place] National Highway Traffic Safety Administration cites drowsiness as a

## 2024-03-05 NOTE — PROGRESS NOTES
[address]   [place] [address]   Tel.  [phone]   Fax. [phone]  [address]   [place] [address]   Tel.  [phone]   Fax. [phone] [address] [place] [address]   Tel.  [phone]   Fax. [phone]     Altagracia Patel (: 1965) is a 58 y.o. female, established patient, seen for positive airway pressure follow-up, she was last seen by Dr. Hernandez on 2023, prior notes reviewed in detail.  Home sleep test 3/2017 showed AHI of 21.1/hr with a lowest SpO2 of 79%. She is seen today for follow up.     ASSESSMENT/PLAN:   Diagnosis Orders   1. Obstructive sleep apnea (adult) (pediatric)  DME Order for (Specify) as OP      2. BMI 40.0-44.9, adult (HCC)          AHI = 21 (2017).  On DS2 Respironics CPAP :  13-18 cmH2O. Set up 3/2017.    She is not compliant with PAP therapy although when used PAP shows benefit to the patient and remains necessary for control of her sleep apnea. There is continued clinical benefit from the hours of use demonstrated by AHI reduced from 21/hr to 3.2/hr.      No follow-up provider specified.    Sleep Apnea -  Continue on current pressures. She admits her non compliance is related to falling asleep on the couch and forgetting to put her device on. We dicussed the importance of wearing PAP nightly. She will work on getting to bed if she is feeling tired or dozing off so that she can wear PAP. She reports feeling better when wearing PAP vs. not wearing it. Follow up in 1 year.     Orders Placed This Encounter   Procedures    DME Order for (Specify) as OP     Diagnosis: (G47.33) BRIDGETT (obstructive sleep apnea)  (primary encounter diagnosis)     Replacement Supplies for Positive Airway Pressure Therapy Device:   Duration of need: 99 months.        Full Face Mask 1 every 3 months.   Full Face Mask Cushion 1 per month.       Headgear 1 every 6 months.   Positive Airway Pressure chinstrap 1

## 2024-03-15 DIAGNOSIS — M48.062 SPINAL STENOSIS, LUMBAR REGION WITH NEUROGENIC CLAUDICATION: ICD-10-CM

## 2024-03-15 DIAGNOSIS — G89.4 CHRONIC PAIN SYNDROME: ICD-10-CM

## 2024-03-15 RX ORDER — GABAPENTIN 400 MG/1
CAPSULE ORAL
Qty: 90 CAPSULE | Refills: 0 | Status: SHIPPED | OUTPATIENT
Start: 2024-03-15 | End: 2024-09-11

## 2024-03-28 DIAGNOSIS — E78.5 HYPERLIPIDEMIA, UNSPECIFIED: ICD-10-CM

## 2024-03-28 RX ORDER — LOVASTATIN 20 MG/1
TABLET ORAL
Qty: 90 TABLET | Refills: 1 | Status: SHIPPED | OUTPATIENT
Start: 2024-03-28

## 2024-03-28 RX ORDER — LEVOTHYROXINE SODIUM 0.1 MG/1
TABLET ORAL
Qty: 90 TABLET | Refills: 1 | Status: SHIPPED | OUTPATIENT
Start: 2024-03-28

## 2024-04-04 DIAGNOSIS — G89.4 CHRONIC PAIN SYNDROME: ICD-10-CM

## 2024-04-04 DIAGNOSIS — M48.062 SPINAL STENOSIS, LUMBAR REGION WITH NEUROGENIC CLAUDICATION: ICD-10-CM

## 2024-04-05 RX ORDER — GABAPENTIN 400 MG/1
CAPSULE ORAL
Qty: 90 CAPSULE | Refills: 0 | Status: SHIPPED | OUTPATIENT
Start: 2024-04-05 | End: 2024-05-05

## 2024-04-08 DIAGNOSIS — G89.4 CHRONIC PAIN SYNDROME: ICD-10-CM

## 2024-04-08 DIAGNOSIS — M48.062 SPINAL STENOSIS, LUMBAR REGION WITH NEUROGENIC CLAUDICATION: ICD-10-CM

## 2024-04-08 NOTE — TELEPHONE ENCOUNTER
----- Message from Atlagracia Patel sent at 4/5/2024  8:18 PM EDT -----  Regarding: Refil  Contact: [phone]  Can u refill my. Gabapentin. Thank You .

## 2024-04-08 NOTE — TELEPHONE ENCOUNTER
PCP: Charline Ordoñez MD    Last appt:   2/13/2024    Future Appointments   Date Time Provider Department Center   3/6/2025  3:10 PM Kasie Amador, JAMAAL - NP [facility] BS AMB       Requested Prescriptions     Pending Prescriptions Disp Refills    gabapentin (NEURONTIN) 400 MG capsule 90 capsule 0     Sig: TAKE 1 CAPSULE BY MOUTH THREE TIMES A DAY MAX DAILY AMOUNT: 1200 MG

## 2024-04-10 RX ORDER — GABAPENTIN 400 MG/1
CAPSULE ORAL
Qty: 90 CAPSULE | Refills: 0 | Status: SHIPPED | OUTPATIENT
Start: 2024-04-10 | End: 2024-05-08

## 2024-04-27 DIAGNOSIS — M48.062 SPINAL STENOSIS, LUMBAR REGION WITH NEUROGENIC CLAUDICATION: ICD-10-CM

## 2024-04-27 DIAGNOSIS — G89.4 CHRONIC PAIN SYNDROME: ICD-10-CM

## 2024-04-28 DIAGNOSIS — I82.512 CHRONIC EMBOLISM AND THROMBOSIS OF LEFT FEMORAL VEIN (HCC): ICD-10-CM

## 2024-04-29 RX ORDER — GABAPENTIN 400 MG/1
CAPSULE ORAL
Qty: 90 CAPSULE | Refills: 0 | Status: SHIPPED | OUTPATIENT
Start: 2024-04-29 | End: 2024-05-25

## 2024-04-29 RX ORDER — TIZANIDINE 4 MG/1
TABLET ORAL
Qty: 90 TABLET | Refills: 1 | Status: SHIPPED | OUTPATIENT
Start: 2024-04-29 | End: 2024-04-30 | Stop reason: SDUPTHER

## 2024-04-29 RX ORDER — RIVAROXABAN 20 MG/1
TABLET, FILM COATED ORAL
Qty: 90 TABLET | Refills: 1 | Status: SHIPPED | OUTPATIENT
Start: 2024-04-29

## 2024-04-30 RX ORDER — TIZANIDINE 4 MG/1
4 TABLET ORAL EVERY 8 HOURS PRN
Qty: 90 TABLET | Refills: 1 | Status: SHIPPED | OUTPATIENT
Start: 2024-04-30

## 2024-05-07 RX ORDER — MONTELUKAST SODIUM 10 MG/1
TABLET ORAL
Qty: 90 TABLET | Refills: 1 | Status: SHIPPED | OUTPATIENT
Start: 2024-05-07

## 2024-05-07 RX ORDER — ALBUTEROL SULFATE 90 UG/1
AEROSOL, METERED RESPIRATORY (INHALATION)
Qty: 25.5 EACH | Refills: 1 | Status: SHIPPED | OUTPATIENT
Start: 2024-05-07

## 2024-06-14 DIAGNOSIS — M48.062 SPINAL STENOSIS, LUMBAR REGION WITH NEUROGENIC CLAUDICATION: ICD-10-CM

## 2024-06-14 DIAGNOSIS — G89.4 CHRONIC PAIN SYNDROME: ICD-10-CM

## 2024-06-17 DIAGNOSIS — G89.4 CHRONIC PAIN SYNDROME: ICD-10-CM

## 2024-06-17 DIAGNOSIS — M48.062 SPINAL STENOSIS, LUMBAR REGION WITH NEUROGENIC CLAUDICATION: ICD-10-CM

## 2024-06-17 RX ORDER — TIZANIDINE 4 MG/1
4 TABLET ORAL EVERY 8 HOURS PRN
Qty: 90 TABLET | Refills: 1 | Status: SHIPPED | OUTPATIENT
Start: 2024-06-17 | End: 2024-06-17 | Stop reason: SDUPTHER

## 2024-06-17 RX ORDER — TIZANIDINE 4 MG/1
4 TABLET ORAL EVERY 8 HOURS PRN
Qty: 90 TABLET | Refills: 1 | Status: SHIPPED | OUTPATIENT
Start: 2024-06-17

## 2024-06-17 NOTE — TELEPHONE ENCOUNTER
----- Message from Altagracia Patel sent at 6/17/2024  1:01 PM EDT -----  Regarding: Rifill  Contact: [phone]  Need refills on Tizanidine 4mgs plz.  Thank you .

## 2024-06-17 NOTE — TELEPHONE ENCOUNTER
PCP: Charline Ordoñez MD    Last appt:   2/13/2024    Future Appointments   Date Time Provider Department Center   9/5/2024  1:10 PM Mike Villegas MD [facility] BS AMB   3/6/2025  3:10 PM Kasie Amador, APRN - NP [facility] BS AMB       Requested Prescriptions     Pending Prescriptions Disp Refills    tiZANidine (ZANAFLEX) 4 MG tablet 90 tablet 1     Sig: Take 1 tablet by mouth every 8 hours as needed (muscle spasms)

## 2024-07-17 DIAGNOSIS — E78.5 HYPERLIPIDEMIA, UNSPECIFIED: ICD-10-CM

## 2024-07-17 DIAGNOSIS — G89.4 CHRONIC PAIN SYNDROME: ICD-10-CM

## 2024-07-17 DIAGNOSIS — M48.062 SPINAL STENOSIS, LUMBAR REGION WITH NEUROGENIC CLAUDICATION: ICD-10-CM

## 2024-07-18 RX ORDER — GABAPENTIN 400 MG/1
CAPSULE ORAL
Qty: 90 CAPSULE | Refills: 0 | Status: SHIPPED | OUTPATIENT
Start: 2024-07-18 | End: 2024-10-18

## 2024-07-18 RX ORDER — LOVASTATIN 20 MG/1
TABLET ORAL
Qty: 90 TABLET | Refills: 1 | Status: SHIPPED | OUTPATIENT
Start: 2024-07-18

## 2024-07-18 RX ORDER — LEVOTHYROXINE SODIUM 0.1 MG/1
TABLET ORAL
Qty: 90 TABLET | Refills: 1 | Status: SHIPPED | OUTPATIENT
Start: 2024-07-18

## 2024-08-02 DIAGNOSIS — F32.A DEPRESSION, UNSPECIFIED: ICD-10-CM

## 2024-08-05 RX ORDER — ESCITALOPRAM OXALATE 20 MG/1
TABLET ORAL
Qty: 90 TABLET | Refills: 1 | Status: SHIPPED | OUTPATIENT
Start: 2024-08-05

## 2024-08-18 DIAGNOSIS — M48.062 SPINAL STENOSIS, LUMBAR REGION WITH NEUROGENIC CLAUDICATION: ICD-10-CM

## 2024-08-18 DIAGNOSIS — G89.4 CHRONIC PAIN SYNDROME: ICD-10-CM

## 2024-08-19 ENCOUNTER — TELEPHONE (OUTPATIENT)
Age: 59
End: 2024-08-19

## 2024-08-19 RX ORDER — GABAPENTIN 400 MG/1
CAPSULE ORAL
Qty: 90 CAPSULE | Refills: 0 | Status: SHIPPED | OUTPATIENT
Start: 2024-08-19 | End: 2024-12-19

## 2024-08-19 NOTE — TELEPHONE ENCOUNTER
----- Message from Apryl COOPER sent at 8/19/2024  9:16 AM EDT -----  Regarding: [facility] Appointment Request  [facility] Appointment Request    Patient needs appointment for [facility] Appointment Type: Annual Visit.    Patient Requested Dates(s): Friday August 23  Patient Requested Time: Evening   Provider Name: Charline Ordoñez        Reason for Appointment Request: Established Patient - No appointments available during search  --------------------------------------------------------------------------------------------------------------------------    Relationship to Patient: Self     Call Back Information: OK to leave message on voicemail  Preferred Call Back Number: Phone [phone]      Patient wanted to request appointment for her Annual visit for well ness to doctor Charline Ordoñez this coming Friday she prepared evening one.

## 2024-09-23 RX ORDER — MONTELUKAST SODIUM 10 MG/1
TABLET ORAL
Qty: 90 TABLET | Refills: 1 | Status: SHIPPED | OUTPATIENT
Start: 2024-09-23

## 2024-09-29 DIAGNOSIS — G89.4 CHRONIC PAIN SYNDROME: ICD-10-CM

## 2024-09-29 DIAGNOSIS — M48.062 SPINAL STENOSIS, LUMBAR REGION WITH NEUROGENIC CLAUDICATION: ICD-10-CM

## 2024-09-29 DIAGNOSIS — I82.512 CHRONIC EMBOLISM AND THROMBOSIS OF LEFT FEMORAL VEIN (HCC): ICD-10-CM

## 2024-09-30 RX ORDER — TRAZODONE HYDROCHLORIDE 50 MG/1
50 TABLET, FILM COATED ORAL NIGHTLY
Qty: 90 TABLET | Refills: 1 | Status: SHIPPED | OUTPATIENT
Start: 2024-09-30

## 2024-09-30 RX ORDER — RIVAROXABAN 20 MG/1
TABLET, FILM COATED ORAL
Qty: 90 TABLET | Refills: 1 | Status: SHIPPED | OUTPATIENT
Start: 2024-09-30

## 2024-09-30 RX ORDER — GABAPENTIN 400 MG/1
CAPSULE ORAL
Qty: 90 CAPSULE | Refills: 0 | Status: SHIPPED | OUTPATIENT
Start: 2024-09-30 | End: 2025-01-29

## 2024-10-11 SDOH — HEALTH STABILITY: PHYSICAL HEALTH: ON AVERAGE, HOW MANY DAYS PER WEEK DO YOU ENGAGE IN MODERATE TO STRENUOUS EXERCISE (LIKE A BRISK WALK)?: 1 DAY

## 2024-10-11 SDOH — ECONOMIC STABILITY: FOOD INSECURITY: WITHIN THE PAST 12 MONTHS, YOU WORRIED THAT YOUR FOOD WOULD RUN OUT BEFORE YOU GOT MONEY TO BUY MORE.: PATIENT DECLINED

## 2024-10-11 SDOH — HEALTH STABILITY: PHYSICAL HEALTH: ON AVERAGE, HOW MANY MINUTES DO YOU ENGAGE IN EXERCISE AT THIS LEVEL?: 10 MIN

## 2024-10-11 SDOH — ECONOMIC STABILITY: FOOD INSECURITY: WITHIN THE PAST 12 MONTHS, THE FOOD YOU BOUGHT JUST DIDN'T LAST AND YOU DIDN'T HAVE MONEY TO GET MORE.: PATIENT DECLINED

## 2024-10-11 SDOH — ECONOMIC STABILITY: TRANSPORTATION INSECURITY
IN THE PAST 12 MONTHS, HAS LACK OF TRANSPORTATION KEPT YOU FROM MEETINGS, WORK, OR FROM GETTING THINGS NEEDED FOR DAILY LIVING?: PATIENT DECLINED

## 2024-10-11 SDOH — ECONOMIC STABILITY: INCOME INSECURITY: HOW HARD IS IT FOR YOU TO PAY FOR THE VERY BASICS LIKE FOOD, HOUSING, MEDICAL CARE, AND HEATING?: PATIENT DECLINED

## 2024-10-11 ASSESSMENT — LIFESTYLE VARIABLES
HOW OFTEN DO YOU HAVE A DRINK CONTAINING ALCOHOL: 1
HOW OFTEN DO YOU HAVE A DRINK CONTAINING ALCOHOL: NEVER
HOW OFTEN DO YOU HAVE SIX OR MORE DRINKS ON ONE OCCASION: 1
HOW MANY STANDARD DRINKS CONTAINING ALCOHOL DO YOU HAVE ON A TYPICAL DAY: 0
HOW MANY STANDARD DRINKS CONTAINING ALCOHOL DO YOU HAVE ON A TYPICAL DAY: PATIENT DOES NOT DRINK

## 2024-10-11 ASSESSMENT — PATIENT HEALTH QUESTIONNAIRE - PHQ9
8. MOVING OR SPEAKING SO SLOWLY THAT OTHER PEOPLE COULD HAVE NOTICED. OR THE OPPOSITE, BEING SO FIGETY OR RESTLESS THAT YOU HAVE BEEN MOVING AROUND A LOT MORE THAN USUAL: NOT AT ALL
9. THOUGHTS THAT YOU WOULD BE BETTER OFF DEAD, OR OF HURTING YOURSELF: NOT AT ALL
1. LITTLE INTEREST OR PLEASURE IN DOING THINGS: NEARLY EVERY DAY
SUM OF ALL RESPONSES TO PHQ QUESTIONS 1-9: 14
10. IF YOU CHECKED OFF ANY PROBLEMS, HOW DIFFICULT HAVE THESE PROBLEMS MADE IT FOR YOU TO DO YOUR WORK, TAKE CARE OF THINGS AT HOME, OR GET ALONG WITH OTHER PEOPLE: NOT DIFFICULT AT ALL
6. FEELING BAD ABOUT YOURSELF - OR THAT YOU ARE A FAILURE OR HAVE LET YOURSELF OR YOUR FAMILY DOWN: MORE THAN HALF THE DAYS
2. FEELING DOWN, DEPRESSED OR HOPELESS: NEARLY EVERY DAY
SUM OF ALL RESPONSES TO PHQ9 QUESTIONS 1 & 2: 6
7. TROUBLE CONCENTRATING ON THINGS, SUCH AS READING THE NEWSPAPER OR WATCHING TELEVISION: MORE THAN HALF THE DAYS
SUM OF ALL RESPONSES TO PHQ QUESTIONS 1-9: 14
4. FEELING TIRED OR HAVING LITTLE ENERGY: MORE THAN HALF THE DAYS
SUM OF ALL RESPONSES TO PHQ QUESTIONS 1-9: 14
3. TROUBLE FALLING OR STAYING ASLEEP: NOT AT ALL
5. POOR APPETITE OR OVEREATING: MORE THAN HALF THE DAYS
SUM OF ALL RESPONSES TO PHQ QUESTIONS 1-9: 14

## 2024-10-14 ENCOUNTER — OFFICE VISIT (OUTPATIENT)
Age: 59
End: 2024-10-14
Payer: MEDICARE

## 2024-10-14 VITALS
HEART RATE: 55 BPM | RESPIRATION RATE: 20 BRPM | HEIGHT: 67 IN | DIASTOLIC BLOOD PRESSURE: 89 MMHG | WEIGHT: 293 LBS | SYSTOLIC BLOOD PRESSURE: 148 MMHG | OXYGEN SATURATION: 93 % | TEMPERATURE: 97.7 F | BODY MASS INDEX: 45.99 KG/M2

## 2024-10-14 DIAGNOSIS — I10 PRIMARY HYPERTENSION: ICD-10-CM

## 2024-10-14 DIAGNOSIS — Z00.00 MEDICARE ANNUAL WELLNESS VISIT, SUBSEQUENT: Primary | ICD-10-CM

## 2024-10-14 DIAGNOSIS — G89.4 CHRONIC PAIN SYNDROME: ICD-10-CM

## 2024-10-14 DIAGNOSIS — E78.00 HIGH CHOLESTEROL: ICD-10-CM

## 2024-10-14 DIAGNOSIS — M48.062 SPINAL STENOSIS, LUMBAR REGION WITH NEUROGENIC CLAUDICATION: ICD-10-CM

## 2024-10-14 DIAGNOSIS — I82.512 CHRONIC EMBOLISM AND THROMBOSIS OF LEFT FEMORAL VEIN (HCC): ICD-10-CM

## 2024-10-14 DIAGNOSIS — E03.9 ACQUIRED HYPOTHYROIDISM: ICD-10-CM

## 2024-10-14 LAB
ALBUMIN SERPL-MCNC: 3.9 G/DL (ref 3.5–5)
ALBUMIN/GLOB SERPL: 1.2 (ref 1.1–2.2)
ALP SERPL-CCNC: 67 U/L (ref 45–117)
ALT SERPL-CCNC: 13 U/L (ref 12–78)
ANION GAP SERPL CALC-SCNC: 9 MMOL/L (ref 2–12)
AST SERPL-CCNC: 9 U/L (ref 15–37)
BASOPHILS # BLD: 0 K/UL (ref 0–0.1)
BASOPHILS NFR BLD: 1 % (ref 0–1)
BILIRUB SERPL-MCNC: 0.6 MG/DL (ref 0.2–1)
BILIRUBIN, URINE, POC: NEGATIVE
BLOOD URINE, POC: NEGATIVE
BUN SERPL-MCNC: 17 MG/DL (ref 6–20)
BUN/CREAT SERPL: 17 (ref 12–20)
CALCIUM SERPL-MCNC: 9.6 MG/DL (ref 8.5–10.1)
CHLORIDE SERPL-SCNC: 103 MMOL/L (ref 97–108)
CHOLEST SERPL-MCNC: 201 MG/DL
CO2 SERPL-SCNC: 26 MMOL/L (ref 21–32)
CREAT SERPL-MCNC: 1.01 MG/DL (ref 0.55–1.02)
DIFFERENTIAL METHOD BLD: NORMAL
EOSINOPHIL # BLD: 0.2 K/UL (ref 0–0.4)
EOSINOPHIL NFR BLD: 3 % (ref 0–7)
ERYTHROCYTE [DISTWIDTH] IN BLOOD BY AUTOMATED COUNT: 12.6 % (ref 11.5–14.5)
GLOBULIN SER CALC-MCNC: 3.2 G/DL (ref 2–4)
GLUCOSE SERPL-MCNC: 115 MG/DL (ref 65–100)
GLUCOSE URINE, POC: NEGATIVE
HCT VFR BLD AUTO: 41.4 % (ref 35–47)
HDLC SERPL-MCNC: 87 MG/DL
HDLC SERPL: 2.3 (ref 0–5)
HGB BLD-MCNC: 13.7 G/DL (ref 11.5–16)
IMM GRANULOCYTES # BLD AUTO: 0 K/UL (ref 0–0.04)
IMM GRANULOCYTES NFR BLD AUTO: 0 % (ref 0–0.5)
KETONES, URINE, POC: NEGATIVE
LDLC SERPL CALC-MCNC: 95.8 MG/DL (ref 0–100)
LEUKOCYTE ESTERASE, URINE, POC: NORMAL
LYMPHOCYTES # BLD: 1.5 K/UL (ref 0.8–3.5)
LYMPHOCYTES NFR BLD: 30 % (ref 12–49)
MCH RBC QN AUTO: 32.2 PG (ref 26–34)
MCHC RBC AUTO-ENTMCNC: 33.1 G/DL (ref 30–36.5)
MCV RBC AUTO: 97.2 FL (ref 80–99)
MONOCYTES # BLD: 0.3 K/UL (ref 0–1)
MONOCYTES NFR BLD: 6 % (ref 5–13)
NEUTS SEG # BLD: 3 K/UL (ref 1.8–8)
NEUTS SEG NFR BLD: 60 % (ref 32–75)
NITRITE, URINE, POC: NEGATIVE
NRBC # BLD: 0 K/UL (ref 0–0.01)
NRBC BLD-RTO: 0 PER 100 WBC
PH, URINE, POC: 6 (ref 4.6–8)
PLATELET # BLD AUTO: 213 K/UL (ref 150–400)
PMV BLD AUTO: 9.9 FL (ref 8.9–12.9)
POTASSIUM SERPL-SCNC: 3.5 MMOL/L (ref 3.5–5.1)
PROT SERPL-MCNC: 7.1 G/DL (ref 6.4–8.2)
PROTEIN,URINE, POC: 30
RBC # BLD AUTO: 4.26 M/UL (ref 3.8–5.2)
SODIUM SERPL-SCNC: 138 MMOL/L (ref 136–145)
SPECIFIC GRAVITY, URINE, POC: 1.02 (ref 1–1.03)
T4 FREE SERPL-MCNC: 1.2 NG/DL (ref 0.8–1.5)
TRIGL SERPL-MCNC: 91 MG/DL
TSH SERPL DL<=0.05 MIU/L-ACNC: 2.43 UIU/ML (ref 0.36–3.74)
URINALYSIS CLARITY, POC: CLEAR
URINALYSIS COLOR, POC: YELLOW
UROBILINOGEN, POC: NORMAL MG/DL
VLDLC SERPL CALC-MCNC: 18.2 MG/DL
WBC # BLD AUTO: 5.1 K/UL (ref 3.6–11)

## 2024-10-14 PROCEDURE — G8484 FLU IMMUNIZE NO ADMIN: HCPCS | Performed by: INTERNAL MEDICINE

## 2024-10-14 PROCEDURE — 3077F SYST BP >= 140 MM HG: CPT | Performed by: INTERNAL MEDICINE

## 2024-10-14 PROCEDURE — PBSHW AMB POC URINALYSIS DIP STICK AUTO W/ MICRO: Performed by: INTERNAL MEDICINE

## 2024-10-14 PROCEDURE — G0439 PPPS, SUBSEQ VISIT: HCPCS | Performed by: INTERNAL MEDICINE

## 2024-10-14 PROCEDURE — 81001 URINALYSIS AUTO W/SCOPE: CPT | Performed by: INTERNAL MEDICINE

## 2024-10-14 PROCEDURE — 3079F DIAST BP 80-89 MM HG: CPT | Performed by: INTERNAL MEDICINE

## 2024-10-14 PROCEDURE — 3017F COLORECTAL CA SCREEN DOC REV: CPT | Performed by: INTERNAL MEDICINE

## 2024-10-14 RX ORDER — TRAMADOL HYDROCHLORIDE 50 MG/1
50 TABLET ORAL EVERY 6 HOURS PRN
Qty: 110 TABLET | Refills: 0 | Status: SHIPPED | OUTPATIENT
Start: 2024-10-14 | End: 2024-11-13

## 2024-10-14 RX ORDER — AMLODIPINE BESYLATE 2.5 MG/1
2.5 TABLET ORAL DAILY
Qty: 90 TABLET | Refills: 1 | Status: SHIPPED | OUTPATIENT
Start: 2024-10-14

## 2024-10-14 NOTE — PATIENT INSTRUCTIONS
exercise specialist. You might also think about joining a weight-loss support group.  If you're not ready to make changes right now, try to pick a date in the future. Then make an appointment with your doctor to talk about when and how you'll get started with a plan.  Follow-up care is a key part of your treatment and safety. Be sure to make and go to all appointments, and call your doctor if you are having problems. It's also a good idea to know your test results and keep a list of the medicines you take.  How can you care for yourself at home?  Set realistic goals. Many people expect to lose much more weight than is likely. A weight loss of 5% to 10% of your body weight may be enough to improve your health.  Get family and friends involved to provide support. Talk to them about why you are trying to lose weight, and ask them to help. They can help by participating in exercise and having meals with you, even if they may be eating something different.  Find what works best for you. If you do not have time or do not like to cook, a program that offers meal replacement bars or shakes may be better for you. Or if you like to prepare meals, finding a plan that includes daily menus and recipes may be best.  Ask your doctor about other health professionals who can help you achieve your weight loss goals.  A dietitian can help you make healthy changes in your diet.  An exercise specialist or  can help you develop a safe and effective exercise program.  A counselor or psychiatrist can help you cope with issues such as depression, anxiety, or family problems that can make it hard to focus on weight loss.  Consider joining a support group for people who are trying to lose weight. Your doctor can suggest groups in your area.  Where can you learn more?  Go to https://www.healthwise.net/patientEd and enter U357 to learn more about \"Starting a Weight Loss Plan: Care Instructions.\"  Current as of: September 20,

## 2024-10-14 NOTE — PROGRESS NOTES
\"Have you been to the ER, urgent care clinic since your last visit?  Hospitalized since your last visit?\"    NO    “Have you seen or consulted any other health care providers outside our system since your last visit?”    NO    Have you had a mammogram?”   NO    Date of last Mammogram: 5/11/2018       “Have you had a colorectal cancer screening such as a colonoscopy/FIT/Cologuard?    NO    Date of last Colonoscopy: 11/14/2017  No cologuard on file  No FIT/FOBT on file   No flexible sigmoidoscopy on file          
was recommended.    4. Hypothyroidism.  She takes Synthroid 100 mcg daily. Thyroid function tests will be ordered as it has been a year since the last assessment.    5. Hyperlipidemia.  She is taking lovastatin to lower her cholesterol. Cholesterol levels will be checked.    6. Depression.  She is currently on Lexapro 20 mg for depression. Denies SI    7. Asthma.  She has a history of asthma and was advised against smoking or vaping. Despite her history of smoking, she declined lung cancer screening.    8. Health Maintenance.  An influenza vaccine will be administered today.    Follow-up  Return in 3 months for follow up.     Recommendations for Preventive Services Due: see orders and patient instructions/AVS.  Recommended screening schedule for the next 5-10 years is provided to the patient in written form: see Patient Instructions/AVS.     Return in 3 months (on 1/14/2025).     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 She has been on Xarelto 20 mg for this condition. On a lower dose, she experienced recurrent pain and swelling. She reports no current swelling in her left leg. However, when the Xarelto dosage was reduced, she developed another clot.    She also has a history of severe chronic back pain for which she has had multiple back surgeries. Her chronic back pain persists, and she is managing it with gabapentin 400 mg three times a day. She has tried medical marijuana but prefers not to use it due to its psychoactive effects. She finds relief from tizanidine 4 mg and is considering trying Lyrica again, even though it was not effective in the past. She requires tramadol for pain management, estimating a need for 3 to 4 pills daily. She has been using Voltaren gel for her back pain without significant relief. She rates her current pain level at 7, which reduces to 3 or 4 with tramadol. She has been on tramadol since

## 2024-10-17 LAB — DRUGS UR: NORMAL

## 2024-11-08 DIAGNOSIS — G89.4 CHRONIC PAIN SYNDROME: ICD-10-CM

## 2024-11-08 DIAGNOSIS — M48.062 SPINAL STENOSIS, LUMBAR REGION WITH NEUROGENIC CLAUDICATION: ICD-10-CM

## 2024-11-12 RX ORDER — GABAPENTIN 400 MG/1
CAPSULE ORAL
Qty: 90 CAPSULE | Refills: 0 | Status: SHIPPED | OUTPATIENT
Start: 2024-11-12 | End: 2025-03-09

## 2024-11-12 RX ORDER — TRAMADOL HYDROCHLORIDE 50 MG/1
50 TABLET ORAL EVERY 6 HOURS PRN
Qty: 110 TABLET | Refills: 0 | Status: SHIPPED | OUTPATIENT
Start: 2024-11-12 | End: 2024-12-12

## 2024-11-18 RX ORDER — AMLODIPINE BESYLATE 5 MG/1
5 TABLET ORAL DAILY
Qty: 90 TABLET | Refills: 0 | Status: SHIPPED | OUTPATIENT
Start: 2024-11-18

## 2024-12-09 DIAGNOSIS — G89.4 CHRONIC PAIN SYNDROME: ICD-10-CM

## 2024-12-09 DIAGNOSIS — M48.062 SPINAL STENOSIS, LUMBAR REGION WITH NEUROGENIC CLAUDICATION: ICD-10-CM

## 2024-12-10 RX ORDER — TRAMADOL HYDROCHLORIDE 50 MG/1
50 TABLET ORAL EVERY 6 HOURS PRN
Qty: 110 TABLET | Refills: 0 | Status: SHIPPED | OUTPATIENT
Start: 2024-12-10 | End: 2025-01-09

## 2024-12-10 RX ORDER — GABAPENTIN 400 MG/1
CAPSULE ORAL
Qty: 90 CAPSULE | Refills: 0 | Status: SHIPPED | OUTPATIENT
Start: 2024-12-10 | End: 2025-04-05

## 2024-12-22 DIAGNOSIS — F32.A DEPRESSION, UNSPECIFIED: ICD-10-CM

## 2024-12-23 RX ORDER — ESCITALOPRAM OXALATE 20 MG/1
TABLET ORAL
Qty: 90 TABLET | Refills: 1 | Status: SHIPPED | OUTPATIENT
Start: 2024-12-23

## 2024-12-23 RX ORDER — ALBUTEROL SULFATE 90 UG/1
INHALANT RESPIRATORY (INHALATION)
Qty: 25.5 EACH | Refills: 1 | Status: SHIPPED | OUTPATIENT
Start: 2024-12-23

## 2025-01-06 DIAGNOSIS — M48.062 SPINAL STENOSIS, LUMBAR REGION WITH NEUROGENIC CLAUDICATION: ICD-10-CM

## 2025-01-07 RX ORDER — TRAMADOL HYDROCHLORIDE 50 MG/1
50 TABLET ORAL EVERY 6 HOURS PRN
Qty: 110 TABLET | Refills: 0 | Status: SHIPPED | OUTPATIENT
Start: 2025-01-07 | End: 2025-02-06

## 2025-01-12 DIAGNOSIS — M48.062 SPINAL STENOSIS, LUMBAR REGION WITH NEUROGENIC CLAUDICATION: ICD-10-CM

## 2025-01-12 DIAGNOSIS — G89.4 CHRONIC PAIN SYNDROME: ICD-10-CM

## 2025-01-12 DIAGNOSIS — I82.512 CHRONIC EMBOLISM AND THROMBOSIS OF LEFT FEMORAL VEIN (HCC): ICD-10-CM

## 2025-01-13 RX ORDER — GABAPENTIN 400 MG/1
CAPSULE ORAL
Qty: 90 CAPSULE | Refills: 0 | Status: SHIPPED | OUTPATIENT
Start: 2025-01-13 | End: 2025-05-08

## 2025-01-17 DIAGNOSIS — M48.062 SPINAL STENOSIS, LUMBAR REGION WITH NEUROGENIC CLAUDICATION: ICD-10-CM

## 2025-01-17 DIAGNOSIS — G89.4 CHRONIC PAIN SYNDROME: ICD-10-CM

## 2025-02-08 DIAGNOSIS — M48.062 SPINAL STENOSIS, LUMBAR REGION WITH NEUROGENIC CLAUDICATION: ICD-10-CM

## 2025-02-10 RX ORDER — TRAMADOL HYDROCHLORIDE 50 MG/1
50 TABLET ORAL EVERY 6 HOURS PRN
Qty: 110 TABLET | Refills: 0 | Status: SHIPPED | OUTPATIENT
Start: 2025-02-10 | End: 2025-03-12

## 2025-02-12 DIAGNOSIS — M48.062 SPINAL STENOSIS, LUMBAR REGION WITH NEUROGENIC CLAUDICATION: ICD-10-CM

## 2025-02-12 DIAGNOSIS — G89.4 CHRONIC PAIN SYNDROME: ICD-10-CM

## 2025-02-17 RX ORDER — AMLODIPINE BESYLATE 5 MG/1
5 TABLET ORAL DAILY
Qty: 90 TABLET | Refills: 0 | Status: SHIPPED | OUTPATIENT
Start: 2025-02-17

## 2025-02-17 RX ORDER — GABAPENTIN 400 MG/1
CAPSULE ORAL
Qty: 90 CAPSULE | Refills: 2 | Status: SHIPPED | OUTPATIENT
Start: 2025-02-17 | End: 2025-05-28

## 2025-03-06 ENCOUNTER — TELEMEDICINE (OUTPATIENT)
Age: 60
End: 2025-03-06
Payer: MEDICARE

## 2025-03-06 ENCOUNTER — CLINICAL DOCUMENTATION (OUTPATIENT)
Age: 60
End: 2025-03-06

## 2025-03-06 DIAGNOSIS — I10 PRIMARY HYPERTENSION: ICD-10-CM

## 2025-03-06 DIAGNOSIS — G47.33 OBSTRUCTIVE SLEEP APNEA (ADULT) (PEDIATRIC): Primary | ICD-10-CM

## 2025-03-06 PROCEDURE — G8417 CALC BMI ABV UP PARAM F/U: HCPCS | Performed by: NURSE PRACTITIONER

## 2025-03-06 PROCEDURE — G8427 DOCREV CUR MEDS BY ELIG CLIN: HCPCS | Performed by: NURSE PRACTITIONER

## 2025-03-06 PROCEDURE — 99213 OFFICE O/P EST LOW 20 MIN: CPT | Performed by: NURSE PRACTITIONER

## 2025-03-06 PROCEDURE — 4004F PT TOBACCO SCREEN RCVD TLK: CPT | Performed by: NURSE PRACTITIONER

## 2025-03-06 PROCEDURE — 3017F COLORECTAL CA SCREEN DOC REV: CPT | Performed by: NURSE PRACTITIONER

## 2025-03-06 ASSESSMENT — SLEEP AND FATIGUE QUESTIONNAIRES
HOW LIKELY ARE YOU TO NOD OFF OR FALL ASLEEP WHILE SITTING AND TALKING TO SOMEONE: WOULD NEVER DOZE
DO YOU HAVE DIFFICULTY CONCENTRATING ON THE THINGS YOU DO BECAUSE YOU ARE SLEEPY OR TIRED: NO
DO YOU HAVE DIFFICULTY BEING AS ACTIVE AS YOU WANT TO BE IN THE EVENING BECAUSE YOU ARE SLEEPY OR TIRED: NO
HOW LIKELY ARE YOU TO NOD OFF OR FALL ASLEEP WHILE SITTING AND TALKING TO SOMEONE: WOULD NEVER DOZE
HOW LIKELY ARE YOU TO NOD OFF OR FALL ASLEEP WHILE SITTING INACTIVE IN A PUBLIC PLACE: WOULD NEVER DOZE
HOW LIKELY ARE YOU TO NOD OFF OR FALL ASLEEP WHILE LYING DOWN TO REST IN THE AFTERNOON WHEN CIRCUMSTANCES PERMIT: SLIGHT CHANCE OF DOZING
HOW LIKELY ARE YOU TO NOD OFF OR FALL ASLEEP WHILE SITTING QUIETLY AFTER LUNCH WITHOUT ALCOHOL: WOULD NEVER DOZE
HOW LIKELY ARE YOU TO NOD OFF OR FALL ASLEEP WHILE SITTING INACTIVE IN A PUBLIC PLACE: WOULD NEVER DOZE
HOW LIKELY ARE YOU TO NOD OFF OR FALL ASLEEP WHILE SITTING QUIETLY AFTER LUNCH WITHOUT ALCOHOL: WOULD NEVER DOZE
HOW LIKELY ARE YOU TO NOD OFF OR FALL ASLEEP IN A CAR, WHILE STOPPED FOR A FEW MINUTES IN TRAFFIC: WOULD NEVER DOZE
HOW LIKELY ARE YOU TO NOD OFF OR FALL ASLEEP WHILE LYING DOWN TO REST IN THE AFTERNOON WHEN CIRCUMSTANCES PERMIT: SLIGHT CHANCE OF DOZING
DO YOU GENERALLY HAVE DIFFICULTY REMEMBERING THINGS BECAUSE YOU ARE SLEEPY OR TIRED: NO
FOSQ SCORE: 19.5
DO YOU HAVE DIFFICULTY VISITING YOUR FAMILY OR FRIENDS IN THEIR HOME BECAUSE YOU BECOME SLEEPY OR TIRED: NO
HAS YOUR RELATIONSHIP WITH FAMILY, FRIENDS OR WORK COLLEAGUES BEEN AFFECTED BECAUSE YOU ARE SLEEPY OR TIRED: NO
HOW LIKELY ARE YOU TO NOD OFF OR FALL ASLEEP WHEN YOU ARE A PASSENGER IN A CAR FOR AN HOUR WITHOUT A BREAK: WOULD NEVER DOZE
DO YOU HAVE DIFFICULTY OPERATING A MOTOR VEHICLE FOR SHORT DISTANCES (LESS THAN 100 MILES) BECAUSE YOU BECOME SLEEPY: NO
HOW LIKELY ARE YOU TO NOD OFF OR FALL ASLEEP WHILE SITTING AND READING: WOULD NEVER DOZE
DO YOU HAVE DIFFICULTY OPERATING A MOTOR VEHICLE FOR LONG DISTANCES (GREATER THAN 100 MILES) BECAUSE YOU BECOME SLEEPY: NO
DO YOU HAVE DIFFICULTY WATCHING A MOVIE OR VIDEO BECAUSE YOU BECOME SLEEPY OR TIRED: NO
HOW LIKELY ARE YOU TO NOD OFF OR FALL ASLEEP WHILE SITTING AND READING: WOULD NEVER DOZE
HOW LIKELY ARE YOU TO NOD OFF OR FALL ASLEEP IN A CAR, WHILE STOPPED FOR A FEW MINUTES IN TRAFFIC: WOULD NEVER DOZE
HAS YOUR MOOD BEEN AFFECTED BECAUSE YOU ARE SLEEPY OR TIRED: YES, LITTLE
ESS TOTAL SCORE: 1
HOW LIKELY ARE YOU TO NOD OFF OR FALL ASLEEP WHEN YOU ARE A PASSENGER IN A CAR FOR AN HOUR WITHOUT A BREAK: WOULD NEVER DOZE
DO YOU HAVE DIFFICULTY BEING AS ACTIVE AS YOU WANT TO BE IN THE MORNING BECAUSE YOU ARE SLEEPY OR TIRED: NO
HOW LIKELY ARE YOU TO NOD OFF OR FALL ASLEEP WHILE WATCHING TV: WOULD NEVER DOZE
HOW LIKELY ARE YOU TO NOD OFF OR FALL ASLEEP WHILE WATCHING TV: WOULD NEVER DOZE

## 2025-03-06 NOTE — PATIENT INSTRUCTIONS
[address]  [place] [address]  Tel.  [phone]  Fax. [phone] [address]  [place] [address]  Tel.  [phone]  Fax. [phone] [address] [place] [address]  Tel.  [phone]  Fax. [phone]     Learning About CPAP for Sleep Apnea  What is CPAP?              CPAP is a small machine that you use at home every night while you sleep. It increases air pressure in your throat to keep your airway open. When you have sleep apnea, this can help you sleep better so you feel much better. CPAP stands for \"continuous positive airway pressure.\"  The CPAP machine will have one of the following:  A mask that covers your nose and mouth  Prongs that fit into your nose  A mask that covers your nose only, the most common type. This type is called NCPAP. The N stands for \"nasal.\"  Why is it done?  CPAP is usually the best treatment for obstructive sleep apnea. It is the first treatment choice and the most widely used. Your doctor may suggest CPAP if you have:  Moderate to severe sleep apnea.  Sleep apnea and coronary artery disease (CAD) or heart failure.  How does it help?  CPAP can help you have more normal sleep, so you feel less sleepy and more alert during the daytime.  CPAP may help keep heart failure or other heart problems from getting worse.  NCPAP may help lower your blood pressure.  If you use CPAP, your bed partner may also sleep better because you are not snoring or restless.  What are the side effects?  Some people who use CPAP have:  A dry or stuffy nose and a sore throat.  Irritated skin on the face.  Sore eyes.  Bloating.  If you have any of these problems, work with your doctor to fix them. Here are some things you can try:  Be sure the mask or nasal prongs fit well.  See if your doctor can adjust the pressure of your CPAP.  If your nose is dry, try a humidifier.  If your nose is runny or stuffy, try decongestant medicine or a steroid

## 2025-03-06 NOTE — PROGRESS NOTES
to COVID-19 and provide necessary medical care. The patient (or guardian if applicable) is aware that this is a billable service, which includes applicable copays.     Patient identification was verified at the start of the visit: Yes using name and date of birth. Patient's phone number [phone] (home)  was confirmed for accuracy.  She gives permission for messages regarding results and appointments to be left at that number.    Services were provided through a video synchronous discussion virtually to substitute for in-person clinic visit.  I was  at home  while conducting this encounter, patient located at their home or alternate location of their choice.    Altagracia Patel, was evaluated through a synchronous (real-time) audio-video encounter. The patient (or guardian if applicable) is aware that this is a billable service, which includes applicable co-pays. This Virtual Visit was conducted with patient's (and/or legal guardian's) consent. Patient identification was verified, and a caregiver was present when appropriate.   The patient was located at Home: [address] [place] [id number]  Provider was located at Home (Appt Dept State): [place]  Confirm you are appropriately licensed, registered, or certified to deliver care in the state where the patient is located as indicated above. If you are not or unsure, please re-schedule the visit: Yes, I confirm.       --JAMAAL Renae NP on 3/6/2025 at 3:08 PM    An electronic signature was used to authenticate this note.

## 2025-03-16 DIAGNOSIS — M48.062 SPINAL STENOSIS, LUMBAR REGION WITH NEUROGENIC CLAUDICATION: ICD-10-CM

## 2025-03-17 RX ORDER — TRAMADOL HYDROCHLORIDE 50 MG/1
50 TABLET ORAL EVERY 8 HOURS PRN
Qty: 90 TABLET | Refills: 0 | Status: SHIPPED | OUTPATIENT
Start: 2025-03-17 | End: 2025-04-16

## 2025-03-22 DIAGNOSIS — E78.5 HYPERLIPIDEMIA, UNSPECIFIED: ICD-10-CM

## 2025-03-24 RX ORDER — LEVOTHYROXINE SODIUM 100 UG/1
100 TABLET ORAL
Qty: 90 TABLET | Refills: 1 | Status: SHIPPED | OUTPATIENT
Start: 2025-03-24

## 2025-03-24 RX ORDER — LOVASTATIN 20 MG/1
20 TABLET ORAL NIGHTLY
Qty: 90 TABLET | Refills: 1 | Status: SHIPPED | OUTPATIENT
Start: 2025-03-24

## 2025-03-24 RX ORDER — MONTELUKAST SODIUM 10 MG/1
10 TABLET ORAL DAILY
Qty: 90 TABLET | Refills: 1 | Status: SHIPPED | OUTPATIENT
Start: 2025-03-24

## 2025-04-15 DIAGNOSIS — M48.062 SPINAL STENOSIS, LUMBAR REGION WITH NEUROGENIC CLAUDICATION: ICD-10-CM

## 2025-04-15 RX ORDER — TRAMADOL HYDROCHLORIDE 50 MG/1
50 TABLET ORAL EVERY 8 HOURS PRN
Qty: 90 TABLET | Refills: 0 | Status: SHIPPED | OUTPATIENT
Start: 2025-04-15 | End: 2025-05-15

## 2025-05-16 DIAGNOSIS — M48.062 SPINAL STENOSIS, LUMBAR REGION WITH NEUROGENIC CLAUDICATION: ICD-10-CM

## 2025-05-17 DIAGNOSIS — G89.4 CHRONIC PAIN SYNDROME: ICD-10-CM

## 2025-05-17 DIAGNOSIS — M48.062 SPINAL STENOSIS, LUMBAR REGION WITH NEUROGENIC CLAUDICATION: ICD-10-CM

## 2025-05-19 RX ORDER — TRAMADOL HYDROCHLORIDE 50 MG/1
50 TABLET ORAL EVERY 8 HOURS PRN
Qty: 90 TABLET | Refills: 0 | Status: SHIPPED | OUTPATIENT
Start: 2025-05-19 | End: 2025-06-18

## 2025-05-19 RX ORDER — AMLODIPINE BESYLATE 5 MG/1
5 TABLET ORAL DAILY
Qty: 90 TABLET | Refills: 0 | Status: SHIPPED | OUTPATIENT
Start: 2025-05-19

## 2025-05-19 RX ORDER — GABAPENTIN 400 MG/1
CAPSULE ORAL
Qty: 90 CAPSULE | Refills: 2 | Status: SHIPPED | OUTPATIENT
Start: 2025-05-19 | End: 2025-08-19

## 2025-05-30 SDOH — ECONOMIC STABILITY: INCOME INSECURITY: IN THE LAST 12 MONTHS, WAS THERE A TIME WHEN YOU WERE NOT ABLE TO PAY THE MORTGAGE OR RENT ON TIME?: NO

## 2025-05-30 SDOH — ECONOMIC STABILITY: TRANSPORTATION INSECURITY
IN THE PAST 12 MONTHS, HAS THE LACK OF TRANSPORTATION KEPT YOU FROM MEDICAL APPOINTMENTS OR FROM GETTING MEDICATIONS?: YES

## 2025-05-30 SDOH — ECONOMIC STABILITY: FOOD INSECURITY: WITHIN THE PAST 12 MONTHS, YOU WORRIED THAT YOUR FOOD WOULD RUN OUT BEFORE YOU GOT MONEY TO BUY MORE.: NEVER TRUE

## 2025-05-30 SDOH — ECONOMIC STABILITY: FOOD INSECURITY: WITHIN THE PAST 12 MONTHS, THE FOOD YOU BOUGHT JUST DIDN'T LAST AND YOU DIDN'T HAVE MONEY TO GET MORE.: NEVER TRUE

## 2025-05-30 SDOH — ECONOMIC STABILITY: TRANSPORTATION INSECURITY
IN THE PAST 12 MONTHS, HAS LACK OF TRANSPORTATION KEPT YOU FROM MEETINGS, WORK, OR FROM GETTING THINGS NEEDED FOR DAILY LIVING?: YES

## 2025-06-02 ENCOUNTER — OFFICE VISIT (OUTPATIENT)
Age: 60
End: 2025-06-02
Payer: MEDICARE

## 2025-06-02 VITALS
OXYGEN SATURATION: 98 % | WEIGHT: 293 LBS | DIASTOLIC BLOOD PRESSURE: 94 MMHG | HEIGHT: 67 IN | HEART RATE: 93 BPM | SYSTOLIC BLOOD PRESSURE: 171 MMHG | RESPIRATION RATE: 18 BRPM | BODY MASS INDEX: 45.99 KG/M2 | TEMPERATURE: 98.1 F

## 2025-06-02 DIAGNOSIS — G89.4 CHRONIC PAIN SYNDROME: ICD-10-CM

## 2025-06-02 DIAGNOSIS — I82.512 CHRONIC EMBOLISM AND THROMBOSIS OF LEFT FEMORAL VEIN (HCC): ICD-10-CM

## 2025-06-02 DIAGNOSIS — M48.062 SPINAL STENOSIS, LUMBAR REGION WITH NEUROGENIC CLAUDICATION: ICD-10-CM

## 2025-06-02 DIAGNOSIS — E78.00 PURE HYPERCHOLESTEROLEMIA: ICD-10-CM

## 2025-06-02 DIAGNOSIS — R73.09 HIGH GLUCOSE: ICD-10-CM

## 2025-06-02 DIAGNOSIS — F32.A DEPRESSION, UNSPECIFIED: ICD-10-CM

## 2025-06-02 DIAGNOSIS — I10 PRIMARY HYPERTENSION: ICD-10-CM

## 2025-06-02 DIAGNOSIS — G89.4 CHRONIC PAIN SYNDROME: Primary | ICD-10-CM

## 2025-06-02 DIAGNOSIS — E78.00 HIGH CHOLESTEROL: ICD-10-CM

## 2025-06-02 PROCEDURE — 3080F DIAST BP >= 90 MM HG: CPT | Performed by: INTERNAL MEDICINE

## 2025-06-02 PROCEDURE — 99214 OFFICE O/P EST MOD 30 MIN: CPT | Performed by: INTERNAL MEDICINE

## 2025-06-02 PROCEDURE — G8417 CALC BMI ABV UP PARAM F/U: HCPCS | Performed by: INTERNAL MEDICINE

## 2025-06-02 PROCEDURE — 3077F SYST BP >= 140 MM HG: CPT | Performed by: INTERNAL MEDICINE

## 2025-06-02 PROCEDURE — 4004F PT TOBACCO SCREEN RCVD TLK: CPT | Performed by: INTERNAL MEDICINE

## 2025-06-02 PROCEDURE — 3017F COLORECTAL CA SCREEN DOC REV: CPT | Performed by: INTERNAL MEDICINE

## 2025-06-02 PROCEDURE — G8427 DOCREV CUR MEDS BY ELIG CLIN: HCPCS | Performed by: INTERNAL MEDICINE

## 2025-06-02 RX ORDER — LOSARTAN POTASSIUM AND HYDROCHLOROTHIAZIDE 12.5; 5 MG/1; MG/1
1 TABLET ORAL DAILY
Qty: 90 TABLET | Refills: 1 | Status: SHIPPED | OUTPATIENT
Start: 2025-06-02

## 2025-06-02 RX ORDER — ESCITALOPRAM OXALATE 20 MG/1
20 TABLET ORAL DAILY
Qty: 90 TABLET | Refills: 1 | Status: SHIPPED | OUTPATIENT
Start: 2025-06-02

## 2025-06-02 ASSESSMENT — PATIENT HEALTH QUESTIONNAIRE - PHQ9
SUM OF ALL RESPONSES TO PHQ QUESTIONS 1-9: 0
1. LITTLE INTEREST OR PLEASURE IN DOING THINGS: NOT AT ALL

## 2025-06-02 NOTE — PROGRESS NOTES
Ms. Altagracia Patel is presenting to follow up     CC:  Hypertension       HPI:    Ms. Altagracia Patel   is a 59 y.o. female with a hx of chronic thrombosis on xarelto, severe chronic back pain s/p multiple back surgeries  Presenting to follow up   Generally feels well reviewed chronic medical issues     1. Chronic thrombosis of the left femoral vein.  She has been on Xarelto 20 mg for this condition. On a lower dose, she experienced recurrent pain and swelling, necessitating the continuation of the current dosage.      2. Severe chronic back pain.  She has a history of multiple back surgeries. Currently, she is taking gabapentin 400 mg three times a day and tizanidine 4 mg for pain management. She takes tramadol 4 times a day with tylenol and managing   She reports that Voltaren gel has not been effective. Previous trials of Lyrica in 2020 were unsuccessful. The interaction between trazodone and tramadol necessitates discontinuation of trazodone.  Pain agreement reviewed signed   appropriate        3. Hypertension.  Her blood pressure readings have consistently been high, Started amlodipine 5mg and BP still high.  Denies CP and dyspnea. No symptoms     4. Hypothyroidism.  She takes Synthroid 100 mcg daily. Thyroid function tests have been at goal. Denies constipation     5. Hyperlipidemia.  She is taking lovastatin to lower her cholesterol. Cholesterol levels will be checked.     6. Depression.  She is currently on Lexapro 20 mg for depression and it is well controlled    Believes she passed a kidney stone several weeks ago   Review of systems:  + chronic back pain    10 systems reviewed and negative other then HPI     Past Medical History:   Diagnosis Date    Arthritis     Asthma     Breast cyst 1996    left, removed    Chronic pain     LOWER BACK    Colon polyps     Diverticulitis     DVT (deep venous thrombosis) (HCC) 10/18/2018    Left groin after back sx    Fatty liver     GERD (gastroesophageal reflux

## 2025-06-02 NOTE — PROGRESS NOTES
Chief Complaint   Patient presents with    Hypertension     \"Have you been to the ER, urgent care clinic since your last visit?  Hospitalized since your last visit?\"    NO    “Have you seen or consulted any other health care providers outside of [facility] since your last visit?”    NO    “Have you had a colorectal cancer screening such as a colonoscopy/FIT/Cologuard?    NO    Date of last Colonoscopy: 11/14/2017  No cologuard on file  No FIT/FOBT on file   No flexible sigmoidoscopy on file        Have you had a mammogram?”   NO    Date of last Mammogram: 5/11/2018

## 2025-06-03 ENCOUNTER — RESULTS FOLLOW-UP (OUTPATIENT)
Age: 60
End: 2025-06-03

## 2025-06-03 DIAGNOSIS — E83.52 HIGH CALCIUM LEVELS: Primary | ICD-10-CM

## 2025-06-03 LAB
APPEARANCE UR: CLEAR
BACTERIA URNS QL MICRO: NEGATIVE /HPF
BILIRUB UR QL: NEGATIVE
COLOR UR: NORMAL
EPITH CASTS URNS QL MICRO: NORMAL /LPF
GLUCOSE UR STRIP.AUTO-MCNC: NEGATIVE MG/DL
HGB UR QL STRIP: NEGATIVE
KETONES UR QL STRIP.AUTO: NEGATIVE MG/DL
LEUKOCYTE ESTERASE UR QL STRIP.AUTO: NEGATIVE
NITRITE UR QL STRIP.AUTO: NEGATIVE
PH UR STRIP: 5.5 (ref 5–8)
PROT UR STRIP-MCNC: NEGATIVE MG/DL
RBC #/AREA URNS HPF: NORMAL /HPF (ref 0–5)
SP GR UR REFRACTOMETRY: 1.02 (ref 1–1.03)
URINE CULTURE IF INDICATED: NORMAL
UROBILINOGEN UR QL STRIP.AUTO: 0.2 EU/DL (ref 0.2–1)
WBC URNS QL MICRO: NORMAL /HPF (ref 0–4)

## 2025-06-05 LAB — DRUGS UR: NORMAL

## 2025-06-09 ENCOUNTER — HOSPITAL ENCOUNTER (OUTPATIENT)
Facility: HOSPITAL | Age: 60
Discharge: HOME | End: 2025-06-12

## 2025-06-09 DIAGNOSIS — I10 PRIMARY HYPERTENSION: ICD-10-CM

## 2025-06-09 DIAGNOSIS — R73.09 HIGH GLUCOSE: ICD-10-CM

## 2025-06-09 LAB
ALBUMIN SERPL-MCNC: 4.5 G/DL (ref 3.5–5)
ALBUMIN/GLOB SERPL: 1.3 (ref 1.1–2.2)
ALP SERPL-CCNC: 77 U/L (ref 45–117)
ALT SERPL-CCNC: 26 U/L (ref 12–78)
ANION GAP SERPL CALC-SCNC: 9 MMOL/L (ref 2–12)
AST SERPL-CCNC: 23 U/L (ref 15–37)
BASOPHILS # BLD: 0.03 K/UL (ref 0–0.1)
BASOPHILS NFR BLD: 0.6 % (ref 0–1)
BILIRUB SERPL-MCNC: 0.7 MG/DL (ref 0.2–1)
BUN SERPL-MCNC: 16 MG/DL (ref 6–20)
BUN/CREAT SERPL: 15 (ref 12–20)
CALCIUM SERPL-MCNC: 10.4 MG/DL (ref 8.5–10.1)
CHLORIDE SERPL-SCNC: 99 MMOL/L (ref 97–108)
CO2 SERPL-SCNC: 30 MMOL/L (ref 21–32)
CREAT SERPL-MCNC: 1.06 MG/DL (ref 0.55–1.02)
DIFFERENTIAL METHOD BLD: ABNORMAL
EOSINOPHIL # BLD: 0.15 K/UL (ref 0–0.4)
EOSINOPHIL NFR BLD: 2.9 % (ref 0–7)
ERYTHROCYTE [DISTWIDTH] IN BLOOD BY AUTOMATED COUNT: 12.7 % (ref 11.5–14.5)
EST. AVERAGE GLUCOSE BLD GHB EST-MCNC: 94 MG/DL
GLOBULIN SER CALC-MCNC: 3.5 G/DL (ref 2–4)
GLUCOSE SERPL-MCNC: 106 MG/DL (ref 65–100)
HBA1C MFR BLD: 4.9 % (ref 4–5.6)
HCT VFR BLD AUTO: 46.7 % (ref 35–47)
HGB BLD-MCNC: 14.4 G/DL (ref 11.5–16)
IMM GRANULOCYTES # BLD AUTO: 0.02 K/UL (ref 0–0.04)
IMM GRANULOCYTES NFR BLD AUTO: 0.4 % (ref 0–0.5)
LYMPHOCYTES # BLD: 2.14 K/UL (ref 0.8–3.5)
LYMPHOCYTES NFR BLD: 41.3 % (ref 12–49)
MCH RBC QN AUTO: 31.2 PG (ref 26–34)
MCHC RBC AUTO-ENTMCNC: 30.8 G/DL (ref 30–36.5)
MCV RBC AUTO: 101.1 FL (ref 80–99)
MONOCYTES # BLD: 0.48 K/UL (ref 0–1)
MONOCYTES NFR BLD: 9.3 % (ref 5–13)
NEUTS SEG # BLD: 2.36 K/UL (ref 1.8–8)
NEUTS SEG NFR BLD: 45.5 % (ref 32–75)
NRBC # BLD: 0 K/UL (ref 0–0.01)
NRBC BLD-RTO: 0 PER 100 WBC
PLATELET # BLD AUTO: 247 K/UL (ref 150–400)
PMV BLD AUTO: 9.5 FL (ref 8.9–12.9)
POTASSIUM SERPL-SCNC: 3.7 MMOL/L (ref 3.5–5.1)
PROT SERPL-MCNC: 8 G/DL (ref 6.4–8.2)
RBC # BLD AUTO: 4.62 M/UL (ref 3.8–5.2)
SODIUM SERPL-SCNC: 138 MMOL/L (ref 136–145)
WBC # BLD AUTO: 5.2 K/UL (ref 3.6–11)

## 2025-06-11 ENCOUNTER — TELEPHONE (OUTPATIENT)
Age: 60
End: 2025-06-11

## 2025-06-11 DIAGNOSIS — I10 PRIMARY HYPERTENSION: Primary | ICD-10-CM

## 2025-06-11 RX ORDER — LOSARTAN POTASSIUM 50 MG/1
50 TABLET ORAL DAILY
Qty: 30 TABLET | Refills: 5 | Status: SHIPPED | OUTPATIENT
Start: 2025-06-11

## 2025-06-11 NOTE — TELEPHONE ENCOUNTER
Pt states bp is reading at 87/60 and would like to know how to proceed with care.     Pt denies experiencing dizziness, fainting, fast heart rate, fatigue, and confusion.     Call transferred to nurse.

## 2025-06-11 NOTE — TELEPHONE ENCOUNTER
Patient called and states her BP is running low now since the medication was changed to   losartan-hydroCHLOROthiazide (HYZAAR) 50-12.5 MG per tablet .   Patient states she took it at 2pm yesterday and her bp is now 87/60 . She states she does not have any of the following dizziness, fainting, fast heart rate, fatigue, and confusion.      Patient was informed not to take her bp meds at this time. Patient was informed to drink water and hold off taking any BP meds.     Please advise .

## 2025-06-12 NOTE — TELEPHONE ENCOUNTER
Called and left vm for patient.   Patient is to stop taking the losartan-hydroCHLOROthiazide (HYZAAR) 50-12.5 MG per tablet  and start taking losartan 50 mg only.

## 2025-06-15 DIAGNOSIS — G89.4 CHRONIC PAIN SYNDROME: ICD-10-CM

## 2025-06-15 DIAGNOSIS — M48.062 SPINAL STENOSIS, LUMBAR REGION WITH NEUROGENIC CLAUDICATION: ICD-10-CM

## 2025-06-17 RX ORDER — GABAPENTIN 400 MG/1
CAPSULE ORAL
Qty: 90 CAPSULE | Refills: 2 | Status: SHIPPED | OUTPATIENT
Start: 2025-06-17 | End: 2025-09-15

## 2025-06-17 RX ORDER — TRAMADOL HYDROCHLORIDE 50 MG/1
50 TABLET ORAL EVERY 8 HOURS PRN
Qty: 90 TABLET | Refills: 0 | Status: SHIPPED | OUTPATIENT
Start: 2025-06-17 | End: 2025-07-17

## 2025-06-23 RX ORDER — ALBUTEROL SULFATE 90 UG/1
INHALANT RESPIRATORY (INHALATION)
Qty: 20.1 EACH | Refills: 1 | Status: SHIPPED | OUTPATIENT
Start: 2025-06-23

## 2025-07-07 ENCOUNTER — HOSPITAL ENCOUNTER (OUTPATIENT)
Facility: HOSPITAL | Age: 60
Discharge: HOME | End: 2025-07-10

## 2025-07-07 DIAGNOSIS — E83.52 HIGH CALCIUM LEVELS: ICD-10-CM

## 2025-07-08 LAB
25(OH)D3 SERPL-MCNC: 54.2 NG/ML (ref 30–100)
ANION GAP SERPL CALC-SCNC: 9 MMOL/L (ref 2–12)
BUN SERPL-MCNC: 12 MG/DL (ref 6–20)
BUN/CREAT SERPL: 11 (ref 12–20)
CALCIUM SERPL-MCNC: 9.4 MG/DL (ref 8.5–10.1)
CHLORIDE SERPL-SCNC: 104 MMOL/L (ref 97–108)
CO2 SERPL-SCNC: 25 MMOL/L (ref 21–32)
CREAT SERPL-MCNC: 1.12 MG/DL (ref 0.55–1.02)
GLUCOSE SERPL-MCNC: 93 MG/DL (ref 65–100)
POTASSIUM SERPL-SCNC: 4.1 MMOL/L (ref 3.5–5.1)
SODIUM SERPL-SCNC: 138 MMOL/L (ref 136–145)

## 2025-07-09 ENCOUNTER — RESULTS FOLLOW-UP (OUTPATIENT)
Age: 60
End: 2025-07-09

## 2025-07-09 DIAGNOSIS — R79.89 ELEVATED SERUM CREATININE: Primary | ICD-10-CM

## 2025-07-20 DIAGNOSIS — M48.062 SPINAL STENOSIS, LUMBAR REGION WITH NEUROGENIC CLAUDICATION: ICD-10-CM

## 2025-07-23 RX ORDER — TRAMADOL HYDROCHLORIDE 50 MG/1
50 TABLET ORAL EVERY 8 HOURS PRN
Qty: 90 TABLET | Refills: 0 | Status: SHIPPED | OUTPATIENT
Start: 2025-07-23 | End: 2025-08-22

## 2025-08-19 DIAGNOSIS — M48.062 SPINAL STENOSIS, LUMBAR REGION WITH NEUROGENIC CLAUDICATION: ICD-10-CM

## 2025-08-21 RX ORDER — TRAMADOL HYDROCHLORIDE 50 MG/1
50 TABLET ORAL EVERY 8 HOURS PRN
Qty: 90 TABLET | Refills: 0 | Status: SHIPPED | OUTPATIENT
Start: 2025-08-21 | End: 2025-09-20

## (undated) DEVICE — Z DISCONTINUED PER MEDLINE LINE GAS SAMPLING O2/CO2 LNG AD 13 FT NSL W/ TBNG FILTERLINE

## (undated) DEVICE — NON-REM POLYHESIVE PATIENT RETURN ELECTRODE: Brand: VALLEYLAB

## (undated) DEVICE — TRAP SUC MUCOUS 70ML -- MEDICHOICE MEDLINE

## (undated) DEVICE — CONTAINER SPEC 20 ML LID NEUT BUFF FORMALIN 10 % POLYPR STS

## (undated) DEVICE — SOLUTION IRRIG 3000ML 0.9% SOD CHL FLX CONT 0797208] ICU MEDICAL INC]

## (undated) DEVICE — SYR 3ML LL TIP 1/10ML GRAD --

## (undated) DEVICE — BIPOLAR SEALER 23-314-1 AQM MINI EVS 3.4: Brand: AQUAMANTYS™

## (undated) DEVICE — SNARE ENDOSCP M L240CM W27MM SHTH DIA2.4MM CHN 2.8MM OVL

## (undated) DEVICE — COVER,TABLE,HEAVY DUTY,60"X90",STRL: Brand: MEDLINE

## (undated) DEVICE — KENDALL RADIOLUCENT FOAM MONITORING ELECTRODE RECTANGULAR SHAPE: Brand: KENDALL

## (undated) DEVICE — PROBE 8225101 5PK STD PRASS FL TIP ROHS

## (undated) DEVICE — SOLUTION IV 1000ML 0.9% SOD CHL

## (undated) DEVICE — INFECTION CONTROL KIT SYS

## (undated) DEVICE — C-ARMOR C-ARM EQUIPMENT COVERS CLEAR STERILE UNIVERSAL FIT 12 PER CASE: Brand: C-ARMOR

## (undated) DEVICE — SUTURE VCRL 2-0 L27IN ABSRB UD CP-2 L26MM 1/2 CIR REV CUT J869H

## (undated) DEVICE — SUTURE VCRL SZ 3-0 L27IN ABSRB UD CP-2 L26MM 1/2 CIR REV J868H

## (undated) DEVICE — DEVON™ KNEE AND BODY STRAP 60" X 3" (1.5 M X 7.6 CM): Brand: DEVON

## (undated) DEVICE — 1200 GUARD II KIT W/5MM TUBE W/O VAC TUBE: Brand: GUARDIAN

## (undated) DEVICE — CLIP INT L235CM WRK CHAN DIA2.8MM OPN 11MM LCK MECHANISM MR

## (undated) DEVICE — HANDLE LT SNAP ON ULT DURABLE LENS FOR TRUMPF ALC DISPOSABLE

## (undated) DEVICE — BIPOLAR IRRIGATOR INTEGRATED TUBING AND BIPOLAR CORD SET, DISPOSABLE

## (undated) DEVICE — NEEDLE HYPO 18GA L1.5IN PNK S STL HUB POLYPR SHLD REG BVL

## (undated) DEVICE — ZIP 16 SURGICAL SKIN CLOSURE DEVICE: Brand: ZIP 16 SURGICAL SKIN CLOSURE DEVICE

## (undated) DEVICE — (D)SYR 10ML 1/5ML GRAD NSAF -- PKGING CHANGE USE ITEM 338027

## (undated) DEVICE — LAMINECTOMY RICHMOND-LF: Brand: MEDLINE INDUSTRIES, INC.

## (undated) DEVICE — TOWEL 4 PLY TISS 19X30 SUE WHT

## (undated) DEVICE — TOOL 14BA50 LEGEND 14CM 5MM BA: Brand: MIDAS REX ™

## (undated) DEVICE — Device

## (undated) DEVICE — SET ADMIN 16ML TBNG L100IN 2 Y INJ SITE IV PIGGY BK DISP

## (undated) DEVICE — SOLIDIFIER MEDC 1200ML -- CONVERT TO 356117

## (undated) DEVICE — PREP SKN PREVAIL 40ML APPL --

## (undated) DEVICE — SOLUTION IV 250ML 0.9% SOD CHL CLR INJ FLX BG CONT PRT CLSR

## (undated) DEVICE — TRAY CATH OD16FR SIL URIN M STATLOK STBL DEV SURSTP

## (undated) DEVICE — Device: Brand: MEDEX

## (undated) DEVICE — 3000CC GUARDIAN II: Brand: GUARDIAN

## (undated) DEVICE — HANDPIECE SET WITH COAXIAL HIGH FLOW TIP AND SUCTION TUBE: Brand: INTERPULSE

## (undated) DEVICE — STERILE POLYISOPRENE POWDER-FREE SURGICAL GLOVES WITH EMOLLIENT COATING: Brand: PROTEXIS

## (undated) DEVICE — STRAINER URIN CALC RNL MSH -- CONVERT TO ITEM 357634

## (undated) DEVICE — SUTURE ABSRB BRAID COAT UD OS-6 NO 1 27IN VCRL J535H

## (undated) DEVICE — NEONATAL-ADULT SPO2 SENSOR: Brand: NELLCOR

## (undated) DEVICE — KENDALL SCD EXPRESS SLEEVES, KNEE LENGTH, MEDIUM: Brand: KENDALL SCD

## (undated) DEVICE — DRAPE XR C ARM 41X74IN LF --

## (undated) DEVICE — BASIN EMSIS 16OZ GRAPHITE PLAS KID SHP MOLD GRAD FOR ORAL

## (undated) DEVICE — BONE WAX WHITE: Brand: BONE WAX WHITE

## (undated) DEVICE — DRAPE,REIN 53X77,STERILE: Brand: MEDLINE

## (undated) DEVICE — CATH IV AUTOGRD BC PNK 20GA 25 -- INSYTE

## (undated) DEVICE — SOLUTION IRRIG 1000ML H2O STRL BLT

## (undated) DEVICE — DRAIN KT WND 10FR RND 400ML --